# Patient Record
Sex: FEMALE | Race: WHITE | NOT HISPANIC OR LATINO | Employment: OTHER | URBAN - NONMETROPOLITAN AREA
[De-identification: names, ages, dates, MRNs, and addresses within clinical notes are randomized per-mention and may not be internally consistent; named-entity substitution may affect disease eponyms.]

---

## 2017-02-06 DIAGNOSIS — E03.8 HYPOTHYROIDISM DUE TO HASHIMOTO'S THYROIDITIS: ICD-10-CM

## 2017-02-06 DIAGNOSIS — E55.9 VITAMIN D DEFICIENCY: ICD-10-CM

## 2017-02-06 DIAGNOSIS — E53.8 B12 DEFICIENCY: ICD-10-CM

## 2017-02-06 DIAGNOSIS — E06.3 HYPOTHYROIDISM DUE TO HASHIMOTO'S THYROIDITIS: ICD-10-CM

## 2017-02-06 DIAGNOSIS — M85.80 OSTEOPENIA: Primary | ICD-10-CM

## 2017-02-07 ENCOUNTER — APPOINTMENT (OUTPATIENT)
Dept: LAB | Facility: HOSPITAL | Age: 72
End: 2017-02-07

## 2017-02-07 LAB
25(OH)D3 SERPL-MCNC: 31.9 NG/ML (ref 30–100)
ALBUMIN SERPL-MCNC: 4.3 G/DL (ref 3.4–4.8)
ALBUMIN/GLOB SERPL: 1.5 G/DL (ref 1.1–1.8)
ALP SERPL-CCNC: 77 U/L (ref 38–126)
ALT SERPL W P-5'-P-CCNC: 27 U/L (ref 9–52)
ANION GAP SERPL CALCULATED.3IONS-SCNC: 11 MMOL/L (ref 5–15)
AST SERPL-CCNC: 42 U/L (ref 14–36)
BASOPHILS # BLD AUTO: 0.04 10*3/MM3 (ref 0–0.2)
BASOPHILS NFR BLD AUTO: 0.8 % (ref 0–2)
BILIRUB SERPL-MCNC: 1.1 MG/DL (ref 0.2–1.3)
BUN BLD-MCNC: 14 MG/DL (ref 7–21)
BUN/CREAT SERPL: 15.9 (ref 7–25)
CALCIUM SPEC-SCNC: 9.9 MG/DL (ref 8.4–10.2)
CHLORIDE SERPL-SCNC: 104 MMOL/L (ref 95–110)
CO2 SERPL-SCNC: 28 MMOL/L (ref 22–31)
CREAT BLD-MCNC: 0.88 MG/DL (ref 0.5–1)
DEPRECATED RDW RBC AUTO: 39.4 FL (ref 36.4–46.3)
EOSINOPHIL # BLD AUTO: 0.25 10*3/MM3 (ref 0–0.7)
EOSINOPHIL NFR BLD AUTO: 5 % (ref 0–7)
ERYTHROCYTE [DISTWIDTH] IN BLOOD BY AUTOMATED COUNT: 12.4 % (ref 11.5–14.5)
GFR SERPL CREATININE-BSD FRML MDRD: 63 ML/MIN/1.73 (ref 39–90)
GLOBULIN UR ELPH-MCNC: 2.9 GM/DL (ref 2.3–3.5)
GLUCOSE BLD-MCNC: 87 MG/DL (ref 60–100)
HCT VFR BLD AUTO: 40.8 % (ref 35–45)
HGB BLD-MCNC: 13.9 G/DL (ref 12–15.5)
IMM GRANULOCYTES # BLD: 0.01 10*3/MM3 (ref 0–0.02)
IMM GRANULOCYTES NFR BLD: 0.2 % (ref 0–0.5)
LYMPHOCYTES # BLD AUTO: 1.53 10*3/MM3 (ref 0.6–4.2)
LYMPHOCYTES NFR BLD AUTO: 30.6 % (ref 10–50)
MCH RBC QN AUTO: 30.2 PG (ref 26.5–34)
MCHC RBC AUTO-ENTMCNC: 34.1 G/DL (ref 31.4–36)
MCV RBC AUTO: 88.7 FL (ref 80–98)
MONOCYTES # BLD AUTO: 0.34 10*3/MM3 (ref 0–0.9)
MONOCYTES NFR BLD AUTO: 6.8 % (ref 0–12)
NEUTROPHILS # BLD AUTO: 2.83 10*3/MM3 (ref 2–8.6)
NEUTROPHILS NFR BLD AUTO: 56.6 % (ref 37–80)
PLATELET # BLD AUTO: 273 10*3/MM3 (ref 150–450)
PMV BLD AUTO: 10.5 FL (ref 8–12)
POTASSIUM BLD-SCNC: 4.6 MMOL/L (ref 3.5–5.1)
PROT SERPL-MCNC: 7.2 G/DL (ref 6.3–8.6)
RBC # BLD AUTO: 4.6 10*6/MM3 (ref 3.77–5.16)
SODIUM BLD-SCNC: 143 MMOL/L (ref 137–145)
T4 FREE SERPL-MCNC: 0.67 NG/DL (ref 0.78–2.19)
TSH SERPL DL<=0.05 MIU/L-ACNC: 1.22 MIU/ML (ref 0.46–4.68)
VIT B12 BLD-MCNC: 346 PG/ML (ref 239–931)
WBC NRBC COR # BLD: 5 10*3/MM3 (ref 3.2–9.8)

## 2017-02-07 PROCEDURE — 84443 ASSAY THYROID STIM HORMONE: CPT | Performed by: INTERNAL MEDICINE

## 2017-02-07 PROCEDURE — 85025 COMPLETE CBC W/AUTO DIFF WBC: CPT | Performed by: INTERNAL MEDICINE

## 2017-02-07 PROCEDURE — 82306 VITAMIN D 25 HYDROXY: CPT | Performed by: INTERNAL MEDICINE

## 2017-02-07 PROCEDURE — 36415 COLL VENOUS BLD VENIPUNCTURE: CPT | Performed by: INTERNAL MEDICINE

## 2017-02-07 PROCEDURE — 84439 ASSAY OF FREE THYROXINE: CPT | Performed by: INTERNAL MEDICINE

## 2017-02-07 PROCEDURE — 80053 COMPREHEN METABOLIC PANEL: CPT | Performed by: INTERNAL MEDICINE

## 2017-02-07 PROCEDURE — 82607 VITAMIN B-12: CPT | Performed by: INTERNAL MEDICINE

## 2017-02-13 RX ORDER — DICYCLOMINE HYDROCHLORIDE 10 MG/1
10 CAPSULE ORAL 2 TIMES DAILY PRN
COMMUNITY
End: 2017-08-01

## 2017-02-17 ENCOUNTER — ANESTHESIA (OUTPATIENT)
Dept: GASTROENTEROLOGY | Facility: HOSPITAL | Age: 72
End: 2017-02-17

## 2017-02-17 ENCOUNTER — ANESTHESIA EVENT (OUTPATIENT)
Dept: GASTROENTEROLOGY | Facility: HOSPITAL | Age: 72
End: 2017-02-17

## 2017-02-17 ENCOUNTER — HOSPITAL ENCOUNTER (OUTPATIENT)
Facility: HOSPITAL | Age: 72
Setting detail: HOSPITAL OUTPATIENT SURGERY
Discharge: HOME | End: 2017-02-17
Attending: INTERNAL MEDICINE | Admitting: INTERNAL MEDICINE

## 2017-02-17 VITALS
DIASTOLIC BLOOD PRESSURE: 89 MMHG | RESPIRATION RATE: 18 BRPM | TEMPERATURE: 97.3 F | HEIGHT: 68 IN | BODY MASS INDEX: 29.54 KG/M2 | OXYGEN SATURATION: 95 % | HEART RATE: 79 BPM | SYSTOLIC BLOOD PRESSURE: 141 MMHG | WEIGHT: 194.89 LBS

## 2017-02-17 DIAGNOSIS — K21.9 ACID REFLUX DISEASE WITH ULCER: ICD-10-CM

## 2017-02-17 PROCEDURE — 88305 TISSUE EXAM BY PATHOLOGIST: CPT | Performed by: INTERNAL MEDICINE

## 2017-02-17 PROCEDURE — 88305 TISSUE EXAM BY PATHOLOGIST: CPT | Performed by: PATHOLOGY

## 2017-02-17 PROCEDURE — 25010000002 PROPOFOL 10 MG/ML EMULSION: Performed by: NURSE ANESTHETIST, CERTIFIED REGISTERED

## 2017-02-17 PROCEDURE — 87071 CULTURE AEROBIC QUANT OTHER: CPT | Performed by: INTERNAL MEDICINE

## 2017-02-17 PROCEDURE — 88342 IMHCHEM/IMCYTCHM 1ST ANTB: CPT | Performed by: PATHOLOGY

## 2017-02-17 PROCEDURE — 88342 IMHCHEM/IMCYTCHM 1ST ANTB: CPT | Performed by: INTERNAL MEDICINE

## 2017-02-17 RX ORDER — PROPOFOL 10 MG/ML
VIAL (ML) INTRAVENOUS AS NEEDED
Status: DISCONTINUED | OUTPATIENT
Start: 2017-02-17 | End: 2017-02-17 | Stop reason: SURG

## 2017-02-17 RX ORDER — DEXTROSE AND SODIUM CHLORIDE 5; .45 G/100ML; G/100ML
20 INJECTION, SOLUTION INTRAVENOUS CONTINUOUS
Status: DISCONTINUED | OUTPATIENT
Start: 2017-02-17 | End: 2017-02-17 | Stop reason: HOSPADM

## 2017-02-17 RX ORDER — LIDOCAINE HYDROCHLORIDE 10 MG/ML
INJECTION, SOLUTION INFILTRATION; PERINEURAL AS NEEDED
Status: DISCONTINUED | OUTPATIENT
Start: 2017-02-17 | End: 2017-02-17 | Stop reason: SURG

## 2017-02-17 RX ADMIN — PROPOFOL 70 MG: 10 INJECTION, EMULSION INTRAVENOUS at 16:51

## 2017-02-17 RX ADMIN — LIDOCAINE HYDROCHLORIDE 60 MG: 10 INJECTION, SOLUTION INFILTRATION; PERINEURAL at 16:51

## 2017-02-17 RX ADMIN — DEXTROSE AND SODIUM CHLORIDE 20 ML/HR: 5; 450 INJECTION, SOLUTION INTRAVENOUS at 15:42

## 2017-02-17 RX ADMIN — PROPOFOL 40 MG: 10 INJECTION, EMULSION INTRAVENOUS at 16:55

## 2017-02-17 NOTE — ANESTHESIA POSTPROCEDURE EVALUATION
Patient: Carla Best    Procedure Summary     Date Anesthesia Start Anesthesia Stop Room / Location    02/17/17 9380 [id number] [facility] ENDOSCOPY 2 / [facility] ENDOSCOPY       Procedure Diagnosis Surgeon Provider    ESOPHAGOGASTRODUODENOSCOPY (N/A Esophagus) Acid reflux disease with ulcer  (ACID REFLUX) DO Ashok Alvarez CRNA          Anesthesia Type: MAC  Last vitals  BP      Temp      Pulse     Resp      SpO2        Post Anesthesia Care and Evaluation    Patient location during evaluation: bedside  Patient participation: complete - patient participated  Level of consciousness: awake and awake and alert  Pain score: 1  Pain management: satisfactory to patient  Airway patency: patent  Anesthetic complications: No anesthetic complications  PONV Status: none  Cardiovascular status: acceptable and stable  Respiratory status: acceptable, room air, unassisted and spontaneous ventilation  Hydration status: acceptable  Post Neuraxial Block status: Motor and sensory function returned to baseline

## 2017-02-17 NOTE — PLAN OF CARE
Problem: Patient Care Overview (Adult)  Goal: Plan of Care Review    02/17/17 1700   Coping/Psychosocial Response Interventions   Plan Of Care Reviewed With patient   Patient Care Overview   Progress no change   Outcome Evaluation   Outcome Summary/Follow up Plan vss         Problem: GI Endoscopy (Adult)  Goal: Signs and Symptoms of Listed Potential Problems Will be Absent or Manageable (GI Endoscopy)  Outcome: Ongoing (interventions implemented as appropriate)    02/17/17 1700   GI Endoscopy   Problems Assessed (GI Endoscopy) all   Problems Present (GI Endoscopy) none

## 2017-02-17 NOTE — ANESTHESIA PREPROCEDURE EVALUATION
Anesthesia Evaluation     Patient summary reviewed and Nursing notes reviewed   NPO Status: > 6 hours   Airway   Mallampati: III  TM distance: >3 FB  Neck ROM: full  no difficulty expected  Dental - normal exam     Pulmonary - negative pulmonary ROS and normal exam   Cardiovascular - negative cardio ROS    Rhythm: regular  Rate: normal        Neuro/Psych- negative ROS  GI/Hepatic/Renal/Endo      Musculoskeletal (-) negative ROS    Abdominal    Substance History      OB/GYN          Other                                    Anesthesia Plan    ASA 3     MAC     intravenous induction   Anesthetic plan and risks discussed with patient.

## 2017-02-17 NOTE — DISCHARGE INSTR - APPOINTMENTS
Dr. Cisco Mason, DO  [address], [address]  [place], [place] [address] [phone]    Appointment date and time: pt will call, office closed

## 2017-02-17 NOTE — H&P
Sydnee Schwartz DO,Psychiatric  Gastroenterology  Hepatology  Endoscopy  Board Certified in Internal Medicine and gastroenterology  [address], [address]  [place]. [address]  - [phone]   F - [phone]     GASTROENTEROLOGY HISTORY AND PHYSICAL  NOTE   SYDNEE SCHWARTZ DO.         SUBJECTIVE:   2/17/2017    Name: Carla Best  DOD: 1945      Chief Complaint:       Subjective : Progressive problems with acid reflux.    Patient is 71 y.o. female presents with desire is for elective EGD for evaluation of mucosal damage secondary to chronic recurrent gastric reflux.  No dysphagia..      ROS/HISTORY/ CURRENT MEDICATIONS/OBJECTIVE/VS/PE:   Review of Systems:   Review of Systems    History:     Past Medical History   Diagnosis Date   • Chest pain    • Fibrocystic disease of breast    • Hashimoto's thyroiditis    • History of screening mammography 08/27/2014     SCREENING MAMMOGRAPHY DIGITAL  (Medicare) (1)    • Hyperlipidemia    • Hypothyroidism due to Hashimoto's thyroiditis 12/3/2016   • Keratoconjunctivitis sicca    • Nuclear senile cataract    • On long term drug therapy    • Osteoarthritis    • Osteopenia    • Osteopenia 12/3/2016   • Photopsia      Right   • Postmenopausal bleeding    • Sinus tachycardia    • Sjogren's syndrome    • Vitamin D deficiency    • Vitamin D deficiency 12/3/2016   • Vitreous detachment of right eye      Past Surgical History   Procedure Laterality Date   • Gallbladder surgery     • Cholecystectomy  06/09/1997     with operative cholangiogram. Chronic cholecystitis & cholelithiasis. HX of passed common duct stone   • Endoscopy and colonoscopy  09/30/1985     Normal colon to left transverse colon   • Cystoscopy  11/26/1962     urethral dilatation.Recurrent pyelonephritis. urethritis   • Pap smear  2009   • Tonsillectomy  1950   • Vaginal delivery       x3     Family History   Problem Relation Age of Onset   • Heart disease Mother    • Arthritis Mother    •  Osteoporosis Mother    • Heart disease Father    • Hypertension Sister    • Arthritis Sister    • Diabetes Sister    • Breast cancer Paternal Grandmother    • Diabetes Other    • Heart disease Other    • Hypertension Other    • Stroke Other    • Thyroid disease Other    • Lung disease Other    • Other Other      Gallstones, Bleeding Tendency, Colon Problems.     Social History   Substance Use Topics   • Smoking status: Never Smoker   • Smokeless tobacco: None   • Alcohol use Yes      Comment: less than 12 drinks a year     Prescriptions Prior to Admission   Medication Sig Dispense Refill Last Dose   • Biotin (BIOTIN MAXIMUM STRENGTH) 5 MG capsule Take  by mouth.   2/16/2017 at Unknown time   • dicyclomine (BENTYL) 10 MG capsule Take 10 mg by mouth 2 (Two) Times a Day As Needed.   2/16/2017 at Unknown time   • hydroxychloroquine (PLAQUENIL) 200 MG tablet Take  by mouth 2 (Two) Times a Day.   2/16/2017 at Unknown time   • Iron-Vitamins (GERITOL PO) Take 5 mL by mouth 2 (Two) Times a Day.   2/16/2017 at Unknown time   • meclizine (ANTIVERT) 12.5 MG tablet Take 12.5 mg by mouth 3 (Three) Times a Day As Needed for dizziness.   2/16/2017 at Unknown time   • metoprolol tartrate (LOPRESSOR) 25 MG tablet TAKE 1 TABLET BY MOUTH TWICE DAILY. 60 tablet 3 2/17/2017 at Unknown time   • Probiotic Product (PROBIOTIC DAILY PO) Take  by mouth.   2/16/2017 at Unknown time   • Thyroid 30 MG PO tablet 2 in a.m. And 2 in p.m. 120 tablet 11 2/17/2017 at Unknown time   • triamcinolone (KENALOG) 0.1 % cream Apply  topically 2 (two) times a day.   2/16/2017 at Unknown time   • vitamin D (ERGOCALCIFEROL) 51743 UNITS capsule capsule Take 50,000 Units by mouth 1 (one) time per week.   2/16/2017 at Unknown time   • aspirin 81 MG EC tablet Take 81 mg by mouth daily.   2/13/2017   • Omega-3 Fatty Acids (FISH OIL) 1000 MG capsule capsule Take 2,000 mg by mouth Daily With Breakfast.   2/13/2017     Allergies:  Augmentin [amoxicillin-pot  clavulanate]; Ciprofloxacin; Codeine; Demerol [meperidine]; Dexlansoprazole; Phenazopyridine; and Tape    I have reviewed the patients medical history, surgical history and family history in the available medical record system.     Current Medications:     Current Facility-Administered Medications   Medication Dose Route Frequency Provider Last Rate Last Dose   • dextrose 5 % and sodium chloride 0.45 % infusion  20 mL/hr Intravenous Continuous Cisco Mason, DO 20 mL/hr at 02/17/17 1542 20 mL/hr at 02/17/17 1542       Objective     Physical Exam:   Temp:  [97.7 °F (36.5 °C)] 97.7 °F (36.5 °C)  Heart Rate:  [82] 82  Resp:  [18] 18  BP: (144)/(68) 144/68    Physical Exam:  General Appearance:    Alert, cooperative, in no acute distress   Head:    Normocephalic, without obvious abnormality, atraumatic   Eyes:            Lids and lashes normal, conjunctivae and sclerae normal, no   icterus, no pallor, corneas clear, PERRLA   Ears:    Ears appear intact with no abnormalities noted   Throat:   No oral lesions, no thrush, oral mucosa moist   Neck:   No adenopathy, supple, trachea midline, no thyromegaly, no     carotid bruit, no JVD   Back:     No kyphosis present, no scoliosis present, no skin lesions,       erythema or scars, no tenderness to percussion or                   palpation,   range of motion normal   Lungs:     Clear to auscultation,respirations regular, even and                   unlabored    Heart:    Regular rhythm and normal rate, normal S1 and S2, no            murmur, no gallop, no rub, no click   Breast Exam:    Deferred   Abdomen:     Normal bowel sounds, no masses, no organomegaly, soft        non-tender, non-distended, no guarding, no rebound                 tenderness   Genitalia:    Deferred   Extremities:   Moves all extremities well, no edema, no cyanosis, no              redness   Pulses:   Pulses palpable and equal bilaterally   Skin:   No bleeding, bruising or rash   Lymph nodes:   No  palpable adenopathy   Neurologic:   Cranial nerves 2 - 12 grossly intact, sensation intact, DTR        present and equal bilaterally      Results Review:     Lab Results   Component Value Date    WBC 5.00 02/07/2017    WBC 5.0 12/07/2016    WBC 5.1 11/23/2016    HGB 13.9 02/07/2017    HGB 13.1 12/07/2016    HGB 13.6 11/23/2016    HCT 40.8 02/07/2017    HCT 39.1 12/07/2016    HCT 40.1 11/23/2016     02/07/2017     12/07/2016     11/23/2016             No results found for: LIPASE  No results found for: INR       Radiology Review:  Imaging Results (last 72 hours)     ** No results found for the last 72 hours. **           I reviewed the patient's new clinical results.  I reviewed the patient's new imaging results and agree with the interpretation.     ASSESSMENT/PLAN:   ASSESSMENT:   1.  Gastroesophageal reflux exclude the possibility of erosive esophagitis or Madison's esophagus    PLAN:   1.  EGD with biopsies    Risk and benefits associated with the procedure are reviewed with the patient.  She wishes to proceed      Cisco Mason DO  02/17/17  4:29 PM

## 2017-02-18 LAB
BACTERIA SPEC AEROBE CULT: ABNORMAL

## 2017-02-23 LAB
LAB AP CASE REPORT: NORMAL
LAB AP DIAGNOSIS COMMENT: NORMAL
Lab: NORMAL
PATH REPORT.FINAL DX SPEC: NORMAL
PATH REPORT.GROSS SPEC: NORMAL

## 2017-03-20 ENCOUNTER — OFFICE VISIT (OUTPATIENT)
Dept: CARDIOLOGY | Facility: CLINIC | Age: 72
End: 2017-03-20

## 2017-03-20 VITALS
WEIGHT: 193 LBS | DIASTOLIC BLOOD PRESSURE: 83 MMHG | BODY MASS INDEX: 30.29 KG/M2 | OXYGEN SATURATION: 94 % | HEART RATE: 78 BPM | HEIGHT: 67 IN | SYSTOLIC BLOOD PRESSURE: 132 MMHG

## 2017-03-20 DIAGNOSIS — E78.5 HYPERLIPIDEMIA, UNSPECIFIED HYPERLIPIDEMIA TYPE: Primary | ICD-10-CM

## 2017-03-20 DIAGNOSIS — R00.2 PALPITATIONS: ICD-10-CM

## 2017-03-20 PROCEDURE — 99213 OFFICE O/P EST LOW 20 MIN: CPT | Performed by: INTERNAL MEDICINE

## 2017-03-20 RX ORDER — OMEPRAZOLE 20 MG/1
CAPSULE, DELAYED RELEASE ORAL
Refills: 0 | COMMUNITY
Start: 2017-03-02 | End: 2017-04-03

## 2017-03-20 NOTE — PROGRESS NOTES
Chief complaint : Palpitation    History of Present Illness very pleasant 71-year-old female who comes today for follow-up visit.  She has no chest pain or chest discomfort.  She denies shortness of breath orthopnea or PND.  Patient has no more palpitations.    Subjective      Review of Systems   Constitution: Negative. Negative for decreased appetite, diaphoresis, weakness, night sweats, weight gain and weight loss.   HENT: Negative for headaches, hearing loss, nosebleeds and sore throat.    Eyes: Negative.  Negative for blurred vision and photophobia.   Cardiovascular: Negative for chest pain, claudication, dyspnea on exertion, irregular heartbeat, leg swelling, palpitations, paroxysmal nocturnal dyspnea and syncope.   Respiratory: Negative for cough, hemoptysis, shortness of breath and wheezing.    Endocrine: Negative for cold intolerance, heat intolerance, polydipsia, polyphagia and polyuria.   Hematologic/Lymphatic: Negative.    Skin: Negative for color change, dry skin, flushing, itching and rash.   Musculoskeletal: Negative.  Negative for muscle cramps, muscle weakness and myalgias.   Gastrointestinal: Negative for abdominal pain, change in bowel habit, diarrhea, hematemesis, melena, nausea and vomiting.   Genitourinary: Negative for dysuria, frequency and hematuria.   Neurological: Negative for dizziness, focal weakness, light-headedness, loss of balance, numbness and seizures.   Psychiatric/Behavioral: Negative.  Negative for substance abuse, suicidal ideas and thoughts of violence.   Allergic/Immunologic: Negative.        Past Medical History   Diagnosis Date   • Chest pain    • Fibrocystic disease of breast    • Hashimoto's thyroiditis    • History of screening mammography 08/27/2014     SCREENING MAMMOGRAPHY DIGITAL  (Medicare) (1)    • Hyperlipidemia    • Hypothyroidism due to Hashimoto's thyroiditis 12/3/2016   • Keratoconjunctivitis sicca    • Nuclear senile cataract    • On long term drug  therapy    • Osteoarthritis    • Osteopenia    • Osteopenia 12/3/2016   • Photopsia      Right   • Postmenopausal bleeding    • Sinus tachycardia    • Sjogren's syndrome    • Vitamin D deficiency    • Vitamin D deficiency 12/3/2016   • Vitreous detachment of right eye        Family History   Problem Relation Age of Onset   • Heart disease Mother    • Arthritis Mother    • Osteoporosis Mother    • Heart disease Father    • Hypertension Sister    • Arthritis Sister    • Diabetes Sister    • Breast cancer Paternal Grandmother    • Diabetes Other    • Heart disease Other    • Hypertension Other    • Stroke Other    • Thyroid disease Other    • Lung disease Other    • Other Other      Gallstones, Bleeding Tendency, Colon Problems.       Augmentin [amoxicillin-pot clavulanate]; Ciprofloxacin; Codeine; Demerol [meperidine]; Dexlansoprazole; Phenazopyridine; and Tape     reports that she has never smoked. She does not have any smokeless tobacco history on file. She reports that she drinks alcohol. She reports that she does not use illicit drugs.    Objective     Vital Signs  Heart Rate:  [78] 78  BP: (132)/(83) 132/83    Physical Exam   Constitutional: She is oriented to person, place, and time. She appears well-developed and well-nourished.   HENT:   Head: Normocephalic and atraumatic.   Eyes: Conjunctivae and EOM are normal. Pupils are equal, round, and reactive to light.   Neck: Neck supple. No JVD present. Carotid bruit is not present. No tracheal deviation and no edema present.   Cardiovascular: Normal rate, regular rhythm, S1 normal, S2 normal, normal heart sounds and intact distal pulses.  Exam reveals no gallop, no S3, no S4 and no friction rub.    No murmur heard.  Pulmonary/Chest: Effort normal and breath sounds normal. She has no wheezes. She has no rales. She exhibits no tenderness.   Abdominal: Bowel sounds are normal. She exhibits no abdominal bruit and no pulsatile midline mass. There is no rebound and no  guarding.   Musculoskeletal: Normal range of motion. She exhibits no edema.   Neurological: She is alert and oriented to person, place, and time.   Skin: Skin is warm and dry.   Psychiatric: She has a normal mood and affect.       Procedures    Assessment/Plan     Patient Active Problem List   Diagnosis   • Osteopenia   • Hypothyroidism due to Hashimoto's thyroiditis   • Vitamin D deficiency   • Keratoconjunctivitis sicca   • Long term use of drug   • Cataract   • Primary osteoarthritis of right knee     1. Hyperlipidemia, unspecified hyperlipidemia type  Component      Latest Ref Rng 11/6/2014 9/9/2015 7/12/2016   HDL-C      60 - 200 mg/dl 53 (L) 45 (L) 52 (L)   LP(a) Cholesterol      0 - 10 mg/dl 0 0 4   LDLCALC ELECT      0 - 129 mg/dl 111 110 107   Chylomicron      Not Present Not Present Not Present Not Present   LDL/HDL Ratio      0.00 - 3.22 2.09 2.44 2.06   Total Cholesterol      0 - 199 mg/dl 199 197 195   Triglycerides      20 - 199 mg/dl 197 210 (H) 165       Component      Latest Ref Rng 1/20/2014 3/13/2014 6/18/2014 1/30/2015   TSH Baseline      0.460 - 4.680 mIU/mL 2.48 2.37 1.41 3.61     Component      Latest Ref Rng 3/16/2015 6/9/2015 1/26/2016 11/23/2016   TSH Baseline      0.460 - 4.680 mIU/mL 0.15 (L) 0.99 0.94 0.69     Component      Latest Ref Rng 2/7/2017   TSH Baseline      0.460 - 4.680 mIU/mL 1.220     Continue with current management.  2. Palpitations  We will continue with current management.    I discussed the patients findings and my recommendations with patient.    Vikas Cleveland MD  03/20/17  3:24 PM    EMR Dragon/Transcription disclaimer:   Much of this encounter note is an electronic transcription/translation of spoken language to printed text. The electronic translation of spoken language may permit erroneous, or at times, nonsensical words or phrases to be inadvertently transcribed; Although I have reviewed the note for such errors, some may still exist.

## 2017-03-28 ENCOUNTER — APPOINTMENT (OUTPATIENT)
Dept: LAB | Facility: HOSPITAL | Age: 72
End: 2017-03-28

## 2017-03-28 PROCEDURE — 84443 ASSAY THYROID STIM HORMONE: CPT | Performed by: INTERNAL MEDICINE

## 2017-03-28 PROCEDURE — 82607 VITAMIN B-12: CPT | Performed by: INTERNAL MEDICINE

## 2017-03-28 PROCEDURE — 84439 ASSAY OF FREE THYROXINE: CPT | Performed by: INTERNAL MEDICINE

## 2017-03-28 PROCEDURE — 84480 ASSAY TRIIODOTHYRONINE (T3): CPT | Performed by: INTERNAL MEDICINE

## 2017-03-28 PROCEDURE — 82306 VITAMIN D 25 HYDROXY: CPT | Performed by: INTERNAL MEDICINE

## 2017-03-28 PROCEDURE — 36415 COLL VENOUS BLD VENIPUNCTURE: CPT | Performed by: INTERNAL MEDICINE

## 2017-03-28 PROCEDURE — 80053 COMPREHEN METABOLIC PANEL: CPT | Performed by: INTERNAL MEDICINE

## 2017-03-28 PROCEDURE — 85025 COMPLETE CBC W/AUTO DIFF WBC: CPT | Performed by: INTERNAL MEDICINE

## 2017-04-03 ENCOUNTER — OFFICE VISIT (OUTPATIENT)
Dept: ENDOCRINOLOGY | Facility: CLINIC | Age: 72
End: 2017-04-03

## 2017-04-03 VITALS
DIASTOLIC BLOOD PRESSURE: 84 MMHG | HEART RATE: 92 BPM | WEIGHT: 193.5 LBS | SYSTOLIC BLOOD PRESSURE: 134 MMHG | BODY MASS INDEX: 30.37 KG/M2 | HEIGHT: 67 IN

## 2017-04-03 DIAGNOSIS — E55.9 VITAMIN D DEFICIENCY: ICD-10-CM

## 2017-04-03 DIAGNOSIS — E06.3 HYPOTHYROIDISM DUE TO HASHIMOTO'S THYROIDITIS: ICD-10-CM

## 2017-04-03 DIAGNOSIS — E03.8 HYPOTHYROIDISM DUE TO HASHIMOTO'S THYROIDITIS: ICD-10-CM

## 2017-04-03 DIAGNOSIS — E53.8 B12 DEFICIENCY: ICD-10-CM

## 2017-04-03 DIAGNOSIS — M85.80 OSTEOPENIA: Primary | ICD-10-CM

## 2017-04-03 PROCEDURE — 99214 OFFICE O/P EST MOD 30 MIN: CPT | Performed by: INTERNAL MEDICINE

## 2017-04-03 RX ORDER — ERGOCALCIFEROL 1.25 MG/1
CAPSULE ORAL
Qty: 6 CAPSULE | Refills: 0 | Status: SHIPPED | OUTPATIENT
Start: 2017-04-03 | End: 2017-06-27 | Stop reason: SDUPTHER

## 2017-04-05 RX ORDER — LEVOTHYROXINE SODIUM 88 MCG
88 TABLET ORAL DAILY
Qty: 30 TABLET | Refills: 11 | Status: SHIPPED | OUTPATIENT
Start: 2017-04-05 | End: 2017-08-01 | Stop reason: CLARIF

## 2017-04-06 ENCOUNTER — TRANSCRIBE ORDERS (OUTPATIENT)
Dept: LAB | Facility: HOSPITAL | Age: 72
End: 2017-04-06

## 2017-04-06 ENCOUNTER — APPOINTMENT (OUTPATIENT)
Dept: LAB | Facility: HOSPITAL | Age: 72
End: 2017-04-06

## 2017-04-06 DIAGNOSIS — Z79.899 ENCOUNTER FOR LONG-TERM (CURRENT) USE OF MEDICATIONS: Primary | ICD-10-CM

## 2017-04-06 DIAGNOSIS — M32.9 LUPUS (HCC): ICD-10-CM

## 2017-04-06 DIAGNOSIS — L67.8 OTHER SPECIFIED DISEASE OF HAIR AND HAIR FOLLICLES: ICD-10-CM

## 2017-04-06 DIAGNOSIS — L40.0 PSORIASIS VULGARIS: ICD-10-CM

## 2017-04-06 DIAGNOSIS — T50.0X5A: ICD-10-CM

## 2017-04-06 DIAGNOSIS — R79.0 DECREASED FERRITIN: ICD-10-CM

## 2017-04-06 DIAGNOSIS — L73.8 OTHER SPECIFIED DISEASE OF HAIR AND HAIR FOLLICLES: ICD-10-CM

## 2017-04-06 LAB
ALBUMIN SERPL-MCNC: 4.7 G/DL (ref 3.4–4.8)
ALBUMIN/GLOB SERPL: 1.6 G/DL (ref 1.1–1.8)
ALP SERPL-CCNC: 77 U/L (ref 38–126)
ALT SERPL W P-5'-P-CCNC: 36 U/L (ref 9–52)
ANION GAP SERPL CALCULATED.3IONS-SCNC: 13 MMOL/L (ref 5–15)
AST SERPL-CCNC: 33 U/L (ref 14–36)
BASOPHILS # BLD AUTO: 0.03 10*3/MM3 (ref 0–0.2)
BASOPHILS NFR BLD AUTO: 0.4 % (ref 0–2)
BILIRUB SERPL-MCNC: 0.7 MG/DL (ref 0.2–1.3)
BUN BLD-MCNC: 18 MG/DL (ref 7–21)
BUN/CREAT SERPL: 19.8 (ref 7–25)
CALCIUM SPEC-SCNC: 9.6 MG/DL (ref 8.4–10.2)
CHLORIDE SERPL-SCNC: 98 MMOL/L (ref 95–110)
CO2 SERPL-SCNC: 27 MMOL/L (ref 22–31)
CREAT BLD-MCNC: 0.91 MG/DL (ref 0.5–1)
DEPRECATED RDW RBC AUTO: 40.1 FL (ref 36.4–46.3)
EOSINOPHIL # BLD AUTO: 0.19 10*3/MM3 (ref 0–0.7)
EOSINOPHIL NFR BLD AUTO: 2.6 % (ref 0–7)
ERYTHROCYTE [DISTWIDTH] IN BLOOD BY AUTOMATED COUNT: 12.4 % (ref 11.5–14.5)
FERRITIN SERPL-MCNC: 96.5 NG/ML (ref 11.1–264)
GFR SERPL CREATININE-BSD FRML MDRD: 61 ML/MIN/1.73 (ref 39–90)
GLOBULIN UR ELPH-MCNC: 2.9 GM/DL (ref 2.3–3.5)
GLUCOSE BLD-MCNC: 95 MG/DL (ref 60–100)
HCT VFR BLD AUTO: 39.8 % (ref 35–45)
HGB BLD-MCNC: 13.7 G/DL (ref 12–15.5)
IMM GRANULOCYTES # BLD: 0.01 10*3/MM3 (ref 0–0.02)
IMM GRANULOCYTES NFR BLD: 0.1 % (ref 0–0.5)
LYMPHOCYTES # BLD AUTO: 1.66 10*3/MM3 (ref 0.6–4.2)
LYMPHOCYTES NFR BLD AUTO: 23.1 % (ref 10–50)
MCH RBC QN AUTO: 30.2 PG (ref 26.5–34)
MCHC RBC AUTO-ENTMCNC: 34.4 G/DL (ref 31.4–36)
MCV RBC AUTO: 87.9 FL (ref 80–98)
MONOCYTES # BLD AUTO: 0.67 10*3/MM3 (ref 0–0.9)
MONOCYTES NFR BLD AUTO: 9.3 % (ref 0–12)
NEUTROPHILS # BLD AUTO: 4.62 10*3/MM3 (ref 2–8.6)
NEUTROPHILS NFR BLD AUTO: 64.5 % (ref 37–80)
PLATELET # BLD AUTO: 276 10*3/MM3 (ref 150–450)
PMV BLD AUTO: 9.8 FL (ref 8–12)
POTASSIUM BLD-SCNC: 4.7 MMOL/L (ref 3.5–5.1)
PROT SERPL-MCNC: 7.6 G/DL (ref 6.3–8.6)
RBC # BLD AUTO: 4.53 10*6/MM3 (ref 3.77–5.16)
SODIUM BLD-SCNC: 138 MMOL/L (ref 137–145)
WBC NRBC COR # BLD: 7.18 10*3/MM3 (ref 3.2–9.8)

## 2017-04-06 PROCEDURE — 80053 COMPREHEN METABOLIC PANEL: CPT | Performed by: NURSE PRACTITIONER

## 2017-04-06 PROCEDURE — 86038 ANTINUCLEAR ANTIBODIES: CPT | Performed by: NURSE PRACTITIONER

## 2017-04-06 PROCEDURE — 82728 ASSAY OF FERRITIN: CPT | Performed by: NURSE PRACTITIONER

## 2017-04-06 PROCEDURE — 36415 COLL VENOUS BLD VENIPUNCTURE: CPT | Performed by: NURSE PRACTITIONER

## 2017-04-06 PROCEDURE — 85025 COMPLETE CBC W/AUTO DIFF WBC: CPT | Performed by: NURSE PRACTITIONER

## 2017-04-07 LAB — ANA SER QL IA: NEGATIVE

## 2017-06-27 RX ORDER — ERGOCALCIFEROL 1.25 MG/1
CAPSULE ORAL
Qty: 6 CAPSULE | Refills: 0 | Status: SHIPPED | OUTPATIENT
Start: 2017-06-27 | End: 2017-09-22 | Stop reason: SDUPTHER

## 2017-06-29 ENCOUNTER — OFFICE VISIT (OUTPATIENT)
Dept: OPHTHALMOLOGY | Facility: CLINIC | Age: 72
End: 2017-06-29

## 2017-06-29 DIAGNOSIS — H26.9 CATARACT: ICD-10-CM

## 2017-06-29 DIAGNOSIS — Z79.899 LONG TERM USE OF DRUG: Primary | ICD-10-CM

## 2017-06-29 DIAGNOSIS — H52.4 PRESBYOPIA: ICD-10-CM

## 2017-06-29 DIAGNOSIS — H43.813 VITREOUS DETACHMENT, BILATERAL: ICD-10-CM

## 2017-06-29 PROCEDURE — 99213 OFFICE O/P EST LOW 20 MIN: CPT | Performed by: OPHTHALMOLOGY

## 2017-06-29 PROCEDURE — 92134 CPTRZ OPH DX IMG PST SGM RTA: CPT | Performed by: OPHTHALMOLOGY

## 2017-06-29 NOTE — PROGRESS NOTES
Subjective   Carla Best is a 71 y.o. female.   Chief Complaint   Patient presents with   • Long Term use of a High Risk Medication     Patient is on Plaquenil for approximately 20 years  Takes 200mg po BID for Sjogren's syndrome      HPI     Long Term use of a High Risk Medication    Additional comments: Patient is on Plaquenil for approximately 20 years  Takes 200mg po BID for Sjogren's syndrome        Last edited by Barbara Elder MD on 6/29/2017  3:01 PM. (History)          Review of Systems   Constitutional: Negative for chills and fever.   Respiratory: Negative for shortness of breath.    Cardiovascular: Negative for chest pain.   Gastrointestinal: Positive for abdominal pain.   Genitourinary: Negative for dysuria.   Musculoskeletal: Positive for myalgias.   Psychiatric/Behavioral: Negative for confusion.     The following portions of the patient’s history were reviewed and updated as appropriate: current medications, allergies, past medical and surgical history and social history.     Objective   Base Eye Exam     Visual Acuity (Snellen - Linear)      Right Left   Dist cc 20/40 -2 20/40   Near cc J2 J3       Correction:  Glasses      Pupils      Pupils   Right PERRL   Left PERRL         Neuro/Psych     Oriented x3:  Yes    Mood/Affect:  Normal      Dilation     Both eyes:  1.0% Mydriacyl, 2.5% Ervin Synephrine @ 2:20 PM            Slit Lamp and Fundus Exam     External Exam      Right Left    External Normal Normal      Slit Lamp Exam      Right Left    Lids/Lashes Normal Normal    Conjunctiva/Sclera White and quiet White and quiet    Cornea Clear Clear    Anterior Chamber Deep and quiet Deep and quiet    Iris Round and reactive Round and reactive    Lens 1-2+ Nuclear sclerosis 1-2+ Nuclear sclerosis    Vitreous Posterior vitreous detachment Posterior vitreous detachment      Fundus Exam      Right Left    Disc Normal Normal    Macula Normal Normal    Vessels Normal Normal    Periphery Normal, no  RT or RD Normal, no RT or Rd            Refraction     Wearing Rx      Sphere Cylinder Add   Right +1.50 Sphere +1.75   Left +1.75 Sphere +1.75         Manifest Refraction      Sphere Cylinder Dist   Right +1.50 Sphere 20/40-2 (NI)   Left +1.50 Sphere 20/30                   OCT Macula:  OD- normal  OS- normal      Assessment/Plan   Diagnoses and all orders for this visit:    Long term use of drug  Comments:  Patient is on Plauquenil 200mg BID OU for about about 20 years. No changes in OCT of the macula or physical examination. Continue regular testing.     Cataract  Comments:  Minimal. Will RTC for refraction.     Vitreous detachment, bilateral  Comments:  No RT or RD. Continue to observe.     Presbyopia  Comments:  Will refract at next visit.    Plan:       Return in about 1 year (around 6/29/2018) for Refraction, Visual Field 10-2, Dilated exam.                            This document has been signed by Barbara Elder MD on June 30, 2017 1:17 PM

## 2017-07-20 ENCOUNTER — TRANSCRIBE ORDERS (OUTPATIENT)
Dept: GENERAL RADIOLOGY | Facility: CLINIC | Age: 72
End: 2017-07-20

## 2017-07-20 DIAGNOSIS — M25.551 RIGHT HIP PAIN: Primary | ICD-10-CM

## 2017-07-21 ENCOUNTER — APPOINTMENT (OUTPATIENT)
Dept: LAB | Facility: HOSPITAL | Age: 72
End: 2017-07-21

## 2017-07-21 ENCOUNTER — TRANSCRIBE ORDERS (OUTPATIENT)
Dept: LAB | Facility: HOSPITAL | Age: 72
End: 2017-07-21

## 2017-07-21 DIAGNOSIS — D64.9 ANEMIA, UNSPECIFIED TYPE: Primary | ICD-10-CM

## 2017-07-21 DIAGNOSIS — E03.9 HYPOTHYROIDISM, UNSPECIFIED TYPE: ICD-10-CM

## 2017-07-21 DIAGNOSIS — R53.83 FATIGUE, UNSPECIFIED TYPE: ICD-10-CM

## 2017-07-21 LAB
ALBUMIN SERPL-MCNC: 4 G/DL (ref 3.4–4.8)
ALBUMIN/GLOB SERPL: 1.4 G/DL (ref 1.1–1.8)
ALP SERPL-CCNC: 85 U/L (ref 38–126)
ALT SERPL W P-5'-P-CCNC: 30 U/L (ref 9–52)
ANION GAP SERPL CALCULATED.3IONS-SCNC: 9 MMOL/L (ref 5–15)
AST SERPL-CCNC: 33 U/L (ref 14–36)
BASOPHILS # BLD AUTO: 0.02 10*3/MM3 (ref 0–0.2)
BASOPHILS NFR BLD AUTO: 0.4 % (ref 0–2)
BILIRUB SERPL-MCNC: 0.7 MG/DL (ref 0.2–1.3)
BUN BLD-MCNC: 12 MG/DL (ref 7–21)
BUN/CREAT SERPL: 15.2 (ref 7–25)
CALCIUM SPEC-SCNC: 9.3 MG/DL (ref 8.4–10.2)
CHLORIDE SERPL-SCNC: 106 MMOL/L (ref 95–110)
CO2 SERPL-SCNC: 27 MMOL/L (ref 22–31)
CREAT BLD-MCNC: 0.79 MG/DL (ref 0.5–1)
DEPRECATED RDW RBC AUTO: 41.1 FL (ref 36.4–46.3)
EOSINOPHIL # BLD AUTO: 0.24 10*3/MM3 (ref 0–0.7)
EOSINOPHIL NFR BLD AUTO: 4.8 % (ref 0–7)
ERYTHROCYTE [DISTWIDTH] IN BLOOD BY AUTOMATED COUNT: 12.7 % (ref 11.5–14.5)
FERRITIN SERPL-MCNC: 106 NG/ML (ref 11.1–264)
GFR SERPL CREATININE-BSD FRML MDRD: 71 ML/MIN/1.73 (ref 39–90)
GLOBULIN UR ELPH-MCNC: 2.9 GM/DL (ref 2.3–3.5)
GLUCOSE BLD-MCNC: 87 MG/DL (ref 60–100)
HCT VFR BLD AUTO: 37.5 % (ref 35–45)
HGB BLD-MCNC: 12.7 G/DL (ref 12–15.5)
IMM GRANULOCYTES # BLD: 0 10*3/MM3 (ref 0–0.02)
IMM GRANULOCYTES NFR BLD: 0 % (ref 0–0.5)
LYMPHOCYTES # BLD AUTO: 1.12 10*3/MM3 (ref 0.6–4.2)
LYMPHOCYTES NFR BLD AUTO: 22.4 % (ref 10–50)
MCH RBC QN AUTO: 30.3 PG (ref 26.5–34)
MCHC RBC AUTO-ENTMCNC: 33.9 G/DL (ref 31.4–36)
MCV RBC AUTO: 89.5 FL (ref 80–98)
MONOCYTES # BLD AUTO: 0.38 10*3/MM3 (ref 0–0.9)
MONOCYTES NFR BLD AUTO: 7.6 % (ref 0–12)
NEUTROPHILS # BLD AUTO: 3.23 10*3/MM3 (ref 2–8.6)
NEUTROPHILS NFR BLD AUTO: 64.8 % (ref 37–80)
PLATELET # BLD AUTO: 303 10*3/MM3 (ref 150–450)
PMV BLD AUTO: 11 FL (ref 8–12)
POTASSIUM BLD-SCNC: 4.2 MMOL/L (ref 3.5–5.1)
PROT SERPL-MCNC: 6.9 G/DL (ref 6.3–8.6)
RBC # BLD AUTO: 4.19 10*6/MM3 (ref 3.77–5.16)
SODIUM BLD-SCNC: 142 MMOL/L (ref 137–145)
T4 SERPL-MCNC: 8.05 MCG/DL (ref 5.5–11)
TSH SERPL DL<=0.05 MIU/L-ACNC: 0.27 MIU/ML (ref 0.46–4.68)
WBC NRBC COR # BLD: 4.99 10*3/MM3 (ref 3.2–9.8)

## 2017-07-21 PROCEDURE — 84436 ASSAY OF TOTAL THYROXINE: CPT | Performed by: DERMATOLOGY

## 2017-07-21 PROCEDURE — 85025 COMPLETE CBC W/AUTO DIFF WBC: CPT | Performed by: DERMATOLOGY

## 2017-07-21 PROCEDURE — 86038 ANTINUCLEAR ANTIBODIES: CPT | Performed by: DERMATOLOGY

## 2017-07-21 PROCEDURE — 36415 COLL VENOUS BLD VENIPUNCTURE: CPT | Performed by: DERMATOLOGY

## 2017-07-21 PROCEDURE — 80053 COMPREHEN METABOLIC PANEL: CPT | Performed by: DERMATOLOGY

## 2017-07-21 PROCEDURE — 82728 ASSAY OF FERRITIN: CPT | Performed by: DERMATOLOGY

## 2017-07-21 PROCEDURE — 84443 ASSAY THYROID STIM HORMONE: CPT | Performed by: DERMATOLOGY

## 2017-07-21 PROCEDURE — 82306 VITAMIN D 25 HYDROXY: CPT | Performed by: DERMATOLOGY

## 2017-07-21 PROCEDURE — 82607 VITAMIN B-12: CPT | Performed by: DERMATOLOGY

## 2017-07-23 DIAGNOSIS — E53.8 B12 DEFICIENCY: ICD-10-CM

## 2017-07-23 DIAGNOSIS — E55.9 VITAMIN D DEFICIENCY: ICD-10-CM

## 2017-07-23 DIAGNOSIS — E06.3 HYPOTHYROIDISM DUE TO HASHIMOTO'S THYROIDITIS: ICD-10-CM

## 2017-07-23 DIAGNOSIS — M85.80 OSTEOPENIA: Primary | ICD-10-CM

## 2017-07-23 DIAGNOSIS — E03.8 HYPOTHYROIDISM DUE TO HASHIMOTO'S THYROIDITIS: ICD-10-CM

## 2017-07-23 LAB
25(OH)D3 SERPL-MCNC: 31.5 NG/ML (ref 30–100)
VIT B12 BLD-MCNC: 295 PG/ML (ref 239–931)

## 2017-07-24 LAB — ANA SER QL IA: NEGATIVE

## 2017-07-25 ENCOUNTER — TRANSCRIBE ORDERS (OUTPATIENT)
Dept: PHYSICAL THERAPY | Facility: HOSPITAL | Age: 72
End: 2017-07-25

## 2017-07-25 DIAGNOSIS — M25.559 HIP JOINT PAINFUL ON MOVEMENT, UNSPECIFIED LATERALITY: Primary | ICD-10-CM

## 2017-07-26 ENCOUNTER — TELEPHONE (OUTPATIENT)
Dept: ENDOCRINOLOGY | Facility: CLINIC | Age: 72
End: 2017-07-26

## 2017-07-26 DIAGNOSIS — M85.80 OSTEOPENIA: Primary | ICD-10-CM

## 2017-07-26 DIAGNOSIS — E55.9 VITAMIN D DEFICIENCY: ICD-10-CM

## 2017-07-26 DIAGNOSIS — E03.8 HYPOTHYROIDISM DUE TO HASHIMOTO'S THYROIDITIS: ICD-10-CM

## 2017-07-26 DIAGNOSIS — E06.3 HYPOTHYROIDISM DUE TO HASHIMOTO'S THYROIDITIS: ICD-10-CM

## 2017-07-26 DIAGNOSIS — E53.8 B12 DEFICIENCY: ICD-10-CM

## 2017-07-28 DIAGNOSIS — E89.0 POSTABLATIVE HYPOTHYROIDISM: Primary | ICD-10-CM

## 2017-07-31 ENCOUNTER — APPOINTMENT (OUTPATIENT)
Dept: LAB | Facility: HOSPITAL | Age: 72
End: 2017-07-31

## 2017-07-31 LAB
T3 SERPL-MCNC: 146 NG/DL (ref 97–169)
T4 FREE SERPL-MCNC: 1.05 NG/DL (ref 0.78–2.19)

## 2017-07-31 PROCEDURE — 84480 ASSAY TRIIODOTHYRONINE (T3): CPT | Performed by: INTERNAL MEDICINE

## 2017-07-31 PROCEDURE — 84439 ASSAY OF FREE THYROXINE: CPT | Performed by: INTERNAL MEDICINE

## 2017-07-31 PROCEDURE — 36415 COLL VENOUS BLD VENIPUNCTURE: CPT | Performed by: INTERNAL MEDICINE

## 2017-08-01 ENCOUNTER — OFFICE VISIT (OUTPATIENT)
Dept: ENDOCRINOLOGY | Facility: CLINIC | Age: 72
End: 2017-08-01

## 2017-08-01 VITALS
HEART RATE: 74 BPM | DIASTOLIC BLOOD PRESSURE: 64 MMHG | HEIGHT: 67 IN | BODY MASS INDEX: 30.06 KG/M2 | SYSTOLIC BLOOD PRESSURE: 118 MMHG | WEIGHT: 191.5 LBS

## 2017-08-01 DIAGNOSIS — E03.8 HYPOTHYROIDISM DUE TO HASHIMOTO'S THYROIDITIS: Primary | ICD-10-CM

## 2017-08-01 DIAGNOSIS — E06.3 HYPOTHYROIDISM DUE TO HASHIMOTO'S THYROIDITIS: Primary | ICD-10-CM

## 2017-08-01 DIAGNOSIS — E55.9 VITAMIN D DEFICIENCY: ICD-10-CM

## 2017-08-01 DIAGNOSIS — M85.80 OSTEOPENIA: ICD-10-CM

## 2017-08-01 PROCEDURE — 99214 OFFICE O/P EST MOD 30 MIN: CPT | Performed by: NURSE PRACTITIONER

## 2017-08-01 RX ORDER — LEVOTHYROXINE AND LIOTHYRONINE 19; 4.5 UG/1; UG/1
30 TABLET ORAL 2 TIMES DAILY WITH MEALS
COMMUNITY
End: 2017-12-11 | Stop reason: SDUPTHER

## 2017-08-01 RX ORDER — HYDROXYCHLOROQUINE SULFATE 200 MG/1
TABLET, FILM COATED ORAL
COMMUNITY
Start: 2017-04-11 | End: 2021-01-29

## 2017-08-01 RX ORDER — LEVOTHYROXINE SODIUM 75 MCG
75 TABLET ORAL DAILY
Qty: 30 TABLET | Refills: 11 | Status: SHIPPED | OUTPATIENT
Start: 2017-08-01 | End: 2017-09-15

## 2017-08-01 RX ORDER — ERGOCALCIFEROL 1.25 MG/1
50000 CAPSULE ORAL
COMMUNITY
End: 2017-10-17 | Stop reason: SDUPTHER

## 2017-08-01 RX ORDER — LEVOTHYROXINE SODIUM 88 UG/1
88 TABLET ORAL
COMMUNITY
End: 2017-09-15

## 2017-08-01 RX ORDER — CLOBETASOL PROPIONATE 0.46 MG/ML
1 SOLUTION TOPICAL DAILY
COMMUNITY
End: 2019-05-30 | Stop reason: SDUPTHER

## 2017-08-01 NOTE — PROGRESS NOTES
Subjective    Carla Best is a 72 y.o. female. she is here today for follow-up.    History of Present Illness            72 y.o.   female comes for fu , very pleasant     PCP - Dr. Lindy Landeros     Note- from June 2011 reviewed        very pleasant     history of subacute thyroiditis with subsequent permanent hypothyroidism      duration - 5+ years  timing - constant  quality - biochemically controlled but still symptomatic        Lab Results   Component Value Date     TSH 2.760 03/28/2017            severity - moderate   symptoms - multiple , detailed under ROS  Fatigue, hair loss, cold intolerance, weigh gain, myalgias, muscle weakness  alleviating factors - brand name synthroid and cytomel      she states that if no changes are needed then she will accept that it is not her thyroid, she only wants to be certain there isn't anything that has been missed.      she has fibromylgia and neck pain has worsened since last appt         The following portions of the patient's history were reviewed and updated as appropriate:   Past Medical History:   Diagnosis Date   • Chest pain    • Fibrocystic disease of breast    • Hashimoto's thyroiditis    • History of screening mammography 08/27/2014    SCREENING MAMMOGRAPHY DIGITAL  (Medicare) (1)    • Hyperlipidemia    • Hypothyroidism due to Hashimoto's thyroiditis 12/3/2016   • Keratoconjunctivitis sicca    • Nuclear senile cataract    • On long term drug therapy    • Osteoarthritis    • Osteopenia    • Osteopenia 12/3/2016   • Photopsia     Right   • Postmenopausal bleeding    • Sinus tachycardia    • Sjogren's syndrome    • Vitamin D deficiency    • Vitamin D deficiency 12/3/2016   • Vitreous detachment of right eye      Past Surgical History:   Procedure Laterality Date   • CHOLECYSTECTOMY  06/09/1997    with operative cholangiogram. Chronic cholecystitis & cholelithiasis. HX of passed common duct stone   • CYSTOSCOPY  11/26/1962    urethral dilatation.Recurrent  pyelonephritis. urethritis   • ENDOSCOPY N/A 2/17/2017    Procedure: ESOPHAGOGASTRODUODENOSCOPY;  Surgeon: Cisco Mason DO;  Location: [facility] ENDOSCOPY;  Service:    • ENDOSCOPY AND COLONOSCOPY  09/30/1985    Normal colon to left transverse colon   • GALLBLADDER SURGERY     • PAP SMEAR  2009   • TONSILLECTOMY  1950   • VAGINAL DELIVERY      x3     Family History   Problem Relation Age of Onset   • Heart disease Mother    • Arthritis Mother    • Osteoporosis Mother    • Cataracts Mother    • Heart disease Father    • Hypertension Sister    • Arthritis Sister    • Diabetes Sister    • Fuchs' dystrophy Sister    • Breast cancer Paternal Grandmother    • Diabetes Other    • Heart disease Other    • Hypertension Other    • Stroke Other    • Thyroid disease Other    • Lung disease Other    • Other Other      Gallstones, Bleeding Tendency, Colon Problems.   • Fuchs' dystrophy Maternal Grandfather      OB History     No data available        Current Outpatient Prescriptions   Medication Sig Dispense Refill   • aspirin 81 MG EC tablet Take 81 mg by mouth daily.     • Biotin (BIOTIN MAXIMUM STRENGTH) 5 MG capsule Take  by mouth.     • CALCIUM CITRATE-VITAMIN D3 PO Take  by mouth.     • clobetasol (TEMOVATE) 0.05 % external solution Apply 1 application topically Daily.     • hydroxychloroquine (PLAQUENIL) 200 MG tablet Take  by mouth 2 (Two) Times a Day.     • hydroxychloroquine (PLAQUENIL) 200 MG tablet TAKE 1 TABLET BY MOUTH TWICE DAILY     • Iron-Vitamins (GERITOL PO) Take 5 mL by mouth 2 (Two) Times a Day.     • meclizine (ANTIVERT) 12.5 MG tablet Take 12.5 mg by mouth 3 (Three) Times a Day As Needed for dizziness.     • metoprolol tartrate (LOPRESSOR) 25 MG tablet TAKE 1 TABLET BY MOUTH TWICE DAILY. 60 tablet 11   • Multiple Vitamins-Minerals (MULTIVITAMIN ADULT PO) Take  by mouth.     • Omega-3 Fatty Acids (FISH OIL) 1000 MG capsule capsule Take 2,000 mg by mouth Daily With Breakfast.     • Probiotic Product  (PROBIOTIC DAILY PO) Take  by mouth.     • Thyroid 30 MG PO tablet 2 in a.m. And 2 in p.m. 120 tablet 11   • triamcinolone (KENALOG) 0.1 % cream Apply  topically 2 (two) times a day.     • vitamin D (ERGOCALCIFEROL) 74722 UNITS capsule capsule TAKE 1 CAPSULE BY MOUTH EVERY 2 WEEKS 6 capsule 0   • Calcium Carbonate-Vitamin D (CALCIUM 600+D) 600-200 MG-UNIT tablet Take  by mouth.     • levothyroxine (SYNTHROID, LEVOTHROID) 88 MCG tablet Take 88 mcg by mouth.     • Multiple Vitamin (M.V.I. ADULT IV) Take  by mouth.     • Probiotic Product (PROBIOTIC & ACIDOPHILUS EX ST PO) Take  by mouth.     • SYNTHROID 75 MCG tablet Take 1 tablet by mouth Daily. 30 tablet 11   • Thyroid 30 MG PO tablet Take 30 mg by mouth.     • vitamin D (ERGOCALCIFEROL) 53043 UNITS capsule capsule Take 50,000 Units by mouth.       No current facility-administered medications for this visit.      Allergies   Allergen Reactions   • Augmentin [Amoxicillin-Pot Clavulanate] Diarrhea   • Ciprofloxacin    • Codeine    • Demerol [Meperidine]    • Dexlansoprazole    • Phenazopyridine    • Tape      Social History     Social History   • Marital status:      Spouse name: N/A   • Number of children: N/A   • Years of education: N/A     Social History Main Topics   • Smoking status: Never Smoker   • Smokeless tobacco: Never Used   • Alcohol use Yes      Comment: less than 12 drinks a year   • Drug use: No   • Sexual activity: Not Asked     Other Topics Concern   • None     Social History Narrative       Review of Systems  Review of Systems   Constitutional: Negative for activity change, appetite change, chills, diaphoresis and fatigue.   HENT: Negative for congestion, dental problem, drooling, ear discharge, ear pain, facial swelling, sneezing, sore throat, tinnitus, trouble swallowing and voice change.    Eyes: Negative for photophobia, pain, discharge, redness, itching and visual disturbance.   Respiratory: Negative for apnea, cough, choking, chest  "tightness and shortness of breath.    Cardiovascular: Negative for chest pain, palpitations and leg swelling.   Gastrointestinal: Negative for abdominal distention, abdominal pain, constipation, diarrhea, nausea and vomiting.   Endocrine: Negative for cold intolerance, heat intolerance, polydipsia, polyphagia and polyuria.   Genitourinary: Negative for difficulty urinating, dysuria, frequency, hematuria and urgency.   Musculoskeletal: Negative for arthralgias, back pain, gait problem, joint swelling, myalgias, neck pain and neck stiffness.   Skin: Negative for color change, pallor, rash and wound.   Allergic/Immunologic: Negative for environmental allergies, food allergies and immunocompromised state.   Neurological: Negative for dizziness, tremors, facial asymmetry, weakness, light-headedness, numbness and headaches.   Hematological: Negative for adenopathy. Does not bruise/bleed easily.   Psychiatric/Behavioral: Negative for agitation, behavioral problems, confusion, decreased concentration and sleep disturbance.        Objective    /64 (BP Location: Right arm, Patient Position: Sitting, Cuff Size: Adult)  Pulse 74  Ht 67\" (170.2 cm)  Wt 191 lb 8 oz (86.9 kg)  BMI 29.99 kg/m2  Physical Exam   Constitutional: She is oriented to person, place, and time. She appears well-developed and well-nourished. No distress.   HENT:   Head: Normocephalic and atraumatic.   Right Ear: External ear normal.   Left Ear: External ear normal.   Nose: Nose normal.   Eyes: Conjunctivae and EOM are normal. Pupils are equal, round, and reactive to light.   Neck: Normal range of motion. Neck supple. No tracheal deviation present. No thyromegaly present.   Cardiovascular: Normal rate, regular rhythm and normal heart sounds.    No murmur heard.  Pulmonary/Chest: Effort normal and breath sounds normal. No respiratory distress. She has no wheezes.   Abdominal: Soft. Bowel sounds are normal. There is no tenderness. There is no rebound " and no guarding.   Musculoskeletal: Normal range of motion. She exhibits no edema, tenderness or deformity.   Neurological: She is alert and oriented to person, place, and time. No cranial nerve deficit.   Skin: Skin is warm and dry. No rash noted.   Psychiatric: She has a normal mood and affect. Her behavior is normal. Judgment and thought content normal.       Lab Review  Glucose (mg/dL)   Date Value   07/21/2017 87   04/06/2017 95   03/28/2017 95     Sodium (mmol/L)   Date Value   07/21/2017 142   04/06/2017 138   03/28/2017 141     Potassium (mmol/L)   Date Value   07/21/2017 4.2   04/06/2017 4.7   03/28/2017 4.4     Chloride (mmol/L)   Date Value   07/21/2017 106   04/06/2017 98   03/28/2017 105     CO2 (mmol/L)   Date Value   07/21/2017 27.0   04/06/2017 27.0   03/28/2017 26.0     BUN (mg/dL)   Date Value   07/21/2017 12   04/06/2017 18   03/28/2017 14     Creatinine (mg/dL)   Date Value   07/21/2017 0.79   04/06/2017 0.91   03/28/2017 0.80     Triglycerides (mg/dl)   Date Value   12/07/2016 228 (H)   09/25/2014 298 (H)       Assessment/Plan      1. Hypothyroidism due to Hashimoto's thyroiditis    2. Vitamin D deficiency    3. Osteopenia    .    Medications prescribed:  Outpatient Encounter Prescriptions as of 8/1/2017   Medication Sig Dispense Refill   • aspirin 81 MG EC tablet Take 81 mg by mouth daily.     • Biotin (BIOTIN MAXIMUM STRENGTH) 5 MG capsule Take  by mouth.     • CALCIUM CITRATE-VITAMIN D3 PO Take  by mouth.     • clobetasol (TEMOVATE) 0.05 % external solution Apply 1 application topically Daily.     • hydroxychloroquine (PLAQUENIL) 200 MG tablet Take  by mouth 2 (Two) Times a Day.     • hydroxychloroquine (PLAQUENIL) 200 MG tablet TAKE 1 TABLET BY MOUTH TWICE DAILY     • Iron-Vitamins (GERITOL PO) Take 5 mL by mouth 2 (Two) Times a Day.     • meclizine (ANTIVERT) 12.5 MG tablet Take 12.5 mg by mouth 3 (Three) Times a Day As Needed for dizziness.     • metoprolol tartrate (LOPRESSOR) 25 MG  tablet TAKE 1 TABLET BY MOUTH TWICE DAILY. 60 tablet 11   • Multiple Vitamins-Minerals (MULTIVITAMIN ADULT PO) Take  by mouth.     • Omega-3 Fatty Acids (FISH OIL) 1000 MG capsule capsule Take 2,000 mg by mouth Daily With Breakfast.     • Probiotic Product (PROBIOTIC DAILY PO) Take  by mouth.     • Thyroid 30 MG PO tablet 2 in a.m. And 2 in p.m. 120 tablet 11   • triamcinolone (KENALOG) 0.1 % cream Apply  topically 2 (two) times a day.     • vitamin D (ERGOCALCIFEROL) 42983 UNITS capsule capsule TAKE 1 CAPSULE BY MOUTH EVERY 2 WEEKS 6 capsule 0   • [DISCONTINUED] SYNTHROID 88 MCG tablet Take 1 tablet by mouth Daily. 30 tablet 11   • Calcium Carbonate-Vitamin D (CALCIUM 600+D) 600-200 MG-UNIT tablet Take  by mouth.     • levothyroxine (SYNTHROID, LEVOTHROID) 88 MCG tablet Take 88 mcg by mouth.     • Multiple Vitamin (M.V.I. ADULT IV) Take  by mouth.     • Probiotic Product (PROBIOTIC & ACIDOPHILUS EX ST PO) Take  by mouth.     • SYNTHROID 75 MCG tablet Take 1 tablet by mouth Daily. 30 tablet 11   • Thyroid 30 MG PO tablet Take 30 mg by mouth.     • vitamin D (ERGOCALCIFEROL) 24851 UNITS capsule capsule Take 50,000 Units by mouth.     • [DISCONTINUED] dicyclomine (BENTYL) 10 MG capsule Take 10 mg by mouth 2 (Two) Times a Day As Needed.     • [DISCONTINUED] metoprolol tartrate (LOPRESSOR) 25 MG tablet Take 1 tablet by mouth 2 (Two) Times a Day. 180 tablet 3     No facility-administered encounter medications on file as of 8/1/2017.        Orders placed during this encounter include:  Orders Placed This Encounter   Procedures   • TSH     Standing Status:   Standing     Number of Occurrences:   12     Standing Expiration Date:   8/1/2018   • T4, Free     Standing Status:   Standing     Number of Occurrences:   12     Standing Expiration Date:   8/1/2018   • T3     Standing Status:   Standing     Number of Occurrences:   12     Standing Expiration Date:   8/1/2018     Hypothyroidism  developed after episode of subacute  thyroiditis     Was on synthroid 88 mcgs x7.5 tabs per week            Lab Results   Component Value Date     TSH 2.760 03/28/2017         [place] not covered but willing to pay out of pocket  changed  to synthroid and cytomel didn't feel better      armour 30 mg tabs, presently doing 60         Add synthroid 88 mcgs two tabs per week ---- decrease to 75 mcg two tablets a week         neg celiac panel and nl  b12  she also has sjogren's      Labs in 1 month and before next appt      =====     osteopenia  dxa from Nov 2015 personally reviewed     L1-L4 bmd 1.056 , T score -1  LFN bmd 0.847 , T score -1.4  RFN , bmd 0.885 , T score -1.1      vit D low in Nov 2013 - start vit D 50,000 q 2weeks and keep calcium + D daily      continue vit D 50,000 units every 2 weeks      Nl Vit D           Lab Results   Component Value Date     [id number] 31.9 03/28/2017     [id number] 31.9 02/07/2017     [id number] 31.7 11/23/2016               =====     follwoing w  pain management   never got botox.   may try supplements      call if needed     ======     Alopecia     Thyroid as above  Dr. Baig added aldactone, she can't tolerate  Added iron even though levels are normal         4. Follow-up: Return in about 6 months (around 2/1/2018) for Recheck.

## 2017-08-02 ENCOUNTER — TELEPHONE (OUTPATIENT)
Dept: ENDOCRINOLOGY | Facility: CLINIC | Age: 72
End: 2017-08-02

## 2017-08-02 ENCOUNTER — HOSPITAL ENCOUNTER (OUTPATIENT)
Dept: PHYSICAL THERAPY | Facility: HOSPITAL | Age: 72
Setting detail: THERAPIES SERIES
Discharge: HOME | End: 2017-08-02

## 2017-08-02 ENCOUNTER — TRANSCRIBE ORDERS (OUTPATIENT)
Dept: PHYSICAL THERAPY | Facility: HOSPITAL | Age: 72
End: 2017-08-02

## 2017-08-02 DIAGNOSIS — M25.559 HIP JOINT PAINFUL ON MOVEMENT, UNSPECIFIED LATERALITY: Primary | ICD-10-CM

## 2017-08-02 DIAGNOSIS — M25.559 ARTHRALGIA OF HIP, UNSPECIFIED LATERALITY: Primary | ICD-10-CM

## 2017-08-02 PROCEDURE — 97110 THERAPEUTIC EXERCISES: CPT

## 2017-08-02 PROCEDURE — G8978 MOBILITY CURRENT STATUS: HCPCS

## 2017-08-02 PROCEDURE — G8979 MOBILITY GOAL STATUS: HCPCS

## 2017-08-02 PROCEDURE — 97161 PT EVAL LOW COMPLEX 20 MIN: CPT

## 2017-08-02 PROCEDURE — 97140 MANUAL THERAPY 1/> REGIONS: CPT

## 2017-08-02 NOTE — THERAPY EVALUATION
Outpatient Physical Therapy Ortho Initial Evaluation  [facility]     Patient Name: Carla Best  : 1945  MRN: [id number]  Today's Date: 2017      Visit Date: 2017  Subjective Improvement: 0%  Visit Number: 1  Insurance approval: Medicare  MD Visit: PRN  Recert Date: 2017      Therapy Diagnosis: R Hip Pain secondary to Posteriorly Rotated Hip and concordant Muscle Shortening  Patient Active Problem List   Diagnosis   • Osteopenia   • Hypothyroidism due to Hashimoto's thyroiditis   • Vitamin D deficiency   • Keratoconjunctivitis sicca   • Long term use of drug   • Cataract   • Primary osteoarthritis of right knee   • B12 deficiency        Past Medical History:   Diagnosis Date   • Chest pain    • Fibrocystic disease of breast    • Hashimoto's thyroiditis    • History of screening mammography 2014    SCREENING MAMMOGRAPHY DIGITAL  (Medicare) (1)    • Hyperlipidemia    • Hypothyroidism due to Hashimoto's thyroiditis 12/3/2016   • Keratoconjunctivitis sicca    • Nuclear senile cataract    • On long term drug therapy    • Osteoarthritis    • Osteopenia    • Osteopenia 12/3/2016   • Photopsia     Right   • Postmenopausal bleeding    • Sinus tachycardia    • Sjogren's syndrome    • Vitamin D deficiency    • Vitamin D deficiency 12/3/2016   • Vitreous detachment of right eye         Past Surgical History:   Procedure Laterality Date   • CHOLECYSTECTOMY  1997    with operative cholangiogram. Chronic cholecystitis & cholelithiasis. HX of passed common duct stone   • CYSTOSCOPY  1962    urethral dilatation.Recurrent pyelonephritis. urethritis   • ENDOSCOPY N/A 2017    Procedure: ESOPHAGOGASTRODUODENOSCOPY;  Surgeon: Cisco Mason DO;  Location: [facility] ENDOSCOPY;  Service:    • ENDOSCOPY AND COLONOSCOPY  1985    Normal colon to left transverse colon   • GALLBLADDER SURGERY     • PAP SMEAR     • TONSILLECTOMY  1950   • VAGINAL DELIVERY      x3        Visit Dx:     ICD-10-CM ICD-9-CM   1. Hip joint painful on movement, unspecified laterality M25.559 719.45             Patient History       08/02/17 0830          History    Chief Complaint Pain  -AK      Type of Pain Hip pain  -AK      Date Current Problem(s) Began --   over 6 weeks ago  -AK      Brief Description of Current Complaint pt was on both knees washing her hair in the bathtub and when she went to stand up she slipped and twisted her R leg, falling backwards. Pt states that since that time her hip pain which was originally around her groin has spread into her left knee and up lateral aspect of her R leg into lateral and posterior aspects of R Hip makng any prolonged postioning or walking difficult.  -AK      Patient/Caregiver Goals Relieve pain  -AK      Hand Dominance right-handed  -AK      Occupation/sports/leisure activities Retired  -AK        User Key  (r) = Recorded By, (t) = Taken By, (c) = Cosigned By    Initials Name Provider Type    AK Gerson Neff, PT Physical Therapist                PT Ortho       08/02/17 0830    Precautions and Contraindications    Precautions/Limitations fall precautions  -AK    Subjective Pain    Able to rate subjective pain? yes  -AK    Pre-Treatment Pain Level 5  -AK    Post-Treatment Pain Level 5  -AK    Posture/Observations    Posture/Observations Comments R Hip rotated posteriorly. Pt tender to palpation along entirety of Right IT Band, Adductors, Quadriceps and Hamstrings. R Quadriceps and Iliopsoas shortened severely resulting in shortened painful steps.   -AK    MMT (Manual Muscle Testing)    General MMT Assessment Detail L LE: Hip Flexion=4-/5, Hip Extension=4/5, Hip Abduction=4+/5, Knee Flexion=4/5, Knee Extension=4+/5, Ankle DF+PF=4+/5. R LE: Hip Flexion=3/5, Hip Extension=4-/5, Hip Abduction=4/5, Knee Flexion and Extension=4-/5, Ankle DF+PF=4-/5.  -AK    Transfers    Transfers, Bed-Chair [place] conditional independence  -AK    Transfers,  Sit-Stand [place] conditional independence   st cane  -AK    Transfer, Safety Issues step length decreased;weight-shifting ability decreased  -AK    Transfer, Impairments decreased flexibility;ROM decreased;strength decreased;impaired balance;coordination impaired;postural control impaired;pain  -AK    Gait Assessment/Treatment    Gait, [place] Level conditional independence  -AK    Gait, Assistive Device straight cane  -AK    Gait, Distance (Feet) 150  -AK    Gait, Gait Deviations narrow base;step length decreased;stride length decreased;stride width increased  -AK      User Key  (r) = Recorded By, (t) = Taken By, (c) = Cosigned By    Initials Name Provider Type    KHARI Neff PT Physical Therapist                            Therapy Education       08/02/17 0942          Therapy Education    Given HEP  -AK      Program New  -AK      How Provided Verbal;Demonstration  -AK      Provided to Patient  -AK      Level of Understanding Teach back education performed;Demonstrated  -AK        User Key  (r) = Recorded By, (t) = Taken By, (c) = Cosigned By    Initials Name Provider Type    KHARI Neff PT Physical Therapist                PT OP Goals       08/02/17 0830       PT Short Term Goals    STG Date to Achieve 08/23/17  -AK     STG 1 Pt pain level in R hip will be no greater than 3/10 on the VAS at all times  -AK     STG 2 Pt will have 4/5 or greater strength in R LE in all regards  -AK     STG 3 Pt will be able to ambulate 400' or gretaer intervals without exacerbation of hip pain symptoms  -AK     STG 4 Pt will have 15 point improvement in LEFS score  -AK     Long Term Goals    LTG Date to Achieve 09/13/17  -AK     LTG 1 Pt will have R hip pain no greater than 1/10 on the VAS at all times.  -AK     LTG 2 Pt will have 4+/5 strength in R LE in all regards  -AK     LTG 3 Pt will be able to ambulate 900' or greater with least restrictive AD without exacerbation or R hip pain symptoms.  -AK     LTG  4 Pt will have 25 point or greater improvement in LEFS score  -AK     Time Calculation    PT Goal Re-Cert Due Date 08/23/17  -AK       User Key  (r) = Recorded By, (t) = Taken By, (c) = Cosigned By    Initials Name Provider Type    KHARI Neff, PT Physical Therapist                PT Assessment/Plan       08/02/17 0946       PT Assessment    Functional Limitations Decreased safety during functional activities;Impaired gait;Performance in leisure activities;Limitations in community activities;Performance in self-care ADL;Limitation in home management  -AK     Impairments Balance;Coordination;Muscle strength;Pain;Posture;Poor body mechanics;Motor function;Joint mobility;Gait;Endurance  -AK     Assessment Comments Pt will benefit from skilled PT intervention addressing pain, strength, coordination, ambulation endurance deficts and musculoskeletal imbalances in order to return pt to highest level of functional mobility and prevent reinjury.  -AK     Rehab Potential Good  -AK     Patient/caregiver participated in establishment of treatment plan and goals Yes  -AK     Patient would benefit from skilled therapy intervention Yes  -AK     PT Plan    PT Frequency 2x/week  -AK     Predicted Duration of Therapy Intervention (days/wks) 6 weeks  -AK     Planned CPT's? PT EVAL LOW COMPLEXITY: 32913;PT THER SUPP EA 15 MIN;PT HOT OR COLD PACK TREAT MCARE;PT THER ACT EA 15 MIN: 13612;PT AQUATIC THERAPY EA 15 MIN: 60196;PT RE-EVAL: 00514;PT NEUROMUSC RE-EDUCATION EA 15 MIN: 97796;PT SELF CARE/HOME MGMT/TRAIN EA 15: 44329  -AK     Physical Therapy Interventions (Optional Details) aquatics exercise;balance training;dry needling;gross motor skills;gait training;home exercise program;joint mobilization;manual therapy techniques;modalities;motor coordination training;neuromuscular re-education;ROM (Range of Motion);strengthening;stretching;stair training;postural re-education;patient/family education  -AK       User Key  (r) =  Recorded By, (t) = Taken By, (c) = Cosigned By    Initials Name Provider Type    KHARI Neff PT Physical Therapist                Modalities       08/02/17 0830          Moist Heat    MH Applied Yes  -AK      Location right hip   during Manual  -AK      Rx Minutes 10 mins  -AK        User Key  (r) = Recorded By, (t) = Taken By, (c) = Cosigned By    Initials Name Provider Type    KHARI Neff PT Physical Therapist              Exercises       08/02/17 0830          Subjective Pain    Able to rate subjective pain? yes  -AK      Pre-Treatment Pain Level 5  -AK      Post-Treatment Pain Level 5  -AK      Aquatics    Aquatics performed? No  -AK      Exercise 1    Exercise Name 1 Bridges 4 x10  -AK      Exercise 2    Exercise Name 2 Rhythmic Trunk rotations in supine with deep breath at end range  -AK      Exercise 3    Exercise Name 3 B knee to chest aarom x20 each  -AK        User Key  (r) = Recorded By, (t) = Taken By, (c) = Cosigned By    Initials Name Provider Type    KHARI Neff PT Physical Therapist           Manual Rx (last 36 hours)      Manual Treatments       08/02/17 0830          Manual Rx 1    Manual Rx 1 Location Cupping to R Quadriceps, IT Band, Adductors, Hamstrings  -AK      Manual Rx 2    Manual Rx 2 Location Manual stretch to R Quadriceps and Iliopsoas muscles  -AK        User Key  (r) = Recorded By, (t) = Taken By, (c) = Cosigned By    Initials Name Provider Type    KHARI Neff PT Physical Therapist                            Outcome Measures       08/02/17 0830          Lower Extremity Functional Index    Any of your usual work, housework or school activities 2  -AK      Your usual hobbies, recreational or sporting activities 2  -AK      Getting into or out of the bath 1  -AK      Walking between rooms 2  -AK      Putting on your shoes or socks 1  -AK      Squatting 1  -AK      Lifting an object, like a bag of groceries from the floor 2  -AK      Performing light activities  around your home 2  -AK      Performing heavy activities around your home 1  -AK      Getting into or out of a car 2  -AK      Walking 2 blocks 1  -AK      Walking a mile 1  -AK      Going up or down 10 stairs (about 1 flight of stairs) 2  -AK      Standing for 1 hour 1  -AK      Sitting for 1 hour 1  -AK      Running on even ground 1  -AK      Running on uneven ground 1  -AK      Making sharp turns while running fast 1  -AK      Hopping 1  -AK      Rolling over in bed 2  -AK      Total 28  -AK      Functional Assessment    Outcome Measure Options Lower Extremity Functional Scale (LEFS)  -AK        User Key  (r) = Recorded By, (t) = Taken By, (c) = Cosigned By    Initials Name Provider Type    AK Gerson Neff PT Physical Therapist            Time Calculation:   Start Time: 0832  Stop Time: 0924  Time Calculation (min): 52 min  Total Timed Code Minutes- PT: 32 minute(s)     Therapy Charges for Today     Code Description Service Date Service Provider Modifiers Qty    66806359995 HC PT MOBILITY CURRENT 8/2/2017 Gerson Neff, PT GP, CL 1    97823888497 HC PT MOBILITY PROJECTED 8/2/2017 Gerson Neff, PT GP, CJ 1    48670932855 HC PT THER SUPP EA 15 MIN 8/2/2017 Gerson Neff, PT GP 1    22131383652 HC PT EVAL LOW COMPLEXITY 1 8/2/2017 Gerson Neff PT GP 1    21995594419 HC PT MANUAL THERAPY EA 15 MIN 8/2/2017 Gerson Neff PT GP 1    33648241418 HC PT THER PROC EA 15 MIN 8/2/2017 Gerson Neff, PT GP 1          PT G-Codes  PT Professional Judgement Used?: Yes  Outcome Measure Options: Lower Extremity Functional Scale (LEFS)  Score: 28/80  Functional Limitation: Mobility: Walking and moving around  Mobility: Walking and Moving Around Current Status (): At least 60 percent but less than 80 percent impaired, limited or restricted  Mobility: Walking and Moving Around Goal Status (): At least 20 percent but less than 40 percent impaired, limited or restricted         Gerson Neff PT  8/2/2017

## 2017-08-08 ENCOUNTER — TELEPHONE (OUTPATIENT)
Dept: ENDOCRINOLOGY | Facility: CLINIC | Age: 72
End: 2017-08-08

## 2017-08-09 ENCOUNTER — HOSPITAL ENCOUNTER (OUTPATIENT)
Dept: PHYSICAL THERAPY | Facility: HOSPITAL | Age: 72
Setting detail: THERAPIES SERIES
Discharge: HOME | End: 2017-08-09

## 2017-08-09 DIAGNOSIS — M25.559 HIP JOINT PAINFUL ON MOVEMENT, UNSPECIFIED LATERALITY: Primary | ICD-10-CM

## 2017-08-09 PROCEDURE — 97140 MANUAL THERAPY 1/> REGIONS: CPT

## 2017-08-09 PROCEDURE — 97110 THERAPEUTIC EXERCISES: CPT

## 2017-08-09 NOTE — THERAPY TREATMENT NOTE
Outpatient Physical Therapy Ortho Treatment Note  [facility]     Patient Name: Carla Best  : 1945  MRN: [id number]  Today's Date: 2017      Visit Date: 2017     Subjective Improvement: 0%  Attendance:   (medicare)  Next MD Visit : PRN  Recert Date:  17      Therapy Diagnosis:   R hip pain secondary to posterior rotation of hip and concordant muscle shortening       Visit Dx:    ICD-10-CM ICD-9-CM   1. Hip joint painful on movement, unspecified laterality M25.559 719.45       Patient Active Problem List   Diagnosis   • Osteopenia   • Hypothyroidism due to Hashimoto's thyroiditis   • Vitamin D deficiency   • Keratoconjunctivitis sicca   • Long term use of drug   • Cataract   • Primary osteoarthritis of right knee   • B12 deficiency        Past Medical History:   Diagnosis Date   • Chest pain    • Fibrocystic disease of breast    • Hashimoto's thyroiditis    • History of screening mammography 2014    SCREENING MAMMOGRAPHY DIGITAL  (Medicare) (1)    • Hyperlipidemia    • Hypothyroidism due to Hashimoto's thyroiditis 12/3/2016   • Keratoconjunctivitis sicca    • Nuclear senile cataract    • On long term drug therapy    • Osteoarthritis    • Osteopenia    • Osteopenia 12/3/2016   • Photopsia     Right   • Postmenopausal bleeding    • Sinus tachycardia    • Sjogren's syndrome    • Vitamin D deficiency    • Vitamin D deficiency 12/3/2016   • Vitreous detachment of right eye         Past Surgical History:   Procedure Laterality Date   • CHOLECYSTECTOMY  1997    with operative cholangiogram. Chronic cholecystitis & cholelithiasis. HX of passed common duct stone   • CYSTOSCOPY  1962    urethral dilatation.Recurrent pyelonephritis. urethritis   • ENDOSCOPY N/A 2017    Procedure: ESOPHAGOGASTRODUODENOSCOPY;  Surgeon: Cisco Mason DO;  Location: [facility] ENDOSCOPY;  Service:    • ENDOSCOPY AND COLONOSCOPY  1985    Normal colon to left transverse colon  "  • GALLBLADDER SURGERY     • PAP SMEAR  2009   • TONSILLECTOMY  1950   • VAGINAL DELIVERY      x3             PT Ortho       08/09/17 1052    Precautions and Contraindications    Precautions/Limitations fall precautions  -    Posture/Observations    Posture/Observations Comments gait antalgic with SPC; decrease extension on R knee; TTP R hip flexor/ITB/glute med  -      User Key  (r) = Recorded By, (t) = Taken By, (c) = Cosigned By    Initials Name Provider Type    KALPANA Tsai PTA Physical Therapy Assistant                            PT Assessment/Plan       08/09/17 1052       PT Assessment    Assessment Comments manual with good response; some increase discomfrot with exercises and hip feels fatigued; pt to use MHP at home; Pain still noted in groining post treatment this date.   -     PT Plan    PT Frequency 2x/week  -     Predicted Duration of Therapy Intervention (days/wks) 6 weeks  -     PT Plan Comments Continue with manaul to R hip; check alignement next visit possible pro ll next for ROM and strenth in the R knee/hip;   -       User Key  (r) = Recorded By, (t) = Taken By, (c) = Cosigned By    Initials Name Provider Type    KALPANA Tsai PTA Physical Therapy Assistant                    Exercises       08/09/17 1052          Subjective Comments    Subjective Comments Using heat at home; Did well after the first therapy visit; no increase pain.   -      Subjective Pain    Able to rate subjective pain? yes  -      Pre-Treatment Pain Level 5  -KH      Post-Treatment Pain Level 4  -KH      Exercise 1    Exercise Name 1 see manual  -KH      Time (Minutes) 1 25'  -KH      Exercise 2    Exercise Name 2 SKTC stretch R  -KH      Sets 2 3  -KH      Time (Seconds) 2 30\"  -KH      Exercise 3    Exercise Name 3 supine pirfomris R  -KH      Sets 3 3  -KH      Time (Seconds) 3 30\"  -KH      Exercise 4    Exercise Name 4 quad sets over bolster  -KH      Sets 4 2  -KH      Reps 4 10  -KH      Exercise 5 " "   Exercise Name 5 LTR   -KH      Sets 5 1  -KH      Reps 5 10  -KH      Time (Seconds) 5 10\"  -KH      Exercise 6    Exercise Name 6 bridges  -KH      Sets 6 4  -KH      Reps 6 10  -KH      Exercise 7    Exercise Name 7 hooklye bent knee fall out  -KH      Sets 7 2  -KH      Reps 7 10  -KH      Exercise 8    Exercise Name 8 SAQ R  -KH      Sets 8 2  -KH      Reps 8 10  -KH        User Key  (r) = Recorded By, (t) = Taken By, (c) = Cosigned By    Initials Name Provider Type    KALPANA Tsai PTA Physical Therapy Assistant                        Manual Rx (last 36 hours)      Manual Treatments       08/09/17 1052          Manual Rx 1    Manual Rx 1 Location R hip musculature  -      Manual Rx 1 Type IASTM/MRF R quad/adductor;ITB;hip flexor  -      Manual Rx 1 Duration 25'  -KH        User Key  (r) = Recorded By, (t) = Taken By, (c) = Cosigned By    Initials Name Provider Type    KALPANA Tsai PTA Physical Therapy Assistant                PT OP Goals       08/09/17 1052       PT Short Term Goals    STG Date to Achieve 08/23/17  -     STG 1 Pt pain level in R hip will be no gretaer than 3/10 on the VAS at all times  -     STG 1 Progress Progressing  -     STG 2 Pt will have 4/5 or greater strength in R LE in all regards  -     STG 2 Progress Progressing  -     STG 3 Pt will be able to ambulate 400' or gretaer intervals without exacerbation of hip pain symptoms  -     STG 3 Progress Progressing  -     STG 4 Pt will have 15 point improvement in LEFS score  -     STG 4 Progress Progressing  -     Long Term Goals    LTG Date to Achieve 09/13/17  -     LTG 1 Pt will have R hip pain no greater than 1/10 on the VAS at all times.  -     LTG 1 Progress Progressing  -     LTG 2 Pt will have 4+/5 strength in R LE in all regards  -     LTG 2 Progress Progressing  -     LTG 3 Pt will be able to ambulate 900' or gretaer with least restrictive AD without exacervation or R hip pain symptoms.  -KH     " LTG 3 Progress Progressing  -     LTG 4 Pt will have 25 point or gretaer improvement in LEFS score  -     LTG 4 Progress Progressing  -     Time Calculation    PT Goal Re-Cert Due Date 08/23/17  -       User Key  (r) = Recorded By, (t) = Taken By, (c) = Cosigned By    Initials Name Provider Type    KALPANA Tsai PTA Physical Therapy Assistant                    Time Calculation:   Start Time: 1052  Stop Time: 1145  Time Calculation (min): 53 min  Total Timed Code Minutes- PT: 53 minute(s)    Therapy Charges for Today     Code Description Service Date Service Provider Modifiers Qty    74244375451 HC PT THER PROC EA 15 MIN 8/9/2017 Eula Tsai PTA GP 2    24469224569 HC PT MANUAL THERAPY EA 15 MIN 8/9/2017 Eula Tsai PTA GP 2                    Eula Tsai PTA  8/9/2017

## 2017-08-14 ENCOUNTER — HOSPITAL ENCOUNTER (OUTPATIENT)
Dept: PHYSICAL THERAPY | Facility: HOSPITAL | Age: 72
Setting detail: THERAPIES SERIES
Discharge: HOME | End: 2017-08-14

## 2017-08-14 DIAGNOSIS — M25.559 HIP JOINT PAINFUL ON MOVEMENT, UNSPECIFIED LATERALITY: Primary | ICD-10-CM

## 2017-08-14 PROCEDURE — 97110 THERAPEUTIC EXERCISES: CPT

## 2017-08-14 PROCEDURE — 97140 MANUAL THERAPY 1/> REGIONS: CPT

## 2017-08-16 ENCOUNTER — HOSPITAL ENCOUNTER (OUTPATIENT)
Dept: PHYSICAL THERAPY | Facility: HOSPITAL | Age: 72
Setting detail: THERAPIES SERIES
Discharge: HOME | End: 2017-08-16

## 2017-08-16 DIAGNOSIS — M25.559 HIP JOINT PAINFUL ON MOVEMENT, UNSPECIFIED LATERALITY: Primary | ICD-10-CM

## 2017-08-16 PROCEDURE — 97140 MANUAL THERAPY 1/> REGIONS: CPT

## 2017-08-16 PROCEDURE — 97110 THERAPEUTIC EXERCISES: CPT

## 2017-08-16 NOTE — THERAPY TREATMENT NOTE
Outpatient Physical Therapy Ortho Treatment Note  [facility]     Patient Name: Carla Best  : 1945  MRN: [id number]  Today's Date: 2017      Visit Date: 2017     Subjective Improvement: 30%  Attendance:   (medicare)  Next MD Visit : PRN  Recert Date:  17      Therapy Diagnosis:  R hip pain secondary to posterior rotation of hip and concordant muscle shortening.         Visit Dx:    ICD-10-CM ICD-9-CM   1. Hip joint painful on movement, unspecified laterality M25.559 719.45       Patient Active Problem List   Diagnosis   • Osteopenia   • Hypothyroidism due to Hashimoto's thyroiditis   • Vitamin D deficiency   • Keratoconjunctivitis sicca   • Long term use of drug   • Cataract   • Primary osteoarthritis of right knee   • B12 deficiency        Past Medical History:   Diagnosis Date   • Chest pain    • Fibrocystic disease of breast    • Hashimoto's thyroiditis    • History of screening mammography 2014    SCREENING MAMMOGRAPHY DIGITAL  (Medicare) (1)    • Hyperlipidemia    • Hypothyroidism due to Hashimoto's thyroiditis 12/3/2016   • Keratoconjunctivitis sicca    • Nuclear senile cataract    • On long term drug therapy    • Osteoarthritis    • Osteopenia    • Osteopenia 12/3/2016   • Photopsia     Right   • Postmenopausal bleeding    • Sinus tachycardia    • Sjogren's syndrome    • Vitamin D deficiency    • Vitamin D deficiency 12/3/2016   • Vitreous detachment of right eye         Past Surgical History:   Procedure Laterality Date   • CHOLECYSTECTOMY  1997    with operative cholangiogram. Chronic cholecystitis & cholelithiasis. HX of passed common duct stone   • CYSTOSCOPY  1962    urethral dilatation.Recurrent pyelonephritis. urethritis   • ENDOSCOPY N/A 2017    Procedure: ESOPHAGOGASTRODUODENOSCOPY;  Surgeon: Cisco Mason DO;  Location: [facility] ENDOSCOPY;  Service:    • ENDOSCOPY AND COLONOSCOPY  1985    Normal colon to left transverse  colon   • GALLBLADDER SURGERY     • PAP SMEAR  2009   • TONSILLECTOMY  1950   • VAGINAL DELIVERY      x3             PT Ortho       08/16/17 1055    Subjective Comments    Subjective Comments Beleives heel lift is helping; Believes her hip is getting some better.  Not catching as much but still having difficulty with getting up and down.   -    Precautions and Contraindications    Precautions/Limitations fall precautions  -    Precautions fibromyalgia  -    Posture/Observations    Posture/Observations Comments decrease antalgia this date; gait with SPC  -      08/14/17 1100    Precautions and Contraindications    Precautions/Limitations fall precautions  -    Precautions fibromyalgia  -    Posture/Observations    Posture/Observations Comments R ant torsion; sacrum deep on R; Reg long; antalgic gait w/ SPC; TTP right adductor; hip flexor area;  -      User Key  (r) = Recorded By, (t) = Taken By, (c) = Cosigned By    Initials Name Provider Type    KALPANA Tsai PTA Physical Therapy Assistant                            PT Assessment/Plan       08/16/17 1055       PT Assessment    Assessment Comments continues to have some discomfort with manual but tolerates. Good tolerance to exercises this date. Pt given standing hip flexor stretch to HEP this date.   -     PT Plan    PT Frequency 2x/week  -     Predicted Duration of Therapy Intervention (days/wks) 6 weeks  -     PT Plan Comments Recheck scheduled for next week; Increase WB activities as able. next  Manual as needed.   -       User Key  (r) = Recorded By, (t) = Taken By, (c) = Cosigned By    Initials Name Provider Type    KALPANA Tsai PTA Physical Therapy Assistant                    Exercises       08/16/17 1055          Subjective Comments    Subjective Comments Murtaza heel lift is helping; Believes her hip is getting some better.  Not catching as much but still having difficulty with getting up and down.   -      Subjective Pain     "Able to rate subjective pain? yes  -      Pre-Treatment Pain Level 0  -KH      Subjective Pain Comment 5 with getting up and down  -KH      Exercise 1    Exercise Name 1 see manual  -KH      Time (Minutes) 1 10'  -KH      Exercise 2    Exercise Name 2 LTR  -KH      Sets 2 1  -KH      Reps 2 10  -KH      Time (Seconds) 2 10\"  -KH      Exercise 3    Exercise Name 3 bent knee fall outs  -KH      Sets 3 2  -KH      Reps 3 10  -KH      Exercise 4    Exercise Name 4 hooklye alt LE lifts  -KH      Sets 4 2  -KH      Reps 4 10  -KH      Exercise 5    Exercise Name 5 supine hamstring stretch  -KH      Sets 5 3  -KH      Time (Seconds) 5 30\"  -KH      Exercise 6    Exercise Name 6 LAQ B alt  -KH      Sets 6 2  -KH      Reps 6 10  -KH      Exercise 7    Exercise Name 7 standing hip flexor stretch  -KH      Sets 7 3  -KH      Time (Seconds) 7 30\"  -KH        User Key  (r) = Recorded By, (t) = Taken By, (c) = Cosigned By    Initials Name Provider Type    KALPANA Tsai PTA Physical Therapy Assistant                        Manual Rx (last 36 hours)      Manual Treatments       08/16/17 1055          Manual Rx 1    Manual Rx 1 Location R hip  -KH      Manual Rx 1 Type MFR/STM quad hip flexor  -KH      Manual Rx 1 Duration 10'  -KH      Manual Rx 2    Manual Rx 2 Location R hip  -KH      Manual Rx 2 Type distraction and ossicilations  -KH      Manual Rx 2 Duration 5'  -KH        User Key  (r) = Recorded By, (t) = Taken By, (c) = Cosigned By    Initials Name Provider Type    KALPANA Tsai PTA Physical Therapy Assistant                PT OP Goals       08/16/17 1055       PT Short Term Goals    STG Date to Achieve 08/23/17  -     STG 1 Pt pain level in R hip will be no gretaer than 3/10 on the VAS at all times  -     STG 1 Progress Progressing  -     STG 2 Pt will have 4/5 or greater strength in R LE in all regards  -     STG 2 Progress Progressing  -     STG 3 Pt will be able to ambulate 400' or gretaer intervals " without exacerbation of hip pain symptoms  -     STG 3 Progress Progressing  -     STG 4 Pt will have 15 point improvement in LEFS score  -     STG 4 Progress Progressing  -     Long Term Goals    LTG Date to Achieve 09/13/17  -     LTG 1 Pt will have R hip pain no greater than 1/10 on the VAS at all times.  -     LTG 1 Progress Progressing  -     LTG 2 Pt will have 4+/5 strength in R LE in all regards  -     LTG 2 Progress Progressing  -     LTG 3 Pt will be able to ambulate 900' or gretaer with least restrictive AD without exacervation or R hip pain symptoms.  -     LTG 3 Progress Progressing  -     LTG 4 Pt will have 25 point or gretaer improvement in LEFS score  -     LTG 4 Progress Progressing  -     Time Calculation    PT Goal Re-Cert Due Date 08/23/17  -       User Key  (r) = Recorded By, (t) = Taken By, (c) = Cosigned By    Initials Name Provider Type    KALPANA Tsai PTA Physical Therapy Assistant                    Time Calculation:   Start Time: 1055  Stop Time: 1138  Time Calculation (min): 43 min  Total Timed Code Minutes- PT: 43 minute(s)    Therapy Charges for Today     Code Description Service Date Service Provider Modifiers Qty    20647494229 HC PT MANUAL THERAPY EA 15 MIN 8/16/2017 Eula Tsai PTA GP 1    09175696505 HC PT THER PROC EA 15 MIN 8/16/2017 Eula Tsai PTA GP 2                    Eula Tsai PTA  8/16/2017

## 2017-08-23 ENCOUNTER — HOSPITAL ENCOUNTER (OUTPATIENT)
Dept: PHYSICAL THERAPY | Facility: HOSPITAL | Age: 72
Setting detail: THERAPIES SERIES
Discharge: HOME | End: 2017-08-23

## 2017-08-23 DIAGNOSIS — M25.559 HIP JOINT PAINFUL ON MOVEMENT, UNSPECIFIED LATERALITY: Primary | ICD-10-CM

## 2017-08-23 PROCEDURE — 97112 NEUROMUSCULAR REEDUCATION: CPT

## 2017-08-23 PROCEDURE — G8978 MOBILITY CURRENT STATUS: HCPCS

## 2017-08-23 PROCEDURE — G8979 MOBILITY GOAL STATUS: HCPCS

## 2017-08-23 PROCEDURE — 97110 THERAPEUTIC EXERCISES: CPT

## 2017-08-23 NOTE — THERAPY PROGRESS REPORT/RE-CERT
Outpatient Physical Therapy Ortho Progress Note  [facility]     Patient Name: Carla Best  : 1945  MRN: [id number]  Today's Date: 2017      Visit Date: 2017  Subjective Improvement: 50%  Visit Number:   Insurance approval: Medicare MD Visit: PRN  Recert Date: 2017      Therapy Diagnosis: R Hip Pain secondary to Posteriorly Rotated Hip and concordant Muscle Shortening  Visit Dx:    ICD-10-CM ICD-9-CM   1. Hip joint painful on movement, unspecified laterality M25.559 719.45       Patient Active Problem List   Diagnosis   • Osteopenia   • Hypothyroidism due to Hashimoto's thyroiditis   • Vitamin D deficiency   • Keratoconjunctivitis sicca   • Long term use of drug   • Cataract   • Primary osteoarthritis of right knee   • B12 deficiency        Past Medical History:   Diagnosis Date   • Chest pain    • Fibrocystic disease of breast    • Hashimoto's thyroiditis    • History of screening mammography 2014    SCREENING MAMMOGRAPHY DIGITAL  (Medicare) (1)    • Hyperlipidemia    • Hypothyroidism due to Hashimoto's thyroiditis 12/3/2016   • Keratoconjunctivitis sicca    • Nuclear senile cataract    • On long term drug therapy    • Osteoarthritis    • Osteopenia    • Osteopenia 12/3/2016   • Photopsia     Right   • Postmenopausal bleeding    • Sinus tachycardia    • Sjogren's syndrome    • Vitamin D deficiency    • Vitamin D deficiency 12/3/2016   • Vitreous detachment of right eye         Past Surgical History:   Procedure Laterality Date   • CHOLECYSTECTOMY  1997    with operative cholangiogram. Chronic cholecystitis & cholelithiasis. HX of passed common duct stone   • CYSTOSCOPY  1962    urethral dilatation.Recurrent pyelonephritis. urethritis   • ENDOSCOPY N/A 2017    Procedure: ESOPHAGOGASTRODUODENOSCOPY;  Surgeon: Cisco Mason DO;  Location: [facility] ENDOSCOPY;  Service:    • ENDOSCOPY AND COLONOSCOPY  1985    Normal colon to left transverse  colon   • GALLBLADDER SURGERY     • PAP SMEAR  2009   • TONSILLECTOMY  1950   • VAGINAL DELIVERY      x3             PT Ortho       08/23/17 1000    Subjective Comments    Subjective Comments Pt states she fell down the stairs this Saturday when her foot slipped; pt states she went to the ER and had imaging which revealed no fractures.  -AK    Precautions and Contraindications    Precautions/Limitations fall precautions  -AK    Precautions fibromyalgia  -AK    Subjective Pain    Able to rate subjective pain? yes  -AK    Pre-Treatment Pain Level 2  -AK    Post-Treatment Pain Level 2  -AK    MMT (Manual Muscle Testing)    General MMT Assessment Detail L LE : Hip Flexion=4-/5, Hip Extension=4/5, Hip Abduction=4/5, Knee Flexion and Extension=4+/5, Ankle DF=4+/5, Ankle PF=5/5. R LE: Hip Flexion=4-/5, Hip Extension=4/5, Hip Abduction=4/5, Knee Flexion and Extension=4+/5, Ankle DF=4+/5, Ankle PF=5/5.  -AK      User Key  (r) = Recorded By, (t) = Taken By, (c) = Cosigned By    Initials Name Provider Type    KHARI Neff, PT Physical Therapist                            PT Assessment/Plan       08/23/17 1205       PT Assessment    Assessment Comments Pt presents with improved LE strength and improved ambulation endurance however has suffered a setback as a result of her fall this past Saturday which resulted in pain and bruising but no fractures. Pt will continue to benefit from skilled PT intervention addressing strength and standing dynamic balance deficits in order to decrease risk of falls and maximize safe functional mobility.  -AK     PT Plan    PT Frequency 2x/week  -AK     Predicted Duration of Therapy Intervention (days/wks) 5 weeks  -AK     Physical Therapy Interventions (Optional Details) balance training;gross motor skills;gait training;home exercise program;manual therapy techniques;modalities;motor coordination training;neuromuscular re-education;stretching;strengthening;stair training;postural  "re-education;patient/family education;transfer training  -AK       User Key  (r) = Recorded By, (t) = Taken By, (c) = Cosigned By    Initials Name Provider Type    KHARI Neff, PT Physical Therapist                    Exercises       08/23/17 1000          Subjective Comments    Subjective Comments Pt states she fell down the stairs this Saturday when her foot slipped; pt states she went to the ER and had imaging which revealed no fractures.  -AK      Subjective Pain    Able to rate subjective pain? yes  -AK      Pre-Treatment Pain Level 2  -AK      Post-Treatment Pain Level 2  -AK      Exercise 1    Exercise Name 1 Bridges 4x10  -AK      Exercise 2    Exercise Name 2 Rhythmic Trunk Rotations in supine x10  -AK      Exercise 3    Exercise Name 3 B Knee to chest 2x10 each  -AK      Exercise 4    Exercise Name 4 Orange hurdles forwards and laterally L+R 3x each  -AK      Exercise 5    Exercise Name 5 sit to stands from 18\" high platform 2x10  -AK      Exercise 6    Exercise Name 6 sidesteps onto 6\" step without UE support 2x10 each  -AK      Exercise 7    Exercise Name 7 Recumbent bike x10 minutes level 2  -AK        User Key  (r) = Recorded By, (t) = Taken By, (c) = Cosigned By    Initials Name Provider Type    KHARI Neff, PT Physical Therapist                               PT OP Goals       08/23/17 1000       PT Short Term Goals    STG Date to Achieve 08/23/17  -AK     STG 1 Pt pain level in R hip will be no gretaer than 3/10 on the VAS at all times  -AK     STG 1 Progress Progressing  -AK     STG 2 Pt will have 4/5 or greater strength in R LE in all regards  -AK     STG 2 Progress Progressing  -AK     STG 3 Pt will be able to ambulate 400' or greater intervals without exacerbation of hip pain symptoms  -AK     STG 3 Progress Progressing  -AK     STG 4 Pt will have 15 point improvement in LEFS score  -AK     STG 4 Progress Progressing  -AK     Long Term Goals    LTG Date to Achieve 09/13/17  -AK     " LTG 1 Pt will have R hip pain no greater than 1/10 on the VAS at all times.  -AK     LTG 1 Progress Progressing  -AK     LTG 2 Pt will have 4+/5 strength in R LE in all regards  -AK     LTG 2 Progress Progressing  -AK     LTG 3 Pt will be able to ambulate 900' or greater with least restrictive AD without exacervation or R hip pain symptoms.  -AK     LTG 3 Progress Progressing  -AK     LTG 4 Pt will have 25 point or greater improvement in LEFS score  -AK     LTG 4 Progress Progressing  -AK       User Key  (r) = Recorded By, (t) = Taken By, (c) = Cosigned By    Initials Name Provider Type    KHARI Neff PT Physical Therapist                    Outcome Measures       08/23/17 1000          Lower Extremity Functional Index    Any of your usual work, housework or school activities 2  -AK      Your usual hobbies, recreational or sporting activities 1  -AK      Getting into or out of the bath 2  -AK      Walking between rooms 3  -AK      Putting on your shoes or socks 3  -AK      Squatting 0  -AK      Lifting an object, like a bag of groceries from the floor 3  -AK      Performing light activities around your home 3  -AK      Performing heavy activities around your home 1  -AK      Getting into or out of a car 2  -AK      Walking 2 blocks 1  -AK      Walking a mile 0  -AK      Going up or down 10 stairs (about 1 flight of stairs) 2  -AK      Standing for 1 hour 0  -AK      Sitting for 1 hour 3  -AK      Running on even ground 0  -AK      Running on uneven ground 0  -AK      Making sharp turns while running fast 0  -AK      Hopping 0  -AK      Rolling over in bed 2  -AK      Total 28  -AK      Functional Assessment    Outcome Measure Options Lower Extremity Functional Scale (LEFS)  -AK        User Key  (r) = Recorded By, (t) = Taken By, (c) = Cosigned By    Initials Name Provider Type    KHARI Neff PT Physical Therapist            Time Calculation:   Start Time: 1000  Stop Time: 1045  Time Calculation (min):  45 min  Total Timed Code Minutes- PT: 45 minute(s)    Therapy Charges for Today     Code Description Service Date Service Provider Modifiers Qty    25577611878 HC PT MOBILITY CURRENT 8/23/2017 Gerson Neff, PT GP, CL 1    21725673855 HC PT MOBILITY PROJECTED 8/23/2017 Gerson Neff, PT GP, CJ 1    23270716114 HC PT NEUROMUSC RE EDUCATION EA 15 MIN 8/23/2017 Gerson Neff, PT GP 1    77136122484 HC PT THER PROC EA 15 MIN 8/23/2017 Gerson Neff, PT GP 2          PT G-Codes  Outcome Measure Options: Lower Extremity Functional Scale (LEFS)  Score: 28/80  Functional Limitation: Mobility: Walking and moving around  Mobility: Walking and Moving Around Current Status (): At least 60 percent but less than 80 percent impaired, limited or restricted  Mobility: Walking and Moving Around Goal Status (): At least 20 percent but less than 40 percent impaired, limited or restricted         Gerson Neff PT  8/23/2017

## 2017-08-25 ENCOUNTER — HOSPITAL ENCOUNTER (OUTPATIENT)
Dept: PHYSICAL THERAPY | Facility: HOSPITAL | Age: 72
Setting detail: THERAPIES SERIES
Discharge: HOME | End: 2017-08-25

## 2017-08-25 DIAGNOSIS — M25.559 HIP JOINT PAINFUL ON MOVEMENT, UNSPECIFIED LATERALITY: Primary | ICD-10-CM

## 2017-08-25 PROCEDURE — 97110 THERAPEUTIC EXERCISES: CPT

## 2017-08-25 NOTE — THERAPY TREATMENT NOTE
Outpatient Physical Therapy Ortho Treatment Note  [facility]     Patient Name: Carla Best  : 1945  MRN: [id number]  Today's Date: 2017      Visit Date: 2017     Subjective Improvement: 50%  Attendance:   (medicare)  Next MD Visit : PRN  Recert Date:  17      Therapy Diagnosis:  R Hip Pain secondary to Posteriorly rotated hip and concordant muscle shortening        Visit Dx:    ICD-10-CM ICD-9-CM   1. Hip joint painful on movement, unspecified laterality M25.559 719.45       Patient Active Problem List   Diagnosis   • Osteopenia   • Hypothyroidism due to Hashimoto's thyroiditis   • Vitamin D deficiency   • Keratoconjunctivitis sicca   • Long term use of drug   • Cataract   • Primary osteoarthritis of right knee   • B12 deficiency        Past Medical History:   Diagnosis Date   • Chest pain    • Fibrocystic disease of breast    • Hashimoto's thyroiditis    • History of screening mammography 2014    SCREENING MAMMOGRAPHY DIGITAL  (Medicare) (1)    • Hyperlipidemia    • Hypothyroidism due to Hashimoto's thyroiditis 12/3/2016   • Keratoconjunctivitis sicca    • Nuclear senile cataract    • On long term drug therapy    • Osteoarthritis    • Osteopenia    • Osteopenia 12/3/2016   • Photopsia     Right   • Postmenopausal bleeding    • Sinus tachycardia    • Sjogren's syndrome    • Vitamin D deficiency    • Vitamin D deficiency 12/3/2016   • Vitreous detachment of right eye         Past Surgical History:   Procedure Laterality Date   • CHOLECYSTECTOMY  1997    with operative cholangiogram. Chronic cholecystitis & cholelithiasis. HX of passed common duct stone   • CYSTOSCOPY  1962    urethral dilatation.Recurrent pyelonephritis. urethritis   • ENDOSCOPY N/A 2017    Procedure: ESOPHAGOGASTRODUODENOSCOPY;  Surgeon: Cisco Mason DO;  Location: [facility] ENDOSCOPY;  Service:    • ENDOSCOPY AND COLONOSCOPY  1985    Normal colon to left transverse colon    • GALLBLADDER SURGERY     • PAP SMEAR  2009   • TONSILLECTOMY  1950   • VAGINAL DELIVERY      x3             PT Ortho       08/25/17 1100    Subjective Comments    Subjective Comments Leg still brusided from fall. Still hurting in the groining but not much  pain to mention.   -    Precautions and Contraindications    Precautions/Limitations fall precautions  -    Precautions fibromyalgia  -    Posture/Observations    Posture/Observations Comments no trendlenburg; gait SPC  -      08/23/17 1000    Subjective Comments    Subjective Comments Pt states she fell down the stairs this Saturday when her foot slipped; pt states she went to the ER and had imaging which revealed no fractures.  -AK    Precautions and Contraindications    Precautions/Limitations fall precautions  -AK    Precautions fibromyalgia  -AK    Subjective Pain    Able to rate subjective pain? yes  -AK    Pre-Treatment Pain Level 2  -AK    Post-Treatment Pain Level 2  -AK    MMT (Manual Muscle Testing)    General MMT Assessment Detail L LE : Hip Flexion=4-/5, Hip Extension=4/5, Hip Abduction=4/5, Knee Flexion and Extension=4+/5, Ankle DF=4+/5, Ankle PF=5/5. R LE: Hip Flexion=4-/5, Hip Extension=4/5, Hip Abduction=4/5, Knee Flexion and Extension=4+/5, Ankle DF=4+/5, Ankle PF=5/5.  -AK      User Key  (r) = Recorded By, (t) = Taken By, (c) = Cosigned By    Initials Name Provider Type    KALPANA Tsai, PTA Physical Therapy Assistant    KHARI Neff, PT Physical Therapist                            PT Assessment/Plan       08/25/17 1100       PT Assessment    Assessment Comments Leg tired after PT; No severe increase pain but sore.  No LOB noted with balance activities.   -     PT Plan    PT Frequency 2x/week  -     Predicted Duration of Therapy Intervention (days/wks) 5 weeks  -     PT Plan Comments Continue with balance training and LE strength. Manual as needed when bruising subsides.   -       User Key  (r) = Recorded By, (t) = Taken  "By, (c) = Cosigned By    Initials Name Provider Type    KALPANA Eulacarlotta Tsai PTA Physical Therapy Assistant                Modalities       08/25/17 1100          Subjective Pain    Post-Treatment Pain Level 1  -KH        User Key  (r) = Recorded By, (t) = Taken By, (c) = Cosigned By    Initials Name Provider Type    KALPANA Eula JAIDEN Tsai Physical Therapy Assistant                Exercises       08/25/17 1100          Subjective Comments    Subjective Comments Leg still brusided from fall. Still hurting in the groining but not much  pain to mention.   -KH      Subjective Pain    Able to rate subjective pain? yes  -KH      Pre-Treatment Pain Level 1  -KH      Post-Treatment Pain Level 1  -KH      Exercise 1    Exercise Name 1 pro ll LE ROM/strength  -KH      Time (Minutes) 1 5'  -KH      Exercise 2    Exercise Name 2 standing hip flexor stretch  -KH      Sets 2 3  -KH      Time (Seconds) 2 30\"  -KH      Exercise 3    Exercise Name 3 standing balance FT EO  -KH      Sets 3 3  -KH      Time (Seconds) 3 30\"  -KH      Exercise 4    Exercise Name 4 standing balance FA EO/EC  -KH      Sets 4 3  -KH      Time (Seconds) 4 30\" each  -KH      Exercise 5    Exercise Name 5 bridges   -KH      Sets 5 4  -KH      Reps 5 10  -KH      Exercise 6    Exercise Name 6 DKTC w/ feet on ball  -KH      Sets 6 2  -KH      Reps 6 10  -KH      Exercise 7    Exercise Name 7 ham sets w/ feet over ball  -KH      Sets 7 2  -KH      Reps 7 10  -KH      Exercise 8    Exercise Name 8 step ups fwd B   -KH      Sets 8 2  -KH      Reps 8 10  -KH      Additional Comments 4 inch  -KH      Exercise 9    Exercise Name 9 step ups lat B  -KH      Sets 9 2  -KH      Reps 9 10  -KH      Additional Comments 4 inch  -KH        User Key  (r) = Recorded By, (t) = Taken By, (c) = Cosigned By    Initials Name Provider Type    KALPANA Eulacarlotta Tsai PTA Physical Therapy Assistant                               PT OP Goals       08/25/17 1100       PT Short Term Goals    STG Date to " Achieve 08/23/17  -     STG 1 Pt pain level in R hip will be no gretaer than 3/10 on the VAS at all times  -     STG 1 Progress Progressing  -KH     STG 2 Pt will have 4/5 or greater strength in R LE in all regards  -     STG 2 Progress Progressing  -KH     STG 3 Pt will be able to ambulate 400' or greater intervals without exacerbation of hip pain symptoms  -KH     STG 3 Progress Progressing  -KH     STG 4 Pt will have 15 point improvement in LEFS score  -     STG 4 Progress Progressing  -KH     Long Term Goals    LTG Date to Achieve 09/13/17  -KH     LTG 1 Pt will have R hip pain no greater than 1/10 on the VAS at all times.  -KH     LTG 1 Progress Progressing  -KH     LTG 2 Pt will have 4+/5 strength in R LE in all regards  -KH     LTG 2 Progress Progressing  -KH     LTG 3 Pt will be able to ambulate 900' or greater with least restrictive AD without exacervation or R hip pain symptoms.  -     LTG 3 Progress Progressing  -KH     LTG 4 Pt will have 25 point or greater improvement in LEFS score  -     LTG 4 Progress Progressing  -KH     Time Calculation    PT Goal Re-Cert Due Date 09/13/17  -       User Key  (r) = Recorded By, (t) = Taken By, (c) = Cosigned By    Initials Name Provider Type    KALPANA Tsai PTA Physical Therapy Assistant                    Outcome Measures       08/23/17 1000          Lower Extremity Functional Index    Any of your usual work, housework or school activities 2  -AK      Your usual hobbies, recreational or sporting activities 1  -AK      Getting into or out of the bath 2  -AK      Walking between rooms 3  -AK      Putting on your shoes or socks 3  -AK      Squatting 0  -AK      Lifting an object, like a bag of groceries from the floor 3  -AK      Performing light activities around your home 3  -AK      Performing heavy activities around your home 1  -AK      Getting into or out of a car 2  -AK      Walking 2 blocks 1  -AK      Walking a mile 0  -AK      Going up or down  10 stairs (about 1 flight of stairs) 2  -AK      Standing for 1 hour 0  -AK      Sitting for 1 hour 3  -AK      Running on even ground 0  -AK      Running on uneven ground 0  -AK      Making sharp turns while running fast 0  -AK      Hopping 0  -AK      Rolling over in bed 2  -AK      Total 28  -AK      Functional Assessment    Outcome Measure Options Lower Extremity Functional Scale (LEFS)  -AK        User Key  (r) = Recorded By, (t) = Taken By, (c) = Cosigned By    Initials Name Provider Type    KHARI Neff, PT Physical Therapist            Time Calculation:   Start Time: 1100  Stop Time: 1145  Time Calculation (min): 45 min  Total Timed Code Minutes- PT: 45 minute(s)    Therapy Charges for Today     Code Description Service Date Service Provider Modifiers Qty    05094071387  PT THER PROC EA 15 MIN 8/25/2017 Eula Tsai PTA GP 3                    Eula Tsai PTA  8/25/2017

## 2017-08-28 ENCOUNTER — APPOINTMENT (OUTPATIENT)
Dept: PHYSICAL THERAPY | Facility: HOSPITAL | Age: 72
End: 2017-08-28

## 2017-08-30 ENCOUNTER — HOSPITAL ENCOUNTER (OUTPATIENT)
Dept: PHYSICAL THERAPY | Facility: HOSPITAL | Age: 72
Setting detail: THERAPIES SERIES
Discharge: HOME | End: 2017-08-30

## 2017-08-30 DIAGNOSIS — M25.559 HIP JOINT PAINFUL ON MOVEMENT, UNSPECIFIED LATERALITY: Primary | ICD-10-CM

## 2017-08-30 PROCEDURE — 97110 THERAPEUTIC EXERCISES: CPT

## 2017-09-01 ENCOUNTER — HOSPITAL ENCOUNTER (OUTPATIENT)
Dept: PHYSICAL THERAPY | Facility: HOSPITAL | Age: 72
Setting detail: THERAPIES SERIES
Discharge: HOME | End: 2017-09-01

## 2017-09-01 DIAGNOSIS — M25.559 HIP JOINT PAINFUL ON MOVEMENT, UNSPECIFIED LATERALITY: Primary | ICD-10-CM

## 2017-09-01 PROCEDURE — 97110 THERAPEUTIC EXERCISES: CPT

## 2017-09-01 PROCEDURE — 97140 MANUAL THERAPY 1/> REGIONS: CPT

## 2017-09-01 NOTE — THERAPY TREATMENT NOTE
Outpatient Physical Therapy Ortho Treatment Note  [facility]     Patient Name: Carla Best  : 1945  MRN: [id number]  Today's Date: 2017      Visit Date: 2017     Subjective Improvement: 50%  Attendance:   (medicare)  Next MD Visit : Aditya Deya 17  Recert Date:  17      Therapy Diagnosis:  R Hip pain secondary to posteriorly rotated hip and concordant muscle shortening.         Visit Dx:    ICD-10-CM ICD-9-CM   1. Hip joint painful on movement, unspecified laterality M25.559 719.45       Patient Active Problem List   Diagnosis   • Osteopenia   • Hypothyroidism due to Hashimoto's thyroiditis   • Vitamin D deficiency   • Keratoconjunctivitis sicca   • Long term use of drug   • Cataract   • Primary osteoarthritis of right knee   • B12 deficiency        Past Medical History:   Diagnosis Date   • Chest pain    • Fibrocystic disease of breast    • Hashimoto's thyroiditis    • History of screening mammography 2014    SCREENING MAMMOGRAPHY DIGITAL  (Medicare) (1)    • Hyperlipidemia    • Hypothyroidism due to Hashimoto's thyroiditis 12/3/2016   • Keratoconjunctivitis sicca    • Nuclear senile cataract    • On long term drug therapy    • Osteoarthritis    • Osteopenia    • Osteopenia 12/3/2016   • Photopsia     Right   • Postmenopausal bleeding    • Sinus tachycardia    • Sjogren's syndrome    • Vitamin D deficiency    • Vitamin D deficiency 12/3/2016   • Vitreous detachment of right eye         Past Surgical History:   Procedure Laterality Date   • CHOLECYSTECTOMY  1997    with operative cholangiogram. Chronic cholecystitis & cholelithiasis. HX of passed common duct stone   • CYSTOSCOPY  1962    urethral dilatation.Recurrent pyelonephritis. urethritis   • ENDOSCOPY N/A 2017    Procedure: ESOPHAGOGASTRODUODENOSCOPY;  Surgeon: Cisco Mason DO;  Location: [facility] ENDOSCOPY;  Service:    • ENDOSCOPY AND COLONOSCOPY  1985    Normal colon to  left transverse colon   • GALLBLADDER SURGERY     • PAP SMEAR  2009   • TONSILLECTOMY  1950   • VAGINAL DELIVERY      x3             PT Ortho       09/01/17 0845    Subjective Comments    Subjective Comments today not as bad as the other day but still has some brusiing an knots in the posterior thigh.   -    Precautions and Contraindications    Precautions/Limitations fall precautions  -    Precautions fibromyalgia  -    Subjective Pain    Able to rate subjective pain? yes  -    Pre-Treatment Pain Level 5  -    Post-Treatment Pain Level 5  -    Posture/Observations    Posture/Observations Comments gait with SPC;  no trendelenburg or antalgia noted.   -      08/30/17 0845    Precautions and Contraindications    Precautions/Limitations fall precautions  -    Precautions fibromyalgia  -    Subjective Pain    Post-Treatment Pain Level 5  -    Posture/Observations    Posture/Observations Comments brusing and triggers noted in the hamstring; gait w/ SPC; no antalgia noted.   -      User Key  (r) = Recorded By, (t) = Taken By, (c) = Cosigned By    Initials Name Provider Type    KALPANA Tsai PTA Physical Therapy Assistant                            PT Assessment/Plan       09/01/17 0845       PT Assessment    Assessment Comments alignment corrected with ME this date. Doing well with progression of exercises but still has pain. in the R groining area.   -     PT Plan    PT Frequency 2x/week  -     Predicted Duration of Therapy Intervention (days/wks) 5 weeks  -     PT Plan Comments MD note next visit. COntinue to increase as pt tolerates. attempt step ups next fwd and lateral.   -       User Key  (r) = Recorded By, (t) = Taken By, (c) = Cosigned By    Initials Name Provider Type    KALPANA Tsai PTA Physical Therapy Assistant                    Exercises       09/01/17 0845          Subjective Comments    Subjective Comments today not as bad as the other day but still has some brusiing an  "knots in the posterior thigh.   -KH      Subjective Pain    Able to rate subjective pain? yes  -KH      Pre-Treatment Pain Level 5  -KH      Post-Treatment Pain Level 5  -KH      Exercise 1    Exercise Name 1 pro ll LE strength  -KH      Time (Minutes) 1 10'  -KH      Additional Comments Level 2  -KH      Exercise 2    Exercise Name 2 supine hip flexor stretch off table.   -KH      Sets 2 3  -KH      Time (Seconds) 2 30\"  -KH      Exercise 3    Exercise Name 3 bridges   -KH      Sets 3 4  -KH      Reps 3 10  -KH      Exercise 4    Exercise Name 4 LTR  -KH      Sets 4 1  -KH      Reps 4 10  -KH      Time (Seconds) 4 10\"  -KH      Exercise 5    Exercise Name 5 jt mobes R hip/ME  -KH      Time (Minutes) 5 10'  -KH      Exercise 6    Exercise Name 6 prone TKE's B  -KH      Sets 6 2  -KH      Reps 6 10  -KH      Exercise 7    Exercise Name 7 prone glut and heel squeezes  -KH      Sets 7 2  -KH      Reps 7 10  -KH        User Key  (r) = Recorded By, (t) = Taken By, (c) = Cosigned By    Initials Name Provider Type    KALPANA Tsai PTA Physical Therapy Assistant                        Manual Rx (last 36 hours)      Manual Treatments       09/01/17 0845          Manual Rx 1    Manual Rx 1 Location R hip anterior torsion/ME to sacrum  -KH      Manual Rx 1 Duration 5'  -KH      Manual Rx 2    Manual Rx 2 Location R hip  -KH      Manual Rx 2 Type inferior;IR/ER jt mobes  -KH      Manual Rx 2 Duration 5'  -KH        User Key  (r) = Recorded By, (t) = Taken By, (c) = Cosigned By    Initials Name Provider Type    KALPANA Tsai PTA Physical Therapy Assistant                PT OP Goals       09/01/17 0845       PT Short Term Goals    STG Date to Achieve 08/23/17  -     STG 1 Pt pain level in R hip will be no gretaer than 3/10 on the VAS at all times  -     STG 1 Progress Progressing  -     STG 2 Pt will have 4/5 or greater strength in R LE in all regards  -     STG 2 Progress Progressing  -     STG 3 Pt will be able to " ambulate 400' or greater intervals without exacerbation of hip pain symptoms  -     STG 3 Progress Progressing  -     STG 4 Pt will have 15 point improvement in LEFS score  -     STG 4 Progress Progressing  -     Long Term Goals    LTG Date to Achieve 09/13/17  -     LTG 1 Pt will have R hip pain no greater than 1/10 on the VAS at all times.  -     LTG 1 Progress Progressing  -     LTG 2 Pt will have 4+/5 strength in R LE in all regards  -     LTG 2 Progress Progressing  -     LTG 3 Pt will be able to ambulate 900' or greater with least restrictive AD without exacervation or R hip pain symptoms.  -     LTG 3 Progress Progressing  -     LTG 4 Pt will have 25 point or greater improvement in LEFS score  -     LTG 4 Progress Progressing  -     Time Calculation    PT Goal Re-Cert Due Date 09/13/17  -       User Key  (r) = Recorded By, (t) = Taken By, (c) = Cosigned By    Initials Name Provider Type    KALPANA Tsai PTA Physical Therapy Assistant                    Time Calculation:   Start Time: 0845  Stop Time: 0925  Time Calculation (min): 40 min  Total Timed Code Minutes- PT: 40 minute(s)    Therapy Charges for Today     Code Description Service Date Service Provider Modifiers Qty    22149822653 HC PT MANUAL THERAPY EA 15 MIN 9/1/2017 Eula Tsai PTA GP 1    81255651834 HC PT THER PROC EA 15 MIN 9/1/2017 Eula Tsai PTA GP 2                    Eula Tsai PTA  9/1/2017

## 2017-09-06 ENCOUNTER — HOSPITAL ENCOUNTER (OUTPATIENT)
Dept: PHYSICAL THERAPY | Facility: HOSPITAL | Age: 72
Setting detail: THERAPIES SERIES
Discharge: HOME | End: 2017-09-06

## 2017-09-06 ENCOUNTER — OFFICE VISIT (OUTPATIENT)
Dept: ORTHOPEDIC SURGERY | Facility: CLINIC | Age: 72
End: 2017-09-06

## 2017-09-06 VITALS — BODY MASS INDEX: 29.1 KG/M2 | WEIGHT: 192 LBS | HEIGHT: 68 IN
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
## 2017-09-06 DIAGNOSIS — M25.551 RIGHT HIP PAIN: Primary | ICD-10-CM

## 2017-09-06 DIAGNOSIS — M25.559 HIP JOINT PAINFUL ON MOVEMENT, UNSPECIFIED LATERALITY: Primary | ICD-10-CM

## 2017-09-06 DIAGNOSIS — M54.50 ACUTE RIGHT-SIDED LOW BACK PAIN WITHOUT SCIATICA: ICD-10-CM

## 2017-09-06 PROCEDURE — 99214 OFFICE O/P EST MOD 30 MIN: CPT | Performed by: NURSE PRACTITIONER

## 2017-09-06 PROCEDURE — 97110 THERAPEUTIC EXERCISES: CPT

## 2017-09-06 NOTE — THERAPY TREATMENT NOTE
Outpatient Physical Therapy Ortho Treatment Note  [facility]     Patient Name: Carla Best  : 1945  MRN: [id number]  Today's Date: 2017      Visit Date: 2017     Subjective Improvement: 50%  Attendance:   (medicare)  Next MD Visit : awaiting MRI  Recert Date:  17      Therapy Diagnosis:  R Hip Pain         Visit Dx:    ICD-10-CM ICD-9-CM   1. Hip joint painful on movement, unspecified laterality M25.559 719.45       Patient Active Problem List   Diagnosis   • Osteopenia   • Hypothyroidism due to Hashimoto's thyroiditis   • Vitamin D deficiency   • Keratoconjunctivitis sicca   • Long term use of drug   • Cataract   • Primary osteoarthritis of right knee   • B12 deficiency   • Right hip pain        Past Medical History:   Diagnosis Date   • Chest pain    • Fibrocystic disease of breast    • Hashimoto's thyroiditis    • History of screening mammography 2014    SCREENING MAMMOGRAPHY DIGITAL  (Medicare) (1)    • Hyperlipidemia    • Hypothyroidism due to Hashimoto's thyroiditis 12/3/2016   • Keratoconjunctivitis sicca    • Nuclear senile cataract    • On long term drug therapy    • Osteoarthritis    • Osteopenia    • Osteopenia 12/3/2016   • Photopsia     Right   • Postmenopausal bleeding    • Sinus tachycardia    • Sjogren's syndrome    • Vitamin D deficiency    • Vitamin D deficiency 12/3/2016   • Vitreous detachment of right eye         Past Surgical History:   Procedure Laterality Date   • CHOLECYSTECTOMY  1997    with operative cholangiogram. Chronic cholecystitis & cholelithiasis. HX of passed common duct stone   • CYSTOSCOPY  1962    urethral dilatation.Recurrent pyelonephritis. urethritis   • ENDOSCOPY N/A 2017    Procedure: ESOPHAGOGASTRODUODENOSCOPY;  Surgeon: Cisco Mason DO;  Location: [facility] ENDOSCOPY;  Service:    • ENDOSCOPY AND COLONOSCOPY  1985    Normal colon to left transverse colon   • GALLBLADDER SURGERY     • PAP SMEAR   "2009   • TONSILLECTOMY  1950   • VAGINAL DELIVERY      x3             PT Ortho       09/06/17 1430    Precautions and Contraindications    Precautions/Limitations fall precautions  -    Precautions fibromyalgia  -    Posture/Observations    Posture/Observations Comments gait with SPC  -      User Key  (r) = Recorded By, (t) = Taken By, (c) = Cosigned By    Initials Name Provider Type    KALPANA Tsai PTA Physical Therapy Assistant                            PT Assessment/Plan       09/06/17 1430       PT Assessment    Assessment Comments reviewed HEP this date.  Pt to be on hold after today until she see MD and has MRI on the right hip.  Recheck to be scheduled week o 09/18/17 to keep current orders for PT.  Pt to call and cancel appts if MRI reveals any other underlying symptoms that warrent d/c of PT.   -     PT Plan    PT Frequency 2x/week  -     Predicted Duration of Therapy Intervention (days/wks) 5 weeks  -     PT Plan Comments Pt to obtain a MRI, placing patient on hold until then; will schedule recert the week of 09/18/17 in order to keep orders current with PT.  Await MRI results and will continue with HEP.   -       User Key  (r) = Recorded By, (t) = Taken By, (c) = Cosigned By    Initials Name Provider Type    KALPANA Tsai PTA Physical Therapy Assistant                    Exercises       09/06/17 1430          Subjective Comments    Subjective Comments Pt reports that her hip was feeling better until she went shopping over the weekend and has kind of went backwards.  Still has brusing and \"knots in the right thigh\". states that she didnt shop for long and made sure she took plenty of rest breaks but still had lots of pain. Saw leda heredia this date and he has ordered an MRI to rule out a hairline fracture or any other underliying issue.   -KALPANA      Subjective Pain    Able to rate subjective pain? yes  -KALPANA      Pre-Treatment Pain Level 5  -KH      Post-Treatment Pain Level 5  -KH      " "Exercise 1    Exercise Name 1 pro ll LE strength   -      Time (Minutes) 1 10'  -KH      Additional Comments Level 2  -KH      Exercise 2    Exercise Name 2 standing hip flex stretch  -KH      Sets 2 3  -KH      Time (Seconds) 2 30\"  -KH      Exercise 3    Exercise Name 3 standing hamstring stretch  -KH      Sets 3 3  -KH      Time (Seconds) 3 30\"  -KH      Exercise 4    Exercise Name 4 seated pirformis stretch  -KH      Sets 4 3  -KH      Time (Seconds) 4 30\"  -KH      Exercise 5    Exercise Name 5 LAQ w/ add squeeze  -KH      Sets 5 2  -KH      Reps 5 10  -KH      Additional Comments B  -KH      Exercise 6    Exercise Name 6 seated hip flexion  -KH      Sets 6 2  -KH      Reps 6 10  -KH        User Key  (r) = Recorded By, (t) = Taken By, (c) = Cosigned By    Initials Name Provider Type    KALPANA sTai PTA Physical Therapy Assistant                               PT OP Goals       09/06/17 1430       PT Short Term Goals    STG Date to Achieve 08/23/17  -     STG 1 Pt pain level in R hip will be no gretaer than 3/10 on the VAS at all times  -     STG 1 Progress Progressing  -     STG 2 Pt will have 4/5 or greater strength in R LE in all regards  -     STG 2 Progress Progressing  -     STG 3 Pt will be able to ambulate 400' or greater intervals without exacerbation of hip pain symptoms  -     STG 3 Progress Progressing  -     STG 4 Pt will have 15 point improvement in LEFS score  -     STG 4 Progress Progressing  -     Long Term Goals    LTG Date to Achieve 09/13/17  -     LTG 1 Pt will have R hip pain no greater than 1/10 on the VAS at all times.  -     LTG 1 Progress Progressing  -     LTG 2 Pt will have 4+/5 strength in R LE in all regards  -     LTG 2 Progress Progressing  -     LTG 3 Pt will be able to ambulate 900' or greater with least restrictive AD without exacervation or R hip pain symptoms.  -     LTG 3 Progress Progressing  -     LTG 4 Pt will have 25 point or greater " improvement in LEFS score  -KALPANA     LTG 4 Progress Progressing  -KALPANA     Time Calculation    PT Goal Re-Cert Due Date 09/13/17  -KALPANA       User Key  (r) = Recorded By, (t) = Taken By, (c) = Cosigned By    Initials Name Provider Type    KALPANA Tsai PTA Physical Therapy Assistant                    Time Calculation:   Start Time: 1430  Stop Time: 1510  Time Calculation (min): 40 min  Total Timed Code Minutes- PT: 40 minute(s)    Therapy Charges for Today     Code Description Service Date Service Provider Modifiers Qty    53197077505 HC PT THER PROC EA 15 MIN 9/6/2017 Eula Tsai PTA GP 3                    Eula Tsai PTA  9/6/2017

## 2017-09-06 NOTE — PROGRESS NOTES
Carla Best is a 72 y.o. female   Primary provider:  ZACHARY Fleming       Chief Complaint   Patient presents with   • Right Hip - Establish Care, Pain       HISTORY OF PRESENT ILLNESS: Patient fell in July    Pain   This is a new problem. The current episode started more than 1 month ago. The problem occurs constantly. The problem has been unchanged. Associated symptoms comments: Sharp pain with burning  . The symptoms are aggravated by walking. She has tried ice and rest for the symptoms.        CONCURRENT MEDICAL HISTORY:    Past Medical History:   Diagnosis Date   • Chest pain    • Fibrocystic disease of breast    • Hashimoto's thyroiditis    • History of screening mammography 08/27/2014    SCREENING MAMMOGRAPHY DIGITAL  (Medicare) (1)    • Hyperlipidemia    • Hypothyroidism due to Hashimoto's thyroiditis 12/3/2016   • Keratoconjunctivitis sicca    • Nuclear senile cataract    • On long term drug therapy    • Osteoarthritis    • Osteopenia    • Osteopenia 12/3/2016   • Photopsia     Right   • Postmenopausal bleeding    • Sinus tachycardia    • Sjogren's syndrome    • Vitamin D deficiency    • Vitamin D deficiency 12/3/2016   • Vitreous detachment of right eye        Allergies   Allergen Reactions   • Augmentin [Amoxicillin-Pot Clavulanate] Diarrhea   • Ciprofloxacin    • Codeine    • Demerol [Meperidine]    • Dexlansoprazole    • Phenazopyridine    • Tape          Current Outpatient Prescriptions:   •  aspirin 81 MG EC tablet, Take 81 mg by mouth daily., Disp: , Rfl:   •  Biotin (BIOTIN MAXIMUM STRENGTH) 5 MG capsule, Take  by mouth., Disp: , Rfl:   •  Calcium Carbonate-Vitamin D (CALCIUM 600+D) 600-200 MG-UNIT tablet, Take  by mouth., Disp: , Rfl:   •  CALCIUM CITRATE-VITAMIN D3 PO, Take  by mouth., Disp: , Rfl:   •  clobetasol (TEMOVATE) 0.05 % external solution, Apply 1 application topically Daily., Disp: , Rfl:   •  hydroxychloroquine (PLAQUENIL) 200 MG tablet, Take  by mouth 2 (Two)  Times a Day., Disp: , Rfl:   •  hydroxychloroquine (PLAQUENIL) 200 MG tablet, TAKE 1 TABLET BY MOUTH TWICE DAILY, Disp: , Rfl:   •  Iron-Vitamins (GERITOL PO), Take 5 mL by mouth 2 (Two) Times a Day., Disp: , Rfl:   •  levothyroxine (SYNTHROID, LEVOTHROID) 88 MCG tablet, Take 88 mcg by mouth., Disp: , Rfl:   •  meclizine (ANTIVERT) 12.5 MG tablet, Take 12.5 mg by mouth 3 (Three) Times a Day As Needed for dizziness., Disp: , Rfl:   •  meloxicam (MOBIC) 15 MG tablet, Take 1 tablet by mouth Daily., Disp: 30 tablet, Rfl: 1  •  metoprolol tartrate (LOPRESSOR) 25 MG tablet, TAKE 1 TABLET BY MOUTH TWICE DAILY., Disp: 60 tablet, Rfl: 11  •  Multiple Vitamin (M.V.I. ADULT IV), Take  by mouth., Disp: , Rfl:   •  Multiple Vitamins-Minerals (MULTIVITAMIN ADULT PO), Take  by mouth., Disp: , Rfl:   •  Omega-3 Fatty Acids (FISH OIL) 1000 MG capsule capsule, Take 2,000 mg by mouth Daily With Breakfast., Disp: , Rfl:   •  Probiotic Product (PROBIOTIC & ACIDOPHILUS EX ST PO), Take  by mouth., Disp: , Rfl:   •  Probiotic Product (PROBIOTIC DAILY PO), Take  by mouth., Disp: , Rfl:   •  SYNTHROID 75 MCG tablet, Take 1 tablet by mouth Daily., Disp: 30 tablet, Rfl: 11  •  Thyroid 30 MG PO tablet, 2 in a.m. And 2 in p.m., Disp: 120 tablet, Rfl: 11  •  Thyroid 30 MG PO tablet, Take 30 mg by mouth., Disp: , Rfl:   •  tiZANidine (ZANAFLEX) 4 MG tablet, Take 1 tablet by mouth Every 8 (Eight) Hours As Needed for Muscle Spasms., Disp: 15 tablet, Rfl: 0  •  triamcinolone (KENALOG) 0.1 % cream, Apply  topically 2 (two) times a day., Disp: , Rfl:   •  vitamin D (ERGOCALCIFEROL) 82032 UNITS capsule capsule, TAKE 1 CAPSULE BY MOUTH EVERY 2 WEEKS, Disp: 6 capsule, Rfl: 0  •  vitamin D (ERGOCALCIFEROL) 15303 UNITS capsule capsule, Take 50,000 Units by mouth., Disp: , Rfl:     Past Surgical History:   Procedure Laterality Date   • CHOLECYSTECTOMY  06/09/1997    with operative cholangiogram. Chronic cholecystitis & cholelithiasis. HX of passed common  "duct stone   • CYSTOSCOPY  11/26/1962    urethral dilatation.Recurrent pyelonephritis. urethritis   • ENDOSCOPY N/A 2/17/2017    Procedure: ESOPHAGOGASTRODUODENOSCOPY;  Surgeon: Cisco Mason DO;  Location: [facility] ENDOSCOPY;  Service:    • ENDOSCOPY AND COLONOSCOPY  09/30/1985    Normal colon to left transverse colon   • GALLBLADDER SURGERY     • PAP SMEAR  2009   • TONSILLECTOMY  1950   • VAGINAL DELIVERY      x3       Family History   Problem Relation Age of Onset   • Heart disease Mother    • Arthritis Mother    • Osteoporosis Mother    • Cataracts Mother    • Heart disease Father    • Hypertension Sister    • Arthritis Sister    • Diabetes Sister    • Fuchs' dystrophy Sister    • Breast cancer Paternal Grandmother    • Diabetes Other    • Heart disease Other    • Hypertension Other    • Stroke Other    • Thyroid disease Other    • Lung disease Other    • Other Other      Gallstones, Bleeding Tendency, Colon Problems.   • Fuchs' dystrophy Maternal Grandfather        Social History     Social History   • Marital status:      Spouse name: N/A   • Number of children: N/A   • Years of education: N/A     Occupational History   • Not on file.     Social History Main Topics   • Smoking status: Never Smoker   • Smokeless tobacco: Never Used   • Alcohol use No   • Drug use: No   • Sexual activity: Not on file     Other Topics Concern   • Not on file     Social History Narrative        Review of Systems   Constitutional: Negative.    HENT: Negative.    Eyes: Negative.    Respiratory: Negative.    Cardiovascular: Negative.    Gastrointestinal: Negative.    Endocrine: Negative.    Genitourinary: Negative.    Musculoskeletal: Negative.    Skin: Negative.    Allergic/Immunologic: Negative.    Neurological: Negative.    Hematological: Negative.    Psychiatric/Behavioral: Negative.        PHYSICAL EXAMINATION:       Ht 68\" (172.7 cm)  Wt 192 lb (87.1 kg)  BMI 29.19 kg/m2    Physical Exam   Constitutional: She is " oriented to person, place, and time. Vital signs are normal. She appears well-developed and well-nourished. She is cooperative.   HENT:   Head: Normocephalic and atraumatic.   Neck: Trachea normal and phonation normal.   Pulmonary/Chest: Effort normal. No respiratory distress.   Abdominal: Soft. Normal appearance. She exhibits no distension.   Neurological: She is alert and oriented to person, place, and time. GCS eye subscore is 4. GCS verbal subscore is 5. GCS motor subscore is 6.   Skin: Skin is warm, dry and intact.   Psychiatric: She has a normal mood and affect. Her speech is normal and behavior is normal. Judgment and thought content normal. Cognition and memory are normal.   Vitals reviewed.      GAIT:     []  Normal  [x]  Antalgic    Assistive device: []  None  []  Walker     []  Crutches  [x]  Cane     []  Wheelchair  []  Stretcher    Back Exam     Tenderness   The patient is experiencing tenderness in the sacroiliac.    Range of Motion   Extension: abnormal   Flexion: abnormal   Lateral Bend Right: normal   Lateral Bend Left: normal   Rotation Right: abnormal   Rotation Left: abnormal     Muscle Strength   Right Quadriceps:  4/5   Left Quadriceps:  4/5   Right Hamstrings:  4/5   Left Hamstrings:  4/5     Tests   Straight leg raise right: negative  Straight leg raise left: negative    Reflexes   Patellar: 1/4  Achilles: 1/4    Other   Toe Walk: abnormal  Heel Walk: abnormal  Sensation: normal  Gait: antalgic   Erythema: no back redness  Scars: absent                  Xr Ribs Right With Pa Chest    Result Date: 8/20/2017  Narrative: Patient Name:  MRS. AKUA OLIVEIRA Patient ID:  [id number] Ordering:  SHANNAN BREWER Attending:  SHANNAN BREWER Referring:  SHANNAN BREWER ------------------------------------------------ Right RIBS with single view chest HISTORY: Pain after falling Radiographs of the right hemithorax and upright PA film of the chest obtained. COMPARISON: None No right rib fracture  identified. The lungs are clear of an acute process. Old granulomatous disease is present. The heart is not enlarged. The pulmonary vasculature is not increased. No pleural effusion. No pneumothorax. No acute osseous abnormality. Surgical clips right upper quadrant of the abdomen.     Impression: CONCLUSION: No right rib fracture identified. No acute disease in the chest. [id number] Electronically signed by:  Torin Lopez MD  8/20/2017 11:40 AM CDT Workstation: [employer] Spine Cervical Complete 4 Or 5 View    Result Date: 8/20/2017  Narrative: Patient Name:  MRS. AKUA OLIVEIRA Patient ID:  [id number] Ordering:  SHANNAN BREWER Attending:  SHANNAN BREWER Referring:  SHANNAN BREWER ------------------------------------------------ Five-view cervical spine HISTORY: Neck pain after falling. AP, odontoid, both oblique and lateral views of the cervical spine were obtained. Normal cervical lordosis. Vertebral height and alignment are maintained. Minimal facet arthropathy mid to lower cervical spine. No fracture. Prevertebral soft tissues unremarkable.     Impression: CONCLUSION: No cervical fracture [id number] Electronically signed by:  Torin Lopez MD  8/20/2017 11:34 AM CDT Workstation: [employer]    Xr Shoulder 2+ View Right    Result Date: 8/20/2017  Narrative: Patient Name:  MRS. AKUA OLIVEIRA Patient ID:  [id number] Ordering:  SHANNAN BREWER Attending:  SHANNAN BREWER Referring:  SHANNAN BREWER ------------------------------------------------ Three view right shoulder HISTORY: Pain after falling AP films with the humerus in internal and external rotation and scapular Y view were obtained. COMPARISON: None No fracture or dislocation. Minimal hypertrophic change acromioclavicular joint. No other osseous or articular abnormality.     Impression: CONCLUSION: No fracture or dislocation [id number] Electronically signed by:  Torin Lopez MD  8/20/2017 11:35 AM CDT Workstation:  [email or web address]          ASSESSMENT:    Diagnoses and all orders for this visit:    Right hip pain  -     MRI Lumbar Spine Without Contrast; Future  -     Cancel: MRI Pelvis Without Contrast; Future  -     MRI Femur Right Without Contrast; Future    Acute right-sided low back pain without sciatica    Fall, initial encounter          PLAN  Recommend MRI of the lumbar spine and right femur for further evaluation of pain that's failed to improve since July when she fell a second time.  She's continue to use a cane for modified weightbearing anti-inflammatories and Tylenol for discomfort ice and physical therapy without improvement.  No Follow-up on file.    ZACHARY Ley    Answers for HPI/ROS submitted by the patient on 9/1/2017   Incident occurred: more than 1 week ago  Incident location: at home  Injury mechanism: a fall  Pain location: right hip, right thigh  Pain quality: aching, burning, shooting, stabbing  Pain course: fluctuating  tingling: No  loss of motion: Yes  loss of sensation: No  Foreign body present: no foreign bodies

## 2017-09-08 ENCOUNTER — APPOINTMENT (OUTPATIENT)
Dept: PHYSICAL THERAPY | Facility: HOSPITAL | Age: 72
End: 2017-09-08

## 2017-09-08 DIAGNOSIS — M25.551 RIGHT HIP PAIN: ICD-10-CM

## 2017-09-12 ENCOUNTER — OFFICE VISIT (OUTPATIENT)
Dept: ORTHOPEDIC SURGERY | Facility: CLINIC | Age: 72
End: 2017-09-12

## 2017-09-12 VITALS — BODY MASS INDEX: 29.4 KG/M2 | WEIGHT: 194 LBS | HEIGHT: 68 IN

## 2017-09-12 DIAGNOSIS — M25.551 RIGHT HIP PAIN: ICD-10-CM

## 2017-09-12 DIAGNOSIS — M54.50 ACUTE RIGHT-SIDED LOW BACK PAIN WITHOUT SCIATICA: ICD-10-CM

## 2017-09-12 DIAGNOSIS — M70.61 TROCHANTERIC BURSITIS OF RIGHT HIP: Primary | ICD-10-CM

## 2017-09-12 PROCEDURE — 99214 OFFICE O/P EST MOD 30 MIN: CPT | Performed by: NURSE PRACTITIONER

## 2017-09-12 NOTE — PROGRESS NOTES
Carla Best is a 72 y.o. female returns for     Chief Complaint   Patient presents with   • Right Femur - Follow-up   • Lumbar Spine - Follow-up   • Results     MRI @  9/11/17       HISTORY OF PRESENT ILLNESS:  Patient's pain continues despite continued physical therapy anti-inflammatories and rest.     CONCURRENT MEDICAL HISTORY:    Past Medical History:   Diagnosis Date   • Chest pain    • Fibrocystic disease of breast    • Hashimoto's thyroiditis    • History of screening mammography 08/27/2014    SCREENING MAMMOGRAPHY DIGITAL  (Medicare) (1)    • Hyperlipidemia    • Hypothyroidism due to Hashimoto's thyroiditis 12/3/2016   • Keratoconjunctivitis sicca    • Nuclear senile cataract    • On long term drug therapy    • Osteoarthritis    • Osteopenia    • Osteopenia 12/3/2016   • Photopsia     Right   • Postmenopausal bleeding    • Sinus tachycardia    • Sjogren's syndrome    • Vitamin D deficiency    • Vitamin D deficiency 12/3/2016   • Vitreous detachment of right eye        Allergies   Allergen Reactions   • Augmentin [Amoxicillin-Pot Clavulanate] Diarrhea   • Ciprofloxacin    • Codeine    • Demerol [Meperidine]    • Dexlansoprazole    • Phenazopyridine    • Tape          Current Outpatient Prescriptions:   •  aspirin 81 MG EC tablet, Take 81 mg by mouth daily., Disp: , Rfl:   •  Biotin (BIOTIN MAXIMUM STRENGTH) 5 MG capsule, Take  by mouth., Disp: , Rfl:   •  Calcium Carbonate-Vitamin D (CALCIUM 600+D) 600-200 MG-UNIT tablet, Take  by mouth., Disp: , Rfl:   •  CALCIUM CITRATE-VITAMIN D3 PO, Take  by mouth., Disp: , Rfl:   •  clobetasol (TEMOVATE) 0.05 % external solution, Apply 1 application topically Daily., Disp: , Rfl:   •  hydroxychloroquine (PLAQUENIL) 200 MG tablet, Take  by mouth 2 (Two) Times a Day., Disp: , Rfl:   •  hydroxychloroquine (PLAQUENIL) 200 MG tablet, TAKE 1 TABLET BY MOUTH TWICE DAILY, Disp: , Rfl:   •  Iron-Vitamins (GERITOL PO), Take 5 mL by mouth 2 (Two) Times a Day., Disp: ,  Rfl:   •  levothyroxine (SYNTHROID, LEVOTHROID) 88 MCG tablet, Take 88 mcg by mouth., Disp: , Rfl:   •  meclizine (ANTIVERT) 12.5 MG tablet, Take 12.5 mg by mouth 3 (Three) Times a Day As Needed for dizziness., Disp: , Rfl:   •  meloxicam (MOBIC) 15 MG tablet, Take 1 tablet by mouth Daily., Disp: 30 tablet, Rfl: 1  •  metoprolol tartrate (LOPRESSOR) 25 MG tablet, TAKE 1 TABLET BY MOUTH TWICE DAILY., Disp: 60 tablet, Rfl: 11  •  Multiple Vitamin (M.V.I. ADULT IV), Take  by mouth., Disp: , Rfl:   •  Multiple Vitamins-Minerals (MULTIVITAMIN ADULT PO), Take  by mouth., Disp: , Rfl:   •  Omega-3 Fatty Acids (FISH OIL) 1000 MG capsule capsule, Take 2,000 mg by mouth Daily With Breakfast., Disp: , Rfl:   •  Probiotic Product (PROBIOTIC & ACIDOPHILUS EX ST PO), Take  by mouth., Disp: , Rfl:   •  Probiotic Product (PROBIOTIC DAILY PO), Take  by mouth., Disp: , Rfl:   •  SYNTHROID 75 MCG tablet, Take 1 tablet by mouth Daily., Disp: 30 tablet, Rfl: 11  •  Thyroid 30 MG PO tablet, 2 in a.m. And 2 in p.m., Disp: 120 tablet, Rfl: 11  •  Thyroid 30 MG PO tablet, Take 30 mg by mouth., Disp: , Rfl:   •  tiZANidine (ZANAFLEX) 4 MG tablet, Take 1 tablet by mouth Every 8 (Eight) Hours As Needed for Muscle Spasms., Disp: 15 tablet, Rfl: 0  •  triamcinolone (KENALOG) 0.1 % cream, Apply  topically 2 (two) times a day., Disp: , Rfl:   •  vitamin D (ERGOCALCIFEROL) 60231 UNITS capsule capsule, TAKE 1 CAPSULE BY MOUTH EVERY 2 WEEKS, Disp: 6 capsule, Rfl: 0  •  vitamin D (ERGOCALCIFEROL) 82342 UNITS capsule capsule, Take 50,000 Units by mouth., Disp: , Rfl:     Past Surgical History:   Procedure Laterality Date   • CHOLECYSTECTOMY  06/09/1997    with operative cholangiogram. Chronic cholecystitis & cholelithiasis. HX of passed common duct stone   • CYSTOSCOPY  11/26/1962    urethral dilatation.Recurrent pyelonephritis. urethritis   • ENDOSCOPY N/A 2/17/2017    Procedure: ESOPHAGOGASTRODUODENOSCOPY;  Surgeon: Cisco Mason DO;  Location:  "[facility] ENDOSCOPY;  Service:    • ENDOSCOPY AND COLONOSCOPY  09/30/1985    Normal colon to left transverse colon   • GALLBLADDER SURGERY     • PAP SMEAR  2009   • TONSILLECTOMY  1950   • VAGINAL DELIVERY      x3       ROS  No fevers or chills.  No chest pain or shortness of air.  No GI or  disturbances.    PHYSICAL EXAMINATION:       Ht 68\" (172.7 cm)  Wt 194 lb (88 kg)  BMI 29.5 kg/m2    Physical Exam   Constitutional: She is oriented to person, place, and time. Vital signs are normal. She appears well-developed and well-nourished. She is cooperative.   HENT:   Head: Normocephalic and atraumatic.   Neck: Trachea normal and phonation normal.   Pulmonary/Chest: Effort normal. No respiratory distress.   Abdominal: Soft. Normal appearance. She exhibits no distension.   Neurological: She is alert and oriented to person, place, and time. GCS eye subscore is 4. GCS verbal subscore is 5. GCS motor subscore is 6.   Skin: Skin is warm, dry and intact.   Psychiatric: She has a normal mood and affect. Her speech is normal and behavior is normal. Judgment and thought content normal. Cognition and memory are normal.   Vitals reviewed.      GAIT:     []  Normal  [x]  Antalgic    Assistive device: []  None  []  Walker     []  Crutches  [x]  Cane     []  Wheelchair  []  Stretcher    Back Exam     Tenderness   The patient is experiencing tenderness in the sacroiliac.    Range of Motion   Extension: abnormal   Flexion: abnormal   Lateral Bend Right: normal   Lateral Bend Left: normal   Rotation Right: abnormal   Rotation Left: abnormal     Muscle Strength   Right Quadriceps:  4/5   Left Quadriceps:  4/5   Right Hamstrings:  4/5   Left Hamstrings:  4/5     Tests   Straight leg raise right: negative  Straight leg raise left: negative    Reflexes   Patellar: 1/4  Achilles: 1/4    Other   Toe Walk: abnormal  Heel Walk: abnormal  Sensation: normal  Gait: antalgic   Erythema: no back redness  Scars: absent                  MRI femur " right without contrast9/8/2017  [email or web address]  Result Impression      Negative right femur.   Result Narrative   Indication:  Chronic right hip pain and anterior thigh pain.    MRI, Right Femur without Gadolinium:  The right femur and hip have a normal marrow signal and appearance.  No muscle injury is seen.  There is no mass, fluid collection, or inflammatory change.   Status Results Details     Encounter Summary   MRI lumbar spine without contrast9/8/2017  [email or web address]  Result Impression      Negative.  Specifically, no right nerve root compression.   Result Narrative   Indication:  Low back and right hip pain.    MRI, L-spine without Gadolinium:  The lumbar vertebrae have a normal signal and appearance.  The discs are unremarkable.  No spinal stenosis or foraminal narrowing.  The conus has a normal position and appearance.  No retroperitoneal abnormality.       Large Joint Arthrocentesis  Date/Time: 9/13/2017 9:53 AM  Consent given by: patient  Timeout: Immediately prior to procedure a time out was called to verify the correct patient, procedure, equipment, support staff and site/side marked as required   Supporting Documentation  Indications: pain   Procedure Details  Location: hip - R greater trochanteric bursa  Preparation: Patient was prepped and draped in the usual sterile fashion  Needle size: 22 G  Approach: lateral  Medications administered: 2 mL lidocaine 1 %; 80 mg triamcinolone acetonide 40 MG/ML  Patient tolerance: patient tolerated the procedure well with no immediate complications            ASSESSMENT:    Diagnoses and all orders for this visit:    Right hip pain    Acute right-sided low back pain without sciatica    Trochanteric bursitis of right hip    Other orders  -     Large Joint Arthrocentesis          PLAN  Recommend progression range of motion and activity as tolerated.  I injected the trochanteric bursa today she'll continue physical therapy and follow-up in 4-6 weeks for  recheck  No Follow-up on file.    Aditya Falk, APRN

## 2017-09-13 ENCOUNTER — LAB (OUTPATIENT)
Dept: LAB | Facility: HOSPITAL | Age: 72
End: 2017-09-13

## 2017-09-13 ENCOUNTER — TELEPHONE (OUTPATIENT)
Dept: ENDOCRINOLOGY | Facility: CLINIC | Age: 72
End: 2017-09-13

## 2017-09-13 DIAGNOSIS — E55.9 VITAMIN D DEFICIENCY: ICD-10-CM

## 2017-09-13 DIAGNOSIS — E06.3 HYPOTHYROIDISM DUE TO HASHIMOTO'S THYROIDITIS: ICD-10-CM

## 2017-09-13 DIAGNOSIS — E03.8 HYPOTHYROIDISM DUE TO HASHIMOTO'S THYROIDITIS: ICD-10-CM

## 2017-09-13 DIAGNOSIS — M85.80 OSTEOPENIA: ICD-10-CM

## 2017-09-13 LAB
T3 SERPL-MCNC: 130 NG/DL (ref 97–169)
T4 FREE SERPL-MCNC: 1.06 NG/DL (ref 0.78–2.19)
TSH SERPL DL<=0.05 MIU/L-ACNC: 0.06 MIU/ML (ref 0.46–4.68)

## 2017-09-13 PROCEDURE — 84439 ASSAY OF FREE THYROXINE: CPT | Performed by: NURSE PRACTITIONER

## 2017-09-13 PROCEDURE — 84443 ASSAY THYROID STIM HORMONE: CPT | Performed by: NURSE PRACTITIONER

## 2017-09-13 PROCEDURE — 36415 COLL VENOUS BLD VENIPUNCTURE: CPT

## 2017-09-13 PROCEDURE — 20610 DRAIN/INJ JOINT/BURSA W/O US: CPT | Performed by: NURSE PRACTITIONER

## 2017-09-13 PROCEDURE — 84480 ASSAY TRIIODOTHYRONINE (T3): CPT | Performed by: NURSE PRACTITIONER

## 2017-09-13 RX ORDER — LIDOCAINE HYDROCHLORIDE 10 MG/ML
2 INJECTION, SOLUTION INFILTRATION; PERINEURAL
Status: COMPLETED | OUTPATIENT
Start: 2017-09-13 | End: 2017-09-13

## 2017-09-13 RX ORDER — TRIAMCINOLONE ACETONIDE 40 MG/ML
80 INJECTION, SUSPENSION INTRA-ARTICULAR; INTRAMUSCULAR
Status: COMPLETED | OUTPATIENT
Start: 2017-09-13 | End: 2017-09-13

## 2017-09-13 RX ADMIN — TRIAMCINOLONE ACETONIDE 80 MG: 40 INJECTION, SUSPENSION INTRA-ARTICULAR; INTRAMUSCULAR at 09:53

## 2017-09-13 RX ADMIN — LIDOCAINE HYDROCHLORIDE 2 ML: 10 INJECTION, SOLUTION INFILTRATION; PERINEURAL at 09:53

## 2017-09-13 NOTE — TELEPHONE ENCOUNTER
----- Message from ZACHARY De La Rosa sent at 9/13/2017  1:04 PM CDT -----  Call patient with result--levels are hyperthyroid - I have she takes 75 mcg two tablets a week if correct decrease to 50 mcg two a week

## 2017-09-21 ENCOUNTER — HOSPITAL ENCOUNTER (OUTPATIENT)
Dept: PHYSICAL THERAPY | Facility: HOSPITAL | Age: 72
Setting detail: THERAPIES SERIES
Discharge: HOME | End: 2017-09-21

## 2017-09-21 DIAGNOSIS — M25.559 HIP JOINT PAINFUL ON MOVEMENT, UNSPECIFIED LATERALITY: Primary | ICD-10-CM

## 2017-09-21 PROCEDURE — 97140 MANUAL THERAPY 1/> REGIONS: CPT

## 2017-09-21 PROCEDURE — 97110 THERAPEUTIC EXERCISES: CPT

## 2017-09-21 PROCEDURE — G8979 MOBILITY GOAL STATUS: HCPCS

## 2017-09-21 PROCEDURE — G8978 MOBILITY CURRENT STATUS: HCPCS

## 2017-09-21 NOTE — THERAPY PROGRESS REPORT/RE-CERT
Outpatient Physical Therapy Ortho Progress Note  [facility]     Patient Name: Carla Best  : 1945  MRN: [id number]  Today's Date: 2017      Visit Date: 2017  Subjective Improvement: 50%  Visit Number: 10/10  Insurance approval: Medicare MD Visit: PRN  Recert Date: 10/12/2017      Therapy Diagnosis: R Hip Pain  Visit Dx:    ICD-10-CM ICD-9-CM   1. Hip joint painful on movement, unspecified laterality M25.559 719.45       Patient Active Problem List   Diagnosis   • Osteopenia   • Hypothyroidism due to Hashimoto's thyroiditis   • Vitamin D deficiency   • Keratoconjunctivitis sicca   • Long term use of drug   • Cataract   • Primary osteoarthritis of right knee   • B12 deficiency   • Right hip pain        Past Medical History:   Diagnosis Date   • Chest pain    • Fibrocystic disease of breast    • Hashimoto's thyroiditis    • History of screening mammography 2014    SCREENING MAMMOGRAPHY DIGITAL  (Medicare) (1)    • Hyperlipidemia    • Hypothyroidism due to Hashimoto's thyroiditis 12/3/2016   • Keratoconjunctivitis sicca    • Nuclear senile cataract    • On long term drug therapy    • Osteoarthritis    • Osteopenia    • Osteopenia 12/3/2016   • Photopsia     Right   • Postmenopausal bleeding    • Sinus tachycardia    • Sjogren's syndrome    • Vitamin D deficiency    • Vitamin D deficiency 12/3/2016   • Vitreous detachment of right eye         Past Surgical History:   Procedure Laterality Date   • CHOLECYSTECTOMY  1997    with operative cholangiogram. Chronic cholecystitis & cholelithiasis. HX of passed common duct stone   • CYSTOSCOPY  1962    urethral dilatation.Recurrent pyelonephritis. urethritis   • ENDOSCOPY N/A 2017    Procedure: ESOPHAGOGASTRODUODENOSCOPY;  Surgeon: Cisco Mason DO;  Location: [facility] ENDOSCOPY;  Service:    • ENDOSCOPY AND COLONOSCOPY  1985    Normal colon to left transverse colon   • GALLBLADDER SURGERY     • PAP SMEAR      • TONSILLECTOMY  1950   • VAGINAL DELIVERY      x3             PT Ortho       09/21/17 1345    Subjective Comments    Subjective Comments Pt reports some pain relief following corticone injection for trochanteric bursitis  -AK    Precautions and Contraindications    Precautions/Limitations fall precautions  -AK    Precautions fibromyalgia  -AK    Subjective Pain    Able to rate subjective pain? yes  -AK    Pre-Treatment Pain Level 2  -AK    Post-Treatment Pain Level 2  -AK    Posture/Observations    Posture/Observations Comments myofascial restriction noted along right proximal quadriceps  -AK    MMT (Manual Muscle Testing)    General MMT Assessment Detail L LE: Hip Flexion+Extension=4/5, Hip ABduction=4+/5, Knee Flexion and Extension=5/5, Ankle DF=4/5, Ankle PF=5/5. R LE: Hip Flexion and Extension=4/5, Hip ABduction=4-/5, Knee Flexion aedn Extension=4+/5, Ankle DF=4/5, Ankle PF=4+/5.  -AK      User Key  (r) = Recorded By, (t) = Taken By, (c) = Cosigned By    Initials Name Provider Type    AK Gerson Neff, PT Physical Therapist                            PT Assessment/Plan       09/21/17 6250       PT Assessment    Assessment Comments Pt presents with improved strength as compared to upon evaluation and decreased pain since having cortisone injection for R Trochanteric Bursitis however still presents with significant weakness in B Hips , standing dynamic balance deficits and pain symptoms; pt will benefit from skilled PT intervention addressing aforementioned deficits in oder to maximize functional mobility and decrease risk of falls.  -AK     PT Plan    PT Frequency 2x/week  -AK     Predicted Duration of Therapy Intervention (days/wks) 5 weeks  -AK     Physical Therapy Interventions (Optional Details) balance training;dry needling;gait training;gross motor skills;home exercise program;joint mobilization;postural re-education;patient/family education;neuromuscular re-education;motor coordination  "training;modalities;manual therapy techniques;lumbar stabilization;ROM (Range of Motion);strengthening;stair training;stretching;transfer training  -AK       User Key  (r) = Recorded By, (t) = Taken By, (c) = Cosigned By    Initials Name Provider Type    KHARI Neff PT Physical Therapist                Modalities       09/21/17 1345          Moist Heat    MH Applied Yes  -AK      Location right quadriceps  -AK      Rx Minutes 10 mins  -AK      MH Prior to Rx Yes  -AK        User Key  (r) = Recorded By, (t) = Taken By, (c) = Cosigned By    Initials Name Provider Type    KHARI Neff, PT Physical Therapist                Exercises       09/21/17 1345          Subjective Comments    Subjective Comments Pt reports some pain relief following corticone injection for trochanteric bursitis  -AK      Subjective Pain    Able to rate subjective pain? yes  -AK      Pre-Treatment Pain Level 2  -AK      Post-Treatment Pain Level 2  -AK      Aquatics    Aquatics performed? No  -AK      Exercise 1    Exercise Name 1 Bridges 4x10  -AK      Exercise 2    Exercise Name 2 Supine B knee to chest aarom x20 each  -AK      Exercise 3    Exercise Name 3 sidesteps onto 4\" step L+R 2x10 each  -AK        User Key  (r) = Recorded By, (t) = Taken By, (c) = Cosigned By    Initials Name Provider Type    KHARI Neff, OJ Physical Therapist                        Manual Rx (last 36 hours)      Manual Treatments       09/21/17 1345          Manual Rx 1    Manual Rx 1 Location Cupping to R Quadriceps and IT Band  -AK        User Key  (r) = Recorded By, (t) = Taken By, (c) = Cosigned By    Initials Name Provider Type    KHARI Neff PT Physical Therapist                PT OP Goals       09/21/17 1345       PT Short Term Goals    STG Date to Achieve 08/23/17  -AK     STG 1 Pt pain level in R hip will be no gretaer than 3/10 on the VAS at all times  -AK     STG 1 Progress Progressing  -AK     STG 2 Pt will have 4/5 or greater " strength in R LE in all regards  -AK     STG 2 Progress Progressing  -AK     STG 3 Pt will be able to ambulate 400' or greater intervals without exacerbation of hip pain symptoms  -AK     STG 3 Progress Progressing  -AK     STG 4 Pt will have 15 point improvement in LEFS score  -AK     STG 4 Progress Progressing  -AK     Long Term Goals    LTG Date to Achieve 09/13/17  -AK     LTG 1 Pt will have R hip pain no greater than 1/10 on the VAS at all times.  -AK     LTG 1 Progress Progressing  -AK     LTG 2 Pt will have 4+/5 strength in R LE in all regards  -AK     LTG 2 Progress Progressing  -AK     LTG 3 Pt will be able to ambulate 900' or greater with least restrictive AD without exacervation or R hip pain symptoms.  -AK     LTG 3 Progress Progressing  -AK     LTG 4 Pt will have 25 point or greater improvement in LEFS score  -AK     LTG 4 Progress Progressing  -AK       User Key  (r) = Recorded By, (t) = Taken By, (c) = Cosigned By    Initials Name Provider Type    KHARI Neff, PT Physical Therapist                    Outcome Measures       09/21/17 1345          Lower Extremity Functional Index    Any of your usual work, housework or school activities 2  -AK      Your usual hobbies, recreational or sporting activities 2  -AK      Getting into or out of the bath 2  -AK      Walking between rooms 3  -AK      Putting on your shoes or socks 2  -AK      Squatting 0  -AK      Lifting an object, like a bag of groceries from the floor 2  -AK      Performing light activities around your home 2  -AK      Performing heavy activities around your home 1  -AK      Getting into or out of a car 1  -AK      Walking 2 blocks 0  -AK      Walking a mile 0  -AK      Going up or down 10 stairs (about 1 flight of stairs) 1  -AK      Standing for 1 hour 0  -AK      Sitting for 1 hour 1  -AK      Running on even ground 0  -AK      Running on uneven ground 0  -AK      Making sharp turns while running fast 0  -AK      Hopping 0  -AK       Rolling over in bed 3  -AK      Total 22  -AK      Functional Assessment    Outcome Measure Options Lower Extremity Functional Scale (LEFS)  -AK        User Key  (r) = Recorded By, (t) = Taken By, (c) = Cosigned By    Initials Name Provider Type    KHARI Neff PT Physical Therapist            Time Calculation:   Start Time: 1345  Stop Time: 1430  Time Calculation (min): 45 min  Total Timed Code Minutes- PT: 35 minute(s)    Therapy Charges for Today     Code Description Service Date Service Provider Modifiers Qty    60210239565 HC PT MOBILITY CURRENT 9/21/2017 Gerson Neff, PT GP, CL 1    79481169068 HC PT MOBILITY PROJECTED 9/21/2017 Gerson Neff, PT GP, CJ 1    34727800082 HC PT THER SUPP EA 15 MIN 9/21/2017 Gerson Neff PT GP 1    47237184465 HC PT MANUAL THERAPY EA 15 MIN 9/21/2017 Gerson Neff PT GP 1    18632879119 HC PT THER PROC EA 15 MIN 9/21/2017 Gerson Neff, PT GP 1          PT G-Codes  PT Professional Judgement Used?: Yes  Outcome Measure Options: Lower Extremity Functional Scale (LEFS)  Score: 22/80  Functional Limitation: Mobility: Walking and moving around  Mobility: Walking and Moving Around Current Status (): At least 60 percent but less than 80 percent impaired, limited or restricted  Mobility: Walking and Moving Around Goal Status (): At least 20 percent but less than 40 percent impaired, limited or restricted         Gerson Neff PT  9/21/2017

## 2017-09-22 RX ORDER — ERGOCALCIFEROL 1.25 MG/1
CAPSULE ORAL
Qty: 6 CAPSULE | Refills: 0 | Status: SHIPPED | OUTPATIENT
Start: 2017-09-22 | End: 2017-12-15 | Stop reason: SDUPTHER

## 2017-09-27 ENCOUNTER — HOSPITAL ENCOUNTER (OUTPATIENT)
Dept: PHYSICAL THERAPY | Facility: HOSPITAL | Age: 72
Setting detail: THERAPIES SERIES
Discharge: HOME | End: 2017-09-27

## 2017-09-27 DIAGNOSIS — M25.559 HIP JOINT PAINFUL ON MOVEMENT, UNSPECIFIED LATERALITY: Primary | ICD-10-CM

## 2017-09-27 PROCEDURE — 97110 THERAPEUTIC EXERCISES: CPT

## 2017-09-27 PROCEDURE — 97140 MANUAL THERAPY 1/> REGIONS: CPT

## 2017-09-27 NOTE — THERAPY TREATMENT NOTE
Outpatient Physical Therapy Ortho Treatment Note  [facility]     Patient Name: Carla Best  : 1945  MRN: [id number]  Today's Date: 2017      Visit Date: 2017      Subjective Improvement: 50%  Attendance:   (medicare)  Next MD Visit : PRN  Recert Date:  10/12/17      Therapy Diagnosis:  R Hip Pain      Visit Dx:    ICD-10-CM ICD-9-CM   1. Hip joint painful on movement, unspecified laterality M25.559 719.45       Patient Active Problem List   Diagnosis   • Osteopenia   • Hypothyroidism due to Hashimoto's thyroiditis   • Vitamin D deficiency   • Keratoconjunctivitis sicca   • Long term use of drug   • Cataract   • Primary osteoarthritis of right knee   • B12 deficiency   • Right hip pain        Past Medical History:   Diagnosis Date   • Chest pain    • Fibrocystic disease of breast    • Hashimoto's thyroiditis    • History of screening mammography 2014    SCREENING MAMMOGRAPHY DIGITAL  (Medicare) (1)    • Hyperlipidemia    • Hypothyroidism due to Hashimoto's thyroiditis 12/3/2016   • Keratoconjunctivitis sicca    • Nuclear senile cataract    • On long term drug therapy    • Osteoarthritis    • Osteopenia    • Osteopenia 12/3/2016   • Photopsia     Right   • Postmenopausal bleeding    • Sinus tachycardia    • Sjogren's syndrome    • Vitamin D deficiency    • Vitamin D deficiency 12/3/2016   • Vitreous detachment of right eye         Past Surgical History:   Procedure Laterality Date   • CHOLECYSTECTOMY  1997    with operative cholangiogram. Chronic cholecystitis & cholelithiasis. HX of passed common duct stone   • CYSTOSCOPY  1962    urethral dilatation.Recurrent pyelonephritis. urethritis   • ENDOSCOPY N/A 2017    Procedure: ESOPHAGOGASTRODUODENOSCOPY;  Surgeon: Cisco Mason DO;  Location: [facility] ENDOSCOPY;  Service:    • ENDOSCOPY AND COLONOSCOPY  1985    Normal colon to left transverse colon   • GALLBLADDER SURGERY     • PAP SMEAR     •  "TONSILLECTOMY  1950   • VAGINAL DELIVERY      x3             PT Ortho       09/27/17 1345    Precautions and Contraindications    Precautions/Limitations fall precautions  -    Precautions fibromyalgia  -    Posture/Observations    Posture/Observations Comments myofscial restriction noted along right proximal quadriceps  -      User Key  (r) = Recorded By, (t) = Taken By, (c) = Cosigned By    Initials Name Provider Type    KALPANA Eula TsaiJAIDEN Physical Therapy Assistant                            PT Assessment/Plan       09/27/17 1345       PT Assessment    Assessment Comments good tolerance to treatment and had decreased pain in the right leg after manaul.   -     PT Plan    PT Frequency 2x/week  -     Predicted Duration of Therapy Intervention (days/wks) 5 weeks  -     PT Plan Comments Continue with LE strengthening and core strengthening and gait as able.   -       User Key  (r) = Recorded By, (t) = Taken By, (c) = Cosigned By    Initials Name Provider Type    KALPANA TsaiJAIDEN Physical Therapy Assistant                    Exercises       09/27/17 1345          Subjective Comments    Subjective Comments pt reports that shes been up and going all day;  having increaesd pain in the upper portion of right leg except the back;  had a busy weekend also.   -      Subjective Pain    Able to rate subjective pain? yes  -      Pre-Treatment Pain Level 5  -KH      Post-Treatment Pain Level 3  -      Exercise 1    Exercise Name 1 pro ll level 2 LE strength  -      Time (Minutes) 1 10'  -KH      Additional Comments level 2  -KH      Exercise 2    Exercise Name 2 standing hamstring stretch  -KH      Sets 2 3  -KH      Time (Seconds) 2 30\"  -      Exercise 3    Exercise Name 3 lateral step ups  -KH      Sets 3 2  -KH      Reps 3 10  -KH      Additional Comments 4 inch  -KH      Exercise 4    Exercise Name 4 standing hip abd B  -KH      Sets 4 2  -KH      Reps 4 10  -KH        User Key  (r) = Recorded By, " (t) = Taken By, (c) = Cosigned By    Initials Name Provider Type    KALPANA Tsai PTA Physical Therapy Assistant                        Manual Rx (last 36 hours)      Manual Treatments       09/27/17 1345          Manual Rx 1    Manual Rx 1 Location Cupping to R Quadriceps and IT Band  -      Manual Rx 1 Duration 10'  -        User Key  (r) = Recorded By, (t) = Taken By, (c) = Cosigned By    Initials Name Provider Type    KALPANA Tsai PTA Physical Therapy Assistant                PT OP Goals       09/27/17 1345       PT Short Term Goals    STG Date to Achieve 08/23/17  -     STG 1 Pt pain level in R hip will be no gretaer than 3/10 on the VAS at all times  -     STG 1 Progress Progressing  -     STG 2 Pt will have 4/5 or greater strength in R LE in all regards  -     STG 2 Progress Progressing  -     STG 3 Pt will be able to ambulate 400' or greater intervals without exacerbation of hip pain symptoms  -     STG 3 Progress Progressing  -     STG 4 Pt will have 15 point improvement in LEFS score  -     STG 4 Progress Progressing  -     Long Term Goals    LTG Date to Achieve 09/13/17  -     LTG 1 Pt will have R hip pain no greater than 1/10 on the VAS at all times.  -     LTG 1 Progress Progressing  -     LTG 2 Pt will have 4+/5 strength in R LE in all regards  -     LTG 2 Progress Progressing  -     LTG 3 Pt will be able to ambulate 900' or greater with least restrictive AD without exacervation or R hip pain symptoms.  -     LTG 3 Progress Progressing  -     LTG 4 Pt will have 25 point or greater improvement in LEFS score  -     LTG 4 Progress Progressing  -     Time Calculation    PT Goal Re-Cert Due Date 10/12/17  -       User Key  (r) = Recorded By, (t) = Taken By, (c) = Cosigned By    Initials Name Provider Type    KALPANA Tsai PTA Physical Therapy Assistant                    Time Calculation:   Start Time: 1345  Stop Time: 1430  Time Calculation (min): 45 min  Total  Timed Code Minutes- PT: 45 minute(s)    Therapy Charges for Today     Code Description Service Date Service Provider Modifiers Qty    59590478018 HC PT MANUAL THERAPY EA 15 MIN 9/27/2017 Eula Tsai PTA GP 1    18005365931 HC PT THER PROC EA 15 MIN 9/27/2017 Eula Tsai PTA GP 2                    Eula Tsai PTA  9/27/2017

## 2017-09-29 ENCOUNTER — HOSPITAL ENCOUNTER (OUTPATIENT)
Dept: PHYSICAL THERAPY | Facility: HOSPITAL | Age: 72
Setting detail: THERAPIES SERIES
Discharge: HOME | End: 2017-09-29

## 2017-09-29 DIAGNOSIS — M25.559 HIP JOINT PAINFUL ON MOVEMENT, UNSPECIFIED LATERALITY: Primary | ICD-10-CM

## 2017-09-29 PROCEDURE — 97140 MANUAL THERAPY 1/> REGIONS: CPT

## 2017-09-29 PROCEDURE — 97110 THERAPEUTIC EXERCISES: CPT

## 2017-09-29 NOTE — THERAPY TREATMENT NOTE
Outpatient Physical Therapy Ortho Treatment Note  [facility]     Patient Name: Carla Best  : 1945  MRN: [id number]  Today's Date: 2017      Visit Date: 2017      Subjective Improvement: 50%  Attendance:   (Medicare)  Next MD Visit : PRN  Recert Date:  10/12/17      Therapy Diagnosis:  R Hip Pain      Visit Dx:    ICD-10-CM ICD-9-CM   1. Hip joint painful on movement, unspecified laterality M25.559 719.45       Patient Active Problem List   Diagnosis   • Osteopenia   • Hypothyroidism due to Hashimoto's thyroiditis   • Vitamin D deficiency   • Keratoconjunctivitis sicca   • Long term use of drug   • Cataract   • Primary osteoarthritis of right knee   • B12 deficiency   • Right hip pain        Past Medical History:   Diagnosis Date   • Chest pain    • Fibrocystic disease of breast    • Hashimoto's thyroiditis    • History of screening mammography 2014    SCREENING MAMMOGRAPHY DIGITAL  (Medicare) (1)    • Hyperlipidemia    • Hypothyroidism due to Hashimoto's thyroiditis 12/3/2016   • Keratoconjunctivitis sicca    • Nuclear senile cataract    • On long term drug therapy    • Osteoarthritis    • Osteopenia    • Osteopenia 12/3/2016   • Photopsia     Right   • Postmenopausal bleeding    • Sinus tachycardia    • Sjogren's syndrome    • Vitamin D deficiency    • Vitamin D deficiency 12/3/2016   • Vitreous detachment of right eye         Past Surgical History:   Procedure Laterality Date   • CHOLECYSTECTOMY  1997    with operative cholangiogram. Chronic cholecystitis & cholelithiasis. HX of passed common duct stone   • CYSTOSCOPY  1962    urethral dilatation.Recurrent pyelonephritis. urethritis   • ENDOSCOPY N/A 2017    Procedure: ESOPHAGOGASTRODUODENOSCOPY;  Surgeon: Cisco Mason DO;  Location: [facility] ENDOSCOPY;  Service:    • ENDOSCOPY AND COLONOSCOPY  1985    Normal colon to left transverse colon   • GALLBLADDER SURGERY     • PAP SMEAR     •  TONSILLECTOMY  1950   • VAGINAL DELIVERY      x3             PT Ortho       09/27/17 1345    Precautions and Contraindications    Precautions/Limitations fall precautions  -    Precautions fibromyalgia  -    Posture/Observations    Posture/Observations Comments myofscial restriction noted along right proximal quadriceps  -      User Key  (r) = Recorded By, (t) = Taken By, (c) = Cosigned By    Initials Name Provider Type    KALPANA Tsai PTA Physical Therapy Assistant                                    Exercises       09/29/17 1021          Subjective Pain    Able to rate subjective pain? yes  -      Pre-Treatment Pain Level 2  -      Exercise 1    Exercise Name 1 pro ll LE strength  -      Time (Minutes) 1 10'  -KH      Additional Comments level 3  -      Exercise 2    Exercise Name 2 manual   -      Time (Minutes) 2 30  -KH        User Key  (r) = Recorded By, (t) = Taken By, (c) = Cosigned By    Initials Name Provider Type    KALPANA Tsai PTA Physical Therapy Assistant                        Manual Rx (last 36 hours)      Manual Treatments       09/29/17 1020          Manual Rx 1    Manual Rx 1 Location Cupping to R Quadriceps and IT Band  -      Manual Rx 1 Duration 30  -KH        User Key  (r) = Recorded By, (t) = Taken By, (c) = Cosigned By    Initials Name Provider Type    KALPANA Tsai PTA Physical Therapy Assistant                PT OP Goals       09/29/17 1020       PT Short Term Goals    STG Date to Achieve 08/23/17  -     STG 1 Pt pain level in R hip will be no gretaer than 3/10 on the VAS at all times  -     STG 1 Progress Progressing  -     STG 2 Pt will have 4/5 or greater strength in R LE in all regards  -     STG 2 Progress Progressing  -     STG 3 Pt will be able to ambulate 400' or greater intervals without exacerbation of hip pain symptoms  -     STG 3 Progress Progressing  -     STG 4 Pt will have 15 point improvement in LEFS score  -     STG 4 Progress  Progressing  -     Long Term Goals    LTG Date to Achieve 09/13/17  -     LTG 1 Pt will have R hip pain no greater than 1/10 on the VAS at all times.  -     LTG 1 Progress Progressing  -     LTG 2 Pt will have 4+/5 strength in R LE in all regards  -     LTG 2 Progress Progressing  -     LTG 3 Pt will be able to ambulate 900' or greater with least restrictive AD without exacervation or R hip pain symptoms.  -     LTG 3 Progress Progressing  -     LTG 4 Pt will have 25 point or greater improvement in LEFS score  -     LTG 4 Progress Progressing  -     Time Calculation    PT Goal Re-Cert Due Date 10/12/17  -       User Key  (r) = Recorded By, (t) = Taken By, (c) = Cosigned By    Initials Name Provider Type    KALPANA Tsai PTA Physical Therapy Assistant                    Time Calculation:   Start Time: 1020  Stop Time: 1100  Time Calculation (min): 40 min  Total Timed Code Minutes- PT: 40 minute(s)    Therapy Charges for Today     Code Description Service Date Service Provider Modifiers Qty    31886743076 HC PT THER PROC EA 15 MIN 9/29/2017 Eula Tsai PTA GP 1    82729438724 HC PT MANUAL THERAPY EA 15 MIN 9/29/2017 Eula Tsai PTA GP 2                    Eula Tsai PTA  9/29/2017

## 2017-10-03 ENCOUNTER — HOSPITAL ENCOUNTER (OUTPATIENT)
Dept: PHYSICAL THERAPY | Facility: HOSPITAL | Age: 72
Setting detail: THERAPIES SERIES
Discharge: HOME | End: 2017-10-03

## 2017-10-03 DIAGNOSIS — M25.559 HIP JOINT PAINFUL ON MOVEMENT, UNSPECIFIED LATERALITY: Primary | ICD-10-CM

## 2017-10-03 PROCEDURE — 97110 THERAPEUTIC EXERCISES: CPT | Performed by: PHYSICAL THERAPIST

## 2017-10-03 NOTE — THERAPY TREATMENT NOTE
Outpatient Physical Therapy Ortho Treatment Note  [facility]     Patient Name: Carla Best  : 1945  MRN: [id number]  Today's Date: 10/3/2017      Visit Date: 10/03/2017  Attendance:   Subjective % Improvement: 50%  Recert Date: 10/12/2017  MD appointment: PRN    Therapy Diagnosis: Right hip pain    Visit Dx:    ICD-10-CM ICD-9-CM   1. Hip joint painful on movement, unspecified laterality M25.559 719.45       Patient Active Problem List   Diagnosis   • Osteopenia   • Hypothyroidism due to Hashimoto's thyroiditis   • Vitamin D deficiency   • Keratoconjunctivitis sicca   • Long term use of drug   • Cataract   • Primary osteoarthritis of right knee   • B12 deficiency   • Right hip pain        Past Medical History:   Diagnosis Date   • Chest pain    • Fibrocystic disease of breast    • Hashimoto's thyroiditis    • History of screening mammography 2014    SCREENING MAMMOGRAPHY DIGITAL  (Medicare) (1)    • Hyperlipidemia    • Hypothyroidism due to Hashimoto's thyroiditis 12/3/2016   • Keratoconjunctivitis sicca    • Nuclear senile cataract    • On long term drug therapy    • Osteoarthritis    • Osteopenia    • Osteopenia 12/3/2016   • Photopsia     Right   • Postmenopausal bleeding    • Sinus tachycardia    • Sjogren's syndrome    • Vitamin D deficiency    • Vitamin D deficiency 12/3/2016   • Vitreous detachment of right eye         Past Surgical History:   Procedure Laterality Date   • CHOLECYSTECTOMY  1997    with operative cholangiogram. Chronic cholecystitis & cholelithiasis. HX of passed common duct stone   • CYSTOSCOPY  1962    urethral dilatation.Recurrent pyelonephritis. urethritis   • ENDOSCOPY N/A 2017    Procedure: ESOPHAGOGASTRODUODENOSCOPY;  Surgeon: Cisco Mason DO;  Location: [facility] ENDOSCOPY;  Service:    • ENDOSCOPY AND COLONOSCOPY  1985    Normal colon to left transverse colon   • GALLBLADDER SURGERY     • PAP SMEAR     • TONSILLECTOMY   1950   • VAGINAL DELIVERY      x3             PT Ortho       10/03/17 1441    Subjective Comments    Subjective Comments Patient reports no new complaints. Patient reports thinking the pain seems more muscular and thinks the heel lift has assisted with symptoms.  -BB    Precautions and Contraindications    Precautions/Limitations fall precautions  -BB    Precautions fibromyalgia  -BB    Subjective Pain    Pre-Treatment Pain Level 3  -BB    Post-Treatment Pain Level 3  -BB    Posture/Observations    Posture/Observations Comments No acute distress. No AD. Ataxic gait noted after treatment. Muscle knots felt distal and mid quad and ITB. Tightness noted medial hamstring.   -BB      User Key  (r) = Recorded By, (t) = Taken By, (c) = Cosigned By    Initials Name Provider Type    BB Mary Larson, PT Physical Therapist                            PT Assessment/Plan       10/03/17 1441       PT Assessment    Assessment Comments Patient tolerated treatment well today without increased complaints. Patient staggered slightly posteriorly initially after treatment but improved. Will monitor response to new treatment techniques today.   -BB     Patient would benefit from skilled therapy intervention Yes  -BB     PT Plan    PT Frequency 2x/week  -BB     Predicted Duration of Therapy Intervention (days/wks) 5 weeks  -BB     PT Plan Comments Monitor response to new treatment. Progress strength and ROM   -BB       User Key  (r) = Recorded By, (t) = Taken By, (c) = Cosigned By    Initials Name Provider Type    MICAELA Larson, PT Physical Therapist                    Exercises       10/03/17 1441          Subjective Comments    Subjective Comments Patient reports no new complaints. Patient reports thinking the pain seems more muscular and thinks the heel lift has assisted with symptoms.  -BB      Subjective Pain    Able to rate subjective pain? yes  -BB      Pre-Treatment Pain Level 3  -BB      Post-Treatment Pain Level 3  -BB       "Exercise 1    Exercise Name 1 Pro 2 seat 10    -BB      Time (Minutes) 1 10  -BB      Additional Comments level 3   -BB      Exercise 2    Exercise Name 2 Right supine 90/90 stretch   -BB      Sets 2 3  -BB      Time (Seconds) 2 20  -BB      Exercise 3    Exercise Name 3 Contract relax for HS   -BB      Sets 3 1  -BB      Reps 3 15  -BB      Time (Seconds) 3 3\" hold   -BB      Exercise 4    Exercise Name 4 Lateral hip distraction with mobilization belt   -BB      Sets 4 1  -BB      Reps 4 10  -BB      Time (Seconds) 4 10\" hold   -BB      Exercise 5    Exercise Name 5 Rolling pin to ITB and Quad  -BB      Time (Minutes) 5 9'  -BB        User Key  (r) = Recorded By, (t) = Taken By, (c) = Cosigned By    Initials Name Provider Type    BB Mary Larson, PT Physical Therapist                               PT OP Goals       10/03/17 1441       PT Short Term Goals    STG Date to Achieve 08/23/17  -BB     STG 1 Pt pain level in R hip will be no gretaer than 3/10 on the VAS at all times  -BB     STG 1 Progress Progressing  -BB     STG 2 Pt will have 4/5 or greater strength in R LE in all regards  -BB     STG 2 Progress Progressing  -BB     STG 3 Pt will be able to ambulate 400' or greater intervals without exacerbation of hip pain symptoms  -BB     STG 3 Progress Progressing  -BB     STG 4 Pt will have 15 point improvement in LEFS score  -BB     STG 4 Progress Progressing  -BB     Long Term Goals    LTG Date to Achieve 09/13/17  -BB     LTG 1 Pt will have R hip pain no greater than 1/10 on the VAS at all times.  -BB     LTG 1 Progress Progressing  -BB     LTG 2 Pt will have 4+/5 strength in R LE in all regards  -BB     LTG 2 Progress Progressing  -BB     LTG 3 Pt will be able to ambulate 900' or greater with least restrictive AD without exacervation or R hip pain symptoms.  -BB     LTG 3 Progress Progressing  -BB     LTG 4 Pt will have 25 point or greater improvement in LEFS score  -BB     LTG 4 Progress Progressing  -BB     " Time Calculation    PT Goal Re-Cert Due Date 10/12/17  -BB       User Key  (r) = Recorded By, (t) = Taken By, (c) = Cosigned By    Initials Name Provider Type    MICAELA Larson PT Physical Therapist                Therapy Education       10/03/17 1441          Therapy Education    Education Details HS 90/90 stretch  -BB      Given HEP  -BB      Program New  -BB      How Provided Verbal  -BB      Provided to Patient  -BB      Level of Understanding Verbalized;Demonstrated  -BB        User Key  (r) = Recorded By, (t) = Taken By, (c) = Cosigned By    Initials Name Provider Type    MICAELA Larson PT Physical Therapist                Time Calculation:   Start Time: 1441  Stop Time: 1532  Time Calculation (min): 51 min  Total Timed Code Minutes- PT: 40 minute(s) (time away from patient.)    Therapy Charges for Today     Code Description Service Date Service Provider Modifiers Qty    62595901014 HC PT THER PROC EA 15 MIN 10/3/2017 Mary Larson PT GP 3                    Mary Larson PT  10/3/2017

## 2017-10-05 ENCOUNTER — APPOINTMENT (OUTPATIENT)
Dept: PHYSICAL THERAPY | Facility: HOSPITAL | Age: 72
End: 2017-10-05

## 2017-10-06 ENCOUNTER — HOSPITAL ENCOUNTER (OUTPATIENT)
Dept: PHYSICAL THERAPY | Facility: HOSPITAL | Age: 72
Setting detail: THERAPIES SERIES
Discharge: HOME | End: 2017-10-06

## 2017-10-06 DIAGNOSIS — M25.559 HIP JOINT PAINFUL ON MOVEMENT, UNSPECIFIED LATERALITY: Primary | ICD-10-CM

## 2017-10-06 PROCEDURE — 97140 MANUAL THERAPY 1/> REGIONS: CPT

## 2017-10-06 PROCEDURE — 97110 THERAPEUTIC EXERCISES: CPT

## 2017-10-06 NOTE — THERAPY TREATMENT NOTE
Outpatient Physical Therapy Ortho Treatment Note  [facility]     Patient Name: Carla Best  : 1945  MRN: [id number]  Today's Date: 10/6/2017      Visit Date: 10/06/2017      Subjective Improvement: 50%  Attendance:   (medicare)  Next MD Visit : PRN  Recert Date:  10/12/17      Therapy Diagnosis:  Right Hip Pain      Visit Dx:    ICD-10-CM ICD-9-CM   1. Hip joint painful on movement, unspecified laterality M25.559 719.45       Patient Active Problem List   Diagnosis   • Osteopenia   • Hypothyroidism due to Hashimoto's thyroiditis   • Vitamin D deficiency   • Keratoconjunctivitis sicca   • Long term use of drug   • Cataract   • Primary osteoarthritis of right knee   • B12 deficiency   • Right hip pain        Past Medical History:   Diagnosis Date   • Chest pain    • Fibrocystic disease of breast    • Hashimoto's thyroiditis    • History of screening mammography 2014    SCREENING MAMMOGRAPHY DIGITAL  (Medicare) (1)    • Hyperlipidemia    • Hypothyroidism due to Hashimoto's thyroiditis 12/3/2016   • Keratoconjunctivitis sicca    • Nuclear senile cataract    • On long term drug therapy    • Osteoarthritis    • Osteopenia    • Osteopenia 12/3/2016   • Photopsia     Right   • Postmenopausal bleeding    • Sinus tachycardia    • Sjogren's syndrome    • Vitamin D deficiency    • Vitamin D deficiency 12/3/2016   • Vitreous detachment of right eye         Past Surgical History:   Procedure Laterality Date   • CHOLECYSTECTOMY  1997    with operative cholangiogram. Chronic cholecystitis & cholelithiasis. HX of passed common duct stone   • CYSTOSCOPY  1962    urethral dilatation.Recurrent pyelonephritis. urethritis   • ENDOSCOPY N/A 2017    Procedure: ESOPHAGOGASTRODUODENOSCOPY;  Surgeon: Cisco Mason DO;  Location: [facility] ENDOSCOPY;  Service:    • ENDOSCOPY AND COLONOSCOPY  1985    Normal colon to left transverse colon   • GALLBLADDER SURGERY     • PAP SMEAR      • TONSILLECTOMY  1950   • VAGINAL DELIVERY      x3             PT Ortho       10/06/17 0845    Subjective Comments    Subjective Comments Pt reports that her whole leg is sore, maybe because of the weather coming in, just wants it to feel better.   -    Precautions and Contraindications    Precautions/Limitations fall precautions  -    Precautions fibromyalgia  -    Subjective Pain    Post-Treatment Pain Level 3  -    Posture/Observations    Posture/Observations Comments gait w/ SPC; tight fasica noted across distal quad  -      10/03/17 1441    Subjective Comments    Subjective Comments Patient reports no new complaints. Patient reports thinking the pain seems more muscular and thinks the heel lift has assisted with symptoms.  -BB    Precautions and Contraindications    Precautions/Limitations fall precautions  -BB    Precautions fibromyalgia  -BB    Subjective Pain    Pre-Treatment Pain Level 3  -BB    Post-Treatment Pain Level 3  -BB    Posture/Observations    Posture/Observations Comments No acute distress. No AD. Ataxic gait noted after treatment. Muscle knots felt distal and mid quad and ITB. Tightness noted medial hamstring.   -      User Key  (r) = Recorded By, (t) = Taken By, (c) = Cosigned By    Initials Name Provider Type    MICAELA Larson, PT Physical Therapist    KALPANA Tsai, PTA Physical Therapy Assistant                            PT Assessment/Plan       10/06/17 0845       PT Assessment    Assessment Comments good tolerance to treatment; Decreased tightness in the fasica of the ITB and adductor today; still quad tightness noted;  tolerating treatment well.   -     PT Plan    PT Frequency 2x/week  -     Predicted Duration of Therapy Intervention (days/wks) 5 weeks  -     PT Plan Comments Continue to increase as pt tolerates.  Begin WB exercises again next;   -       User Key  (r) = Recorded By, (t) = Taken By, (c) = Cosigned By    Initials Name Provider Type    KALPANA Forde  "JAIDEN Tsai Physical Therapy Assistant                    Exercises       10/06/17 0845          Subjective Comments    Subjective Comments Pt reports that her whole leg is sore, maybe because of the weather coming in, just wants it to feel better.   -      Subjective Pain    Able to rate subjective pain? yes  -      Pre-Treatment Pain Level 4  -      Post-Treatment Pain Level 3  -      Exercise 1    Exercise Name 1 pro ll LE strength  -      Time (Minutes) 1 10'  -KH      Additional Comments level 3  -KH      Exercise 2    Exercise Name 2 manual   -      Time (Minutes) 2 20'  -KH      Exercise 3    Exercise Name 3 standing hip add stretch  -      Sets 3 3  -KH      Time (Seconds) 3 30\"  -KH      Exercise 4    Exercise Name 4 standing quad stretch (lunge)  -      Sets 4 3  -KH      Time (Seconds) 4 30\"  -KH        User Key  (r) = Recorded By, (t) = Taken By, (c) = Cosigned By    Initials Name Provider Type    KALPANA Tsai PTA Physical Therapy Assistant                               PT OP Goals       10/06/17 0845       PT Short Term Goals    STG Date to Achieve 08/23/17  -     STG 1 Pt pain level in R hip will be no gretaer than 3/10 on the VAS at all times  -     STG 1 Progress Progressing  -     STG 2 Pt will have 4/5 or greater strength in R LE in all regards  -     STG 2 Progress Progressing  -     STG 3 Pt will be able to ambulate 400' or greater intervals without exacerbation of hip pain symptoms  -     STG 3 Progress Progressing  -     STG 4 Pt will have 15 point improvement in LEFS score  -     STG 4 Progress Progressing  -     Long Term Goals    LTG Date to Achieve 09/13/17  -     LTG 1 Pt will have R hip pain no greater than 1/10 on the VAS at all times.  -     LTG 1 Progress Progressing  -     LTG 2 Pt will have 4+/5 strength in R LE in all regards  -     LTG 2 Progress Progressing  -     LTG 3 Pt will be able to ambulate 900' or greater with least restrictive " AD without exacervation or R hip pain symptoms.  -KALPANA     LTG 3 Progress Progressing  -KALPANA     LTG 4 Pt will have 25 point or greater improvement in LEFS score  -KALPANA     LTG 4 Progress Progressing  -KALPANA     Time Calculation    PT Goal Re-Cert Due Date 10/12/17  -KALPANA       User Key  (r) = Recorded By, (t) = Taken By, (c) = Cosigned By    Initials Name Provider Type    KALPANA Tsai PTA Physical Therapy Assistant                    Time Calculation:   Start Time: 0845  Stop Time: 0930  Time Calculation (min): 45 min  Total Timed Code Minutes- PT: 45 minute(s)    Therapy Charges for Today     Code Description Service Date Service Provider Modifiers Qty    56915752296 HC PT MANUAL THERAPY EA 15 MIN 10/6/2017 Eula Tsai PTA GP 1    90348509177 HC PT THER PROC EA 15 MIN 10/6/2017 Eula Tsai PTA GP 2                    Eula Tsai PTA  10/6/2017

## 2017-10-09 ENCOUNTER — HOSPITAL ENCOUNTER (OUTPATIENT)
Dept: PHYSICAL THERAPY | Facility: HOSPITAL | Age: 72
Setting detail: THERAPIES SERIES
Discharge: HOME | End: 2017-10-09

## 2017-10-09 DIAGNOSIS — M25.559 HIP JOINT PAINFUL ON MOVEMENT, UNSPECIFIED LATERALITY: Primary | ICD-10-CM

## 2017-10-09 PROCEDURE — 97110 THERAPEUTIC EXERCISES: CPT

## 2017-10-09 NOTE — THERAPY TREATMENT NOTE
Outpatient Physical Therapy Ortho Treatment Note  [facility]     Patient Name: Carla Best  : 1945  MRN: [id number]  Today's Date: 10/9/2017      Visit Date: 10/09/2017     Subjective Improvement: 50%  Attendance:  15/15 (Medicare)  Next MD Visit : 10/17/17  Recert Date:  10/12/17      Therapy Diagnosis:  R hip pain        Visit Dx:    ICD-10-CM ICD-9-CM   1. Hip joint painful on movement, unspecified laterality M25.559 719.45       Patient Active Problem List   Diagnosis   • Osteopenia   • Hypothyroidism due to Hashimoto's thyroiditis   • Vitamin D deficiency   • Keratoconjunctivitis sicca   • Long term use of drug   • Cataract   • Primary osteoarthritis of right knee   • B12 deficiency   • Right hip pain        Past Medical History:   Diagnosis Date   • Chest pain    • Fibrocystic disease of breast    • Hashimoto's thyroiditis    • History of screening mammography 2014    SCREENING MAMMOGRAPHY DIGITAL  (Medicare) (1)    • Hyperlipidemia    • Hypothyroidism due to Hashimoto's thyroiditis 12/3/2016   • Keratoconjunctivitis sicca    • Nuclear senile cataract    • On long term drug therapy    • Osteoarthritis    • Osteopenia    • Osteopenia 12/3/2016   • Photopsia     Right   • Postmenopausal bleeding    • Sinus tachycardia    • Sjogren's syndrome    • Vitamin D deficiency    • Vitamin D deficiency 12/3/2016   • Vitreous detachment of right eye         Past Surgical History:   Procedure Laterality Date   • CHOLECYSTECTOMY  1997    with operative cholangiogram. Chronic cholecystitis & cholelithiasis. HX of passed common duct stone   • CYSTOSCOPY  1962    urethral dilatation.Recurrent pyelonephritis. urethritis   • ENDOSCOPY N/A 2017    Procedure: ESOPHAGOGASTRODUODENOSCOPY;  Surgeon: Cisco Mason DO;  Location: [facility] ENDOSCOPY;  Service:    • ENDOSCOPY AND COLONOSCOPY  1985    Normal colon to left transverse colon   • GALLBLADDER SURGERY     • PAP SMEAR   "2009   • TONSILLECTOMY  1950   • VAGINAL DELIVERY      x3             PT Ortho       10/09/17 0850    Precautions and Contraindications    Precautions/Limitations fall precautions  -    Precautions fibromyalgia  -    Posture/Observations    Posture/Observations Comments gait w/ SPC; continues to wear heel lift   -      User Key  (r) = Recorded By, (t) = Taken By, (c) = Cosigned By    Initials Name Provider Type    KALPANA Tsai PTA Physical Therapy Assistant                            PT Assessment/Plan       10/09/17 0850       PT Assessment    Assessment Comments pt concerned about her lack of improvement.  discussion with patient about PT and all the options we could take;  Pt educated on deep tissue massage and is to seek a massage therapist at this time. pt wants to take a break from PT until after seeing MD; Feels that when she takes a break and rests it makes her feel better.   -     PT Plan    PT Frequency 2x/week  -     Predicted Duration of Therapy Intervention (days/wks) 5 weeks  -     PT Plan Comments Pt cancelled all appts this week; to seek a massage therapist, return to MD on 10/17/17 then see PT on 10/18/17 with possible d/c at that time unless further options are determined by MD.   -       User Key  (r) = Recorded By, (t) = Taken By, (c) = Cosigned By    Initials Name Provider Type    KALPANA Tsai PTA Physical Therapy Assistant                    Exercises       10/09/17 0850          Subjective Comments    Subjective Comments Pt reports that she had a really bad weekend.  States that she feels like she is \"wading through quick sand\"  Leg feels really heavy and stiff and yesterday leg felt like a rubberband was around leg and almost made leg feel numb all the way down to the ankle. Does report measuring for swelling but there was none there.  Today not much better.   -KALPANA      Subjective Pain    Able to rate subjective pain? yes  -KALPANA      Pre-Treatment Pain Level 5  -KALPANA      " "Exercise 1    Exercise Name 1 pro ll LE strength   -      Time (Minutes) 1 10'  -      Additional Comments level 3  -      Exercise 2    Exercise Name 2 LE stretches  -      Sets 2 3  -      Time (Seconds) 2 30\"  -      Additional Comments  each  -      Exercise 3    Exercise Name 3 education about massage/HEP  -        User Key  (r) = Recorded By, (t) = Taken By, (c) = Cosigned By    Initials Name Provider Type    KALPANA Tsai PTA Physical Therapy Assistant                               PT OP Goals       10/09/17 0850       PT Short Term Goals    STG Date to Achieve 08/23/17  -     STG 1 Pt pain level in R hip will be no gretaer than 3/10 on the VAS at all times  -     STG 1 Progress Progressing  -     STG 2 Pt will have 4/5 or greater strength in R LE in all regards  -     STG 2 Progress Progressing  -     STG 3 Pt will be able to ambulate 400' or greater intervals without exacerbation of hip pain symptoms  -     STG 3 Progress Progressing  -     STG 4 Pt will have 15 point improvement in LEFS score  -     STG 4 Progress Progressing  -     Long Term Goals    LTG Date to Achieve 09/13/17  -     LTG 1 Pt will have R hip pain no greater than 1/10 on the VAS at all times.  -     LTG 1 Progress Progressing  -     LTG 2 Pt will have 4+/5 strength in R LE in all regards  -     LTG 2 Progress Progressing  -     LTG 3 Pt will be able to ambulate 900' or greater with least restrictive AD without exacervation or R hip pain symptoms.  -     LTG 3 Progress Progressing  -     LTG 4 Pt will have 25 point or greater improvement in LEFS score  -     LTG 4 Progress Progressing  -     Time Calculation    PT Goal Re-Cert Due Date 10/12/17  -       User Key  (r) = Recorded By, (t) = Taken By, (c) = Cosigned By    Initials Name Provider Type    KALPANA Tsai PTA Physical Therapy Assistant                    Time Calculation:   Start Time: 0850  Stop Time: 0915  Time Calculation " (min): 25 min  Total Timed Code Minutes- PT: 15 minute(s)    Therapy Charges for Today     Code Description Service Date Service Provider Modifiers Qty    66101207803 HC PT THER PROC EA 15 MIN 10/9/2017 Eula Tsai PTA GP 1    17940023045 HC PT THER SUPP EA 15 MIN 10/9/2017 Eula Tsai PTA GP 1                    Eula Tsai PTA  10/9/2017

## 2017-10-11 ENCOUNTER — APPOINTMENT (OUTPATIENT)
Dept: PHYSICAL THERAPY | Facility: HOSPITAL | Age: 72
End: 2017-10-11

## 2017-10-16 ENCOUNTER — LAB (OUTPATIENT)
Dept: LAB | Facility: HOSPITAL | Age: 72
End: 2017-10-16

## 2017-10-16 ENCOUNTER — TELEPHONE (OUTPATIENT)
Dept: ENDOCRINOLOGY | Facility: CLINIC | Age: 72
End: 2017-10-16

## 2017-10-16 DIAGNOSIS — E03.8 HYPOTHYROIDISM DUE TO HASHIMOTO'S THYROIDITIS: ICD-10-CM

## 2017-10-16 DIAGNOSIS — E06.3 HYPOTHYROIDISM DUE TO HASHIMOTO'S THYROIDITIS: ICD-10-CM

## 2017-10-16 DIAGNOSIS — M85.80 OSTEOPENIA: ICD-10-CM

## 2017-10-16 DIAGNOSIS — E55.9 VITAMIN D DEFICIENCY: ICD-10-CM

## 2017-10-16 LAB
T3 SERPL-MCNC: 129 NG/DL (ref 97–169)
T4 FREE SERPL-MCNC: 0.8 NG/DL (ref 0.78–2.19)
TSH SERPL DL<=0.05 MIU/L-ACNC: 0.77 MIU/ML (ref 0.46–4.68)

## 2017-10-16 PROCEDURE — 36415 COLL VENOUS BLD VENIPUNCTURE: CPT

## 2017-10-16 PROCEDURE — 84443 ASSAY THYROID STIM HORMONE: CPT | Performed by: NURSE PRACTITIONER

## 2017-10-16 PROCEDURE — 84480 ASSAY TRIIODOTHYRONINE (T3): CPT | Performed by: NURSE PRACTITIONER

## 2017-10-16 PROCEDURE — 84439 ASSAY OF FREE THYROXINE: CPT | Performed by: NURSE PRACTITIONER

## 2017-10-16 NOTE — TELEPHONE ENCOUNTER
----- Message from ZACHARY De La Rosa sent at 10/16/2017  1:28 PM CDT -----  Call patient with result----thyroid levels are normal

## 2017-10-17 ENCOUNTER — OFFICE VISIT (OUTPATIENT)
Dept: ORTHOPEDIC SURGERY | Facility: CLINIC | Age: 72
End: 2017-10-17

## 2017-10-17 VITALS — BODY MASS INDEX: 28.4 KG/M2 | HEIGHT: 68 IN | WEIGHT: 187.4 LBS

## 2017-10-17 DIAGNOSIS — M25.551 RIGHT HIP PAIN: ICD-10-CM

## 2017-10-17 DIAGNOSIS — M54.50 ACUTE RIGHT-SIDED LOW BACK PAIN WITHOUT SCIATICA: ICD-10-CM

## 2017-10-17 DIAGNOSIS — M70.61 TROCHANTERIC BURSITIS OF RIGHT HIP: Primary | ICD-10-CM

## 2017-10-17 PROCEDURE — 99214 OFFICE O/P EST MOD 30 MIN: CPT | Performed by: NURSE PRACTITIONER

## 2017-10-17 RX ORDER — DIPHENOXYLATE HYDROCHLORIDE AND ATROPINE SULFATE 2.5; .025 MG/1; MG/1
TABLET ORAL
Refills: 5 | COMMUNITY
Start: 2017-09-25 | End: 2022-01-31

## 2017-10-17 NOTE — PROGRESS NOTES
Carla Best is a 72 y.o. female returns for     Chief Complaint   Patient presents with   • Lumbar Spine - Follow-up   • Right Hip - Follow-up       HISTORY OF PRESENT ILLNESS:  Patient reports that she's completed several sessions of therapy and has experienced some improvement after the trochanteric bursa injection several months ago.  She continues to report some discomfort and wonders if this could be attributed to her autoimmune disorder.  She reports that her rheumatologist has recently decreased her Plaquenil dose around the same time that she experienced the falls that exacerbated all of these symptoms.    Answers for HPI/ROS submitted by the patient on 10/17/2017   Lower extremity pain  Incident occurred: more than 1 week ago  Incident location: at home  Injury mechanism: a fall, a twisting injury  Pain location: right hip, right thigh  Pain quality: aching, burning  Pain - numeric: 5/10  Pain course: fluctuating  tingling: No  inability to bear weight: No  loss of motion: Yes  loss of sensation: No  Foreign body present: no foreign bodies  Aggravated by: movement, weight bearing         CONCURRENT MEDICAL HISTORY:    Past Medical History:   Diagnosis Date   • Chest pain    • Fibrocystic disease of breast    • Hashimoto's thyroiditis    • History of screening mammography 08/27/2014    SCREENING MAMMOGRAPHY DIGITAL  (Medicare) (1)    • Hyperlipidemia    • Hypothyroidism due to Hashimoto's thyroiditis 12/3/2016   • Keratoconjunctivitis sicca    • Nuclear senile cataract    • On long term drug therapy    • Osteoarthritis    • Osteopenia    • Osteopenia 12/3/2016   • Photopsia     Right   • Postmenopausal bleeding    • Sinus tachycardia    • Sjogren's syndrome    • Vitamin D deficiency    • Vitamin D deficiency 12/3/2016   • Vitreous detachment of right eye        Allergies   Allergen Reactions   • Augmentin [Amoxicillin-Pot Clavulanate] Diarrhea   • Ciprofloxacin    • Codeine    • Demerol  [Meperidine]    • Dexlansoprazole    • Phenazopyridine    • Tape          Current Outpatient Prescriptions:   •  aspirin 81 MG EC tablet, Take 81 mg by mouth daily., Disp: , Rfl:   •  Biotin (BIOTIN MAXIMUM STRENGTH) 5 MG capsule, Take  by mouth., Disp: , Rfl:   •  Calcium Carbonate-Vitamin D (CALCIUM 600+D) 600-200 MG-UNIT tablet, Take  by mouth., Disp: , Rfl:   •  CALCIUM CITRATE-VITAMIN D3 PO, Take  by mouth., Disp: , Rfl:   •  clobetasol (TEMOVATE) 0.05 % external solution, Apply 1 application topically Daily., Disp: , Rfl:   •  diphenoxylate-atropine (LOMOTIL) 2.5-0.025 MG per tablet, TK 1 T PO SIX TIMES PER DAY PRN, Disp: , Rfl: 5  •  hydroxychloroquine (PLAQUENIL) 200 MG tablet, TAKE 1 TABLET BY MOUTH TWICE DAILY, Disp: , Rfl:   •  Iron-Vitamins (GERITOL PO), Take 5 mL by mouth 2 (Two) Times a Day., Disp: , Rfl:   •  meclizine (ANTIVERT) 12.5 MG tablet, Take 12.5 mg by mouth 3 (Three) Times a Day As Needed for dizziness., Disp: , Rfl:   •  meloxicam (MOBIC) 15 MG tablet, Take 1 tablet by mouth Daily., Disp: 30 tablet, Rfl: 1  •  metoprolol tartrate (LOPRESSOR) 25 MG tablet, TAKE 1 TABLET BY MOUTH TWICE DAILY., Disp: 60 tablet, Rfl: 11  •  Multiple Vitamins-Minerals (MULTIVITAMIN ADULT PO), Take  by mouth., Disp: , Rfl:   •  Omega-3 Fatty Acids (FISH OIL) 1000 MG capsule capsule, Take 2,000 mg by mouth Daily With Breakfast., Disp: , Rfl:   •  Probiotic Product (PROBIOTIC & ACIDOPHILUS EX ST PO), Take  by mouth., Disp: , Rfl:   •  Thyroid 30 MG PO tablet, Take 30 mg by mouth 2 (Two) Times a Day With Meals., Disp: , Rfl:   •  tiZANidine (ZANAFLEX) 4 MG tablet, Take 1 tablet by mouth Every 8 (Eight) Hours As Needed for Muscle Spasms., Disp: 15 tablet, Rfl: 0  •  triamcinolone (KENALOG) 0.1 % cream, Apply  topically 2 (two) times a day., Disp: , Rfl:   •  vitamin D (ERGOCALCIFEROL) 18397 units capsule capsule, TAKE 1 CAPSULE BY MOUTH EVERY 2 WEEKS, Disp: 6 capsule, Rfl: 0    Past Surgical History:   Procedure  "Laterality Date   • CHOLECYSTECTOMY  06/09/1997    with operative cholangiogram. Chronic cholecystitis & cholelithiasis. HX of passed common duct stone   • CYSTOSCOPY  11/26/1962    urethral dilatation.Recurrent pyelonephritis. urethritis   • ENDOSCOPY N/A 2/17/2017    Procedure: ESOPHAGOGASTRODUODENOSCOPY;  Surgeon: Cisco Mason DO;  Location: [facility] ENDOSCOPY;  Service:    • ENDOSCOPY AND COLONOSCOPY  09/30/1985    Normal colon to left transverse colon   • GALLBLADDER SURGERY     • PAP SMEAR  2009   • TONSILLECTOMY  1950   • VAGINAL DELIVERY      x3       ROS  No fevers or chills.  No chest pain or shortness of air.  No GI or  disturbances.    PHYSICAL EXAMINATION:       Ht 68\" (172.7 cm)  Wt 187 lb 6.4 oz (85 kg)  BMI 28.49 kg/m2    Physical Exam   Constitutional: She is oriented to person, place, and time. Vital signs are normal. She appears well-developed and well-nourished. She is cooperative.   HENT:   Head: Normocephalic and atraumatic.   Neck: Trachea normal and phonation normal.   Pulmonary/Chest: Effort normal. No respiratory distress.   Abdominal: Soft. Normal appearance. She exhibits no distension.   Neurological: She is alert and oriented to person, place, and time. GCS eye subscore is 4. GCS verbal subscore is 5. GCS motor subscore is 6.   Skin: Skin is warm, dry and intact.   Psychiatric: She has a normal mood and affect. Her speech is normal and behavior is normal. Judgment and thought content normal. Cognition and memory are normal.   Vitals reviewed.      GAIT:     []  Normal  [x]  Antalgic    Assistive device: []  None  []  Walker     []  Crutches  []  Cane     []  Wheelchair  []  Stretcher    Back Exam     Tenderness   The patient is experiencing tenderness in the sacroiliac.    Range of Motion   Extension: abnormal   Flexion: abnormal   Lateral Bend Right: normal   Lateral Bend Left: normal   Rotation Right: abnormal   Rotation Left: abnormal     Muscle Strength   Right Quadriceps:  " 4/5   Left Quadriceps:  4/5   Right Hamstrings:  4/5   Left Hamstrings:  4/5     Tests   Straight leg raise right: negative  Straight leg raise left: negative    Reflexes   Patellar: 1/4  Achilles: 1/4    Other   Toe Walk: abnormal  Heel Walk: abnormal  Sensation: normal  Gait: antalgic   Erythema: no back redness  Scars: absent              No results found.          ASSESSMENT:    Diagnoses and all orders for this visit:    Trochanteric bursitis of right hip    Acute right-sided low back pain without sciatica    Right hip pain    Other orders  -     diphenoxylate-atropine (LOMOTIL) 2.5-0.025 MG per tablet; TK 1 T PO SIX TIMES PER DAY PRN          PLAN  After further discussion with the patient she has decided to contact her rheumatologist to discuss the possibility of increasing her dosage back to what she was on prior to the falls to see if that improves her discomfort.  I told her follow-up as needed for possible repeat trochanteric bursa injections or any other new or more severe symptoms.  No Follow-up on file.    Aditya Falk, APRN

## 2017-10-18 ENCOUNTER — HOSPITAL ENCOUNTER (OUTPATIENT)
Dept: PHYSICAL THERAPY | Facility: HOSPITAL | Age: 72
Setting detail: THERAPIES SERIES
Discharge: HOME | End: 2017-10-18

## 2017-10-18 DIAGNOSIS — M25.559 HIP JOINT PAINFUL ON MOVEMENT, UNSPECIFIED LATERALITY: Primary | ICD-10-CM

## 2017-10-18 PROCEDURE — G8979 MOBILITY GOAL STATUS: HCPCS | Performed by: PHYSICAL THERAPIST

## 2017-10-18 PROCEDURE — G8978 MOBILITY CURRENT STATUS: HCPCS | Performed by: PHYSICAL THERAPIST

## 2017-10-18 PROCEDURE — 97110 THERAPEUTIC EXERCISES: CPT | Performed by: PHYSICAL THERAPIST

## 2017-10-18 NOTE — THERAPY PROGRESS REPORT/RE-CERT
Outpatient Physical Therapy Ortho Progress Note  [facility]     Patient Name: Carla Best  : 1945  MRN: [id number]  Today's Date: 10/18/2017      Visit Date: 10/18/2017  Attendance:  (Medicare)  Subjective Improvement: 50%  Next MD Appt: none noted  Recert Date: 17    Therapy Diagnosis: R hip pain    Patient Active Problem List   Diagnosis   • Osteopenia   • Hypothyroidism due to Hashimoto's thyroiditis   • Vitamin D deficiency   • Keratoconjunctivitis sicca   • Long term use of drug   • Cataract   • Primary osteoarthritis of right knee   • B12 deficiency   • Right hip pain   • Encounter for screening for osteoporosis   • GERD (gastroesophageal reflux disease)   • Hip pain, acute, right   • Myofascial pain   • Neck pain   • Osteoarthritis   • Spondylosis of cervical joint        Past Medical History:   Diagnosis Date   • Chest pain    • Fibrocystic disease of breast    • Hashimoto's thyroiditis    • History of screening mammography 2014    SCREENING MAMMOGRAPHY DIGITAL  (Medicare) (1)    • Hyperlipidemia    • Hypothyroidism due to Hashimoto's thyroiditis 12/3/2016   • Keratoconjunctivitis sicca    • Nuclear senile cataract    • On long term drug therapy    • Osteoarthritis    • Osteopenia    • Osteopenia 12/3/2016   • Photopsia     Right   • Postmenopausal bleeding    • Sinus tachycardia    • Sjogren's syndrome    • Vitamin D deficiency    • Vitamin D deficiency 12/3/2016   • Vitreous detachment of right eye         Past Surgical History:   Procedure Laterality Date   • CHOLECYSTECTOMY  1997    with operative cholangiogram. Chronic cholecystitis & cholelithiasis. HX of passed common duct stone   • CYSTOSCOPY  1962    urethral dilatation.Recurrent pyelonephritis. urethritis   • ENDOSCOPY N/A 2017    Procedure: ESOPHAGOGASTRODUODENOSCOPY;  Surgeon: Cisco Mason DO;  Location: [facility] ENDOSCOPY;  Service:    • ENDOSCOPY AND COLONOSCOPY  1985     "Normal colon to left transverse colon   • GALLBLADDER SURGERY     • PAP SMEAR  2009   • TONSILLECTOMY  1950   • VAGINAL DELIVERY      x3       Visit Dx:     ICD-10-CM ICD-9-CM   1. Hip joint painful on movement, unspecified laterality M25.559 719.45       Changes in Medications: none noted  Changes in MD Orders: none noted  Number of Work Days Lost: n/a              PT Ortho       10/18/17 0900    Subjective Comments    Subjective Comments Saw ZACHARY Price, yesterday. He is unsure of anything that would be causing her continuing pain. She reports that an MRI of the pelvis was the only thing that hasn't been looked at. Her rheumatologist, Dr. Sánchez, is retiring. She has to get a new rheumatologist. \"Everything is much better, but nothing is where I need to be not to have a lot of pain.\" Able to ascend and descend stairs but painful. Has an approximate 10 minute standing tolerance. Not comfortable sitting, especially soft surfaces. Constant pain described as burning that starts in the lateral hip that radiates down the lateral thigh to the knee. Also has pain in the anterior and medial thigh. Pain pretty much constant. No additional falls. No medication changes. 50-60% subjective improvement. Forgot cane at home today. Uses cane when out and about.  -SS    Precautions and Contraindications    Precautions/Limitations fall precautions  -SS    Precautions fibromyalgia  -SS    Subjective Pain    Able to rate subjective pain? yes  -SS    Pre-Treatment Pain Level 5  -SS    Posture/Observations    Posture/Observations Comments diffusely TTP with myofascial restrictions noted in R glute, posterolateral hip, anterior thigh, medial thigh, lateral thigh. Non-specific findings with palpatory exam.  -SS    Right Hip    Flexion AROM deficit 83; posterolateral hip and thigh pain  -SS    Extension AROM Deficit 3; pain outer thigh  -SS    ABduction AROM Deficit 20; pain entire upper thigh  -SS    ADduction AROM Deficit 20; pain " "posterolateral hip and thigh  -SS    Internal Rotation AROM Deficit 22; posterolateral hip and lateral thigh pain  -SS    External Rotation AROM deficit 28; posterolateral hip and lateral thigh pain  -SS    Right Hip    Hip Flexion Gross Movement (4+/5) good plus   \"it was a little sore\"  -SS    Hip Extension Gross Movement (4+/5) good plus   uncomfortable  -SS    Hip ABduction Gross Movement (5/5) normal   pain upper groin area  -SS    Hip ADduction Gross Movement (5/5) normal  -SS    Hip Int (Medial) Rotation Gross Movement (5/5) normal  -SS    Hip Ext (Lat) Rotation Gross Movement (5/5) normal   \"feel it\" posterior hip  -SS      User Key  (r) = Recorded By, (t) = Taken By, (c) = Cosigned By    Initials Name Provider Type     Torin Euceda, PT DPT Physical Therapist                            Therapy Education       10/18/17 0900          Therapy Education    Given Other (comment)   aquatics, massage therapy  -      How Provided Verbal  -SS      Provided to Patient  -      Level of Understanding Verbalized  -SS        User Key  (r) = Recorded By, (t) = Taken By, (c) = Cosigned By    Initials Name Provider Type     Torin Euceda, PT DPT Physical Therapist                PT OP Goals       10/18/17 0900       PT Short Term Goals    STG Date to Achieve --   further STGs deferred  -     STG 1 Pt pain level in R hip will be no greater than 3/10 on the VAS at all times  -     STG 1 Progress Not Met  -     STG 2 Pt will have 4/5 or greater strength in R LE in all regards  -     STG 2 Progress Met  -     STG 3 Pt will be able to ambulate 400' or greater intervals without exacerbation of hip pain symptoms  -     STG 3 Progress Not Met  -     STG 4 Pt will have 15 point improvement in LEFS score  -     STG 4 Progress Not Met  -     Long Term Goals    LTG Date to Achieve 11/08/17  -     LTG 1 Pt will have R hip pain no greater than 1/10 on the VAS at all times.  -     LTG 1 " Progress Not Met  -SS     LTG 2 Pt will have 4+/5 strength in R LE in all regards  -SS     LTG 2 Progress Met  -SS     LTG 3 Pt will be able to ambulate 900' or greater with least restrictive AD without exacerbation or R hip pain symptoms.  -SS     LTG 3 Progress Not Met  -SS     LTG 4 Pt will have 25 point or greater improvement in LEFS score  -SS     LTG 4 Progress Not Met  -SS     LTG 5 Independent with self-management  -     LTG 5 Progress New  -     Time Calculation    PT Goal Re-Cert Due Date 11/08/17  -       User Key  (r) = Recorded By, (t) = Taken By, (c) = Cosigned By    Initials Name Provider Type     Torin Euceda, PT DPT Physical Therapist                PT Assessment/Plan       10/18/17 0900       PT Assessment    Functional Limitations Impaired gait;Limitation in home management;Performance in leisure activities;Limitations in community activities  -     Impairments Gait;Muscle strength;Pain  -     Assessment Comments Patient continues to have pain limiting her functional and activity level. I recommended massage therapy and aquatic HEP. Patient has been in aquatics previously and verbalizes that she knows what to do. She was given a free 1-month membership to [employer].   -     Rehab Potential Fair   barrier: fibromyalgia, ongoing elevated pain  -     Patient/caregiver participated in establishment of treatment plan and goals Yes  -     Patient would benefit from skilled therapy intervention --   uncertain  -     PT Plan    PT Frequency Other (comment)   PRN  -     PT Plan Comments Patient is to attempt self-management. Will keep chart open through 11/17/17 in case patient needs to return for therapy. Recert/re-eval at that time.  -       User Key  (r) = Recorded By, (t) = Taken By, (c) = Cosigned By    Initials Name Provider Type     Torin Euceda, PT DPT Physical Therapist                                    Outcome Measures       10/18/17 0900           Lower Extremity Functional Index    Any of your usual work, housework or school activities 2  -SS      Your usual hobbies, recreational or sporting activities 2  -SS      Getting into or out of the bath 3  -SS      Walking between rooms 4  -SS      Putting on your shoes or socks 2  -SS      Squatting 0  -SS      Lifting an object, like a bag of groceries from the floor 3  -SS      Performing light activities around your home 3  -SS      Performing heavy activities around your home 1  -SS      Getting into or out of a car 3  -SS      Walking 2 blocks 0  -SS      Walking a mile 0  -SS      Going up or down 10 stairs (about 1 flight of stairs) 3  -SS      Standing for 1 hour 1  -SS      Sitting for 1 hour 2  -SS      Running on even ground 0  -SS      Running on uneven ground 0  -SS      Making sharp turns while running fast 0  -SS      Hopping 0  -SS      Rolling over in bed 3  -SS      Total 32  -SS      Functional Assessment    Outcome Measure Options Lower Extremity Functional Scale (LEFS)  -SS        User Key  (r) = Recorded By, (t) = Taken By, (c) = Cosigned By    Initials Name Provider Type    SS Torin Euceda, PT DPT Physical Therapist            Time Calculation:   Start Time: 0938  Stop Time: 1018  Time Calculation (min): 40 min  Total Timed Code Minutes- PT: 40 minute(s)     Therapy Charges for Today     Code Description Service Date Service Provider Modifiers Qty    47199895053 HC PT THER PROC EA 15 MIN 10/18/2017 Torin Euceda PT DPT GP 3    12715404329 HC PT MOBILITY CURRENT 10/18/2017 Torin Euceda PT DPT GP, CL 1    47745589608 HC PT MOBILITY PROJECTED 10/18/2017 Torin Euceda PT DPT GP, CJ 1          PT G-Codes  Outcome Measure Options: Lower Extremity Functional Scale (LEFS)  Score: 32/80  Functional Limitation: Mobility: Walking and moving around  Mobility: Walking and Moving Around Current Status (): At least 60 percent but less than 80 percent impaired,  limited or restricted  Mobility: Walking and Moving Around Goal Status (): At least 20 percent but less than 40 percent impaired, limited or restricted         Torin Euceda, PT, DPT, CHT  10/18/2017

## 2017-11-06 ENCOUNTER — CLINICAL SUPPORT (OUTPATIENT)
Dept: AUDIOLOGY | Facility: CLINIC | Age: 72
End: 2017-11-06

## 2017-11-06 ENCOUNTER — OFFICE VISIT (OUTPATIENT)
Dept: OTOLARYNGOLOGY | Facility: CLINIC | Age: 72
End: 2017-11-06

## 2017-11-06 VITALS — BODY MASS INDEX: 28.19 KG/M2 | WEIGHT: 186 LBS | HEIGHT: 68 IN | OXYGEN SATURATION: 95 %

## 2017-11-06 DIAGNOSIS — H90.3 SENSORINEURAL HEARING LOSS OF BOTH EARS: Primary | ICD-10-CM

## 2017-11-06 DIAGNOSIS — H90.3 SENSORINEURAL HEARING LOSS, BILATERAL: Primary | ICD-10-CM

## 2017-11-06 DIAGNOSIS — H93.13 TINNITUS OF BOTH EARS: ICD-10-CM

## 2017-11-06 PROCEDURE — 99214 OFFICE O/P EST MOD 30 MIN: CPT | Performed by: OTOLARYNGOLOGY

## 2017-11-06 NOTE — PROGRESS NOTES
Subjective   Carla Best is a 72 y.o. female.       History of Present Illness   This is a patient have seen in the past with recurring cerumen impactions.  Normally she comes in for yearly visits to get her ears cleaned.  Called for an early work and because she perceives her hearing to be decreased and thought her wax impaction had recurred.  This is worse on the right than the left.  No otorrhea, no otalgia.  No previous otologic surgery.  No significant occupational noise exposure.  Does have a family history of hearing loss.  Has binaural tinnitus that is worse in a quiet environment.  Already utilizes a fan for masking noise when she sleeps.      The following portions of the patient's history were reviewed and updated as appropriate: allergies, current medications, past family history, past medical history, past social history, past surgical history and problem list.     reports that she has never smoked. She has never used smokeless tobacco. She reports that she does not drink alcohol or use illicit drugs.   Patient is not a tobacco user and has not been counseled for use of tobacco products      Review of Systems   Constitutional: Negative for fever.   HENT: Positive for hearing loss and tinnitus.            Objective   Physical Exam  General: Well-developed well-nourished female in no acute distress.  Alert and oriented ×3. Head: Normocephalic. Face: Symmetrical strength and appearance. PERRL. EOMI. Voice:Strong. Speech:Fluent  Ears: External ears no deformity, canals show only a modest amount of nonobstructing wax that is removed to facilitate visualization of the tympanic membranes, tympanic membranes intact clear and mobile bilaterally.  Nose: Nares show no discharge mass polyp or purulence.  Boggy mucosa is present.  No gross external deformity.  Septum: Midline  Oral cavity: Lips and gums without lesions.  Tongue and floor of mouth without lesions.  Parotid and submandibular ducts unobstructed.   No mucosal lesions on the buccal mucosa or vestibule of the mouth.  Pharynx: No erythema exudate mass or ulcer  Neck: No lymphadenopathy.  No thyromegaly.  Trachea and larynx midline.  No masses in the parotid or submandibular glands.    Due to perceived hearing loss an audiogram is obtained and reviewed and does in fact show a bilateral normal sloping to moderately severe sensorineural hearing loss bilaterally that is worse on the right than the left.  Tympanograms are type A bilaterally.  Discrimination scores are 92% on the left 80% on the right.    Assessment/Plan   Carla was seen today for ear problem.    Diagnoses and all orders for this visit:    Sensorineural hearing loss of both ears    Tinnitus of both ears        Plan: Patient's hearing loss is slightly asymmetric but in my opinion not enough to warrant a full workup for retrocochlear pathology.  I do think she should avoid unnecessary exposure loud noise, use ear protection when unavoidably around loud noise and use masking noise as she is already doing for tinnitus.  She could consider binaural amplification at her discretion.  I do want to see her back in 6 months with a repeat audiogram to assure that this is not a progressive loss in 1 ear.  Call sooner for problems or acceleration of her hearing loss prior to her next visit.

## 2017-11-07 NOTE — PROGRESS NOTES
STANDARD AUDIOMETRIC EVALUATION      Name:  Carla Best  :  1945  Age:  72 y.o.  Date of Evaluation:  2017      HISTORY    Reason for visit:  Carla Best is seen today for a hearing evaluation at the request of Dr. Leno Alvarenga.  Patient reports she has noticed a gradual decrease in hearing.  She reports that her  and daughter have noticed the decrease in hearing too.  She reports having tinnitus that mainly occurs in the right, but occasionally happens in the left.      EVALUATION    See Audiogram    RESULTS        Otoscopy and Tympanometry 226 Hz :  Right Ear:  Otoscopy:  Clear ear canal          Tympanometry:  Middle ear function within normal limits    Left Ear:   Otoscopy:  Clear ear canal        Tympanometry:  Middle ear function within normal limits    Test technique:  Standard Audiometry     Pure Tone Audiometry:   Patient responded to pure tones at 10-70 dB for 250-8000 Hz in right ear, and at 10-65 dB for 250-8000 Hz in left ear.       Speech Audiometry:        Right Ear:  Speech Reception Threshold (SRT) was obtained at 15 dBHL                 Speech Discrimination scores were 80% in quiet when words were presented at 55 dBHL       Left Ear:  Speech Reception Threshold (SRT) was obtained at 15 dBHL                 Speech Discrimination scores were 92% in quiet when words were presented at 55 dBHL    Reliability:   good    IMPRESSIONS:  1.  Tympanometry results are consistent with Middle ear function within normal limits in both ears.  2.  Pure tone results are consistent with within normal limits to moderately-severe sloping sensorineural hearing loss for both ears.       RECOMMENDATIONS:  Patient is seeing the Ear Nose and Throat physician immediately following this examination.  It was a pleasure seeing Carla Best in Audiology today.  We would be happy to do further testing or discuss these test as necessary.          This document has been electronically signed  by MELO Meyers on November 7, 2017 8:01 AM          MELO Meyers  Licensed Audiologist

## 2017-11-22 ENCOUNTER — DOCUMENTATION (OUTPATIENT)
Dept: PHYSICAL THERAPY | Facility: HOSPITAL | Age: 72
End: 2017-11-22

## 2017-11-22 DIAGNOSIS — M25.559 HIP JOINT PAINFUL ON MOVEMENT, UNSPECIFIED LATERALITY: Primary | ICD-10-CM

## 2017-11-22 NOTE — THERAPY DISCHARGE NOTE
Outpatient Physical Therapy Ortho Discharge Summary       Patient Name: Carla Best  : 1945  MRN: [id number]  Today's Date: 2017      Attendance:    Subjective Improvement: 50%     Therapy Diagnosis: R hip pain      Visit Dx:    ICD-10-CM ICD-9-CM   1. Hip joint painful on movement, unspecified laterality M25.559 719.45       Patient Active Problem List   Diagnosis   • Osteopenia   • Hypothyroidism due to Hashimoto's thyroiditis   • Vitamin D deficiency   • Keratoconjunctivitis sicca   • Long term use of drug   • Cataract   • Primary osteoarthritis of right knee   • B12 deficiency   • Right hip pain   • Encounter for screening for osteoporosis   • GERD (gastroesophageal reflux disease)   • Hip pain, acute, right   • Myofascial pain   • Neck pain   • Osteoarthritis   • Spondylosis of cervical joint        Past Medical History:   Diagnosis Date   • Chest pain    • Fibrocystic disease of breast    • Hashimoto's thyroiditis    • History of screening mammography 2014    SCREENING MAMMOGRAPHY DIGITAL  (Medicare) (1)    • Hyperlipidemia    • Hypothyroidism due to Hashimoto's thyroiditis 12/3/2016   • Keratoconjunctivitis sicca    • Nuclear senile cataract    • On long term drug therapy    • Osteoarthritis    • Osteopenia    • Osteopenia 12/3/2016   • Photopsia     Right   • Postmenopausal bleeding    • Sinus tachycardia    • Sjogren's syndrome    • Vitamin D deficiency    • Vitamin D deficiency 12/3/2016   • Vitreous detachment of right eye         Past Surgical History:   Procedure Laterality Date   • CHOLECYSTECTOMY  1997    with operative cholangiogram. Chronic cholecystitis & cholelithiasis. HX of passed common duct stone   • CYSTOSCOPY  1962    urethral dilatation.Recurrent pyelonephritis. urethritis   • ENDOSCOPY N/A 2017    Procedure: ESOPHAGOGASTRODUODENOSCOPY;  Surgeon: Cisco Mason DO;  Location: [facility] ENDOSCOPY;  Service:    • ENDOSCOPY AND  COLONOSCOPY  09/30/1985    Normal colon to left transverse colon   • GALLBLADDER SURGERY     • PAP SMEAR  2009   • TONSILLECTOMY  1950   • VAGINAL DELIVERY      x3             OP PT Discharge Summary  Date of Discharge: 11/17/17  Reason for Discharge: Maximum functional potential achieved  Outcomes Achieved: Patient able to partially achieve established goals  Discharge Destination: Home with home program      Torin Euceda, PT, DPT, CHT  11/22/2017

## 2017-12-11 RX ORDER — LEVOTHYROXINE AND LIOTHYRONINE 19; 4.5 UG/1; UG/1
30 TABLET ORAL 2 TIMES DAILY WITH MEALS
Qty: 60 TABLET | Refills: 11 | Status: SHIPPED | OUTPATIENT
Start: 2017-12-11 | End: 2018-05-11

## 2017-12-15 RX ORDER — ERGOCALCIFEROL 1.25 MG/1
CAPSULE ORAL
Qty: 6 CAPSULE | Refills: 0 | Status: SHIPPED | OUTPATIENT
Start: 2017-12-15 | End: 2018-03-09 | Stop reason: SDUPTHER

## 2018-01-24 ENCOUNTER — LAB (OUTPATIENT)
Dept: LAB | Facility: HOSPITAL | Age: 73
End: 2018-01-24

## 2018-01-24 DIAGNOSIS — M85.80 OSTEOPENIA: ICD-10-CM

## 2018-01-24 DIAGNOSIS — E03.8 HYPOTHYROIDISM DUE TO HASHIMOTO'S THYROIDITIS: ICD-10-CM

## 2018-01-24 DIAGNOSIS — E06.3 HYPOTHYROIDISM DUE TO HASHIMOTO'S THYROIDITIS: ICD-10-CM

## 2018-01-24 DIAGNOSIS — E55.9 VITAMIN D DEFICIENCY: ICD-10-CM

## 2018-01-24 LAB
T3 SERPL-MCNC: 126 NG/DL (ref 97–169)
T4 FREE SERPL-MCNC: 0.85 NG/DL (ref 0.78–2.19)
TSH SERPL DL<=0.05 MIU/L-ACNC: 1.87 MIU/ML (ref 0.46–4.68)

## 2018-01-24 PROCEDURE — 84443 ASSAY THYROID STIM HORMONE: CPT

## 2018-01-24 PROCEDURE — 84439 ASSAY OF FREE THYROXINE: CPT

## 2018-01-24 PROCEDURE — 84480 ASSAY TRIIODOTHYRONINE (T3): CPT

## 2018-01-24 PROCEDURE — 36415 COLL VENOUS BLD VENIPUNCTURE: CPT

## 2018-02-01 ENCOUNTER — OFFICE VISIT (OUTPATIENT)
Dept: ENDOCRINOLOGY | Facility: CLINIC | Age: 73
End: 2018-02-01

## 2018-02-01 VITALS
HEIGHT: 68 IN | WEIGHT: 186 LBS | HEART RATE: 82 BPM | SYSTOLIC BLOOD PRESSURE: 138 MMHG | DIASTOLIC BLOOD PRESSURE: 80 MMHG | BODY MASS INDEX: 28.19 KG/M2

## 2018-02-01 DIAGNOSIS — E53.8 B12 DEFICIENCY: ICD-10-CM

## 2018-02-01 DIAGNOSIS — E06.3 HYPOTHYROIDISM DUE TO HASHIMOTO'S THYROIDITIS: Primary | ICD-10-CM

## 2018-02-01 DIAGNOSIS — E89.0 POSTABLATIVE HYPOTHYROIDISM: ICD-10-CM

## 2018-02-01 DIAGNOSIS — M85.80 OSTEOPENIA, UNSPECIFIED LOCATION: ICD-10-CM

## 2018-02-01 DIAGNOSIS — E03.8 HYPOTHYROIDISM DUE TO HASHIMOTO'S THYROIDITIS: Primary | ICD-10-CM

## 2018-02-01 DIAGNOSIS — E55.9 VITAMIN D DEFICIENCY: ICD-10-CM

## 2018-02-01 PROCEDURE — 99214 OFFICE O/P EST MOD 30 MIN: CPT | Performed by: INTERNAL MEDICINE

## 2018-02-01 NOTE — PROGRESS NOTES
Subjective    Carla Best is a 72 y.o. female. she is here today for follow-up.    Thyroid Problem   Symptoms include anxiety. Patient reports no cold intolerance, constipation, diaphoresis, diarrhea, fatigue, heat intolerance, palpitations or tremors.               72 y.o.   female comes for fu , very pleasant      very pleasant     history of subacute thyroiditis with subsequent permanent hypothyroidism      duration - 5+ years  timing - constant  quality - biochemically controlled but still symptomatic        severity - moderate     symptoms - multiple , detailed under ROS    Fatigue, hair loss, cold intolerance, weigh gain, myalgias, muscle weakness  alleviating factors - brand name synthroid and cytomel      she states that if no changes are needed then she will accept that it is not her thyroid, she only wants to be certain there isn't anything that has been missed.      she has fibromylgia and neck pain has worsened since last appt         The following portions of the patient's history were reviewed and updated as appropriate:   Past Medical History:   Diagnosis Date   • Chest pain    • Fibrocystic disease of breast    • Hashimoto's thyroiditis    • History of screening mammography 08/27/2014    SCREENING MAMMOGRAPHY DIGITAL  (Medicare) (1)    • Hyperlipidemia    • Hypothyroidism due to Hashimoto's thyroiditis 12/3/2016   • Keratoconjunctivitis sicca    • Nuclear senile cataract    • On long term drug therapy    • Osteoarthritis    • Osteopenia    • Osteopenia 12/3/2016   • Photopsia     Right   • Postmenopausal bleeding    • Sinus tachycardia    • Sjogren's syndrome    • Vitamin D deficiency    • Vitamin D deficiency 12/3/2016   • Vitreous detachment of right eye      Past Surgical History:   Procedure Laterality Date   • CHOLECYSTECTOMY  06/09/1997    with operative cholangiogram. Chronic cholecystitis & cholelithiasis. HX of passed common duct stone   • CYSTOSCOPY  11/26/1962    urethral  dilatation.Recurrent pyelonephritis. urethritis   • ENDOSCOPY N/A 2/17/2017    Procedure: ESOPHAGOGASTRODUODENOSCOPY;  Surgeon: Cisco Mason DO;  Location: [facility] ENDOSCOPY;  Service:    • ENDOSCOPY AND COLONOSCOPY  09/30/1985    Normal colon to left transverse colon   • GALLBLADDER SURGERY     • PAP SMEAR  2009   • TONSILLECTOMY  1950   • VAGINAL DELIVERY      x3     Family History   Problem Relation Age of Onset   • Heart disease Mother    • Arthritis Mother    • Osteoporosis Mother    • Cataracts Mother    • Heart disease Father    • Hypertension Sister    • Arthritis Sister    • Diabetes Sister    • Fuchs' dystrophy Sister    • Breast cancer Paternal Grandmother    • Diabetes Other    • Heart disease Other    • Hypertension Other    • Stroke Other    • Thyroid disease Other    • Lung disease Other    • Other Other      Gallstones, Bleeding Tendency, Colon Problems.   • Fuchs' dystrophy Maternal Grandfather      OB History     No data available        Current Outpatient Prescriptions   Medication Sig Dispense Refill   • aspirin 81 MG EC tablet Take 81 mg by mouth daily.     • Biotin (BIOTIN MAXIMUM STRENGTH) 5 MG capsule Take  by mouth.     • Calcium Carbonate-Vitamin D (CALCIUM 600+D) 600-200 MG-UNIT tablet Take  by mouth.     • CALCIUM CITRATE-VITAMIN D3 PO Take  by mouth.     • clobetasol (TEMOVATE) 0.05 % external solution Apply 1 application topically Daily.     • diphenoxylate-atropine (LOMOTIL) 2.5-0.025 MG per tablet TK 1 T PO SIX TIMES PER DAY PRN  5   • hydroxychloroquine (PLAQUENIL) 200 MG tablet TAKE 1 TABLET BY MOUTH TWICE DAILY     • Iron-Vitamins (GERITOL PO) Take 5 mL by mouth 2 (Two) Times a Day.     • meclizine (ANTIVERT) 12.5 MG tablet Take 12.5 mg by mouth 3 (Three) Times a Day As Needed for dizziness.     • meloxicam (MOBIC) 15 MG tablet Take 1 tablet by mouth Daily. 30 tablet 1   • metoprolol tartrate (LOPRESSOR) 25 MG tablet TAKE 1 TABLET BY MOUTH TWICE DAILY. 60 tablet 11   • Multiple  Vitamins-Minerals (MULTIVITAMIN ADULT PO) Take  by mouth.     • Omega-3 Fatty Acids (FISH OIL) 1000 MG capsule capsule Take 2,000 mg by mouth Daily With Breakfast.     • Probiotic Product (PROBIOTIC & ACIDOPHILUS EX ST PO) Take  by mouth.     • Thyroid 30 MG PO tablet Take 1 tablet by mouth 2 (Two) Times a Day With Meals. 60 tablet 11   • tiZANidine (ZANAFLEX) 4 MG tablet Take 1 tablet by mouth Every 8 (Eight) Hours As Needed for Muscle Spasms. 15 tablet 0   • triamcinolone (KENALOG) 0.1 % cream Apply  topically 2 (two) times a day.     • vitamin D (ERGOCALCIFEROL) 21922 units capsule capsule TAKE 1 CAPSULE BY MOUTH EVERY 2 WEEKS 6 capsule 0     No current facility-administered medications for this visit.      Allergies   Allergen Reactions   • Augmentin [Amoxicillin-Pot Clavulanate] Diarrhea   • Ciprofloxacin    • Codeine    • Demerol [Meperidine]    • Dexlansoprazole    • Phenazopyridine    • Tape      Social History     Social History   • Marital status:      Spouse name: N/A   • Number of children: N/A   • Years of education: N/A     Social History Main Topics   • Smoking status: Never Smoker   • Smokeless tobacco: Never Used   • Alcohol use No   • Drug use: No   • Sexual activity: Not on file     Other Topics Concern   • Not on file     Social History Narrative       Review of Systems  Review of Systems   Constitutional: Negative for activity change, appetite change, chills, diaphoresis and fatigue.   HENT: Negative for congestion, dental problem, drooling, ear discharge, ear pain, facial swelling, sneezing, sore throat, tinnitus, trouble swallowing and voice change.    Eyes: Negative for photophobia, pain, discharge, redness, itching and visual disturbance.   Respiratory: Negative for apnea, cough, choking, chest tightness and shortness of breath.    Cardiovascular: Negative for chest pain, palpitations and leg swelling.   Gastrointestinal: Negative for abdominal distention, abdominal pain,  "constipation, diarrhea, nausea and vomiting.   Endocrine: Negative for cold intolerance, heat intolerance, polydipsia, polyphagia and polyuria.   Genitourinary: Negative for difficulty urinating, dysuria, frequency, hematuria and urgency.   Musculoskeletal: Positive for arthralgias and myalgias. Negative for back pain, gait problem, joint swelling, neck pain and neck stiffness.   Skin: Negative for color change, pallor, rash and wound.   Allergic/Immunologic: Negative for environmental allergies, food allergies and immunocompromised state.   Neurological: Positive for weakness. Negative for dizziness, tremors, facial asymmetry, light-headedness, numbness and headaches.   Hematological: Negative for adenopathy. Does not bruise/bleed easily.   Psychiatric/Behavioral: Positive for decreased concentration. Negative for agitation, behavioral problems, confusion and sleep disturbance. The patient is nervous/anxious.         Objective    /80 (BP Location: Right arm, Patient Position: Sitting, Cuff Size: Large Adult)  Pulse 82  Ht 172.7 cm (67.99\")  Wt 84.4 kg (186 lb)  BMI 28.29 kg/m2  Physical Exam   Constitutional: She is oriented to person, place, and time. She appears well-developed and well-nourished. No distress.   HENT:   Head: Normocephalic and atraumatic.   Right Ear: External ear normal.   Left Ear: External ear normal.   Nose: Nose normal.   Eyes: Conjunctivae and EOM are normal. Pupils are equal, round, and reactive to light.   Neck: Normal range of motion. Neck supple. No tracheal deviation present. No thyromegaly present.   Cardiovascular: Normal rate, regular rhythm and normal heart sounds.    No murmur heard.  Pulmonary/Chest: Effort normal and breath sounds normal. No respiratory distress. She has no wheezes.   Abdominal: Soft. Bowel sounds are normal. There is no tenderness. There is no rebound and no guarding.   Musculoskeletal: Normal range of motion. She exhibits no edema, tenderness or " deformity.   Neurological: She is alert and oriented to person, place, and time. No cranial nerve deficit.   Skin: Skin is warm and dry. No rash noted.   Psychiatric: She has a normal mood and affect. Her behavior is normal. Judgment and thought content normal.       Lab Review  Glucose (mg/dL)   Date Value   07/21/2017 87   04/06/2017 95   03/28/2017 95     Sodium (mmol/L)   Date Value   07/21/2017 142   04/06/2017 138   03/28/2017 141     Potassium (mmol/L)   Date Value   07/21/2017 4.2   04/06/2017 4.7   03/28/2017 4.4     Chloride (mmol/L)   Date Value   07/21/2017 106   04/06/2017 98   03/28/2017 105     CO2 (mmol/L)   Date Value   07/21/2017 27.0   04/06/2017 27.0   03/28/2017 26.0     BUN (mg/dL)   Date Value   07/21/2017 12   04/06/2017 18   03/28/2017 14     Creatinine (mg/dL)   Date Value   07/21/2017 0.79   04/06/2017 0.91   03/28/2017 0.80     Triglycerides (mg/dl)   Date Value   12/07/2016 228 (H)   09/25/2014 298 (H)       Assessment/Plan      1. Hypothyroidism due to Hashimoto's thyroiditis    2. Vitamin D deficiency    3. Osteopenia, unspecified location    4. Postablative hypothyroidism    5. B12 deficiency    .    Medications prescribed:  Outpatient Encounter Prescriptions as of 2/1/2018   Medication Sig Dispense Refill   • aspirin 81 MG EC tablet Take 81 mg by mouth daily.     • Biotin (BIOTIN MAXIMUM STRENGTH) 5 MG capsule Take  by mouth.     • Calcium Carbonate-Vitamin D (CALCIUM 600+D) 600-200 MG-UNIT tablet Take  by mouth.     • CALCIUM CITRATE-VITAMIN D3 PO Take  by mouth.     • clobetasol (TEMOVATE) 0.05 % external solution Apply 1 application topically Daily.     • diphenoxylate-atropine (LOMOTIL) 2.5-0.025 MG per tablet TK 1 T PO SIX TIMES PER DAY PRN  5   • hydroxychloroquine (PLAQUENIL) 200 MG tablet TAKE 1 TABLET BY MOUTH TWICE DAILY     • Iron-Vitamins (GERITOL PO) Take 5 mL by mouth 2 (Two) Times a Day.     • meclizine (ANTIVERT) 12.5 MG tablet Take 12.5 mg by mouth 3 (Three) Times a  Day As Needed for dizziness.     • meloxicam (MOBIC) 15 MG tablet Take 1 tablet by mouth Daily. 30 tablet 1   • metoprolol tartrate (LOPRESSOR) 25 MG tablet TAKE 1 TABLET BY MOUTH TWICE DAILY. 60 tablet 11   • Multiple Vitamins-Minerals (MULTIVITAMIN ADULT PO) Take  by mouth.     • Omega-3 Fatty Acids (FISH OIL) 1000 MG capsule capsule Take 2,000 mg by mouth Daily With Breakfast.     • Probiotic Product (PROBIOTIC & ACIDOPHILUS EX ST PO) Take  by mouth.     • Thyroid 30 MG PO tablet Take 1 tablet by mouth 2 (Two) Times a Day With Meals. 60 tablet 11   • tiZANidine (ZANAFLEX) 4 MG tablet Take 1 tablet by mouth Every 8 (Eight) Hours As Needed for Muscle Spasms. 15 tablet 0   • triamcinolone (KENALOG) 0.1 % cream Apply  topically 2 (two) times a day.     • vitamin D (ERGOCALCIFEROL) 95615 units capsule capsule TAKE 1 CAPSULE BY MOUTH EVERY 2 WEEKS 6 capsule 0     No facility-administered encounter medications on file as of 2/1/2018.        Orders placed during this encounter include:  No orders of the defined types were placed in this encounter.    Hypothyroidism  developed after episode of subacute thyroiditis     Was on synthroid 88 mcgs x7.5 tabs per week        Lab Results   Component Value Date    TSH 1.870 01/24/2018         [place] not covered but willing to pay out of pocket  changed  to synthroid and cytomel didn't feel better      armour 30 mg tabs bid         neg celiac panel     she also has sjogren's    =====     Osteopenia    Rheumatology Following    Last DXA in 2017     On Vit D     Lab Results   Component Value Date    QZPX30EO 31.5 07/21/2017    HXOU92LR 31.9 03/28/2017    PCRE19TG 31.9 02/07/2017              =====      Alopecia     Thyroid as above  Dr. Baig added aldactone, she can't tolerate  Added iron even though levels are normal         4. Follow-up: No Follow-up on file.

## 2018-02-01 NOTE — PATIENT INSTRUCTIONS
Call [phone]  [facility]    Component      Latest Ref Rng & Units 7/21/2017 1/24/2018   WBC      3.20 - 9.80 10*3/mm3 4.99    RBC      3.77 - 5.16 10*6/mm3 4.19    Hemoglobin      12.0 - 15.5 g/dL 12.7    Hematocrit      35.0 - 45.0 % 37.5    MCV      80.0 - 98.0 fL 89.5    MCH      26.5 - 34.0 pg 30.3    MCHC      31.4 - 36.0 g/dL 33.9    RDW      11.5 - 14.5 % 12.7    RDW-SD      36.4 - 46.3 fl 41.1    MPV      8.0 - 12.0 fL 11.0    Platelets      150 - 450 10*3/mm3 303    Neutrophil %      37.0 - 80.0 % 64.8    Lymphocyte %      10.0 - 50.0 % 22.4    Monocyte %      0.0 - 12.0 % 7.6    Eosinophil %      0.0 - 7.0 % 4.8    Basophil %      0.0 - 2.0 % 0.4    Immature Grans %      0.0 - 0.5 % 0.0    Neutrophils, Absolute      2.00 - 8.60 10*3/mm3 3.23    Lymphocytes, Absolute      0.60 - 4.20 10*3/mm3 1.12    Monocytes, Absolute      0.00 - 0.90 10*3/mm3 0.38    Eosinophils, Absolute      0.00 - 0.70 10*3/mm3 0.24    Basophils, Absolute      0.00 - 0.20 10*3/mm3 0.02    Immature Grans, Absolute      0.00 - 0.02 10*3/mm3 0.00    Glucose      60 - 100 mg/dL 87    BUN      7 - 21 mg/dL 12    Creatinine      0.50 - 1.00 mg/dL 0.79    Sodium      137 - 145 mmol/L 142    Potassium      3.5 - 5.1 mmol/L 4.2    Chloride      95 - 110 mmol/L 106    CO2      22.0 - 31.0 mmol/L 27.0    Calcium      8.4 - 10.2 mg/dL 9.3    Total Protein      6.3 - 8.6 g/dL 6.9    Albumin      3.40 - 4.80 g/dL 4.00    ALT (SGPT)      9 - 52 U/L 30    AST (SGOT)      14 - 36 U/L 33    Alkaline Phosphatase      38 - 126 U/L 85    Total Bilirubin      0.2 - 1.3 mg/dL 0.7    eGFR Non  Amer      39 - 90 mL/min/1.73 71    Globulin      2.3 - 3.5 gm/dL 2.9    A/G Ratio      1.1 - 1.8 g/dL 1.4    BUN/Creatinine Ratio      7.0 - 25.0 15.2    Anion Gap      5.0 - 15.0 mmol/L 9.0    TSH Baseline      0.460 - 4.680 mIU/mL  1.870   Free T4      0.78 - 2.19 ng/dL  0.85   T3, Total      97.0 - 169.0 ng/dl  126.0

## 2018-02-27 ENCOUNTER — OFFICE VISIT (OUTPATIENT)
Dept: CARDIOLOGY | Facility: CLINIC | Age: 73
End: 2018-02-27

## 2018-02-27 ENCOUNTER — HOSPITAL ENCOUNTER (OUTPATIENT)
Dept: CT IMAGING | Facility: HOSPITAL | Age: 73
Discharge: HOME | End: 2018-02-27
Admitting: NURSE PRACTITIONER

## 2018-02-27 ENCOUNTER — HOSPITAL ENCOUNTER (OUTPATIENT)
Dept: GENERAL RADIOLOGY | Facility: HOSPITAL | Age: 73
Discharge: HOME | End: 2018-02-27

## 2018-02-27 VITALS
DIASTOLIC BLOOD PRESSURE: 86 MMHG | BODY MASS INDEX: 28.64 KG/M2 | SYSTOLIC BLOOD PRESSURE: 130 MMHG | HEIGHT: 68 IN | HEART RATE: 93 BPM | WEIGHT: 189 LBS

## 2018-02-27 DIAGNOSIS — S70.01XA CONTUSION OF RIGHT HIP, INITIAL ENCOUNTER: ICD-10-CM

## 2018-02-27 DIAGNOSIS — H53.8 BLURRED VISION: ICD-10-CM

## 2018-02-27 DIAGNOSIS — E03.9 ACQUIRED HYPOTHYROIDISM: ICD-10-CM

## 2018-02-27 DIAGNOSIS — G44.319 ACUTE POST-TRAUMATIC HEADACHE, NOT INTRACTABLE: ICD-10-CM

## 2018-02-27 DIAGNOSIS — S00.03XA CONTUSION OF SCALP, INITIAL ENCOUNTER: ICD-10-CM

## 2018-02-27 DIAGNOSIS — R00.2 PALPITATIONS: Primary | ICD-10-CM

## 2018-02-27 DIAGNOSIS — Z01.818 PRE-OPERATIVE CLEARANCE: ICD-10-CM
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
## 2018-02-27 PROCEDURE — 70450 CT HEAD/BRAIN W/O DYE: CPT

## 2018-02-27 PROCEDURE — 73502 X-RAY EXAM HIP UNI 2-3 VIEWS: CPT

## 2018-02-27 PROCEDURE — 99214 OFFICE O/P EST MOD 30 MIN: CPT | Performed by: INTERNAL MEDICINE

## 2018-02-27 PROCEDURE — 93000 ELECTROCARDIOGRAM COMPLETE: CPT | Performed by: INTERNAL MEDICINE

## 2018-02-27 RX ORDER — METOPROLOL SUCCINATE 50 MG/1
50 TABLET, EXTENDED RELEASE ORAL
Qty: 90 TABLET | Refills: 6 | Status: SHIPPED | OUTPATIENT
Start: 2018-02-27 | End: 2019-03-18 | Stop reason: SDUPTHER

## 2018-02-27 RX ORDER — LANOLIN ALCOHOL/MO/W.PET/CERES
1000 CREAM (GRAM) TOPICAL EVERY OTHER DAY
COMMUNITY

## 2018-02-27 NOTE — PROGRESS NOTES
[facility] Cardiology  OFFICE NOTE    Carla Best  72 y.o. female    02/27/2018  1. Palpitations    2. Acquired hypothyroidism    3. Pre-operative clearance        Chief complaint -Palpitations      History of present Illness-71 lady who has no history of chest pain or shortness of breath has occasional palpitations and she has been on Lopressor 25 twice a day, she does get occasional breakthroughs with it she takes chloroquine for disease modifying agents for collagen  Disorder.  She had her last echo in 2014 and showed normal LV systolic function.  She is planning to cataract surgery.  She can proceed with mild risk by ACC/AHA guidelines.  She is on Synthroid for hypothyroidism and her free T4, TSH is okay.  She takes multivitamins and omega-3 for cholesterol.              Allergies   Allergen Reactions   • Augmentin [Amoxicillin-Pot Clavulanate] Diarrhea   • Ciprofloxacin    • Codeine    • Demerol [Meperidine]    • Dexlansoprazole    • Phenazopyridine    • Tape          Past Medical History:   Diagnosis Date   • Chest pain    • Fibrocystic disease of breast    • Hashimoto's thyroiditis    • History of screening mammography 08/27/2014    SCREENING MAMMOGRAPHY DIGITAL  (Medicare) (1)    • Hyperlipidemia    • Hypothyroidism due to Hashimoto's thyroiditis 12/3/2016   • Keratoconjunctivitis sicca    • Nuclear senile cataract    • On long term drug therapy    • Osteoarthritis    • Osteopenia    • Osteopenia 12/3/2016   • Photopsia     Right   • Postmenopausal bleeding    • Sinus tachycardia    • Sjogren's syndrome    • Vitamin D deficiency    • Vitamin D deficiency 12/3/2016   • Vitreous detachment of right eye          Past Surgical History:   Procedure Laterality Date   • CHOLECYSTECTOMY  06/09/1997    with operative cholangiogram. Chronic cholecystitis & cholelithiasis. HX of passed common duct stone   • CYSTOSCOPY  11/26/1962    urethral dilatation.Recurrent pyelonephritis. urethritis    • ENDOSCOPY N/A 2/17/2017    Procedure: ESOPHAGOGASTRODUODENOSCOPY;  Surgeon: Cisco Mason DO;  Location: [facility] ENDOSCOPY;  Service:    • ENDOSCOPY AND COLONOSCOPY  09/30/1985    Normal colon to left transverse colon   • GALLBLADDER SURGERY     • PAP SMEAR  2009   • TONSILLECTOMY  1950   • VAGINAL DELIVERY      x3         Family History   Problem Relation Age of Onset   • Heart disease Mother    • Arthritis Mother    • Osteoporosis Mother    • Cataracts Mother    • Heart disease Father    • Hypertension Sister    • Arthritis Sister    • Diabetes Sister    • Fuchs' dystrophy Sister    • Breast cancer Paternal Grandmother    • Diabetes Other    • Heart disease Other    • Hypertension Other    • Stroke Other    • Thyroid disease Other    • Lung disease Other    • Other Other      Gallstones, Bleeding Tendency, Colon Problems.   • Fuchs' dystrophy Maternal Grandfather          Social History     Social History   • Marital status:      Spouse name: N/A   • Number of children: N/A   • Years of education: N/A     Occupational History   • Not on file.     Social History Main Topics   • Smoking status: Never Smoker   • Smokeless tobacco: Never Used   • Alcohol use No   • Drug use: No   • Sexual activity: Defer     Other Topics Concern   • Not on file     Social History Narrative         Current Outpatient Prescriptions   Medication Sig Dispense Refill   • aspirin 81 MG EC tablet Take 81 mg by mouth daily.     • Biotin (BIOTIN MAXIMUM STRENGTH) 5 MG capsule Take  by mouth.     • Calcium Carbonate-Vitamin D (CALCIUM 600+D) 600-200 MG-UNIT tablet Take  by mouth.     • clobetasol (TEMOVATE) 0.05 % external solution Apply 1 application topically Daily.     • diphenoxylate-atropine (LOMOTIL) 2.5-0.025 MG per tablet TK 1 T PO SIX TIMES PER DAY PRN  5   • hydroxychloroquine (PLAQUENIL) 200 MG tablet TAKE 1 TABLET BY MOUTH TWICE DAILY     • meclizine (ANTIVERT) 12.5 MG tablet Take 12.5 mg by mouth 3 (Three) Times a Day  "As Needed for dizziness.     • metoprolol tartrate (LOPRESSOR) 25 MG tablet TAKE 1 TABLET BY MOUTH TWICE DAILY. 60 tablet 11   • Multiple Vitamins-Minerals (MULTIVITAMIN ADULT PO) Take  by mouth.     • Omega-3 Fatty Acids (FISH OIL) 1000 MG capsule capsule Take 2,000 mg by mouth Daily With Breakfast.     • Probiotic Product (PROBIOTIC & ACIDOPHILUS EX ST PO) Take  by mouth.     • Thyroid 30 MG PO tablet Take 1 tablet by mouth 2 (Two) Times a Day With Meals. 60 tablet 11   • triamcinolone (KENALOG) 0.1 % cream Apply  topically 2 (two) times a day.     • vitamin B-12 (CYANOCOBALAMIN) 1000 MCG tablet Take 1,000 mcg by mouth Daily.     • vitamin D (ERGOCALCIFEROL) 40999 units capsule capsule TAKE 1 CAPSULE BY MOUTH EVERY 2 WEEKS 6 capsule 0   • metoprolol succinate XL (TOPROL-XL) 50 MG 24 hr tablet Take 1 tablet by mouth Daily With Dinner. 90 tablet 6     No current facility-administered medications for this visit.          Review of Systems     Constitution: Denies any fatigue, fever or chills    HENT: Denies any headache, hearing impairment,     Eyes:Has cataract     Cardivascular - As per history of present illness     Respiratory system-denies any COPD, shortness of breath,     Endocrine:  history of hyperlipidemia                      Hypothyrodism       Musculoskeletal:  history of arthritis with musculoskeletal problems    Gastrointestinal: Irritable bowel syndrome    Genitourinary: No dysuria or hematuria    Neurological:   No history of seizure disorder, stroke, memory problems    Psychiatric/Behavioral:        No history of depression,     Hematological- no history of easy bruising             OBJECTIVE    /86  Pulse 93  Ht 172.7 cm (68\")  Wt 85.7 kg (189 lb)  BMI 28.74 kg/m2      Physical Exam     Constitutional: is oriented to person, place, and time.     Skin-warm and dry    Well developed and nourished in no acute distress      Head: Normocephalic and atraumatic.     Eyes: Pupils are " equal    Neck: Neck supple. No bruit in the carotids    Cardiovascular: [place] in the fifth intercostal space   Regular rate, and  Rhythm,    S1 greater than S2, no S3 or S4, no gallop     Pulmonary/Chest:   Air  Entry is equal on both sides  No wheezing or crackles,      Abdominal: Soft.  No hepatosplenomegaly, bowel sounds are present    Musculoskeletal: No kyphoscoliosis, no significant thickening of the joints    Neurological: is alert and oriented to person, place, and time.    cranial nerve are intact .   No motor or sensory deficit    Extremities-no edema, no radial femoral delay      Psychiatric: He has a normal mood and affect.                  His behavior is normal.             ECG 12 Lead  Date/Time: 2/27/2018 3:48 PM  Performed by: PRAVIN WINTERS  Authorized by: PRAVIN WINTERS   Comparison: not compared with previous ECG   Rhythm: sinus rhythm  Comments: Sinus rhythm with nonspecific ST-T changes with occasional PACs and sinus arrhythmia              Lab Results   Component Value Date    WBC 4.99 07/21/2017    HGB 12.7 07/21/2017    HCT 37.5 07/21/2017    MCV 89.5 07/21/2017     07/21/2017     Lab Results   Component Value Date    GLUCOSE 87 07/21/2017    BUN 12 07/21/2017    CREATININE 0.79 07/21/2017    EGFRIFNONA 71 07/21/2017    BCR 15.2 07/21/2017    CO2 27.0 07/21/2017    CALCIUM 9.3 07/21/2017    ALBUMIN 4.00 07/21/2017    LABIL2 1.4 07/21/2017    AST 33 07/21/2017    ALT 30 07/21/2017     No results found for: CHOL  Lab Results   Component Value Date    TRIG 228 (H) 12/07/2016    TRIG 298 (H) 09/25/2014     Lab Results   Component Value Date    HDL 51 (L) 12/07/2016    HDL 56 (L) 09/25/2014     No components found for: LDLCALC  Lab Results   Component Value Date     (H) 12/07/2016     (H) 09/25/2014     09/25/2014     No results found for: HDLLDLRATIO  No components found for: CHOLHDL  No results found for: HGBA1C  Lab Results   Component Value Date    TSH  1.870 01/24/2018    A3THREB 8.05 07/21/2017                  A/P    Pre-op cataract surgery-she can proceed with mild risk by ACC/AHA guidelines as she has no symptoms of chest pain or shortness of breath.    Palpitations she is on Lopressor I switched her to Toprol-XL 50 mill grams once a day in the evening, and she takes an aspirin every day.  She does not have any symptoms of angina or shortness of breath with her day-to-day activities.    Hyperlipidemia on omega-3.  She has history of irritable bowel syndrome and she takes probiotic, and takes Synthroid for hypothyroidism.    Follow-up in 1 year            This document has been electronically signed by Brian Jacques MD on February 27, 2018 3:45 PM       EMR Dragon/Transcription disclaimer:   Some of this note may be an electronic transcription/translation of spoken language to printed text. The electronic translation of spoken language may permit erroneous, or at times, nonsensical words or phrases to be inadvertently transcribed; Although I have reviewed the note for such errors, some may still exist.

## 2018-03-09 RX ORDER — ERGOCALCIFEROL 1.25 MG/1
CAPSULE ORAL
Qty: 6 CAPSULE | Refills: 0 | Status: SHIPPED | OUTPATIENT
Start: 2018-03-09 | End: 2018-05-31 | Stop reason: SDUPTHER

## 2018-05-07 ENCOUNTER — APPOINTMENT (OUTPATIENT)
Dept: LAB | Facility: HOSPITAL | Age: 73
End: 2018-05-07

## 2018-05-07 LAB
25(OH)D3 SERPL-MCNC: 39.6 NG/ML (ref 30–100)
ALBUMIN SERPL-MCNC: 4.1 G/DL (ref 3.4–4.8)
ALBUMIN/GLOB SERPL: 1.4 G/DL (ref 1.1–1.8)
ALP SERPL-CCNC: 63 U/L (ref 38–126)
ALT SERPL W P-5'-P-CCNC: 20 U/L (ref 9–52)
ANION GAP SERPL CALCULATED.3IONS-SCNC: 10 MMOL/L (ref 5–15)
AST SERPL-CCNC: 32 U/L (ref 14–36)
BASOPHILS # BLD AUTO: 0.02 10*3/MM3 (ref 0–0.2)
BASOPHILS NFR BLD AUTO: 0.4 % (ref 0–2)
BILIRUB SERPL-MCNC: 0.4 MG/DL (ref 0.2–1.3)
BUN BLD-MCNC: 9 MG/DL (ref 7–21)
BUN/CREAT SERPL: 12.5 (ref 7–25)
CALCIUM SPEC-SCNC: 9.8 MG/DL (ref 8.4–10.2)
CHLORIDE SERPL-SCNC: 103 MMOL/L (ref 95–110)
CO2 SERPL-SCNC: 30 MMOL/L (ref 22–31)
CREAT BLD-MCNC: 0.72 MG/DL (ref 0.5–1)
DEPRECATED RDW RBC AUTO: 39.8 FL (ref 36.4–46.3)
EOSINOPHIL # BLD AUTO: 0.27 10*3/MM3 (ref 0–0.7)
EOSINOPHIL NFR BLD AUTO: 5.5 % (ref 0–7)
ERYTHROCYTE [DISTWIDTH] IN BLOOD BY AUTOMATED COUNT: 12.4 % (ref 11.5–14.5)
GFR SERPL CREATININE-BSD FRML MDRD: 79 ML/MIN/1.73 (ref 60–90)
GLOBULIN UR ELPH-MCNC: 3 GM/DL (ref 2.3–3.5)
GLUCOSE BLD-MCNC: 88 MG/DL (ref 60–100)
HCT VFR BLD AUTO: 39.3 % (ref 35–45)
HGB BLD-MCNC: 13.4 G/DL (ref 12–15.5)
IMM GRANULOCYTES # BLD: 0.01 10*3/MM3 (ref 0–0.02)
IMM GRANULOCYTES NFR BLD: 0.2 % (ref 0–0.5)
LYMPHOCYTES # BLD AUTO: 1.44 10*3/MM3 (ref 0.6–4.2)
LYMPHOCYTES NFR BLD AUTO: 29.3 % (ref 10–50)
MCH RBC QN AUTO: 30.3 PG (ref 26.5–34)
MCHC RBC AUTO-ENTMCNC: 34.1 G/DL (ref 31.4–36)
MCV RBC AUTO: 88.9 FL (ref 80–98)
MONOCYTES # BLD AUTO: 0.34 10*3/MM3 (ref 0–0.9)
MONOCYTES NFR BLD AUTO: 6.9 % (ref 0–12)
NEUTROPHILS # BLD AUTO: 2.83 10*3/MM3 (ref 2–8.6)
NEUTROPHILS NFR BLD AUTO: 57.7 % (ref 37–80)
PLATELET # BLD AUTO: 250 10*3/MM3 (ref 150–450)
PMV BLD AUTO: 10.2 FL (ref 8–12)
POTASSIUM BLD-SCNC: 4.6 MMOL/L (ref 3.5–5.1)
PROT SERPL-MCNC: 7.1 G/DL (ref 6.3–8.6)
RBC # BLD AUTO: 4.42 10*6/MM3 (ref 3.77–5.16)
SODIUM BLD-SCNC: 143 MMOL/L (ref 137–145)
T4 FREE SERPL-MCNC: 0.63 NG/DL (ref 0.78–2.19)
TSH SERPL DL<=0.05 MIU/L-ACNC: 3.31 MIU/ML (ref 0.46–4.68)
VIT B12 BLD-MCNC: >1000 PG/ML (ref 239–931)
WBC NRBC COR # BLD: 4.91 10*3/MM3 (ref 3.2–9.8)

## 2018-05-07 PROCEDURE — 84443 ASSAY THYROID STIM HORMONE: CPT | Performed by: INTERNAL MEDICINE

## 2018-05-07 PROCEDURE — 82607 VITAMIN B-12: CPT | Performed by: INTERNAL MEDICINE

## 2018-05-07 PROCEDURE — 82306 VITAMIN D 25 HYDROXY: CPT | Performed by: INTERNAL MEDICINE

## 2018-05-07 PROCEDURE — 85025 COMPLETE CBC W/AUTO DIFF WBC: CPT | Performed by: INTERNAL MEDICINE

## 2018-05-07 PROCEDURE — 84481 FREE ASSAY (FT-3): CPT | Performed by: INTERNAL MEDICINE

## 2018-05-07 PROCEDURE — 84439 ASSAY OF FREE THYROXINE: CPT | Performed by: INTERNAL MEDICINE

## 2018-05-07 PROCEDURE — 36415 COLL VENOUS BLD VENIPUNCTURE: CPT | Performed by: INTERNAL MEDICINE

## 2018-05-07 PROCEDURE — 80053 COMPREHEN METABOLIC PANEL: CPT | Performed by: INTERNAL MEDICINE

## 2018-05-08 ENCOUNTER — CLINICAL SUPPORT (OUTPATIENT)
Dept: AUDIOLOGY | Facility: CLINIC | Age: 73
End: 2018-05-08

## 2018-05-08 ENCOUNTER — OFFICE VISIT (OUTPATIENT)
Dept: OTOLARYNGOLOGY | Facility: CLINIC | Age: 73
End: 2018-05-08

## 2018-05-08 VITALS — HEIGHT: 68 IN | BODY MASS INDEX: 29.4 KG/M2 | OXYGEN SATURATION: 96 % | WEIGHT: 194 LBS

## 2018-05-08 DIAGNOSIS — H90.3 SENSORINEURAL HEARING LOSS, BILATERAL: Primary | ICD-10-CM

## 2018-05-08 DIAGNOSIS — H90.3 SENSORINEURAL HEARING LOSS OF BOTH EARS: Primary | ICD-10-CM

## 2018-05-08 LAB — T3FREE SERPL-MCNC: 2.4 PG/ML (ref 2–4.4)

## 2018-05-08 PROCEDURE — 99213 OFFICE O/P EST LOW 20 MIN: CPT | Performed by: OTOLARYNGOLOGY

## 2018-05-08 NOTE — PROGRESS NOTES
STANDARD AUDIOMETRIC EVALUATION      Name:  Carla Best  :  1945  Age:  72 y.o.  Date of Evaluation:  2018      HISTORY    Reason for visit:  Carla Best is seen today for a hearing evaluation at the request of Dr. Leno Alvarenga.  Patient reports no new problems or concerns since her last hearing test in 2017.      EVALUATION    See Audiogram    RESULTS        Otoscopy and Tympanometry 226 Hz :  Right Ear:  Otoscopy:  Clear ear canal          Tympanometry:  Middle ear function within normal limits    Left Ear:   Otoscopy:  Clear ear canal        Tympanometry:  Middle ear function within normal limits    Test technique:  Standard Audiometry     Pure Tone Audiometry:   Patient responded to pure tones at 10-70 dB for 250-8000 Hz in right ear, and at 10-65 dB for 250-8000 Hz in left ear.       Speech Audiometry:        Right Ear:  Speech Reception Threshold (SRT) was obtained at 20 dBHL                 Speech Discrimination scores were 76% in quiet when words were presented at 60 dBHL       Left Ear:  Speech Reception Threshold (SRT) was obtained at 20 dBHL                 Speech Discrimination scores were 88% in quiet when words were presented at 60 dBHL    Reliability:   good    IMPRESSIONS:  1.  Tympanometry results are consistent with Middle ear function within normal limits in both ears.  2.  Pure tone results are consistent with within normal limits to moderately-severe sloping sensorineural hearing loss for both ears.     RECOMMENDATIONS:  Patient is seeing the Ear Nose and Throat physician immediately following this examination.  It was a pleasure seeing Carla Best in Audiology today.  We would be happy to do further testing or discuss these test as necessary.      This document has been electronically signed by MELO Meyers on May 8, 2018 10:43 AM          MELO Meyers  Licensed Audiologist

## 2018-05-09 NOTE — PROGRESS NOTES
Bridget Best is a 72 y.o. female.       History of Present Illness   Patient is followed with binaural sensorineural hearing loss.  Hearing was slightly asymmetric.  I had recommended a 6 month reevaluation to assure this was rapidly progressing.  She says she can't tell that her hearing is significantly decreased.  She says her right ear still feels full at times.  No otorrhea.  Has not had any ear infections.      The following portions of the patient's history were reviewed and updated as appropriate: allergies, current medications, past family history, past medical history, past social history, past surgical history and problem list.     reports that she has never smoked. She has never used smokeless tobacco. She reports that she does not drink alcohol or use drugs.   Patient is not a tobacco user and has not been counseled for use of tobacco products      Review of Systems   Constitutional: Negative for fever.           Objective   Physical Exam  General: Well-developed well-nourished female in no acute distress.  Alert and oriented ×3. Head: Normocephalic. Face: Symmetrical strength and appearance. PERRL. EOMI. Voice:Strong. Speech:Fluent  Ears: External ears no deformity, canals show modest squamous debris that cleaned under the microscope bilaterally., tympanic membranes intact clear and mobile bilaterally.  Nose: Nares show no discharge mass polyp or purulence.  Boggy mucosa is present.  No gross external deformity.  Septum: Midline  Oral cavity: Lips and gums without lesions.  Tongue and floor of mouth without lesions.  Parotid and submandibular ducts unobstructed.  No mucosal lesions on the buccal mucosa or vestibule of the mouth.  Pharynx: No erythema exudate mass or ulcer  Neck: No lymphadenopathy.  No thyromegaly.  Trachea and larynx midline.  No masses in the parotid or submandibular glands.    Audiogram is obtained and reviewed and shows a bilateral normal sloping to moderately  severe sensorineural hearing loss that is slightly worse on the right than the left but unchanged from 6 months ago.  Tympanograms are type A bilaterally.  Discrimination scores 88% on the left 76% on the right    Assessment/Plan   Carla was seen today for follow-up.    Diagnoses and all orders for this visit:    Sensorineural hearing loss of both ears      Plan: Reassured the patient that her hearing is stable.  I'll she could consider amplification she doesn't feel like she needs it I've advised avoidance of unnecessary exposure loud noise and use severe protection when unavoidably around loud noise.  She should return in a year with a hearing test and call sooner for problems.

## 2018-05-11 ENCOUNTER — OFFICE VISIT (OUTPATIENT)
Dept: ENDOCRINOLOGY | Facility: CLINIC | Age: 73
End: 2018-05-11

## 2018-05-11 VITALS
SYSTOLIC BLOOD PRESSURE: 128 MMHG | OXYGEN SATURATION: 96 % | HEIGHT: 68 IN | DIASTOLIC BLOOD PRESSURE: 74 MMHG | HEART RATE: 101 BPM | WEIGHT: 195 LBS | BODY MASS INDEX: 29.55 KG/M2

## 2018-05-11 DIAGNOSIS — M85.80 OSTEOPENIA, UNSPECIFIED LOCATION: ICD-10-CM

## 2018-05-11 DIAGNOSIS — E55.9 VITAMIN D DEFICIENCY: ICD-10-CM

## 2018-05-11 DIAGNOSIS — E53.8 B12 DEFICIENCY: ICD-10-CM

## 2018-05-11 DIAGNOSIS — E03.8 HYPOTHYROIDISM DUE TO HASHIMOTO'S THYROIDITIS: Primary | ICD-10-CM

## 2018-05-11 DIAGNOSIS — E06.3 HYPOTHYROIDISM DUE TO HASHIMOTO'S THYROIDITIS: Primary | ICD-10-CM

## 2018-05-11 PROCEDURE — 99214 OFFICE O/P EST MOD 30 MIN: CPT | Performed by: INTERNAL MEDICINE

## 2018-05-11 RX ORDER — SPIRONOLACTONE 25 MG/1
25 TABLET ORAL DAILY
Qty: 30 TABLET | Refills: 11 | Status: SHIPPED | OUTPATIENT
Start: 2018-05-11 | End: 2018-06-17

## 2018-05-11 RX ORDER — FINASTERIDE 1 MG/1
1 TABLET, FILM COATED ORAL DAILY
Qty: 30 TABLET | Refills: 11 | Status: SHIPPED | OUTPATIENT
Start: 2018-05-11 | End: 2019-03-18

## 2018-05-11 RX ORDER — LEVOTHYROXINE SODIUM 0.05 MG/1
50 TABLET ORAL DAILY
Qty: 30 TABLET | Refills: 11 | Status: SHIPPED | OUTPATIENT
Start: 2018-05-11 | End: 2018-05-13

## 2018-05-11 NOTE — PROGRESS NOTES
Bridget Best is a 72 y.o. female. she is here today for follow-up.    Thyroid Problem   Symptoms include anxiety. Patient reports no cold intolerance, constipation, diaphoresis, diarrhea, fatigue, heat intolerance, palpitations or tremors.   Hypothyroidism   Associated symptoms include arthralgias, myalgias and weakness. Pertinent negatives include no abdominal pain, chest pain, chills, congestion, coughing, diaphoresis, fatigue, headaches, joint swelling, nausea, neck pain, numbness, rash, sore throat or vomiting.               72 y.o.   female comes for fu , very pleasant       history of subacute thyroiditis with subsequent permanent hypothyroidism      duration - 5+ years  timing - constant  quality - biochemically controlled but still symptomatic        severity - moderate     symptoms - multiple , detailed under ROS    Fatigue, hair loss, cold intolerance, weigh gain, myalgias, muscle weakness  alleviating factors - brand name synthroid and cytomel but lately not equally effective     she states that if no changes are needed then she will accept that it is not her thyroid, she only wants to be certain there isn't anything that has been missed.      she has fibromylgia and neck pain has worsened since last appt, most importantly for her , hair loss         The following portions of the patient's history were reviewed and updated as appropriate:   Past Medical History:   Diagnosis Date   • Chest pain    • Fibrocystic disease of breast    • Hashimoto's thyroiditis    • History of screening mammography 08/27/2014    SCREENING MAMMOGRAPHY DIGITAL  (Medicare) (1)    • Hyperlipidemia    • Hypothyroidism due to Hashimoto's thyroiditis 12/3/2016   • Keratoconjunctivitis sicca    • Nuclear senile cataract    • On long term drug therapy    • Osteoarthritis    • Osteopenia    • Osteopenia 12/3/2016   • Photopsia     Right   • Postmenopausal bleeding    • Sinus tachycardia    • Sjogren's syndrome     • Vitamin D deficiency    • Vitamin D deficiency 12/3/2016   • Vitreous detachment of right eye      Past Surgical History:   Procedure Laterality Date   • CHOLECYSTECTOMY  06/09/1997    with operative cholangiogram. Chronic cholecystitis & cholelithiasis. HX of passed common duct stone   • CYSTOSCOPY  11/26/1962    urethral dilatation.Recurrent pyelonephritis. urethritis   • ENDOSCOPY N/A 2/17/2017    Procedure: ESOPHAGOGASTRODUODENOSCOPY;  Surgeon: Cisco Mason DO;  Location: [facility] ENDOSCOPY;  Service:    • ENDOSCOPY AND COLONOSCOPY  09/30/1985    Normal colon to left transverse colon   • GALLBLADDER SURGERY     • PAP SMEAR  2009   • TONSILLECTOMY  1950   • VAGINAL DELIVERY      x3     Family History   Problem Relation Age of Onset   • Heart disease Mother    • Arthritis Mother    • Osteoporosis Mother    • Cataracts Mother    • Heart disease Father    • Hypertension Sister    • Arthritis Sister    • Diabetes Sister    • Fuchs' dystrophy Sister    • Breast cancer Paternal Grandmother    • Diabetes Other    • Heart disease Other    • Hypertension Other    • Stroke Other    • Thyroid disease Other    • Lung disease Other    • Other Other      Gallstones, Bleeding Tendency, Colon Problems.   • Fuchs' dystrophy Maternal Grandfather      OB History     No data available        Current Outpatient Prescriptions   Medication Sig Dispense Refill   • aspirin 81 MG EC tablet Take 81 mg by mouth daily.     • Biotin (BIOTIN MAXIMUM STRENGTH) 5 MG capsule Take  by mouth.     • Calcium Carbonate-Vitamin D (CALCIUM 600+D) 600-200 MG-UNIT tablet Take  by mouth.     • clobetasol (TEMOVATE) 0.05 % external solution Apply 1 application topically Daily.     • diphenoxylate-atropine (LOMOTIL) 2.5-0.025 MG per tablet TK 1 T PO SIX TIMES PER DAY PRN  5   • hydroxychloroquine (PLAQUENIL) 200 MG tablet TAKE 1 TABLET BY MOUTH TWICE DAILY     • meclizine (ANTIVERT) 12.5 MG tablet Take 12.5 mg by mouth 3 (Three) Times a Day As Needed  for dizziness.     • metoprolol succinate XL (TOPROL-XL) 50 MG 24 hr tablet Take 1 tablet by mouth Daily With Dinner. 90 tablet 6   • metoprolol tartrate (LOPRESSOR) 25 MG tablet TAKE 1 TABLET BY MOUTH TWICE DAILY. 60 tablet 11   • Multiple Vitamins-Minerals (MULTIVITAMIN ADULT PO) Take  by mouth.     • Omega-3 Fatty Acids (FISH OIL) 1000 MG capsule capsule Take 2,000 mg by mouth Daily With Breakfast.     • Probiotic Product (PROBIOTIC & ACIDOPHILUS EX ST PO) Take  by mouth.     • triamcinolone (KENALOG) 0.1 % cream Apply  topically 2 (two) times a day.     • vitamin B-12 (CYANOCOBALAMIN) 1000 MCG tablet Take 1,000 mcg by mouth Daily.     • vitamin D (ERGOCALCIFEROL) 91734 units capsule capsule TAKE 1 CAPSULE BY MOUTH EVERY 2 WEEKS 6 capsule 0   • finasteride (PROPECIA) 1 MG tablet Take 1 tablet by mouth Daily. 30 tablet 11   • levothyroxine (SYNTHROID) 50 MCG tablet Take 1 tablet by mouth Daily. 30 tablet 11   • spironolactone (ALDACTONE) 25 MG tablet Take 1 tablet by mouth Daily. 30 tablet 11     No current facility-administered medications for this visit.      Allergies   Allergen Reactions   • Augmentin [Amoxicillin-Pot Clavulanate] Diarrhea   • Ciprofloxacin    • Codeine    • Demerol [Meperidine]    • Dexlansoprazole    • Phenazopyridine    • Tape      Social History     Social History   • Marital status:      Social History Main Topics   • Smoking status: Never Smoker   • Smokeless tobacco: Never Used   • Alcohol use No   • Drug use: No   • Sexual activity: Defer     Other Topics Concern   • Not on file       Review of Systems  Review of Systems   Constitutional: Negative for activity change, appetite change, chills, diaphoresis and fatigue.   HENT: Negative for congestion, dental problem, drooling, ear discharge, ear pain, facial swelling, sneezing, sore throat, tinnitus, trouble swallowing and voice change.    Eyes: Negative for photophobia, pain, discharge, redness, itching and visual disturbance.  "  Respiratory: Negative for apnea, cough, choking, chest tightness and shortness of breath.    Cardiovascular: Negative for chest pain, palpitations and leg swelling.   Gastrointestinal: Negative for abdominal distention, abdominal pain, constipation, diarrhea, nausea and vomiting.   Endocrine: Negative for cold intolerance, heat intolerance, polydipsia, polyphagia and polyuria.   Genitourinary: Negative for difficulty urinating, dysuria, frequency, hematuria and urgency.   Musculoskeletal: Positive for arthralgias and myalgias. Negative for back pain, gait problem, joint swelling, neck pain and neck stiffness.   Skin: Negative for color change, pallor, rash and wound.   Allergic/Immunologic: Negative for environmental allergies, food allergies and immunocompromised state.   Neurological: Positive for weakness. Negative for dizziness, tremors, facial asymmetry, light-headedness, numbness and headaches.   Hematological: Negative for adenopathy. Does not bruise/bleed easily.   Psychiatric/Behavioral: Positive for decreased concentration. Negative for agitation, behavioral problems, confusion and sleep disturbance. The patient is nervous/anxious.         Objective    /74 (BP Location: Left arm, Patient Position: Sitting, Cuff Size: Large Adult)   Pulse 101   Ht 172.7 cm (68\")   Wt 88.5 kg (195 lb)   SpO2 96%   BMI 29.65 kg/m²   Physical Exam   Constitutional: She is oriented to person, place, and time. She appears well-developed and well-nourished. No distress.   HENT:   Head: Normocephalic and atraumatic.   Right Ear: External ear normal.   Left Ear: External ear normal.   Nose: Nose normal.   Eyes: Conjunctivae and EOM are normal. Pupils are equal, round, and reactive to light.   Neck: Normal range of motion. Neck supple. No tracheal deviation present. No thyromegaly present.   Cardiovascular: Normal rate, regular rhythm and normal heart sounds.    No murmur heard.  Pulmonary/Chest: Effort normal and " breath sounds normal. No respiratory distress. She has no wheezes.   Abdominal: Soft. Bowel sounds are normal. There is no tenderness. There is no rebound and no guarding.   Musculoskeletal: Normal range of motion. She exhibits no edema, tenderness or deformity.   Neurological: She is alert and oriented to person, place, and time. No cranial nerve deficit.   Skin: Skin is warm and dry. No rash noted.   Psychiatric: She has a normal mood and affect. Her behavior is normal. Judgment and thought content normal.       Lab Review  Glucose (mg/dL)   Date Value   05/07/2018 88   07/21/2017 87   04/06/2017 95     Sodium (mmol/L)   Date Value   05/07/2018 143   07/21/2017 142   04/06/2017 138     Potassium (mmol/L)   Date Value   05/07/2018 4.6   07/21/2017 4.2   04/06/2017 4.7     Chloride (mmol/L)   Date Value   05/07/2018 103   07/21/2017 106   04/06/2017 98     CO2 (mmol/L)   Date Value   05/07/2018 30.0   07/21/2017 27.0   04/06/2017 27.0     BUN (mg/dL)   Date Value   05/07/2018 9   07/21/2017 12   04/06/2017 18     Creatinine (mg/dL)   Date Value   05/07/2018 0.72   07/21/2017 0.79   04/06/2017 0.91     Triglycerides (mg/dl)   Date Value   12/07/2016 228 (H)   09/25/2014 298 (H)     LDL Cholesterol  (mg/dl)   Date Value   12/07/2016 152 (H)   09/25/2014 143 (H)   09/25/2014 123       Assessment/Plan      1. Hypothyroidism due to Hashimoto's thyroiditis    2. Vitamin D deficiency    3. B12 deficiency    4. Osteopenia, unspecified location    .    Medications prescribed:  Outpatient Encounter Prescriptions as of 5/11/2018   Medication Sig Dispense Refill   • aspirin 81 MG EC tablet Take 81 mg by mouth daily.     • Biotin (BIOTIN MAXIMUM STRENGTH) 5 MG capsule Take  by mouth.     • Calcium Carbonate-Vitamin D (CALCIUM 600+D) 600-200 MG-UNIT tablet Take  by mouth.     • clobetasol (TEMOVATE) 0.05 % external solution Apply 1 application topically Daily.     • diphenoxylate-atropine (LOMOTIL) 2.5-0.025 MG per tablet TK 1 T  PO SIX TIMES PER DAY PRN  5   • hydroxychloroquine (PLAQUENIL) 200 MG tablet TAKE 1 TABLET BY MOUTH TWICE DAILY     • meclizine (ANTIVERT) 12.5 MG tablet Take 12.5 mg by mouth 3 (Three) Times a Day As Needed for dizziness.     • metoprolol succinate XL (TOPROL-XL) 50 MG 24 hr tablet Take 1 tablet by mouth Daily With Dinner. 90 tablet 6   • metoprolol tartrate (LOPRESSOR) 25 MG tablet TAKE 1 TABLET BY MOUTH TWICE DAILY. 60 tablet 11   • Multiple Vitamins-Minerals (MULTIVITAMIN ADULT PO) Take  by mouth.     • Omega-3 Fatty Acids (FISH OIL) 1000 MG capsule capsule Take 2,000 mg by mouth Daily With Breakfast.     • Probiotic Product (PROBIOTIC & ACIDOPHILUS EX ST PO) Take  by mouth.     • triamcinolone (KENALOG) 0.1 % cream Apply  topically 2 (two) times a day.     • vitamin B-12 (CYANOCOBALAMIN) 1000 MCG tablet Take 1,000 mcg by mouth Daily.     • vitamin D (ERGOCALCIFEROL) 94776 units capsule capsule TAKE 1 CAPSULE BY MOUTH EVERY 2 WEEKS 6 capsule 0   • [DISCONTINUED] Thyroid 30 MG PO tablet Take 1 tablet by mouth 2 (Two) Times a Day With Meals. 60 tablet 11   • finasteride (PROPECIA) 1 MG tablet Take 1 tablet by mouth Daily. 30 tablet 11   • levothyroxine (SYNTHROID) 50 MCG tablet Take 1 tablet by mouth Daily. 30 tablet 11   • spironolactone (ALDACTONE) 25 MG tablet Take 1 tablet by mouth Daily. 30 tablet 11     No facility-administered encounter medications on file as of 5/11/2018.        Orders placed during this encounter include:  No orders of the defined types were placed in this encounter.    Hypothyroidism  developed after episode of subacute thyroiditis     Was on synthroid 88 mcgs x7.5 tabs per week        Lab Results   Component Value Date    TSH 3.310 05/07/2018         [place] not covered but willing to pay out of pocket  changed  to synthroid and cytomel didn't feel better      armour 30 mg tabs bid      Change back  to synthroid 88 mcgs daily      neg celiac panel     she also has sjogren's    =====      Osteopenia    Rheumatology Following    Last DXA in 2017     On Vit D     Lab Results   Component Value Date    EZAU16TY 39.6 05/07/2018    BOBL43LH 31.5 07/21/2017    MHJG25PJ 31.9 03/28/2017              =====      Alopecia     Thyroid as above  Dr. Baig added aldactone, she can't tolerate  Added iron even though levels are normal       I added finasteride 1 mg daily or aldactone 25 mg daily , either one       4. Follow-up: No Follow-up on file.      Labs in 1 month and talk over the phone and appt in 4 months , orders entered as monthly x 3

## 2018-05-13 RX ORDER — LEVOTHYROXINE SODIUM 88 MCG
88 TABLET ORAL DAILY
Qty: 30 TABLET | Refills: 11 | Status: SHIPPED | OUTPATIENT
Start: 2018-05-13 | End: 2018-06-14 | Stop reason: SDUPTHER

## 2018-05-31 RX ORDER — ERGOCALCIFEROL 1.25 MG/1
CAPSULE ORAL
Qty: 6 CAPSULE | Refills: 0 | Status: SHIPPED | OUTPATIENT
Start: 2018-05-31 | End: 2019-02-26 | Stop reason: SDUPTHER

## 2018-06-14 ENCOUNTER — LAB (OUTPATIENT)
Dept: LAB | Facility: HOSPITAL | Age: 73
End: 2018-06-14

## 2018-06-14 DIAGNOSIS — E55.9 VITAMIN D DEFICIENCY: ICD-10-CM

## 2018-06-14 DIAGNOSIS — E55.9 VITAMIN D DEFICIENCY: Primary | ICD-10-CM

## 2018-06-14 DIAGNOSIS — M85.80 OSTEOPENIA: ICD-10-CM

## 2018-06-14 DIAGNOSIS — E06.3 HYPOTHYROIDISM DUE TO HASHIMOTO'S THYROIDITIS: ICD-10-CM

## 2018-06-14 DIAGNOSIS — E03.8 HYPOTHYROIDISM DUE TO HASHIMOTO'S THYROIDITIS: ICD-10-CM

## 2018-06-14 DIAGNOSIS — M85.80 OSTEOPENIA, UNSPECIFIED LOCATION: ICD-10-CM

## 2018-06-14 DIAGNOSIS — E89.0 POSTABLATIVE HYPOTHYROIDISM: ICD-10-CM

## 2018-06-14 DIAGNOSIS — E53.8 B12 DEFICIENCY: ICD-10-CM

## 2018-06-14 LAB
25(OH)D3 SERPL-MCNC: 39.8 NG/ML (ref 30–100)
ALBUMIN SERPL-MCNC: 4.4 G/DL (ref 3.4–4.8)
ALBUMIN/GLOB SERPL: 1.5 G/DL (ref 1.1–1.8)
ALP SERPL-CCNC: 72 U/L (ref 38–126)
ALT SERPL W P-5'-P-CCNC: 25 U/L (ref 9–52)
ANION GAP SERPL CALCULATED.3IONS-SCNC: 10 MMOL/L (ref 5–15)
AST SERPL-CCNC: 32 U/L (ref 14–36)
BASOPHILS # BLD AUTO: 0.03 10*3/MM3 (ref 0–0.2)
BASOPHILS NFR BLD AUTO: 0.6 % (ref 0–2)
BILIRUB SERPL-MCNC: 0.6 MG/DL (ref 0.2–1.3)
BUN BLD-MCNC: 14 MG/DL (ref 7–21)
BUN/CREAT SERPL: 17.3 (ref 7–25)
CALCIUM SPEC-SCNC: 9.6 MG/DL (ref 8.4–10.2)
CHLORIDE SERPL-SCNC: 103 MMOL/L (ref 95–110)
CO2 SERPL-SCNC: 29 MMOL/L (ref 22–31)
CREAT BLD-MCNC: 0.81 MG/DL (ref 0.5–1)
DEPRECATED RDW RBC AUTO: 40.5 FL (ref 36.4–46.3)
EOSINOPHIL # BLD AUTO: 0.13 10*3/MM3 (ref 0–0.7)
EOSINOPHIL NFR BLD AUTO: 2.7 % (ref 0–7)
ERYTHROCYTE [DISTWIDTH] IN BLOOD BY AUTOMATED COUNT: 12.5 % (ref 11.5–14.5)
GFR SERPL CREATININE-BSD FRML MDRD: 69 ML/MIN/1.73 (ref 39–90)
GLOBULIN UR ELPH-MCNC: 3 GM/DL (ref 2.3–3.5)
GLUCOSE BLD-MCNC: 94 MG/DL (ref 60–100)
HCT VFR BLD AUTO: 40.4 % (ref 35–45)
HGB BLD-MCNC: 13.9 G/DL (ref 12–15.5)
IMM GRANULOCYTES # BLD: 0.01 10*3/MM3 (ref 0–0.02)
IMM GRANULOCYTES NFR BLD: 0.2 % (ref 0–0.5)
LYMPHOCYTES # BLD AUTO: 1.09 10*3/MM3 (ref 0.6–4.2)
LYMPHOCYTES NFR BLD AUTO: 22.8 % (ref 10–50)
MCH RBC QN AUTO: 30.3 PG (ref 26.5–34)
MCHC RBC AUTO-ENTMCNC: 34.4 G/DL (ref 31.4–36)
MCV RBC AUTO: 88 FL (ref 80–98)
MONOCYTES # BLD AUTO: 0.45 10*3/MM3 (ref 0–0.9)
MONOCYTES NFR BLD AUTO: 9.4 % (ref 0–12)
NEUTROPHILS # BLD AUTO: 3.08 10*3/MM3 (ref 2–8.6)
NEUTROPHILS NFR BLD AUTO: 64.3 % (ref 37–80)
PLATELET # BLD AUTO: 243 10*3/MM3 (ref 150–450)
PMV BLD AUTO: 9.8 FL (ref 8–12)
POTASSIUM BLD-SCNC: 4.5 MMOL/L (ref 3.5–5.1)
PROT SERPL-MCNC: 7.4 G/DL (ref 6.3–8.6)
RBC # BLD AUTO: 4.59 10*6/MM3 (ref 3.77–5.16)
SODIUM BLD-SCNC: 142 MMOL/L (ref 137–145)
T3 SERPL-MCNC: 112 NG/DL (ref 97–169)
T4 FREE SERPL-MCNC: 1.2 NG/DL (ref 0.78–2.19)
TSH SERPL DL<=0.05 MIU/L-ACNC: 6.12 MIU/ML (ref 0.46–4.68)
VIT B12 BLD-MCNC: 885 PG/ML (ref 239–931)
WBC NRBC COR # BLD: 4.79 10*3/MM3 (ref 3.2–9.8)

## 2018-06-14 PROCEDURE — 84443 ASSAY THYROID STIM HORMONE: CPT

## 2018-06-14 PROCEDURE — 84439 ASSAY OF FREE THYROXINE: CPT

## 2018-06-14 PROCEDURE — 82607 VITAMIN B-12: CPT

## 2018-06-14 PROCEDURE — 84480 ASSAY TRIIODOTHYRONINE (T3): CPT

## 2018-06-14 PROCEDURE — 85025 COMPLETE CBC W/AUTO DIFF WBC: CPT

## 2018-06-14 PROCEDURE — 36415 COLL VENOUS BLD VENIPUNCTURE: CPT

## 2018-06-14 PROCEDURE — 80053 COMPREHEN METABOLIC PANEL: CPT

## 2018-06-14 PROCEDURE — 82306 VITAMIN D 25 HYDROXY: CPT

## 2018-06-14 RX ORDER — LEVOTHYROXINE SODIUM 88 MCG
TABLET ORAL
Qty: 32 TABLET | Refills: 11 | Status: SHIPPED | OUTPATIENT
Start: 2018-06-14 | End: 2019-07-04 | Stop reason: SDUPTHER

## 2018-08-10 ENCOUNTER — LAB (OUTPATIENT)
Dept: LAB | Facility: HOSPITAL | Age: 73
End: 2018-08-10

## 2018-08-10 DIAGNOSIS — E55.9 VITAMIN D DEFICIENCY: ICD-10-CM

## 2018-08-10 DIAGNOSIS — M85.80 OSTEOPENIA, UNSPECIFIED LOCATION: ICD-10-CM

## 2018-08-10 DIAGNOSIS — E03.8 HYPOTHYROIDISM DUE TO HASHIMOTO'S THYROIDITIS: ICD-10-CM

## 2018-08-10 DIAGNOSIS — E53.8 B12 DEFICIENCY: ICD-10-CM

## 2018-08-10 DIAGNOSIS — E06.3 HYPOTHYROIDISM DUE TO HASHIMOTO'S THYROIDITIS: ICD-10-CM

## 2018-08-10 LAB
25(OH)D3 SERPL-MCNC: 44.1 NG/ML (ref 30–100)
ALBUMIN SERPL-MCNC: 4.2 G/DL (ref 3.4–4.8)
ALBUMIN/GLOB SERPL: 1.4 G/DL (ref 1.1–1.8)
ALP SERPL-CCNC: 59 U/L (ref 38–126)
ALT SERPL W P-5'-P-CCNC: 22 U/L (ref 9–52)
ANION GAP SERPL CALCULATED.3IONS-SCNC: 8 MMOL/L (ref 5–15)
AST SERPL-CCNC: 28 U/L (ref 14–36)
BASOPHILS # BLD AUTO: 0.03 10*3/MM3 (ref 0–0.2)
BASOPHILS NFR BLD AUTO: 0.6 % (ref 0–2)
BILIRUB SERPL-MCNC: 0.6 MG/DL (ref 0.2–1.3)
BUN BLD-MCNC: 12 MG/DL (ref 7–21)
BUN/CREAT SERPL: 13.6 (ref 7–25)
CALCIUM SPEC-SCNC: 9.7 MG/DL (ref 8.4–10.2)
CHLORIDE SERPL-SCNC: 104 MMOL/L (ref 95–110)
CO2 SERPL-SCNC: 28 MMOL/L (ref 22–31)
CREAT BLD-MCNC: 0.88 MG/DL (ref 0.5–1)
DEPRECATED RDW RBC AUTO: 41.5 FL (ref 36.4–46.3)
EOSINOPHIL # BLD AUTO: 0.27 10*3/MM3 (ref 0–0.7)
EOSINOPHIL NFR BLD AUTO: 5.7 % (ref 0–7)
ERYTHROCYTE [DISTWIDTH] IN BLOOD BY AUTOMATED COUNT: 12.5 % (ref 11.5–14.5)
GFR SERPL CREATININE-BSD FRML MDRD: 63 ML/MIN/1.73 (ref 39–90)
GLOBULIN UR ELPH-MCNC: 3 GM/DL (ref 2.3–3.5)
GLUCOSE BLD-MCNC: 83 MG/DL (ref 60–100)
HCT VFR BLD AUTO: 39.5 % (ref 35–45)
HGB BLD-MCNC: 13.4 G/DL (ref 12–15.5)
IMM GRANULOCYTES # BLD: 0.01 10*3/MM3 (ref 0–0.02)
IMM GRANULOCYTES NFR BLD: 0.2 % (ref 0–0.5)
LYMPHOCYTES # BLD AUTO: 1.38 10*3/MM3 (ref 0.6–4.2)
LYMPHOCYTES NFR BLD AUTO: 28.9 % (ref 10–50)
MCH RBC QN AUTO: 30.5 PG (ref 26.5–34)
MCHC RBC AUTO-ENTMCNC: 33.9 G/DL (ref 31.4–36)
MCV RBC AUTO: 90 FL (ref 80–98)
MONOCYTES # BLD AUTO: 0.47 10*3/MM3 (ref 0–0.9)
MONOCYTES NFR BLD AUTO: 9.9 % (ref 0–12)
NEUTROPHILS # BLD AUTO: 2.61 10*3/MM3 (ref 2–8.6)
NEUTROPHILS NFR BLD AUTO: 54.7 % (ref 37–80)
PLATELET # BLD AUTO: 267 10*3/MM3 (ref 150–450)
PMV BLD AUTO: 9.7 FL (ref 8–12)
POTASSIUM BLD-SCNC: 4.6 MMOL/L (ref 3.5–5.1)
PROT SERPL-MCNC: 7.2 G/DL (ref 6.3–8.6)
RBC # BLD AUTO: 4.39 10*6/MM3 (ref 3.77–5.16)
SODIUM BLD-SCNC: 140 MMOL/L (ref 137–145)
T3 SERPL-MCNC: 123 NG/DL (ref 97–169)
T4 FREE SERPL-MCNC: 1.34 NG/DL (ref 0.78–2.19)
TSH SERPL DL<=0.05 MIU/L-ACNC: 1.47 MIU/ML (ref 0.46–4.68)
VIT B12 BLD-MCNC: 870 PG/ML (ref 239–931)
WBC NRBC COR # BLD: 4.77 10*3/MM3 (ref 3.2–9.8)

## 2018-08-10 PROCEDURE — 82306 VITAMIN D 25 HYDROXY: CPT

## 2018-08-10 PROCEDURE — 82607 VITAMIN B-12: CPT

## 2018-08-10 PROCEDURE — 84439 ASSAY OF FREE THYROXINE: CPT | Performed by: INTERNAL MEDICINE

## 2018-08-10 PROCEDURE — 84443 ASSAY THYROID STIM HORMONE: CPT

## 2018-08-10 PROCEDURE — 84480 ASSAY TRIIODOTHYRONINE (T3): CPT | Performed by: INTERNAL MEDICINE

## 2018-08-10 PROCEDURE — 36415 COLL VENOUS BLD VENIPUNCTURE: CPT | Performed by: INTERNAL MEDICINE

## 2018-08-10 PROCEDURE — 84481 FREE ASSAY (FT-3): CPT | Performed by: INTERNAL MEDICINE

## 2018-08-10 PROCEDURE — 85025 COMPLETE CBC W/AUTO DIFF WBC: CPT

## 2018-08-10 PROCEDURE — 80053 COMPREHEN METABOLIC PANEL: CPT

## 2018-08-11 LAB — T3FREE SERPL-MCNC: 2.5 PG/ML (ref 2–4.4)

## 2018-08-17 ENCOUNTER — OFFICE VISIT (OUTPATIENT)
Dept: ENDOCRINOLOGY | Facility: CLINIC | Age: 73
End: 2018-08-17

## 2018-08-17 VITALS
OXYGEN SATURATION: 92 % | WEIGHT: 198.5 LBS | DIASTOLIC BLOOD PRESSURE: 64 MMHG | SYSTOLIC BLOOD PRESSURE: 122 MMHG | BODY MASS INDEX: 30.08 KG/M2 | HEIGHT: 68 IN | HEART RATE: 98 BPM

## 2018-08-17 DIAGNOSIS — E06.3 HYPOTHYROIDISM DUE TO HASHIMOTO'S THYROIDITIS: ICD-10-CM

## 2018-08-17 DIAGNOSIS — E55.9 VITAMIN D DEFICIENCY: ICD-10-CM

## 2018-08-17 DIAGNOSIS — M85.80 OSTEOPENIA, UNSPECIFIED LOCATION: ICD-10-CM

## 2018-08-17 DIAGNOSIS — E53.8 B12 DEFICIENCY: ICD-10-CM

## 2018-08-17 DIAGNOSIS — E03.8 HYPOTHYROIDISM DUE TO HASHIMOTO'S THYROIDITIS: ICD-10-CM

## 2018-08-17 DIAGNOSIS — E89.0 POSTABLATIVE HYPOTHYROIDISM: Primary | ICD-10-CM

## 2018-08-17 PROCEDURE — 99214 OFFICE O/P EST MOD 30 MIN: CPT | Performed by: INTERNAL MEDICINE

## 2018-08-17 NOTE — PROGRESS NOTES
Bridget Best is a 73 y.o. female. she is here today for follow-up.    Hypothyroidism   Associated symptoms include arthralgias, myalgias and weakness. Pertinent negatives include no abdominal pain, chest pain, chills, congestion, coughing, diaphoresis, fatigue, headaches, joint swelling, nausea, neck pain, numbness, rash, sore throat or vomiting.   Thyroid Problem   Symptoms include anxiety. Patient reports no cold intolerance, constipation, diaphoresis, diarrhea, fatigue, heat intolerance, palpitations or tremors.               72 y.o.   female comes for fu , very pleasant       history of subacute thyroiditis with subsequent permanent hypothyroidism      duration - 5+ years  timing - constant  quality - biochemically controlled but still symptomatic        severity - moderate     symptoms - multiple , detailed under ROS    Fatigue, hair loss, cold intolerance, weigh gain, myalgias, muscle weakness  alleviating factors - brand name synthroid and cytomel but lately not equally effective     she states that if no changes are needed then she will accept that it is not her thyroid, she only wants to be certain there isn't anything that has been missed.      she has fibromylgia and neck pain has worsened since last appt, most importantly for her , hair loss     On cerave       The following portions of the patient's history were reviewed and updated as appropriate:   Past Medical History:   Diagnosis Date   • Chest pain    • Fibrocystic disease of breast    • Hashimoto's thyroiditis    • History of screening mammography 08/27/2014    SCREENING MAMMOGRAPHY DIGITAL  (Medicare) (1)    • Hyperlipidemia    • Hypothyroidism due to Hashimoto's thyroiditis 12/3/2016   • Keratoconjunctivitis sicca (CMS/HCC)    • Nuclear senile cataract    • On long term drug therapy    • Osteoarthritis    • Osteopenia    • Osteopenia 12/3/2016   • Photopsia     Right   • Postmenopausal bleeding    • Sinus tachycardia     • Sjogren's syndrome (CMS/HCC)    • Vitamin D deficiency    • Vitamin D deficiency 12/3/2016   • Vitreous detachment of right eye      Past Surgical History:   Procedure Laterality Date   • CHOLECYSTECTOMY  06/09/1997    with operative cholangiogram. Chronic cholecystitis & cholelithiasis. HX of passed common duct stone   • CYSTOSCOPY  11/26/1962    urethral dilatation.Recurrent pyelonephritis. urethritis   • ENDOSCOPY N/A 2/17/2017    Procedure: ESOPHAGOGASTRODUODENOSCOPY;  Surgeon: Cisco Mason DO;  Location: [facility] ENDOSCOPY;  Service:    • ENDOSCOPY AND COLONOSCOPY  09/30/1985    Normal colon to left transverse colon   • GALLBLADDER SURGERY     • PAP SMEAR  2009   • TONSILLECTOMY  1950   • VAGINAL DELIVERY      x3     Family History   Problem Relation Age of Onset   • Heart disease Mother    • Arthritis Mother    • Osteoporosis Mother    • Cataracts Mother    • Heart disease Father    • Hypertension Sister    • Arthritis Sister    • Diabetes Sister    • Fuchs' dystrophy Sister    • Breast cancer Paternal Grandmother    • Diabetes Other    • Heart disease Other    • Hypertension Other    • Stroke Other    • Thyroid disease Other    • Lung disease Other    • Other Other         Gallstones, Bleeding Tendency, Colon Problems.   • Fuchs' dystrophy Maternal Grandfather      OB History     No data available        Current Outpatient Prescriptions   Medication Sig Dispense Refill   • aspirin 81 MG EC tablet Take 81 mg by mouth daily.     • Biotin (BIOTIN MAXIMUM STRENGTH) 5 MG capsule Take  by mouth.     • Calcium Carbonate-Vitamin D (CALCIUM 600+D) 600-200 MG-UNIT tablet Take  by mouth.     • clobetasol (TEMOVATE) 0.05 % external solution Apply 1 application topically Daily.     • diphenoxylate-atropine (LOMOTIL) 2.5-0.025 MG per tablet TK 1 T PO SIX TIMES PER DAY PRN  5   • finasteride (PROPECIA) 1 MG tablet Take 1 tablet by mouth Daily. 30 tablet 11   • hydroxychloroquine (PLAQUENIL) 200 MG tablet TAKE 1  TABLET BY MOUTH TWICE DAILY     • meclizine (ANTIVERT) 12.5 MG tablet Take 12.5 mg by mouth 3 (Three) Times a Day As Needed for dizziness.     • metoprolol succinate XL (TOPROL-XL) 50 MG 24 hr tablet Take 1 tablet by mouth Daily With Dinner. 90 tablet 6   • Multiple Vitamins-Minerals (MULTIVITAMIN ADULT PO) Take  by mouth.     • Omega-3 Fatty Acids (FISH OIL) 1000 MG capsule capsule Take 2,000 mg by mouth Daily With Breakfast.     • SYNTHROID 88 MCG tablet Take 1 tablet daily Monday to Saturday and 1.5 tablets on Sunday 32 tablet 11   • triamcinolone (KENALOG) 0.1 % cream Apply  topically 2 (two) times a day.     • vitamin B-12 (CYANOCOBALAMIN) 1000 MCG tablet Take 1,000 mcg by mouth Daily.     • vitamin D (ERGOCALCIFEROL) 95557 units capsule capsule TAKE 1 CAPSULE BY MOUTH EVERY 2 WEEKS 6 capsule 0     No current facility-administered medications for this visit.      Allergies   Allergen Reactions   • Augmentin [Amoxicillin-Pot Clavulanate] Diarrhea   • Ciprofloxacin    • Codeine    • Demerol [Meperidine]    • Dexlansoprazole    • Phenazopyridine    • Tape      Social History     Social History   • Marital status:      Social History Main Topics   • Smoking status: Never Smoker   • Smokeless tobacco: Never Used   • Alcohol use No   • Drug use: No   • Sexual activity: Defer     Other Topics Concern   • Not on file       Review of Systems  Review of Systems   Constitutional: Negative for activity change, appetite change, chills, diaphoresis and fatigue.   HENT: Negative for congestion, dental problem, drooling, ear discharge, ear pain, facial swelling, sneezing, sore throat, tinnitus, trouble swallowing and voice change.    Eyes: Negative for photophobia, pain, discharge, redness, itching and visual disturbance.   Respiratory: Negative for apnea, cough, choking, chest tightness and shortness of breath.    Cardiovascular: Negative for chest pain, palpitations and leg swelling.   Gastrointestinal: Negative for  "abdominal distention, abdominal pain, constipation, diarrhea, nausea and vomiting.   Endocrine: Negative for cold intolerance, heat intolerance, polydipsia, polyphagia and polyuria.   Genitourinary: Negative for difficulty urinating, dysuria, frequency, hematuria and urgency.   Musculoskeletal: Positive for arthralgias and myalgias. Negative for back pain, gait problem, joint swelling, neck pain and neck stiffness.   Skin: Negative for color change, pallor, rash and wound.   Allergic/Immunologic: Negative for environmental allergies, food allergies and immunocompromised state.   Neurological: Positive for weakness. Negative for dizziness, tremors, facial asymmetry, light-headedness, numbness and headaches.   Hematological: Negative for adenopathy. Does not bruise/bleed easily.   Psychiatric/Behavioral: Positive for decreased concentration. Negative for agitation, behavioral problems, confusion and sleep disturbance. The patient is nervous/anxious.         Objective    /64   Pulse 98   Ht 172.7 cm (68\")   Wt 90 kg (198 lb 8 oz)   SpO2 92%   BMI 30.18 kg/m²   Physical Exam   Constitutional: She is oriented to person, place, and time. She appears well-developed and well-nourished. No distress.   HENT:   Head: Normocephalic and atraumatic.   Right Ear: External ear normal.   Left Ear: External ear normal.   Nose: Nose normal.   Eyes: Pupils are equal, round, and reactive to light. Conjunctivae and EOM are normal.   Neck: Normal range of motion. Neck supple. No tracheal deviation present. No thyromegaly present.   Cardiovascular: Normal rate, regular rhythm and normal heart sounds.    No murmur heard.  Pulmonary/Chest: Effort normal and breath sounds normal. No respiratory distress. She has no wheezes.   Abdominal: Soft. Bowel sounds are normal. There is no tenderness. There is no rebound and no guarding.   Musculoskeletal: Normal range of motion. She exhibits no edema, tenderness or deformity.   Neurological: " She is alert and oriented to person, place, and time. No cranial nerve deficit.   Skin: Skin is warm and dry. No rash noted.   Psychiatric: She has a normal mood and affect. Her behavior is normal. Judgment and thought content normal.       Lab Review  Glucose (mg/dL)   Date Value   08/10/2018 83   06/14/2018 94   05/07/2018 88     Sodium (mmol/L)   Date Value   08/10/2018 140   06/14/2018 142   05/07/2018 143     Potassium (mmol/L)   Date Value   08/10/2018 4.6   06/14/2018 4.5   05/07/2018 4.6     Chloride (mmol/L)   Date Value   08/10/2018 104   06/14/2018 103   05/07/2018 103     CO2 (mmol/L)   Date Value   08/10/2018 28.0   06/14/2018 29.0   05/07/2018 30.0     BUN (mg/dL)   Date Value   08/10/2018 12   06/14/2018 14   05/07/2018 9     Creatinine (mg/dL)   Date Value   08/10/2018 0.88   06/14/2018 0.81   05/07/2018 0.72     Triglycerides (mg/dl)   Date Value   12/07/2016 228 (H)   09/25/2014 298 (H)     LDL Cholesterol  (mg/dl)   Date Value   12/07/2016 152 (H)   09/25/2014 143 (H)   09/25/2014 123       Assessment/Plan      1. Postablative hypothyroidism    2. Vitamin D deficiency    3. Hypothyroidism due to Hashimoto's thyroiditis    4. B12 deficiency    5. Osteopenia, unspecified location    .    Medications prescribed:  Outpatient Encounter Prescriptions as of 8/17/2018   Medication Sig Dispense Refill   • aspirin 81 MG EC tablet Take 81 mg by mouth daily.     • Biotin (BIOTIN MAXIMUM STRENGTH) 5 MG capsule Take  by mouth.     • Calcium Carbonate-Vitamin D (CALCIUM 600+D) 600-200 MG-UNIT tablet Take  by mouth.     • clobetasol (TEMOVATE) 0.05 % external solution Apply 1 application topically Daily.     • diphenoxylate-atropine (LOMOTIL) 2.5-0.025 MG per tablet TK 1 T PO SIX TIMES PER DAY PRN  5   • finasteride (PROPECIA) 1 MG tablet Take 1 tablet by mouth Daily. 30 tablet 11   • hydroxychloroquine (PLAQUENIL) 200 MG tablet TAKE 1 TABLET BY MOUTH TWICE DAILY     • meclizine (ANTIVERT) 12.5 MG tablet Take  12.5 mg by mouth 3 (Three) Times a Day As Needed for dizziness.     • metoprolol succinate XL (TOPROL-XL) 50 MG 24 hr tablet Take 1 tablet by mouth Daily With Dinner. 90 tablet 6   • Multiple Vitamins-Minerals (MULTIVITAMIN ADULT PO) Take  by mouth.     • Omega-3 Fatty Acids (FISH OIL) 1000 MG capsule capsule Take 2,000 mg by mouth Daily With Breakfast.     • SYNTHROID 88 MCG tablet Take 1 tablet daily Monday to Saturday and 1.5 tablets on Sunday 32 tablet 11   • triamcinolone (KENALOG) 0.1 % cream Apply  topically 2 (two) times a day.     • vitamin B-12 (CYANOCOBALAMIN) 1000 MCG tablet Take 1,000 mcg by mouth Daily.     • vitamin D (ERGOCALCIFEROL) 48686 units capsule capsule TAKE 1 CAPSULE BY MOUTH EVERY 2 WEEKS 6 capsule 0     No facility-administered encounter medications on file as of 8/17/2018.        Orders placed during this encounter include:  No orders of the defined types were placed in this encounter.    Hypothyroidism  developed after episode of subacute thyroiditis     On synthroid 88 mcgs x7.5 tabs per week     Failed armour and cytomel     She stops biotin        Lab Results   Component Value Date    TSH 1.470 08/10/2018              neg celiac panel     she also has sjogren's    =====     Osteopenia    Rheumatology Following    Last DXA in 2017     On Vit D     Lab Results   Component Value Date    TPPW89CQ 44.1 08/10/2018    VHFR12RA 39.8 06/14/2018    [id number] 39.6 05/07/2018              =====      Alopecia     Thyroid as above  Dr. Baig added aldactone, she can't tolerate  Added iron even though levels are normal       I added finasteride 1 mg daily     Lab Results   Component Value Date    GLUCOSE 83 08/10/2018    BUN 12 08/10/2018    CREATININE 0.88 08/10/2018    EGFRIFNONA 63 08/10/2018    BCR 13.6 08/10/2018    K 4.6 08/10/2018    CO2 28.0 08/10/2018    CALCIUM 9.7 08/10/2018    ALBUMIN 4.20 08/10/2018    AST 28 08/10/2018    ALT 22 08/10/2018           4. Follow-up: No Follow-up on  file.      Labs in 1 month and talk over the phone and appt in 4 months , orders entered as monthly x 3

## 2018-09-12 ENCOUNTER — OFFICE VISIT (OUTPATIENT)
Dept: ORTHOPEDIC SURGERY | Facility: CLINIC | Age: 73
End: 2018-09-12

## 2018-09-12 VITALS — BODY MASS INDEX: 30.01 KG/M2 | WEIGHT: 198 LBS | HEIGHT: 68 IN

## 2018-09-12 DIAGNOSIS — M17.11 PRIMARY OSTEOARTHRITIS OF RIGHT KNEE: ICD-10-CM

## 2018-09-12 DIAGNOSIS — M25.551 RIGHT HIP PAIN: ICD-10-CM

## 2018-09-12 DIAGNOSIS — M70.61 GREATER TROCHANTERIC BURSITIS, RIGHT: Primary | ICD-10-CM

## 2018-09-12 PROCEDURE — 20610 DRAIN/INJ JOINT/BURSA W/O US: CPT | Performed by: NURSE PRACTITIONER

## 2018-09-12 PROCEDURE — 99213 OFFICE O/P EST LOW 20 MIN: CPT | Performed by: NURSE PRACTITIONER

## 2018-09-12 RX ORDER — LIDOCAINE HYDROCHLORIDE 20 MG/ML
2 INJECTION, SOLUTION INFILTRATION; PERINEURAL
Status: COMPLETED | OUTPATIENT
Start: 2018-09-12 | End: 2018-09-12

## 2018-09-12 RX ORDER — TRIAMCINOLONE ACETONIDE 40 MG/ML
80 INJECTION, SUSPENSION INTRA-ARTICULAR; INTRAMUSCULAR ONCE
Status: DISCONTINUED | OUTPATIENT
Start: 2018-09-12 | End: 2021-04-08

## 2018-09-12 RX ORDER — TRIAMCINOLONE ACETONIDE 40 MG/ML
40 INJECTION, SUSPENSION INTRA-ARTICULAR; INTRAMUSCULAR
Status: COMPLETED | OUTPATIENT
Start: 2018-09-12 | End: 2018-09-12

## 2018-09-12 RX ADMIN — TRIAMCINOLONE ACETONIDE 40 MG: 40 INJECTION, SUSPENSION INTRA-ARTICULAR; INTRAMUSCULAR at 13:46

## 2018-09-12 RX ADMIN — LIDOCAINE HYDROCHLORIDE 2 ML: 20 INJECTION, SOLUTION INFILTRATION; PERINEURAL at 13:46

## 2018-09-12 NOTE — PROGRESS NOTES
"Carla Best is a 73 y.o. female returns for     Chief Complaint   Patient presents with   • Right Hip - Follow-up       HISTORY OF PRESENT ILLNESS: f/u rt hip patient wants injection today, patient needs a form filled out for medical reasons she has it with her,      CONCURRENT MEDICAL HISTORY:    The following portions of the patient's history were reviewed and updated as appropriate: allergies, current medications, past family history, past medical history, past social history, past surgical history and problem list.     ROS  No fevers or chills.  No chest pain or shortness of air.  No GI or  disturbances.    PHYSICAL EXAMINATION:       Ht 172.7 cm (68\")   Wt 89.8 kg (198 lb)   BMI 30.11 kg/m²     Physical Exam   Constitutional: She is oriented to person, place, and time. Vital signs are normal. She appears well-developed and well-nourished. She is cooperative.   HENT:   Head: Normocephalic and atraumatic.   Neck: Trachea normal and phonation normal.   Pulmonary/Chest: Effort normal. No respiratory distress.   Abdominal: Soft. Normal appearance. She exhibits no distension.   Neurological: She is alert and oriented to person, place, and time. GCS eye subscore is 4. GCS verbal subscore is 5. GCS motor subscore is 6.   Skin: Skin is warm, dry and intact.   Psychiatric: She has a normal mood and affect. Her speech is normal and behavior is normal. Judgment and thought content normal. Cognition and memory are normal.   Vitals reviewed.      GAIT:     [x]  Normal  []  Antalgic    Assistive device: []  None  []  Walker     []  Crutches  [x]  Cane     []  Wheelchair  []  Stretcher    Back Exam     Tenderness   The patient is experiencing tenderness in the sacroiliac.    Range of Motion   Extension: abnormal   Flexion: abnormal   Lateral Bend Right: normal   Lateral Bend Left: normal   Rotation Right: abnormal   Rotation Left: abnormal     Muscle Strength   Right Quadriceps:  4/5   Left Quadriceps:  4/5   Right " Hamstrings:  4/5   Left Hamstrings:  4/5     Tests   Straight leg raise right: negative  Straight leg raise left: negative    Reflexes   Patellar: 1/4  Achilles: 1/4    Other   Toe Walk: abnormal  Heel Walk: abnormal  Sensation: normal  Gait: antalgic   Erythema: no back redness  Scars: absent              Large Joint Arthrocentesis  Date/Time: 9/12/2018 1:46 PM  Procedure Details  Location: hip - R greater trochanteric bursa  Preparation: Patient was prepped and draped in the usual sterile fashion  Needle size: 22 G  Approach: lateral  Medications administered: 2 mL lidocaine 2%; 40 mg triamcinolone acetonide 40 MG/ML  Patient tolerance: patient tolerated the procedure well with no immediate complications                ASSESSMENT:    Diagnoses and all orders for this visit:    Greater trochanteric bursitis, right    Right hip pain  -     Miscellaneous DME  -     triamcinolone acetonide (KENALOG-40) injection 80 mg; Inject 2 mL into the appropriate muscle as directed by prescriber 1 (One) Time.  -     Large Joint Arthrocentesis    Primary osteoarthritis of right knee  -     Miscellaneous DME  -     triamcinolone acetonide (KENALOG-40) injection 80 mg; Inject 2 mL into the appropriate muscle as directed by prescriber 1 (One) Time.  -     Large Joint Arthrocentesis          PLAN  Repeated the trochanteric bursa defect injection today and recommended progression range of motion and activity as tolerated, continued home exercises, continued use of a cane for modified weightbearing during periods of increased pain and follow-up as needed for exacerbations of symptoms.  No Follow-up on file.    Aditya Falk, APRN

## 2018-10-24 RX ORDER — AMITRIPTYLINE HYDROCHLORIDE 10 MG/1
10 TABLET, FILM COATED ORAL NIGHTLY
COMMUNITY
End: 2019-02-18

## 2018-10-31 ENCOUNTER — ANESTHESIA (OUTPATIENT)
Dept: GASTROENTEROLOGY | Facility: HOSPITAL | Age: 73
End: 2018-10-31

## 2018-10-31 ENCOUNTER — ANESTHESIA EVENT (OUTPATIENT)
Dept: GASTROENTEROLOGY | Facility: HOSPITAL | Age: 73
End: 2018-10-31

## 2018-10-31 ENCOUNTER — HOSPITAL ENCOUNTER (OUTPATIENT)
Facility: HOSPITAL | Age: 73
Setting detail: HOSPITAL OUTPATIENT SURGERY
Discharge: HOME | End: 2018-10-31
Attending: INTERNAL MEDICINE | Admitting: INTERNAL MEDICINE

## 2018-10-31 VITALS
TEMPERATURE: 96.9 F | HEART RATE: 87 BPM | RESPIRATION RATE: 18 BRPM | BODY MASS INDEX: 29.84 KG/M2 | HEIGHT: 68 IN | SYSTOLIC BLOOD PRESSURE: 141 MMHG | OXYGEN SATURATION: 95 % | WEIGHT: 196.87 LBS | DIASTOLIC BLOOD PRESSURE: 74 MMHG

## 2018-10-31 DIAGNOSIS — K22.2 STRICTURE ESOPHAGUS: ICD-10-CM

## 2018-10-31 PROCEDURE — 25010000002 PROPOFOL 10 MG/ML EMULSION: Performed by: NURSE ANESTHETIST, CERTIFIED REGISTERED

## 2018-10-31 PROCEDURE — 88305 TISSUE EXAM BY PATHOLOGIST: CPT | Performed by: PATHOLOGY

## 2018-10-31 PROCEDURE — 88305 TISSUE EXAM BY PATHOLOGIST: CPT | Performed by: INTERNAL MEDICINE

## 2018-10-31 RX ORDER — DEXTROSE AND SODIUM CHLORIDE 5; .45 G/100ML; G/100ML
20 INJECTION, SOLUTION INTRAVENOUS CONTINUOUS
Status: DISCONTINUED | OUTPATIENT
Start: 2018-10-31 | End: 2018-10-31 | Stop reason: HOSPADM

## 2018-10-31 RX ORDER — LIDOCAINE HYDROCHLORIDE 10 MG/ML
INJECTION, SOLUTION INFILTRATION; PERINEURAL AS NEEDED
Status: DISCONTINUED | OUTPATIENT
Start: 2018-10-31 | End: 2018-10-31 | Stop reason: SURG

## 2018-10-31 RX ORDER — PROPOFOL 10 MG/ML
VIAL (ML) INTRAVENOUS AS NEEDED
Status: DISCONTINUED | OUTPATIENT
Start: 2018-10-31 | End: 2018-10-31 | Stop reason: SURG

## 2018-10-31 RX ADMIN — PROPOFOL 30 MG: 10 INJECTION, EMULSION INTRAVENOUS at 15:59

## 2018-10-31 RX ADMIN — DEXTROSE AND SODIUM CHLORIDE 20 ML/HR: 5; 450 INJECTION, SOLUTION INTRAVENOUS at 15:33

## 2018-10-31 RX ADMIN — LIDOCAINE HYDROCHLORIDE 100 MG: 10 INJECTION, SOLUTION INFILTRATION; PERINEURAL at 15:57

## 2018-10-31 RX ADMIN — PROPOFOL 100 MG: 10 INJECTION, EMULSION INTRAVENOUS at 15:57

## 2018-10-31 RX ADMIN — PROPOFOL 30 MG: 10 INJECTION, EMULSION INTRAVENOUS at 16:01

## 2018-10-31 NOTE — ANESTHESIA POSTPROCEDURE EVALUATION
Patient: Carla Best    Procedure Summary     Date:  10/31/18 Room / Location:  [facility] ENDOSCOPY 2 / [facility] ENDOSCOPY    Anesthesia Start:  1555 Anesthesia Stop:  1605    Procedure:  ESOPHAGOGASTRODUODENOSCOPY (N/A ) Diagnosis:       Stricture esophagus      (Stricture esophagus [K22.2])    Surgeon:  Cisco Mason DO Provider:  Pamela Cole CRNA    Anesthesia Type:  MAC ASA Status:  2          Anesthesia Type: MAC  Last vitals  BP   172/94 (10/31/18 1520)   Temp   97.9 °F (36.6 °C) (10/31/18 1520)   Pulse   88 (10/31/18 1520)   Resp   18 (10/31/18 1520)     SpO2   95 % (10/31/18 1520)     Post Anesthesia Care and Evaluation    Patient location during evaluation: bedside  Patient participation: complete - patient participated  Level of consciousness: sleepy but conscious  Pain management: adequate  Airway patency: patent  Anesthetic complications: No anesthetic complications  PONV Status: none  Cardiovascular status: acceptable  Respiratory status: acceptable  Hydration status: acceptable

## 2018-10-31 NOTE — ANESTHESIA PREPROCEDURE EVALUATION
Anesthesia Evaluation     no history of anesthetic complications:  NPO Solid Status: > 8 hours  NPO Liquid Status: > 2 hours           Airway   Mallampati: II  TM distance: >3 FB  Neck ROM: full  No difficulty expected  Dental - normal exam     Pulmonary - negative pulmonary ROS and normal exam   Cardiovascular - normal exam    (+) hypertension less than 2 medications, hyperlipidemia,       Neuro/Psych- negative ROS  GI/Hepatic/Renal/Endo    (+)  GERD,  hypothyroidism,     Musculoskeletal     (+) neck pain,   Abdominal    Substance History      OB/GYN          Other   (+) arthritis                     Anesthesia Plan    ASA 2     MAC     intravenous induction   Anesthetic plan, all risks, benefits, and alternatives have been provided, discussed and informed consent has been obtained with: patient.

## 2018-11-02 LAB
LAB AP CASE REPORT: NORMAL
PATH REPORT.FINAL DX SPEC: NORMAL
PATH REPORT.GROSS SPEC: NORMAL

## 2018-11-19 ENCOUNTER — APPOINTMENT (OUTPATIENT)
Dept: LAB | Facility: HOSPITAL | Age: 73
End: 2018-11-19

## 2018-11-19 LAB
25(OH)D3 SERPL-MCNC: 45.3 NG/ML (ref 30–100)
ALBUMIN SERPL-MCNC: 4.6 G/DL (ref 3.4–4.8)
ALBUMIN/GLOB SERPL: 1.5 G/DL (ref 1.1–1.8)
ALP SERPL-CCNC: 84 U/L (ref 38–126)
ALT SERPL W P-5'-P-CCNC: 19 U/L (ref 9–52)
ANION GAP SERPL CALCULATED.3IONS-SCNC: 12 MMOL/L (ref 5–15)
ARTICHOKE IGE QN: 138 MG/DL (ref 1–129)
AST SERPL-CCNC: 47 U/L (ref 14–36)
BASOPHILS # BLD AUTO: 0.04 10*3/MM3 (ref 0–0.2)
BASOPHILS NFR BLD AUTO: 0.6 % (ref 0–2)
BILIRUB SERPL-MCNC: 0.7 MG/DL (ref 0.2–1.3)
BUN BLD-MCNC: 17 MG/DL (ref 7–21)
BUN/CREAT SERPL: 14.8 (ref 7–25)
CALCIUM SPEC-SCNC: 9.7 MG/DL (ref 8.4–10.2)
CHLORIDE SERPL-SCNC: 101 MMOL/L (ref 95–110)
CHOLEST SERPL-MCNC: 249 MG/DL (ref 0–199)
CO2 SERPL-SCNC: 25 MMOL/L (ref 22–31)
CREAT BLD-MCNC: 1.15 MG/DL (ref 0.5–1)
DEPRECATED RDW RBC AUTO: 39.8 FL (ref 36.4–46.3)
EOSINOPHIL # BLD AUTO: 0.32 10*3/MM3 (ref 0–0.7)
EOSINOPHIL NFR BLD AUTO: 4.6 % (ref 0–7)
ERYTHROCYTE [DISTWIDTH] IN BLOOD BY AUTOMATED COUNT: 12.5 % (ref 11.5–14.5)
GFR SERPL CREATININE-BSD FRML MDRD: 46 ML/MIN/1.73 (ref 39–90)
GLOBULIN UR ELPH-MCNC: 3.1 GM/DL (ref 2.3–3.5)
GLUCOSE BLD-MCNC: 87 MG/DL (ref 60–100)
HCT VFR BLD AUTO: 40.3 % (ref 35–45)
HDLC SERPL-MCNC: 47 MG/DL (ref 60–200)
HGB BLD-MCNC: 14 G/DL (ref 12–15.5)
IMM GRANULOCYTES # BLD: 0.02 10*3/MM3 (ref 0–0.02)
IMM GRANULOCYTES NFR BLD: 0.3 % (ref 0–0.5)
LDLC/HDLC SERPL: 3.21 {RATIO} (ref 0–3.22)
LYMPHOCYTES # BLD AUTO: 1.68 10*3/MM3 (ref 0.6–4.2)
LYMPHOCYTES NFR BLD AUTO: 24.3 % (ref 10–50)
MCH RBC QN AUTO: 30.8 PG (ref 26.5–34)
MCHC RBC AUTO-ENTMCNC: 34.7 G/DL (ref 31.4–36)
MCV RBC AUTO: 88.8 FL (ref 80–98)
MONOCYTES # BLD AUTO: 0.43 10*3/MM3 (ref 0–0.9)
MONOCYTES NFR BLD AUTO: 6.2 % (ref 0–12)
NEUTROPHILS # BLD AUTO: 4.42 10*3/MM3 (ref 2–8.6)
NEUTROPHILS NFR BLD AUTO: 64 % (ref 37–80)
PLATELET # BLD AUTO: 303 10*3/MM3 (ref 150–450)
PMV BLD AUTO: 9.5 FL (ref 8–12)
POTASSIUM BLD-SCNC: 5 MMOL/L (ref 3.5–5.1)
PROT SERPL-MCNC: 7.7 G/DL (ref 6.3–8.6)
RBC # BLD AUTO: 4.54 10*6/MM3 (ref 3.77–5.16)
SODIUM BLD-SCNC: 138 MMOL/L (ref 137–145)
TRIGL SERPL-MCNC: 255 MG/DL (ref 20–199)
TSH SERPL DL<=0.05 MIU/L-ACNC: 0.96 MIU/ML (ref 0.46–4.68)
VIT B12 BLD-MCNC: 767 PG/ML (ref 239–931)
WBC NRBC COR # BLD: 6.91 10*3/MM3 (ref 3.2–9.8)

## 2018-11-19 PROCEDURE — 82607 VITAMIN B-12: CPT | Performed by: INTERNAL MEDICINE

## 2018-11-19 PROCEDURE — 80053 COMPREHEN METABOLIC PANEL: CPT | Performed by: INTERNAL MEDICINE

## 2018-11-19 PROCEDURE — 85025 COMPLETE CBC W/AUTO DIFF WBC: CPT | Performed by: INTERNAL MEDICINE

## 2018-11-19 PROCEDURE — 36415 COLL VENOUS BLD VENIPUNCTURE: CPT | Performed by: INTERNAL MEDICINE

## 2018-11-19 PROCEDURE — 82306 VITAMIN D 25 HYDROXY: CPT | Performed by: INTERNAL MEDICINE

## 2018-11-19 PROCEDURE — 84443 ASSAY THYROID STIM HORMONE: CPT | Performed by: INTERNAL MEDICINE

## 2018-11-19 PROCEDURE — 80061 LIPID PANEL: CPT | Performed by: INTERNAL MEDICINE

## 2019-02-11 ENCOUNTER — APPOINTMENT (OUTPATIENT)
Dept: LAB | Facility: HOSPITAL | Age: 74
End: 2019-02-11

## 2019-02-11 DIAGNOSIS — E55.9 VITAMIN D DEFICIENCY: ICD-10-CM

## 2019-02-11 DIAGNOSIS — M85.80 OSTEOPENIA, UNSPECIFIED LOCATION: ICD-10-CM

## 2019-02-11 DIAGNOSIS — E89.0 POSTABLATIVE HYPOTHYROIDISM: Primary | ICD-10-CM

## 2019-02-11 DIAGNOSIS — E53.8 B12 DEFICIENCY: ICD-10-CM

## 2019-02-11 LAB
25(OH)D3 SERPL-MCNC: 38.6 NG/ML (ref 30–100)
ALBUMIN SERPL-MCNC: 4.4 G/DL (ref 3.4–4.8)
ALBUMIN/GLOB SERPL: 1.8 G/DL (ref 1.1–1.8)
ALP SERPL-CCNC: 66 U/L (ref 38–126)
ALT SERPL W P-5'-P-CCNC: 15 U/L (ref 9–52)
ANION GAP SERPL CALCULATED.3IONS-SCNC: 9 MMOL/L (ref 5–15)
AST SERPL-CCNC: 40 U/L (ref 14–36)
BASOPHILS # BLD AUTO: 0.06 10*3/MM3 (ref 0–0.2)
BASOPHILS NFR BLD AUTO: 1.2 % (ref 0–1.5)
BILIRUB SERPL-MCNC: 0.6 MG/DL (ref 0.2–1.3)
BUN BLD-MCNC: 13 MG/DL (ref 7–21)
BUN/CREAT SERPL: 16.3 (ref 7–25)
CALCIUM SPEC-SCNC: 9.9 MG/DL (ref 8.4–10.2)
CHLORIDE SERPL-SCNC: 103 MMOL/L (ref 95–110)
CO2 SERPL-SCNC: 27 MMOL/L (ref 22–31)
CREAT BLD-MCNC: 0.8 MG/DL (ref 0.5–1)
DEPRECATED RDW RBC AUTO: 38.7 FL (ref 37–54)
EOSINOPHIL # BLD AUTO: 0.27 10*3/MM3 (ref 0–0.4)
EOSINOPHIL NFR BLD AUTO: 5.4 % (ref 0.3–6.2)
ERYTHROCYTE [DISTWIDTH] IN BLOOD BY AUTOMATED COUNT: 11.9 % (ref 12.3–15.4)
GFR SERPL CREATININE-BSD FRML MDRD: 70 ML/MIN/1.73 (ref 39–90)
GLOBULIN UR ELPH-MCNC: 2.5 GM/DL (ref 2.3–3.5)
GLUCOSE BLD-MCNC: 101 MG/DL (ref 60–100)
HCT VFR BLD AUTO: 40.1 % (ref 34–46.6)
HGB BLD-MCNC: 13.4 G/DL (ref 12–15.9)
IMM GRANULOCYTES # BLD AUTO: 0.01 10*3/MM3 (ref 0–0.05)
IMM GRANULOCYTES NFR BLD AUTO: 0.2 % (ref 0–0.5)
LYMPHOCYTES # BLD AUTO: 1.41 10*3/MM3 (ref 0.7–3.1)
LYMPHOCYTES NFR BLD AUTO: 28.3 % (ref 19.6–45.3)
MCH RBC QN AUTO: 29.9 PG (ref 26.6–33)
MCHC RBC AUTO-ENTMCNC: 33.4 G/DL (ref 31.5–35.7)
MCV RBC AUTO: 89.5 FL (ref 79–97)
MONOCYTES # BLD AUTO: 0.5 10*3/MM3 (ref 0.1–0.9)
MONOCYTES NFR BLD AUTO: 10 % (ref 5–12)
NEUTROPHILS # BLD AUTO: 2.74 10*3/MM3 (ref 1.4–7)
NEUTROPHILS NFR BLD AUTO: 54.9 % (ref 42.7–76)
NRBC BLD AUTO-RTO: 0 /100 WBC (ref 0–0)
PLATELET # BLD AUTO: 289 10*3/MM3 (ref 140–450)
PMV BLD AUTO: 10.7 FL (ref 6–12)
POTASSIUM BLD-SCNC: 4.3 MMOL/L (ref 3.5–5.1)
PROT SERPL-MCNC: 6.9 G/DL (ref 6.3–8.6)
RBC # BLD AUTO: 4.48 10*6/MM3 (ref 3.77–5.28)
SODIUM BLD-SCNC: 139 MMOL/L (ref 137–145)
TSH SERPL DL<=0.05 MIU/L-ACNC: 0.47 MIU/ML (ref 0.46–4.68)
VIT B12 BLD-MCNC: 834 PG/ML (ref 239–931)
WBC NRBC COR # BLD: 4.99 10*3/MM3 (ref 3.4–10.8)

## 2019-02-11 PROCEDURE — 80053 COMPREHEN METABOLIC PANEL: CPT | Performed by: INTERNAL MEDICINE

## 2019-02-11 PROCEDURE — 82306 VITAMIN D 25 HYDROXY: CPT | Performed by: INTERNAL MEDICINE

## 2019-02-11 PROCEDURE — 84443 ASSAY THYROID STIM HORMONE: CPT | Performed by: INTERNAL MEDICINE

## 2019-02-11 PROCEDURE — 85025 COMPLETE CBC W/AUTO DIFF WBC: CPT | Performed by: INTERNAL MEDICINE

## 2019-02-11 PROCEDURE — 82607 VITAMIN B-12: CPT | Performed by: INTERNAL MEDICINE

## 2019-02-11 PROCEDURE — 36415 COLL VENOUS BLD VENIPUNCTURE: CPT | Performed by: INTERNAL MEDICINE

## 2019-02-18 ENCOUNTER — OFFICE VISIT (OUTPATIENT)
Dept: ENDOCRINOLOGY | Facility: CLINIC | Age: 74
End: 2019-02-18

## 2019-02-18 VITALS
WEIGHT: 200.6 LBS | DIASTOLIC BLOOD PRESSURE: 76 MMHG | BODY MASS INDEX: 30.4 KG/M2 | HEIGHT: 68 IN | OXYGEN SATURATION: 91 % | HEART RATE: 83 BPM | SYSTOLIC BLOOD PRESSURE: 138 MMHG

## 2019-02-18 DIAGNOSIS — E55.9 VITAMIN D DEFICIENCY: ICD-10-CM

## 2019-02-18 DIAGNOSIS — E53.8 B12 DEFICIENCY: ICD-10-CM

## 2019-02-18 DIAGNOSIS — E89.0 POSTABLATIVE HYPOTHYROIDISM: Primary | ICD-10-CM

## 2019-02-18 DIAGNOSIS — E78.5 DYSLIPIDEMIA: ICD-10-CM

## 2019-02-18 DIAGNOSIS — M85.80 OSTEOPENIA, UNSPECIFIED LOCATION: ICD-10-CM

## 2019-02-18 PROCEDURE — 99214 OFFICE O/P EST MOD 30 MIN: CPT | Performed by: INTERNAL MEDICINE

## 2019-02-18 RX ORDER — EZETIMIBE 10 MG/1
10 TABLET ORAL DAILY
Qty: 30 TABLET | Refills: 11 | Status: SHIPPED | OUTPATIENT
Start: 2019-02-18 | End: 2019-03-18

## 2019-02-18 NOTE — PROGRESS NOTES
Bridget Best is a 73 y.o. female. she is here today for follow-up.    Hypothyroidism   Associated symptoms include arthralgias, myalgias and weakness. Pertinent negatives include no abdominal pain, chest pain, chills, congestion, coughing, diaphoresis, fatigue, headaches, joint swelling, nausea, neck pain, numbness, rash, sore throat or vomiting.   Thyroid Problem   Symptoms include anxiety. Patient reports no cold intolerance, constipation, diaphoresis, diarrhea, fatigue, heat intolerance, palpitations or tremors.               73 y.o.    female comes for fu , very pleasant       history of subacute thyroiditis with subsequent permanent hypothyroidism      duration - 5+ years  timing - constant  quality - biochemically controlled but still symptomatic        severity - moderate     symptoms - multiple , detailed under ROS    Fatigue, hair loss, cold intolerance, weigh gain, myalgias, muscle weakness  alleviating factors - brand name synthroid and cytomel but lately not equally effective     she states that if no changes are needed then she will accept that it is not her thyroid, she only wants to be certain there isn't anything that has been missed.      she has fibromylgia and neck pain has worsened since last appt, most importantly for her , hair loss     On cerave       The following portions of the patient's history were reviewed and updated as appropriate:   Past Medical History:   Diagnosis Date   • Chest pain    • Fibrocystic disease of breast    • Hashimoto's thyroiditis    • History of screening mammography 08/27/2014    SCREENING MAMMOGRAPHY DIGITAL  (Medicare) (1)    • Hyperlipidemia    • Hypothyroidism due to Hashimoto's thyroiditis 12/3/2016   • Keratoconjunctivitis sicca (CMS/HCC)    • Nuclear senile cataract    • On long term drug therapy    • Osteoarthritis    • Osteopenia    • Osteopenia 12/3/2016   • Photopsia     Right   • Postmenopausal bleeding    • Sinus tachycardia     • Sjogren's syndrome (CMS/HCC)    • Vitamin D deficiency    • Vitamin D deficiency 12/3/2016   • Vitreous detachment of right eye      Past Surgical History:   Procedure Laterality Date   • CHOLECYSTECTOMY  1997    with operative cholangiogram. Chronic cholecystitis & cholelithiasis. HX of passed common duct stone   • CYSTOSCOPY  1962    urethral dilatation.Recurrent pyelonephritis. urethritis   • ENDOSCOPY N/A 2017    Procedure: ESOPHAGOGASTRODUODENOSCOPY;  Surgeon: Cisco Mason DO;  Location: [facility] ENDOSCOPY;  Service:    • ENDOSCOPY N/A 10/31/2018    Procedure: ESOPHAGOGASTRODUODENOSCOPY;  Surgeon: Cisco Mason DO;  Location: [facility] ENDOSCOPY;  Service: Gastroenterology   • ENDOSCOPY AND COLONOSCOPY  1985    Normal colon to left transverse colon   • GALLBLADDER SURGERY     • PAP SMEAR     • TONSILLECTOMY  1950   • VAGINAL DELIVERY      x3     Family History   Problem Relation Age of Onset   • Heart disease Mother    • Arthritis Mother    • Osteoporosis Mother    • Cataracts Mother    • Heart disease Father    • Hypertension Sister    • Arthritis Sister    • Diabetes Sister    • Fuchs' dystrophy Sister    • Breast cancer Paternal Grandmother    • Diabetes Other    • Heart disease Other    • Hypertension Other    • Stroke Other    • Thyroid disease Other    • Lung disease Other    • Other Other         Gallstones, Bleeding Tendency, Colon Problems.   • Fuchs' dystrophy Maternal Grandfather      OB History      Para Term  AB Living    3 3 3          SAB TAB Ectopic Molar Multiple Live Births                       Current Outpatient Medications   Medication Sig Dispense Refill   • aspirin 81 MG EC tablet Take 81 mg by mouth daily.     • Biotin (BIOTIN MAXIMUM STRENGTH) 5 MG capsule Take 1 tablet by mouth Daily.     • Calcium Carbonate-Vitamin D (CALCIUM 600+D) 600-200 MG-UNIT tablet Take 1 tablet by mouth Daily.     • clobetasol (TEMOVATE) 0.05 % external  solution Apply 1 application topically Daily.     • diphenoxylate-atropine (LOMOTIL) 2.5-0.025 MG per tablet TK 1 T PO SIX TIMES PER DAY PRN  5   • finasteride (PROPECIA) 1 MG tablet Take 1 tablet by mouth Daily. 30 tablet 11   • hydroxychloroquine (PLAQUENIL) 200 MG tablet TAKE 1 TABLET BY MOUTH TWICE DAILY     • meclizine (ANTIVERT) 12.5 MG tablet Take 12.5 mg by mouth 3 (Three) Times a Day As Needed for dizziness.     • metoprolol succinate XL (TOPROL-XL) 50 MG 24 hr tablet Take 1 tablet by mouth Daily With Dinner. 90 tablet 6   • Multiple Vitamins-Minerals (MULTIVITAMIN ADULT PO) Take 1 tablet by mouth Daily.     • Omega-3 Fatty Acids (FISH OIL) 1000 MG capsule capsule Take 2,000 mg by mouth 2 (Two) Times a Day.     • SYNTHROID 88 MCG tablet Take 1 tablet daily Monday to Saturday and 1.5 tablets on Sunday (Patient taking differently: Take 88 mcg by mouth Daily. Take 1 tablet daily Monday to Saturday and 1.5 tablets on Sunday) 32 tablet 11   • triamcinolone (KENALOG) 0.1 % cream Apply 1 application topically to the appropriate area as directed 2 (Two) Times a Day.     • vitamin B-12 (CYANOCOBALAMIN) 1000 MCG tablet Take 1,000 mcg by mouth Every Other Day.     • vitamin D (ERGOCALCIFEROL) 29444 units capsule capsule TAKE 1 CAPSULE BY MOUTH EVERY 2 WEEKS 6 capsule 0   • ezetimibe (ZETIA) 10 MG tablet Take 1 tablet by mouth Daily. 30 tablet 11     Current Facility-Administered Medications   Medication Dose Route Frequency Provider Last Rate Last Dose   • triamcinolone acetonide (KENALOG-40) injection 80 mg  80 mg Intramuscular Once Aditya Falk APRN         Allergies   Allergen Reactions   • Augmentin [Amoxicillin-Pot Clavulanate] Diarrhea   • Ciprofloxacin Hives   • Codeine Other (See Comments)     headache   • Demerol [Meperidine] Other (See Comments)     Drop in blood pressure   • Dexlansoprazole Nausea Only   • Tape Other (See Comments)     Burns skin      • Phenazopyridine Rash     Social History      Socioeconomic History   • Marital status:      Spouse name: Not on file   • Number of children: Not on file   • Years of education: Not on file   • Highest education level: Not on file   Tobacco Use   • Smoking status: Never Smoker   • Smokeless tobacco: Never Used   Substance and Sexual Activity   • Alcohol use: No   • Drug use: No   • Sexual activity: Defer       Review of Systems  Review of Systems   Constitutional: Negative for activity change, appetite change, chills, diaphoresis and fatigue.   HENT: Negative for congestion, dental problem, drooling, ear discharge, ear pain, facial swelling, sneezing, sore throat, tinnitus, trouble swallowing and voice change.    Eyes: Negative for photophobia, pain, discharge, redness, itching and visual disturbance.   Respiratory: Negative for apnea, cough, choking, chest tightness and shortness of breath.    Cardiovascular: Negative for chest pain, palpitations and leg swelling.   Gastrointestinal: Negative for abdominal distention, abdominal pain, constipation, diarrhea, nausea and vomiting.   Endocrine: Negative for cold intolerance, heat intolerance, polydipsia, polyphagia and polyuria.   Genitourinary: Negative for difficulty urinating, dysuria, frequency, hematuria and urgency.   Musculoskeletal: Positive for arthralgias and myalgias. Negative for back pain, gait problem, joint swelling, neck pain and neck stiffness.   Skin: Negative for color change, pallor, rash and wound.   Allergic/Immunologic: Negative for environmental allergies, food allergies and immunocompromised state.   Neurological: Positive for weakness. Negative for dizziness, tremors, facial asymmetry, light-headedness, numbness and headaches.   Hematological: Negative for adenopathy. Does not bruise/bleed easily.   Psychiatric/Behavioral: Positive for decreased concentration. Negative for agitation, behavioral problems, confusion and sleep disturbance. The patient is nervous/anxious.        "  Objective    /76   Pulse 83   Ht 172.7 cm (68\")   Wt 91 kg (200 lb 9.6 oz)   SpO2 91%   BMI 30.50 kg/m²   Physical Exam   Constitutional: She is oriented to person, place, and time. She appears well-developed and well-nourished. No distress.   HENT:   Head: Normocephalic and atraumatic.   Right Ear: External ear normal.   Left Ear: External ear normal.   Nose: Nose normal.   Eyes: Conjunctivae and EOM are normal. Pupils are equal, round, and reactive to light.   Neck: Normal range of motion. Neck supple. No tracheal deviation present. No thyromegaly present.   Cardiovascular: Normal rate, regular rhythm and normal heart sounds.   No murmur heard.  Pulmonary/Chest: Effort normal and breath sounds normal. No respiratory distress. She has no wheezes.   Abdominal: Soft. Bowel sounds are normal. There is no tenderness. There is no rebound and no guarding.   Musculoskeletal: Normal range of motion. She exhibits no edema, tenderness or deformity.   Neurological: She is alert and oriented to person, place, and time. No cranial nerve deficit.   Skin: Skin is warm and dry. No rash noted.   Psychiatric: She has a normal mood and affect. Her behavior is normal. Judgment and thought content normal.       Lab Review  Glucose (mg/dL)   Date Value   02/11/2019 101 (H)   11/19/2018 87   08/10/2018 83     Sodium (mmol/L)   Date Value   02/11/2019 139   11/19/2018 138   08/10/2018 140     Potassium (mmol/L)   Date Value   02/11/2019 4.3   11/19/2018 5.0   08/10/2018 4.6     Chloride (mmol/L)   Date Value   02/11/2019 103   11/19/2018 101   08/10/2018 104     CO2 (mmol/L)   Date Value   02/11/2019 27.0   11/19/2018 25.0   08/10/2018 28.0     BUN (mg/dL)   Date Value   02/11/2019 13   11/19/2018 17   08/10/2018 12     Creatinine (mg/dL)   Date Value   02/11/2019 0.80   11/19/2018 1.15 (H)   08/10/2018 0.88     Triglycerides   Date Value   11/19/2018 255 mg/dL (H)   12/07/2016 228 mg/dl (H)   09/25/2014 298 mg/dl (H)     LDL " Cholesterol    Date Value   11/19/2018 138 mg/dL (H)   12/07/2016 152 mg/dl (H)   09/25/2014 143 mg/dl (H)   09/25/2014 123 mg/dl       Assessment/Plan      1. Postablative hypothyroidism    2. Vitamin D deficiency    3. B12 deficiency    4. Osteopenia, unspecified location    5. Dyslipidemia    .    Medications prescribed:  Outpatient Encounter Medications as of 2/18/2019   Medication Sig Dispense Refill   • aspirin 81 MG EC tablet Take 81 mg by mouth daily.     • Biotin (BIOTIN MAXIMUM STRENGTH) 5 MG capsule Take 1 tablet by mouth Daily.     • Calcium Carbonate-Vitamin D (CALCIUM 600+D) 600-200 MG-UNIT tablet Take 1 tablet by mouth Daily.     • clobetasol (TEMOVATE) 0.05 % external solution Apply 1 application topically Daily.     • diphenoxylate-atropine (LOMOTIL) 2.5-0.025 MG per tablet TK 1 T PO SIX TIMES PER DAY PRN  5   • finasteride (PROPECIA) 1 MG tablet Take 1 tablet by mouth Daily. 30 tablet 11   • hydroxychloroquine (PLAQUENIL) 200 MG tablet TAKE 1 TABLET BY MOUTH TWICE DAILY     • meclizine (ANTIVERT) 12.5 MG tablet Take 12.5 mg by mouth 3 (Three) Times a Day As Needed for dizziness.     • metoprolol succinate XL (TOPROL-XL) 50 MG 24 hr tablet Take 1 tablet by mouth Daily With Dinner. 90 tablet 6   • Multiple Vitamins-Minerals (MULTIVITAMIN ADULT PO) Take 1 tablet by mouth Daily.     • Omega-3 Fatty Acids (FISH OIL) 1000 MG capsule capsule Take 2,000 mg by mouth 2 (Two) Times a Day.     • SYNTHROID 88 MCG tablet Take 1 tablet daily Monday to Saturday and 1.5 tablets on Sunday (Patient taking differently: Take 88 mcg by mouth Daily. Take 1 tablet daily Monday to Saturday and 1.5 tablets on Sunday) 32 tablet 11   • triamcinolone (KENALOG) 0.1 % cream Apply 1 application topically to the appropriate area as directed 2 (Two) Times a Day.     • vitamin B-12 (CYANOCOBALAMIN) 1000 MCG tablet Take 1,000 mcg by mouth Every Other Day.     • vitamin D (ERGOCALCIFEROL) 54677 units capsule capsule TAKE 1 CAPSULE BY  MOUTH EVERY 2 WEEKS 6 capsule 0   • [DISCONTINUED] amitriptyline (ELAVIL) 10 MG tablet Take 10 mg by mouth Every Night.     • ezetimibe (ZETIA) 10 MG tablet Take 1 tablet by mouth Daily. 30 tablet 11     Facility-Administered Encounter Medications as of 2/18/2019   Medication Dose Route Frequency Provider Last Rate Last Dose   • triamcinolone acetonide (KENALOG-40) injection 80 mg  80 mg Intramuscular Once Aditya Falk APRN           Orders placed during this encounter include:  Orders Placed This Encounter   Procedures   • TSH   • T4, Free   • T3, Free   • Comprehensive Metabolic Panel   • Vitamin B12   • Vitamin D 25 Hydroxy   • Lipid Panel   • CBC & Differential     Order Specific Question:   Manual Differential     Answer:   No     Hypothyroidism  developed after episode of subacute thyroiditis     On synthroid 88 mcgs x7.5 tabs per week     Failed armour and cytomel     She stops biotin        Lab Results   Component Value Date    TSH 0.470 02/11/2019       Wants T3 and T4 measured        neg celiac panel     she also has sjogren's    =====     Osteopenia    Rheumatology Following    Last DXA in 2017     On Vit D     Lab Results   Component Value Date    IWGD85YE 38.6 02/11/2019    WPML05IA 45.3 11/19/2018    ENOJ09PJ 44.1 08/10/2018              =====      Alopecia     Thyroid as above  Dr. Baig added aldactone, she can't tolerate  Added iron even though levels are normal       I added finasteride 1 mg daily     Lab Results   Component Value Date    GLUCOSE 101 (H) 02/11/2019    BUN 13 02/11/2019    CREATININE 0.80 02/11/2019    EGFRIFNONA 70 02/11/2019    BCR 16.3 02/11/2019    K 4.3 02/11/2019    CO2 27.0 02/11/2019    CALCIUM 9.9 02/11/2019    ALBUMIN 4.40 02/11/2019    AST 40 (H) 02/11/2019    ALT 15 02/11/2019       Gained weight due to elavil , she had to stop due to side effects  Fasting glucose 101 and mild elevation in LFT     ===    Dyslipidemia    Side effects to statins in the past     Add  zetia 10 mg daily     Strong fam hx of CAD    4. Follow-up: No Follow-up on file.      Labs in 1 month and talk over the phone and appt in 4 months , orders entered as monthly x 3

## 2019-02-26 RX ORDER — ERGOCALCIFEROL 1.25 MG/1
50000 CAPSULE ORAL
Qty: 6 CAPSULE | Refills: 0 | Status: SHIPPED | OUTPATIENT
Start: 2019-02-26 | End: 2019-06-12 | Stop reason: SDUPTHER

## 2019-03-18 ENCOUNTER — OFFICE VISIT (OUTPATIENT)
Dept: CARDIOLOGY | Facility: CLINIC | Age: 74
End: 2019-03-18

## 2019-03-18 VITALS
HEART RATE: 96 BPM | HEIGHT: 68 IN | SYSTOLIC BLOOD PRESSURE: 142 MMHG | WEIGHT: 201 LBS | OXYGEN SATURATION: 97 % | BODY MASS INDEX: 30.46 KG/M2 | DIASTOLIC BLOOD PRESSURE: 86 MMHG

## 2019-03-18 DIAGNOSIS — R00.2 PALPITATIONS: Primary | ICD-10-CM

## 2019-03-18 PROCEDURE — 99214 OFFICE O/P EST MOD 30 MIN: CPT | Performed by: INTERNAL MEDICINE

## 2019-03-18 RX ORDER — METOPROLOL SUCCINATE 50 MG/1
50 TABLET, EXTENDED RELEASE ORAL
Qty: 90 TABLET | Refills: 6 | Status: SHIPPED | OUTPATIENT
Start: 2019-03-18 | End: 2021-07-29 | Stop reason: SDUPTHER

## 2019-03-18 RX ORDER — PRAVASTATIN SODIUM 20 MG
20 TABLET ORAL DAILY
Qty: 90 TABLET | Refills: 3 | Status: SHIPPED | OUTPATIENT
Start: 2019-03-18 | End: 2019-03-18 | Stop reason: SDUPTHER

## 2019-03-18 RX ORDER — PRAVASTATIN SODIUM 20 MG
20 TABLET ORAL DAILY
Qty: 90 TABLET | Refills: 3 | Status: SHIPPED | OUTPATIENT
Start: 2019-03-18 | End: 2021-07-29 | Stop reason: SDUPTHER

## 2019-03-18 NOTE — PROGRESS NOTES
[facility] Cardiology  OFFICE NOTE    Carla Best  73 y.o. female    03/18/2019  1. Palpitations    2.    Hyperlipidemia    Chief complaint -Palpitations      History of present Illness-73 lady who has no history of chest pain or shortness of breath has occasional palpitations and she has been on Toprol-XL 50 mg daily, she does get occasional breakthroughs with it she takes chloroquine for disease modifying agents for collagen  Disorder.  She sees Dr. Jose Og regarding her endocrinological problems and her LDL is high start her on pravastatin as she had previous problems with atorvastatin.  Denies any GI symptoms              Allergies   Allergen Reactions   • Augmentin [Amoxicillin-Pot Clavulanate] Diarrhea   • Ciprofloxacin Hives   • Codeine Other (See Comments)     headache   • Demerol [Meperidine] Other (See Comments)     Drop in blood pressure   • Dexlansoprazole Nausea Only   • Lipitor [Atorvastatin Calcium] Myalgia   • Tape Other (See Comments)     Burns skin      • Phenazopyridine Rash         Past Medical History:   Diagnosis Date   • Chest pain    • Fibrocystic disease of breast    • Hashimoto's thyroiditis    • History of screening mammography 08/27/2014    SCREENING MAMMOGRAPHY DIGITAL  (Medicare) (1)    • Hyperlipidemia    • Hypothyroidism due to Hashimoto's thyroiditis 12/3/2016   • Keratoconjunctivitis sicca (CMS/HCC)    • Nuclear senile cataract    • On long term drug therapy    • Osteoarthritis    • Osteopenia    • Osteopenia 12/3/2016   • Photopsia     Right   • Postmenopausal bleeding    • Sinus tachycardia    • Sjogren's syndrome (CMS/HCC)    • Vitamin D deficiency    • Vitamin D deficiency 12/3/2016   • Vitreous detachment of right eye          Past Surgical History:   Procedure Laterality Date   • CATARACT EXTRACTION, BILATERAL  2018   • CHOLECYSTECTOMY  06/09/1997    with operative cholangiogram. Chronic cholecystitis & cholelithiasis. HX of passed common  duct stone   • CYSTOSCOPY  11/26/1962    urethral dilatation.Recurrent pyelonephritis. urethritis   • ENDOSCOPY N/A 2/17/2017    Procedure: ESOPHAGOGASTRODUODENOSCOPY;  Surgeon: Cisco Mason DO;  Location: [facility] ENDOSCOPY;  Service:    • ENDOSCOPY N/A 10/31/2018    Procedure: ESOPHAGOGASTRODUODENOSCOPY;  Surgeon: Cisco Mason DO;  Location: [facility] ENDOSCOPY;  Service: Gastroenterology   • ENDOSCOPY AND COLONOSCOPY  09/30/1985    Normal colon to left transverse colon   • GALLBLADDER SURGERY     • PAP SMEAR  2009   • TONSILLECTOMY  1950   • VAGINAL DELIVERY      x3         Family History   Problem Relation Age of Onset   • Heart disease Mother    • Arthritis Mother    • Osteoporosis Mother    • Cataracts Mother    • Heart disease Father    • Hypertension Sister    • Arthritis Sister    • Diabetes Sister    • Fuchs' dystrophy Sister    • Breast cancer Paternal Grandmother    • Diabetes Other    • Heart disease Other    • Hypertension Other    • Stroke Other    • Thyroid disease Other    • Lung disease Other    • Other Other         Gallstones, Bleeding Tendency, Colon Problems.   • Fuchs' dystrophy Maternal Grandfather          Social History     Socioeconomic History   • Marital status:      Spouse name: Not on file   • Number of children: Not on file   • Years of education: Not on file   • Highest education level: Not on file   Social Needs   • Financial resource strain: Not on file   • Food insecurity - worry: Not on file   • Food insecurity - inability: Not on file   • Transportation needs - medical: Not on file   • Transportation needs - non-medical: Not on file   Occupational History   • Not on file   Tobacco Use   • Smoking status: Never Smoker   • Smokeless tobacco: Never Used   Substance and Sexual Activity   • Alcohol use: No   • Drug use: No   • Sexual activity: Defer   Other Topics Concern   • Not on file   Social History Narrative   • Not on file         Current Outpatient Medications    Medication Sig Dispense Refill   • aspirin 81 MG EC tablet Take 81 mg by mouth daily.     • Biotin (BIOTIN MAXIMUM STRENGTH) 5 MG capsule Take 1 tablet by mouth Daily.     • Calcium Carbonate-Vitamin D (CALCIUM 600+D) 600-200 MG-UNIT tablet Take 1 tablet by mouth Daily.     • clobetasol (TEMOVATE) 0.05 % external solution Apply 1 application topically Daily.     • diphenoxylate-atropine (LOMOTIL) 2.5-0.025 MG per tablet TK 1 T PO SIX TIMES PER DAY PRN  5   • hydroxychloroquine (PLAQUENIL) 200 MG tablet TAKE 1 TABLET BY MOUTH TWICE DAILY     • meclizine (ANTIVERT) 12.5 MG tablet Take 12.5 mg by mouth 3 (Three) Times a Day As Needed for dizziness.     • metoprolol succinate XL (TOPROL-XL) 50 MG 24 hr tablet Take 1 tablet by mouth Daily With Dinner. 90 tablet 6   • Multiple Vitamins-Minerals (MULTIVITAMIN ADULT PO) Take 1 tablet by mouth Daily.     • Omega-3 Fatty Acids (FISH OIL) 1000 MG capsule capsule Take 2,000 mg by mouth 2 (Two) Times a Day.     • Probiotic Product (PROBIOTIC-10 PO) Take  by mouth.     • SYNTHROID 88 MCG tablet Take 1 tablet daily Monday to Saturday and 1.5 tablets on Sunday (Patient taking differently: Take 88 mcg by mouth Daily. Take 1 tablet daily Monday to Saturday and 1.5 tablets on Sunday) 32 tablet 11   • triamcinolone (KENALOG) 0.1 % cream Apply 1 application topically to the appropriate area as directed 2 (Two) Times a Day.     • vitamin B-12 (CYANOCOBALAMIN) 1000 MCG tablet Take 1,000 mcg by mouth Every Other Day.     • vitamin D (ERGOCALCIFEROL) 78241 units capsule capsule Take 1 capsule by mouth Every 14 (Fourteen) Days. 6 capsule 0   • pravastatin (PRAVACHOL) 20 MG tablet Take 1 tablet by mouth Daily. 90 tablet 3     Current Facility-Administered Medications   Medication Dose Route Frequency Provider Last Rate Last Dose   • triamcinolone acetonide (KENALOG-40) injection 80 mg  80 mg Intramuscular Once Aditya Falk, APRN             Review of Systems     Constitution: Denies  "any fatigue, fever or chills    HENT: Denies any headache, hearing impairment,     Eyes:Has cataract     Cardivascular - As per history of present illness     Respiratory system-denies any COPD, shortness of breath,     Endocrine:  history of hyperlipidemia                      Hypothyrodism       Musculoskeletal:  history of arthritis with musculoskeletal problems    Gastrointestinal: Irritable bowel syndrome    Genitourinary: No dysuria or hematuria    Neurological:   No history of seizure disorder, stroke, memory problems    Psychiatric/Behavioral:        No history of depression,     Hematological- no history of easy bruising             OBJECTIVE    /86   Pulse 96   Ht 172.7 cm (68\")   Wt 91.2 kg (201 lb)   SpO2 97%   BMI 30.56 kg/m²       Physical Exam     Constitutional: is oriented to person, place, and time.     Skin-warm and dry    Well developed and nourished in no acute distress      Head: Normocephalic and atraumatic.     Eyes: Pupils are equal    Neck: Neck supple. No bruit in the carotids    Cardiovascular: [place] in the fifth intercostal space   Regular rate, and  Rhythm,    S1 greater than S2,     Pulmonary/Chest:   Air  Entry is equal on both sides  No wheezing or crackles,      Abdominal: Soft.  No hepatosplenomegaly, bowel sounds are present    Musculoskeletal: No kyphoscoliosis, no significant thickening of the joints    Neurological: is alert and oriented to person, place, and time.    cranial nerve are intact .   No motor or sensory deficit    Extremities-no edema, no radial femoral delay      Psychiatric: He has a normal mood and affect.                  His behavior is normal.           Procedures      Lab Results   Component Value Date    WBC 4.99 02/11/2019    HGB 13.4 02/11/2019    HCT 40.1 02/11/2019    MCV 89.5 02/11/2019     02/11/2019     Lab Results   Component Value Date    GLUCOSE 101 (H) 02/11/2019    BUN 13 02/11/2019    CREATININE 0.80 02/11/2019    EGFRIFNONA 70 " 02/11/2019    BCR 16.3 02/11/2019    CO2 27.0 02/11/2019    CALCIUM 9.9 02/11/2019    ALBUMIN 4.40 02/11/2019    AST 40 (H) 02/11/2019    ALT 15 02/11/2019     Lab Results   Component Value Date    CHOL 249 (H) 11/19/2018     Lab Results   Component Value Date    TRIG 255 (H) 11/19/2018    TRIG 228 (H) 12/07/2016    TRIG 298 (H) 09/25/2014     Lab Results   Component Value Date    HDL 47 (L) 11/19/2018    HDL 51 (L) 12/07/2016    HDL 56 (L) 09/25/2014     No components found for: LDLCALC  Lab Results   Component Value Date     (H) 11/19/2018     (H) 12/07/2016     (H) 09/25/2014     09/25/2014     No results found for: HDLLDLRATIO  No components found for: CHOLHDL  No results found for: HGBA1C  Lab Results   Component Value Date    TSH 0.470 02/11/2019    [id number] 8.05 07/21/2017                  A/P    Palpitations she is on  Toprol-XL 50 mill grams once a day in the evening, and she takes an aspirin every day.  She does not have any symptoms of angina or shortness of breath with her day-to-day activities.    Hyperlipidemia started her on pravastatin she could not tolerate atorvastatin in the past    Hyperlipidemia on omega-3.  She has history of irritable bowel syndrome and she takes probiotic, and takes Synthroid for hypothyroidism.    Follow-up in 1 year            This document has been electronically signed by Brian Jacques MD on March 18, 2019 11:34 AM       EMR Dragon/Transcription disclaimer:   Some of this note may be an electronic transcription/translation of spoken language to printed text. The electronic translation of spoken language may permit erroneous, or at times, nonsensical words or phrases to be inadvertently transcribed; Although I have reviewed the note for such errors, some may still exist.

## 2019-03-26 ENCOUNTER — OFFICE VISIT (OUTPATIENT)
Dept: OTOLARYNGOLOGY | Facility: CLINIC | Age: 74
End: 2019-03-26

## 2019-03-26 VITALS — OXYGEN SATURATION: 96 % | HEIGHT: 68 IN | WEIGHT: 201.4 LBS | BODY MASS INDEX: 30.52 KG/M2

## 2019-03-26 DIAGNOSIS — H61.21 IMPACTED CERUMEN OF RIGHT EAR: Primary | ICD-10-CM

## 2019-03-26 PROCEDURE — 69210 REMOVE IMPACTED EAR WAX UNI: CPT | Performed by: OTOLARYNGOLOGY

## 2019-03-27 NOTE — PROGRESS NOTES
Subjective   Carla Best is a 73 y.o. female.       History of Present Illness   Patient has a history of cerumen impactions as well as sensorineural hearing loss that was slightly asymmetric.  She asked to be seen early today because her right ear has felt stopped up for about a month.  She is not having any otorrhea.          The following portions of the patient's history were reviewed and updated as appropriate: allergies, current medications, past family history, past medical history, past social history, past surgical history and problem list.     reports that she has never smoked. She has never used smokeless tobacco. She reports that she does not drink alcohol or use drugs.   Patient is not a tobacco user and has not been counseled for use of tobacco products      Review of Systems        Objective   Physical Exam  Right ear is completely occluded with cerumen.  Using the binocular microscope for visualization, cerumen impaction was removed from right ear canal(s) using instrumentation. This was personally performed by Leno Alvarenga MD  Following cerumen removal tympanic membrane is noted to be intact and clear and she notes immediate subjective improvement in hearing  Left ear no discharge.  Tympanic membrane intact and clear.    Assessment/Plan   Carla was seen today for ear problem.    Diagnoses and all orders for this visit:    Impacted cerumen of right ear      Plan: Cerumen removed as described above.  Advised her to keep her previously scheduled appointment in May with myself and audiology.

## 2019-05-07 ENCOUNTER — OFFICE VISIT (OUTPATIENT)
Dept: OTOLARYNGOLOGY | Facility: CLINIC | Age: 74
End: 2019-05-07

## 2019-05-07 ENCOUNTER — CLINICAL SUPPORT (OUTPATIENT)
Dept: AUDIOLOGY | Facility: CLINIC | Age: 74
End: 2019-05-07

## 2019-05-07 VITALS — BODY MASS INDEX: 30.07 KG/M2 | HEIGHT: 68 IN | RESPIRATION RATE: 18 BRPM | WEIGHT: 198.4 LBS

## 2019-05-07 DIAGNOSIS — H93.13 TINNITUS OF BOTH EARS: ICD-10-CM

## 2019-05-07 DIAGNOSIS — H90.3 SENSORINEURAL HEARING LOSS OF BOTH EARS: Primary | ICD-10-CM

## 2019-05-07 DIAGNOSIS — H90.3 SENSORINEURAL HEARING LOSS, BILATERAL: Primary | ICD-10-CM

## 2019-05-07 PROCEDURE — 99213 OFFICE O/P EST LOW 20 MIN: CPT | Performed by: OTOLARYNGOLOGY

## 2019-05-07 NOTE — PROGRESS NOTES
STANDARD AUDIOMETRIC EVALUATION      Name:  Carla Best  :  1945  Age:  73 y.o.  Date of Evaluation:  2019      HISTORY    Reason for visit:  Carla Best is seen today for a hearing test at the request of Dr. Leno Alvarenga.  Patient reports she doesn't hear well in her right ear.  She states otherwise she hasn't noticed any significant changes in her hearing.  She states she does not wear hearing aids.       EVALUATION    See Audiogram    RESULTS        Otoscopy and Tympanometry 226 Hz :  Right Ear:  Otoscopy:  Clear ear canal          Tympanometry:  Middle ear function within normal limits    Left Ear:   Otoscopy:  Clear ear canal        Tympanometry:  Middle ear function within normal limits    Test technique:  Standard Audiometry     Pure Tone Audiometry:   Patient responded to pure tones at 10-70 dB for 250-8000 Hz in right ear, and at 10-60 dB for 250-8000 Hz in left ear.       Speech Audiometry:        Right Ear:  Speech Reception Threshold (SRT) was obtained at 10 dBHL                 Speech Discrimination scores were 68% in quiet when words were presented at 50 dBHL       Left Ear:  Speech Reception Threshold (SRT) was obtained at 20 dBHL                 Speech Discrimination scores were 84% in quiet when words were presented at 60 dBHL    Reliability:   good    IMPRESSIONS:  1.  Tympanometry results are consistent with Middle ear function within normal limits in both ears.  2.  Pure tone results are consistent with within normal limits to moderate sloping sensorineural hearing loss for both ears.       RECOMMENDATIONS:  Patient is seeing the Ear Nose and Throat physician immediately following this examination.  It was a pleasure seeing Carla Best in Audiology today.  We would be happy to do further testing or discuss these test as necessary.          This document has been electronically signed by Elsa Euceda MS CCC-A on May 7, 2019 4:53 PM       Elsa Coffey  MS Ok CCC-A  Licensed Audiologist

## 2019-05-10 NOTE — PROGRESS NOTES
Subjective   Carla Best is a 73 y.o. female.       History of Present Illness   Patient has a history of bilateral sensorineural hearing loss that was slightly asymmetrical previously.  Also has recurring cerumen impactions.  Could not wait the full year to get her ears cleaned and had come in a couple months ago and had cerumen removed.  Feels like her hearing is not subjectively changed now that she has had her ears cleaned out.  No otorrhea.    The following portions of the patient's history were reviewed and updated as appropriate: allergies, current medications, past family history, past medical history, past social history, past surgical history and problem list.     reports that she has never smoked. She has never used smokeless tobacco. She reports that she does not drink alcohol or use drugs.   Patient is not a tobacco user and has not been counseled for use of tobacco products      Review of Systems   Constitutional: Negative for fever.           Objective   Physical Exam  Ears normal    Audiogram is obtained and reviewed and shows a bilateral sensorineural hearing loss that is now essentially symmetrical.  Tympanograms are type a bilaterally.  Discrimination scores are 68% on the right, 84% on the left.  This represents a slight decrease in hearing on the left compared to previous.      Assessment/Plan   Carla was seen today for follow-up.    Diagnoses and all orders for this visit:    Sensorineural hearing loss of both ears    Tinnitus of both ears      Plan: She should return in 10 months for reevaluation of recurrent cerumen impaction.  As long as her hearing remains subjectively unchanged I do not think she needs another audiogram until 2021.

## 2019-05-30 ENCOUNTER — OFFICE VISIT (OUTPATIENT)
Dept: OBSTETRICS AND GYNECOLOGY | Facility: CLINIC | Age: 74
End: 2019-05-30

## 2019-05-30 VITALS
DIASTOLIC BLOOD PRESSURE: 89 MMHG | SYSTOLIC BLOOD PRESSURE: 145 MMHG | HEIGHT: 68 IN | WEIGHT: 202 LBS | BODY MASS INDEX: 30.62 KG/M2

## 2019-05-30 DIAGNOSIS — R32 URINARY INCONTINENCE IN FEMALE: ICD-10-CM

## 2019-05-30 DIAGNOSIS — N89.8 VAGINAL IRRITATION: ICD-10-CM

## 2019-05-30 DIAGNOSIS — N89.8 VAGINAL DISCHARGE: Primary | ICD-10-CM

## 2019-05-30 LAB
CANDIDA ALBICANS: NEGATIVE
GARDNERELLA VAGINALIS: NEGATIVE
T VAGINALIS DNA VAG QL PROBE+SIG AMP: NEGATIVE

## 2019-05-30 PROCEDURE — 99214 OFFICE O/P EST MOD 30 MIN: CPT | Performed by: OBSTETRICS & GYNECOLOGY

## 2019-05-30 PROCEDURE — 87660 TRICHOMONAS VAGIN DIR PROBE: CPT | Performed by: OBSTETRICS & GYNECOLOGY

## 2019-05-30 PROCEDURE — 87109 MYCOPLASMA: CPT | Performed by: OBSTETRICS & GYNECOLOGY

## 2019-05-30 PROCEDURE — A4561 PESSARY RUBBER, ANY TYPE: HCPCS | Performed by: OBSTETRICS & GYNECOLOGY

## 2019-05-30 PROCEDURE — 87480 CANDIDA DNA DIR PROBE: CPT | Performed by: OBSTETRICS & GYNECOLOGY

## 2019-05-30 PROCEDURE — 57160 INSERT PESSARY/OTHER DEVICE: CPT | Performed by: OBSTETRICS & GYNECOLOGY

## 2019-05-30 PROCEDURE — 87510 GARDNER VAG DNA DIR PROBE: CPT | Performed by: OBSTETRICS & GYNECOLOGY

## 2019-05-30 RX ORDER — CLOBETASOL PROPIONATE 0.46 MG/ML
1 SOLUTION TOPICAL DAILY
Qty: 50 ML | Refills: 3 | Status: SHIPPED | OUTPATIENT
Start: 2019-05-30 | End: 2019-08-21

## 2019-06-03 ENCOUNTER — OFFICE VISIT (OUTPATIENT)
Dept: OBSTETRICS AND GYNECOLOGY | Facility: CLINIC | Age: 74
End: 2019-06-03

## 2019-06-03 VITALS
HEART RATE: 77 BPM | WEIGHT: 202 LBS | HEIGHT: 68 IN | BODY MASS INDEX: 30.62 KG/M2 | DIASTOLIC BLOOD PRESSURE: 84 MMHG | SYSTOLIC BLOOD PRESSURE: 142 MMHG

## 2019-06-03 DIAGNOSIS — N81.4 CYSTOCELE WITH PROLAPSE: Primary | ICD-10-CM

## 2019-06-03 DIAGNOSIS — N81.9 URINARY INCONTINENCE CONCURRENT WITH AND DUE TO FEMALE GENITAL PROLAPSE: ICD-10-CM

## 2019-06-03 DIAGNOSIS — R32 URINARY INCONTINENCE CONCURRENT WITH AND DUE TO FEMALE GENITAL PROLAPSE: ICD-10-CM

## 2019-06-03 PROCEDURE — 99212 OFFICE O/P EST SF 10 MIN: CPT | Performed by: OBSTETRICS & GYNECOLOGY

## 2019-06-04 NOTE — PROGRESS NOTES
Carla Best is a 73 y.o. y/o female.     Chief Complaint: Follow-up pessary    HPI:   73 y.o. .  No LMP recorded. Patient is postmenopausal..  Patient says the pessary is stated well good support occasional urgency incontinence she wants to leave this in for a while importance of follow-up reviewed          Review of Systems   ROS:  CNS: No history of brain malignancy.  HEENT: No history of throat cancer.  Eye: No history of retinal cancer.  Pulmonary: No history of lung cancer.                                                                                 Cardiovascular: No history of cardiac tumors.  Gastrointestinal: No history of small bowel tumors.  Renal: No history of kidney malignancy.  Musculoskeletal: No history of smooth muscle tumors.  Lymphatics: No history of Hodgkin's disease.  Endocrine: No history of thyroid malignancy.    The following portions of the patient's history were reviewed and updated as appropriate: allergies, current medications, past family history, past medical history, past social history, past surgical history and problem list.    Allergies   Allergen Reactions   • Augmentin [Amoxicillin-Pot Clavulanate] Diarrhea   • Ciprofloxacin Hives   • Codeine Other (See Comments)     headache   • Demerol [Meperidine] Other (See Comments)     Drop in blood pressure   • Dexlansoprazole Nausea Only   • Lipitor [Atorvastatin Calcium] Myalgia   • Tape Other (See Comments)     Burns skin      • Phenazopyridine Rash        Outpatient Medications Prior to Visit   Medication Sig Dispense Refill   • aspirin 81 MG EC tablet Take 81 mg by mouth daily.     • Biotin (BIOTIN MAXIMUM STRENGTH) 5 MG capsule Take 1 tablet by mouth Daily.     • Calcium Carbonate-Vitamin D (CALCIUM 600+D) 600-200 MG-UNIT tablet Take 1 tablet by mouth Daily.     • clobetasol (TEMOVATE) 0.05 % external solution Apply 1 application topically to the appropriate area as directed Daily. 50 mL 3   • diphenoxylate-atropine  (LOMOTIL) 2.5-0.025 MG per tablet TK 1 T PO SIX TIMES PER DAY PRN  5   • hydroxychloroquine (PLAQUENIL) 200 MG tablet TAKE 1 TABLET BY MOUTH TWICE DAILY     • meclizine (ANTIVERT) 12.5 MG tablet Take 12.5 mg by mouth 3 (Three) Times a Day As Needed for dizziness.     • metoprolol succinate XL (TOPROL-XL) 50 MG 24 hr tablet Take 1 tablet by mouth Daily With Dinner. 90 tablet 6   • Multiple Vitamins-Minerals (MULTIVITAMIN ADULT PO) Take 1 tablet by mouth Daily.     • Omega-3 Fatty Acids (FISH OIL) 1000 MG capsule capsule Take 2,000 mg by mouth 2 (Two) Times a Day.     • pravastatin (PRAVACHOL) 20 MG tablet Take 1 tablet by mouth Daily. 90 tablet 3   • Probiotic Product (PROBIOTIC-10 PO) Take  by mouth.     • SYNTHROID 88 MCG tablet Take 1 tablet daily Monday to Saturday and 1.5 tablets on Sunday (Patient taking differently: Take 88 mcg by mouth Daily. Take 1 tablet daily Monday to Saturday and 1.5 tablets on Sunday) 32 tablet 11   • triamcinolone (KENALOG) 0.1 % cream Apply 1 application topically to the appropriate area as directed 2 (Two) Times a Day.     • vitamin B-12 (CYANOCOBALAMIN) 1000 MCG tablet Take 1,000 mcg by mouth Every Other Day.     • vitamin D (ERGOCALCIFEROL) 15544 units capsule capsule Take 1 capsule by mouth Every 14 (Fourteen) Days. 6 capsule 0     Facility-Administered Medications Prior to Visit   Medication Dose Route Frequency Provider Last Rate Last Dose   • triamcinolone acetonide (KENALOG-40) injection 80 mg  80 mg Intramuscular Once Aditya Falk, ZACHARY            The patient has a family history of   Family History   Problem Relation Age of Onset   • Heart disease Mother    • Arthritis Mother    • Osteoporosis Mother    • Cataracts Mother    • Heart disease Father    • Hypertension Sister    • Arthritis Sister    • Diabetes Sister    • Fuchs' dystrophy Sister    • Breast cancer Paternal Grandmother    • Diabetes Other    • Heart disease Other    • Hypertension Other    • Stroke Other     • Thyroid disease Other    • Lung disease Other    • Other Other         Gallstones, Bleeding Tendency, Colon Problems.   • Fuchs' dystrophy Maternal Grandfather         Past Medical History:   Diagnosis Date   • Chest pain    • Fibrocystic disease of breast    • Hashimoto's thyroiditis    • History of screening mammography 2014    SCREENING MAMMOGRAPHY DIGITAL  (Medicare) (1)    • Hyperlipidemia    • Hypothyroidism due to Hashimoto's thyroiditis 12/3/2016   • Keratoconjunctivitis sicca (CMS/[facility])    • Nuclear senile cataract    • On long term drug therapy    • Osteoarthritis    • Osteopenia    • Osteopenia 12/3/2016   • Photopsia     Right   • Postmenopausal bleeding    • Sinus tachycardia    • Sjogren's syndrome (CMS/HCC)    • Vitamin D deficiency    • Vitamin D deficiency 12/3/2016   • Vitreous detachment of right eye         OB History      Para Term  AB Living    3 3 3          SAB TAB Ectopic Molar Multiple Live Births                          Social History     Socioeconomic History   • Marital status:      Spouse name: Not on file   • Number of children: Not on file   • Years of education: Not on file   • Highest education level: Not on file   Tobacco Use   • Smoking status: Never Smoker   • Smokeless tobacco: Never Used   Substance and Sexual Activity   • Alcohol use: No   • Drug use: No   • Sexual activity: Defer        Past Surgical History:   Procedure Laterality Date   • CATARACT EXTRACTION, BILATERAL     • CHOLECYSTECTOMY  1997    with operative cholangiogram. Chronic cholecystitis & cholelithiasis. HX of passed common duct stone   • CYSTOSCOPY  1962    urethral dilatation.Recurrent pyelonephritis. urethritis   • ENDOSCOPY N/A 2017    Procedure: ESOPHAGOGASTRODUODENOSCOPY;  Surgeon: Cisco Mason DO;  Location: [facility] ENDOSCOPY;  Service:    • ENDOSCOPY N/A 10/31/2018    Procedure: ESOPHAGOGASTRODUODENOSCOPY;  Surgeon: Cisco Mason DO;   "Location: [facility] ENDOSCOPY;  Service: Gastroenterology   • ENDOSCOPY AND COLONOSCOPY  09/30/1985    Normal colon to left transverse colon   • GALLBLADDER SURGERY     • PAP SMEAR  2009   • TONSILLECTOMY  1950   • VAGINAL DELIVERY      x3        Patient Active Problem List   Diagnosis   • Osteopenia   • Hypothyroidism due to Hashimoto's thyroiditis   • Vitamin D deficiency   • Keratoconjunctivitis sicca (CMS/HCC)   • Long term use of drug   • Cataract   • Primary osteoarthritis of right knee   • B12 deficiency   • Right hip pain   • Encounter for screening for osteoporosis   • GERD (gastroesophageal reflux disease)   • Hip pain, acute, right   • Myofascial pain   • Neck pain   • Osteoarthritis   • Spondylosis of cervical joint   • Palpitations   • Acquired hypothyroidism   • Pre-operative clearance   • Vaginal discharge   • Vaginal irritation   • Urinary incontinence in female        Documented Vitals    06/03/19 0916   BP: 142/84   Pulse: 77   Weight: 91.6 kg (202 lb)   Height: 172.7 cm (68\")   PainSc: 0-No pain        Body mass index is 30.71 kg/m².    Physical Exam  Constitutional: Appears to be in no acute distress; Eyes: sclera normal; Endocrine system: thyroid palpate is normal; Pulmonary system: lungs clear; Cardiovascular system: heart regular rate and rhythm; Gastrointestinal system: abdomen soft nontender, active bowel sounds; Urologic system: CVA negative; Psychiatric: appropriate insight; Neurologic: gait within normal limits female reproductive system pessary in place no evidence of pressure points    Assessment       No diagnosis found.      Plan       No orders of the defined types were placed in this encounter.          This document has been electronically signed by Nav Barber MD on June 4, 2019 5:10 PM    "

## 2019-06-10 LAB
MYCOPLASMA HOMINIS: NEGATIVE
UREAPLASMA UREALYTICUM: NEGATIVE

## 2019-06-12 RX ORDER — ERGOCALCIFEROL 1.25 MG/1
50000 CAPSULE ORAL
Qty: 6 CAPSULE | Refills: 0 | Status: SHIPPED | OUTPATIENT
Start: 2019-06-12 | End: 2019-08-27 | Stop reason: SDUPTHER

## 2019-06-18 NOTE — PROGRESS NOTES
Calra Best is a 73 y.o. y/o female.     Chief Complaint: Vaginal irritation    HPI:   73 y.o. .  No LMP recorded. Patient is postmenopausal.  Patient presents to the office with complaints of vaginal and perineal irritation.  She is uncertain if it is due to her recent diagnosis of lichen sclerosis.  She only notes mild discharge in most of her irritation is externally and only at the introitus of the vagina.  She also complains of urinary stress incontinence mostly when coughing he also notes increased urinary frequency and incomplete emptying and when she voids.  He would like to discuss management options for this issue as well.  Denies fevers or chills.  She has occasional dysuria.          Review of Systems   Constitutional: Positive for fatigue and unexpected weight change (weight gain).   HENT: Positive for trouble swallowing.    Eyes: Negative.    Respiratory: Negative.    Cardiovascular: Negative.    Gastrointestinal: Positive for diarrhea.   Endocrine:        Thyroid issues   Genitourinary: Positive for vaginal discharge and vaginal pain.        Vaginal itching, burning   Musculoskeletal: Positive for arthralgias and joint swelling.   Skin:        Itchy skin   Allergic/Immunologic:        Augmentin   Neurological: Positive for tremors and numbness.   Hematological: Negative.    Psychiatric/Behavioral: Positive for sleep disturbance.      ROS:  Constitutional: Subjective fever  CNS: No history of brain malignancy.  HEENT: No history of throat cancer.  Eye: No history of retinal cancer.  Pulmonary: No history of lung cancer.                                                                                   Cardiovascular: No history of cardiac tumors.  Gastrointestinal: No history of small bowel tumors.  Renal: No history of kidney malignancy.  Musculoskeletal: No history of smooth muscle tumors.  Lymphatics: No history of Hodgkin's disease.  Endocrine: No history of thyroid malignancy.    The  following portions of the patient's history were reviewed and updated as appropriate: allergies, current medications, past family history, past medical history, past social history, past surgical history and problem list.    Allergies   Allergen Reactions   • Augmentin [Amoxicillin-Pot Clavulanate] Diarrhea   • Ciprofloxacin Hives   • Codeine Other (See Comments)     headache   • Demerol [Meperidine] Other (See Comments)     Drop in blood pressure   • Dexlansoprazole Nausea Only   • Lipitor [Atorvastatin Calcium] Myalgia   • Tape Other (See Comments)     Burns skin      • Phenazopyridine Rash        Outpatient Medications Prior to Visit   Medication Sig Dispense Refill   • aspirin 81 MG EC tablet Take 81 mg by mouth daily.     • Biotin (BIOTIN MAXIMUM STRENGTH) 5 MG capsule Take 1 tablet by mouth Daily.     • Calcium Carbonate-Vitamin D (CALCIUM 600+D) 600-200 MG-UNIT tablet Take 1 tablet by mouth Daily.     • diphenoxylate-atropine (LOMOTIL) 2.5-0.025 MG per tablet TK 1 T PO SIX TIMES PER DAY PRN  5   • hydroxychloroquine (PLAQUENIL) 200 MG tablet TAKE 1 TABLET BY MOUTH TWICE DAILY     • meclizine (ANTIVERT) 12.5 MG tablet Take 12.5 mg by mouth 3 (Three) Times a Day As Needed for dizziness.     • metoprolol succinate XL (TOPROL-XL) 50 MG 24 hr tablet Take 1 tablet by mouth Daily With Dinner. 90 tablet 6   • Multiple Vitamins-Minerals (MULTIVITAMIN ADULT PO) Take 1 tablet by mouth Daily.     • Omega-3 Fatty Acids (FISH OIL) 1000 MG capsule capsule Take 2,000 mg by mouth 2 (Two) Times a Day.     • pravastatin (PRAVACHOL) 20 MG tablet Take 1 tablet by mouth Daily. 90 tablet 3   • Probiotic Product (PROBIOTIC-10 PO) Take  by mouth.     • SYNTHROID 88 MCG tablet Take 1 tablet daily Monday to Saturday and 1.5 tablets on Sunday (Patient taking differently: Take 88 mcg by mouth Daily. Take 1 tablet daily Monday to Saturday and 1.5 tablets on Sunday) 32 tablet 11   • triamcinolone (KENALOG) 0.1 % cream Apply 1 application  topically to the appropriate area as directed 2 (Two) Times a Day.     • vitamin B-12 (CYANOCOBALAMIN) 1000 MCG tablet Take 1,000 mcg by mouth Every Other Day.     • clobetasol (TEMOVATE) 0.05 % external solution Apply 1 application topically Daily.     • vitamin D (ERGOCALCIFEROL) 02793 units capsule capsule Take 1 capsule by mouth Every 14 (Fourteen) Days. 6 capsule 0     Facility-Administered Medications Prior to Visit   Medication Dose Route Frequency Provider Last Rate Last Dose   • triamcinolone acetonide (KENALOG-40) injection 80 mg  80 mg Intramuscular Once Aditya Falk APRN            The patient has a family history of   Family History   Problem Relation Age of Onset   • Heart disease Mother    • Arthritis Mother    • Osteoporosis Mother    • Cataracts Mother    • Heart disease Father    • Hypertension Sister    • Arthritis Sister    • Diabetes Sister    • Fuchs' dystrophy Sister    • Breast cancer Paternal Grandmother    • Diabetes Other    • Heart disease Other    • Hypertension Other    • Stroke Other    • Thyroid disease Other    • Lung disease Other    • Other Other         Gallstones, Bleeding Tendency, Colon Problems.   • Fuchs' dystrophy Maternal Grandfather         Past Medical History:   Diagnosis Date   • Chest pain    • Fibrocystic disease of breast    • Hashimoto's thyroiditis    • History of screening mammography 2014    SCREENING MAMMOGRAPHY DIGITAL  (Medicare) (1)    • Hyperlipidemia    • Hypothyroidism due to Hashimoto's thyroiditis 12/3/2016   • Keratoconjunctivitis sicca (CMS/HCC)    • Nuclear senile cataract    • On long term drug therapy    • Osteoarthritis    • Osteopenia    • Osteopenia 12/3/2016   • Photopsia     Right   • Postmenopausal bleeding    • Sinus tachycardia    • Sjogren's syndrome (CMS/HCC)    • Vitamin D deficiency    • Vitamin D deficiency 12/3/2016   • Vitreous detachment of right eye         OB History      Para Term  AB Living    3 3 3           SAB TAB Ectopic Molar Multiple Live Births                          Social History     Socioeconomic History   • Marital status:      Spouse name: Not on file   • Number of children: Not on file   • Years of education: Not on file   • Highest education level: Not on file   Tobacco Use   • Smoking status: Never Smoker   • Smokeless tobacco: Never Used   Substance and Sexual Activity   • Alcohol use: No   • Drug use: No   • Sexual activity: Defer        Past Surgical History:   Procedure Laterality Date   • CATARACT EXTRACTION, BILATERAL  2018   • CHOLECYSTECTOMY  06/09/1997    with operative cholangiogram. Chronic cholecystitis & cholelithiasis. HX of passed common duct stone   • CYSTOSCOPY  11/26/1962    urethral dilatation.Recurrent pyelonephritis. urethritis   • ENDOSCOPY N/A 2/17/2017    Procedure: ESOPHAGOGASTRODUODENOSCOPY;  Surgeon: Cisco Mason DO;  Location: [facility] ENDOSCOPY;  Service:    • ENDOSCOPY N/A 10/31/2018    Procedure: ESOPHAGOGASTRODUODENOSCOPY;  Surgeon: Cisco Mason DO;  Location: [facility] ENDOSCOPY;  Service: Gastroenterology   • ENDOSCOPY AND COLONOSCOPY  09/30/1985    Normal colon to left transverse colon   • GALLBLADDER SURGERY     • PAP SMEAR  2009   • TONSILLECTOMY  1950   • VAGINAL DELIVERY      x3        Patient Active Problem List   Diagnosis   • Osteopenia   • Hypothyroidism due to Hashimoto's thyroiditis   • Vitamin D deficiency   • Keratoconjunctivitis sicca (CMS/HCC)   • Long term use of drug   • Cataract   • Primary osteoarthritis of right knee   • B12 deficiency   • Right hip pain   • Encounter for screening for osteoporosis   • GERD (gastroesophageal reflux disease)   • Hip pain, acute, right   • Myofascial pain   • Neck pain   • Osteoarthritis   • Spondylosis of cervical joint   • Palpitations   • Acquired hypothyroidism   • Pre-operative clearance   • Vaginal discharge   • Vaginal irritation   • Urinary incontinence in female        Documented Vitals     "05/30/19 1601   BP: 145/89   Weight: 91.6 kg (202 lb)   Height: 172.7 cm (68\")        Body mass index is 30.71 kg/m².    Physical Exam  Constitutional: Appears to be in no acute distress;   Eyes: sclera normal  Endocrine system: thyroid palpate is normal  Pulmonary system: lungs clear  Cardiovascular system: heart regular rate and rhythm  Gastrointestinal system: abdomen soft nontender; active bowel sounds, no rebound or guarding  Genitourinary system: CVA negative; Thinning of perineal skin and loss of architecture of clitoral españa, c/w lichen sclerosis, Grade 2/3 anterior prolapse; minimal discharge;   Psychiatric: appropriate insight  Neurologic: gait within normal limits    Assessment        Diagnosis Plan   1. Vaginal discharge  Gardnerella vaginalis, Trichomonas vaginalis, Candida albicans, DNA - Swab, Vagina    Mycoplasma / Ureaplasma Culture - Swab, Endocervix   2. Vaginal irritation     3. Urinary incontinence in female           Plan         New Medications Ordered This Visit   Medications   • clobetasol (TEMOVATE) 0.05 % external solution     Sig: Apply 1 application topically to the appropriate area as directed Daily.     Dispense:  50 mL     Refill:  3       1.  One swab sent today to test for mycoplasma and Ureaplasma  2.  Clobetasol prescription refilled today  3.  Patient was attempted to be fitted for pessary today with the assistance from Dr. Barber in the office.  She was fitted with a size 1 today and will return to clinic for follow-up in 1 week with Dr. Barber for reevaluation.  He was able to void with pessary in place prior to leaving the office.    This document has been electronically signed by Mary Mei MD PhD on June 18, 2019 9:37 AM  "

## 2019-06-26 DIAGNOSIS — R35.0 URINARY FREQUENCY: Primary | ICD-10-CM

## 2019-06-27 ENCOUNTER — LAB (OUTPATIENT)
Dept: LAB | Facility: HOSPITAL | Age: 74
End: 2019-06-27

## 2019-06-27 DIAGNOSIS — R35.0 URINARY FREQUENCY: ICD-10-CM

## 2019-06-27 PROCEDURE — 87186 SC STD MICRODIL/AGAR DIL: CPT

## 2019-06-27 PROCEDURE — 87077 CULTURE AEROBIC IDENTIFY: CPT

## 2019-06-27 PROCEDURE — 87086 URINE CULTURE/COLONY COUNT: CPT

## 2019-06-27 PROCEDURE — 81001 URINALYSIS AUTO W/SCOPE: CPT

## 2019-06-28 LAB
BACTERIA UR QL AUTO: ABNORMAL /HPF
BILIRUB UR QL STRIP: NEGATIVE
CLARITY UR: ABNORMAL
COLOR UR: YELLOW
GLUCOSE UR STRIP-MCNC: NEGATIVE MG/DL
HGB UR QL STRIP.AUTO: ABNORMAL
HYALINE CASTS UR QL AUTO: ABNORMAL /LPF
KETONES UR QL STRIP: NEGATIVE
LEUKOCYTE ESTERASE UR QL STRIP.AUTO: ABNORMAL
NITRITE UR QL STRIP: NEGATIVE
PH UR STRIP.AUTO: 7 [PH] (ref 5–8)
PROT UR QL STRIP: NEGATIVE
RBC # UR: ABNORMAL /HPF
REF LAB TEST METHOD: ABNORMAL
SP GR UR STRIP: 1.01 (ref 1–1.03)
SQUAMOUS #/AREA URNS HPF: ABNORMAL /HPF
UROBILINOGEN UR QL STRIP: ABNORMAL
WBC UR QL AUTO: ABNORMAL /HPF

## 2019-06-29 ENCOUNTER — DOCUMENTATION (OUTPATIENT)
Dept: OBSTETRICS AND GYNECOLOGY | Facility: CLINIC | Age: 74
End: 2019-06-29

## 2019-06-29 DIAGNOSIS — N30.00 ACUTE CYSTITIS WITHOUT HEMATURIA: Primary | ICD-10-CM

## 2019-06-29 LAB — BACTERIA SPEC AEROBE CULT: ABNORMAL

## 2019-06-29 RX ORDER — SULFAMETHOXAZOLE AND TRIMETHOPRIM 400; 80 MG/1; MG/1
1 TABLET ORAL 2 TIMES DAILY
COMMUNITY
Start: 2019-06-29 | End: 2019-07-06

## 2019-06-29 RX ORDER — SULFAMETHOXAZOLE AND TRIMETHOPRIM 400; 80 MG/1; MG/1
1 TABLET ORAL 2 TIMES DAILY
Qty: 14 TABLET | Refills: 0 | Status: SHIPPED | OUTPATIENT
Start: 2019-06-29 | End: 2019-07-06

## 2019-06-29 NOTE — PROGRESS NOTES
Urine positive at 50,000 but patient symptomatic we will treat since takes Bactrim without problems risks benefits alternatives reviewed

## 2019-07-01 ENCOUNTER — OFFICE VISIT (OUTPATIENT)
Dept: OBSTETRICS AND GYNECOLOGY | Facility: CLINIC | Age: 74
End: 2019-07-01

## 2019-07-01 VITALS
BODY MASS INDEX: 30.22 KG/M2 | WEIGHT: 199.4 LBS | SYSTOLIC BLOOD PRESSURE: 120 MMHG | DIASTOLIC BLOOD PRESSURE: 80 MMHG | HEIGHT: 68 IN

## 2019-07-01 DIAGNOSIS — N81.4 CYSTOCELE WITH PROLAPSE: ICD-10-CM

## 2019-07-01 DIAGNOSIS — R35.0 URINARY FREQUENCY: Primary | ICD-10-CM

## 2019-07-01 PROCEDURE — 99213 OFFICE O/P EST LOW 20 MIN: CPT | Performed by: OBSTETRICS & GYNECOLOGY

## 2019-07-01 PROCEDURE — 57160 INSERT PESSARY/OTHER DEVICE: CPT | Performed by: OBSTETRICS & GYNECOLOGY

## 2019-07-01 PROCEDURE — A4561 PESSARY RUBBER, ANY TYPE: HCPCS | Performed by: OBSTETRICS & GYNECOLOGY

## 2019-07-02 ENCOUNTER — LAB (OUTPATIENT)
Dept: LAB | Facility: HOSPITAL | Age: 74
End: 2019-07-02

## 2019-07-02 DIAGNOSIS — R35.0 URINARY FREQUENCY: ICD-10-CM

## 2019-07-02 LAB
BACTERIA UR QL AUTO: ABNORMAL /HPF
BILIRUB UR QL STRIP: NEGATIVE
CLARITY UR: CLEAR
COLOR UR: YELLOW
GLUCOSE UR STRIP-MCNC: NEGATIVE MG/DL
HGB UR QL STRIP.AUTO: ABNORMAL
HYALINE CASTS UR QL AUTO: ABNORMAL /LPF
KETONES UR QL STRIP: NEGATIVE
LEUKOCYTE ESTERASE UR QL STRIP.AUTO: ABNORMAL
NITRITE UR QL STRIP: NEGATIVE
PH UR STRIP.AUTO: 7 [PH] (ref 5–8)
PROT UR QL STRIP: ABNORMAL
RBC # UR: ABNORMAL /HPF
REF LAB TEST METHOD: ABNORMAL
SP GR UR STRIP: 1.01 (ref 1–1.03)
SQUAMOUS #/AREA URNS HPF: ABNORMAL /HPF
UROBILINOGEN UR QL STRIP: ABNORMAL
WBC UR QL AUTO: ABNORMAL /HPF

## 2019-07-02 PROCEDURE — 81001 URINALYSIS AUTO W/SCOPE: CPT

## 2019-07-04 DIAGNOSIS — E55.9 VITAMIN D DEFICIENCY: ICD-10-CM

## 2019-07-04 DIAGNOSIS — E89.0 POSTABLATIVE HYPOTHYROIDISM: ICD-10-CM

## 2019-07-05 ENCOUNTER — OFFICE VISIT (OUTPATIENT)
Dept: OBSTETRICS AND GYNECOLOGY | Facility: CLINIC | Age: 74
End: 2019-07-05

## 2019-07-05 VITALS
HEIGHT: 68 IN | SYSTOLIC BLOOD PRESSURE: 126 MMHG | BODY MASS INDEX: 30.62 KG/M2 | WEIGHT: 202 LBS | DIASTOLIC BLOOD PRESSURE: 77 MMHG

## 2019-07-05 DIAGNOSIS — N81.10 FEMALE CYSTOCELE: Primary | ICD-10-CM

## 2019-07-05 PROCEDURE — 99213 OFFICE O/P EST LOW 20 MIN: CPT | Performed by: OBSTETRICS & GYNECOLOGY

## 2019-07-05 RX ORDER — LEVOTHYROXINE SODIUM 88 MCG
TABLET ORAL
Qty: 32 TABLET | Refills: 1 | Status: SHIPPED | OUTPATIENT
Start: 2019-07-05 | End: 2019-08-19 | Stop reason: SDUPTHER

## 2019-07-08 NOTE — PROGRESS NOTES
Carla Best is a 73 y.o. y/o female.     Chief Complaint: Follow-up pessary    HPI:   73 y.o. .  No LMP recorded. Patient is postmenopausal..  She has seen a follow-up pessary for cystocele.  She denies vaginal bleeding she denies extreme pain is having some pressure with the pessary but finds it tolerable.  The pressure of the cystocele coming out is gone.  After reviewing the risks benefits alternatives she would like to keep using the pessary for now          Review of Systems   ROS:  CNS: No history of brain malignancy.  HEENT: No history of throat cancer.  Eye: No history of retinal cancer.  Pulmonary: No history of lung cancer.                                                                                 Cardiovascular: No history of cardiac tumors.  Gastrointestinal: No history of small bowel tumors.  Renal: No history of kidney malignancy.  Musculoskeletal: No history of smooth muscle tumors.  Lymphatics: No history of Hodgkin's disease.  Endocrine: No history of thyroid malignancy.    The following portions of the patient's history were reviewed and updated as appropriate: allergies, current medications, past family history, past medical history, past social history, past surgical history and problem list.    Allergies   Allergen Reactions   • Augmentin [Amoxicillin-Pot Clavulanate] Diarrhea   • Ciprofloxacin Hives   • Codeine Other (See Comments)     headache   • Demerol [Meperidine] Other (See Comments)     Drop in blood pressure   • Dexlansoprazole Nausea Only   • Lipitor [Atorvastatin Calcium] Myalgia   • Tape Other (See Comments)     Burns skin      • Phenazopyridine Rash        Outpatient Medications Prior to Visit   Medication Sig Dispense Refill   • aspirin 81 MG EC tablet Take 81 mg by mouth daily.     • Biotin (BIOTIN MAXIMUM STRENGTH) 5 MG capsule Take 1 tablet by mouth Daily.     • Calcium Carbonate-Vitamin D (CALCIUM 600+D) 600-200 MG-UNIT tablet Take 1 tablet by mouth Daily.      • clobetasol (TEMOVATE) 0.05 % external solution Apply 1 application topically to the appropriate area as directed Daily. 50 mL 3   • diphenoxylate-atropine (LOMOTIL) 2.5-0.025 MG per tablet TK 1 T PO SIX TIMES PER DAY PRN  5   • hydroxychloroquine (PLAQUENIL) 200 MG tablet TAKE 1 TABLET BY MOUTH TWICE DAILY     • meclizine (ANTIVERT) 12.5 MG tablet Take 12.5 mg by mouth 3 (Three) Times a Day As Needed for dizziness.     • metoprolol succinate XL (TOPROL-XL) 50 MG 24 hr tablet Take 1 tablet by mouth Daily With Dinner. 90 tablet 6   • Multiple Vitamins-Minerals (MULTIVITAMIN ADULT PO) Take 1 tablet by mouth Daily.     • Omega-3 Fatty Acids (FISH OIL) 1000 MG capsule capsule Take 2,000 mg by mouth 2 (Two) Times a Day.     • pravastatin (PRAVACHOL) 20 MG tablet Take 1 tablet by mouth Daily. 90 tablet 3   • Probiotic Product (PROBIOTIC-10 PO) Take  by mouth.     • sulfamethoxazole-trimethoprim (BACTRIM) 400-80 MG tablet Take 1 tablet by mouth 2 (Two) Times a Day for 7 days. 14 tablet 0   • SYNTHROID 88 MCG tablet TAKE 1 TABLET BY MOUTH DAILY ON MONDAY TO SATURDAY AND 1.5 TABLETS ON SUNDAY. 32 tablet 1   • triamcinolone (KENALOG) 0.1 % cream Apply 1 application topically to the appropriate area as directed 2 (Two) Times a Day.     • vitamin B-12 (CYANOCOBALAMIN) 1000 MCG tablet Take 1,000 mcg by mouth Every Other Day.     • vitamin D (ERGOCALCIFEROL) 86303 units capsule capsule Take 1 capsule by mouth Every 14 (Fourteen) Days. 6 capsule 0   • sulfamethoxazole-trimethoprim (BACTRIM,SEPTRA) 400-80 MG tablet Take 1 tablet by mouth 2 (Two) Times a Day.       Facility-Administered Medications Prior to Visit   Medication Dose Route Frequency Provider Last Rate Last Dose   • triamcinolone acetonide (KENALOG-40) injection 80 mg  80 mg Intramuscular Once Aditya Falk APRN            The patient has a family history of   Family History   Problem Relation Age of Onset   • Heart disease Mother    • Arthritis Mother    •  Osteoporosis Mother    • Cataracts Mother    • Heart disease Father    • Hypertension Sister    • Arthritis Sister    • Diabetes Sister    • Fuchs' dystrophy Sister    • Breast cancer Paternal Grandmother    • Diabetes Other    • Heart disease Other    • Hypertension Other    • Stroke Other    • Thyroid disease Other    • Lung disease Other    • Other Other         Gallstones, Bleeding Tendency, Colon Problems.   • Fuchs' dystrophy Maternal Grandfather         Past Medical History:   Diagnosis Date   • Chest pain    • Fibrocystic disease of breast    • Hashimoto's thyroiditis    • History of screening mammography 2014    SCREENING MAMMOGRAPHY DIGITAL  (Medicare) (1)    • Hyperlipidemia    • Hypothyroidism due to Hashimoto's thyroiditis 12/3/2016   • Keratoconjunctivitis sicca (CMS/[facility])    • Nuclear senile cataract    • On long term drug therapy    • Osteoarthritis    • Osteopenia    • Osteopenia 12/3/2016   • Photopsia     Right   • Postmenopausal bleeding    • Sinus tachycardia    • Sjogren's syndrome (CMS/[facility])    • Vitamin D deficiency    • Vitamin D deficiency 12/3/2016   • Vitreous detachment of right eye         OB History      Para Term  AB Living    3 3 3          SAB TAB Ectopic Molar Multiple Live Births                          Social History     Socioeconomic History   • Marital status:      Spouse name: Not on file   • Number of children: Not on file   • Years of education: Not on file   • Highest education level: Not on file   Tobacco Use   • Smoking status: Never Smoker   • Smokeless tobacco: Never Used   Substance and Sexual Activity   • Alcohol use: No   • Drug use: No   • Sexual activity: Defer        Past Surgical History:   Procedure Laterality Date   • CATARACT EXTRACTION, BILATERAL     • CHOLECYSTECTOMY  1997    with operative cholangiogram. Chronic cholecystitis & cholelithiasis. HX of passed common duct stone   • CYSTOSCOPY  1962    urethral  "dilatation.Recurrent pyelonephritis. urethritis   • ENDOSCOPY N/A 2/17/2017    Procedure: ESOPHAGOGASTRODUODENOSCOPY;  Surgeon: Cisco Mason DO;  Location: [facility] ENDOSCOPY;  Service:    • ENDOSCOPY N/A 10/31/2018    Procedure: ESOPHAGOGASTRODUODENOSCOPY;  Surgeon: Cisco Mason DO;  Location: [facility] ENDOSCOPY;  Service: Gastroenterology   • ENDOSCOPY AND COLONOSCOPY  09/30/1985    Normal colon to left transverse colon   • GALLBLADDER SURGERY     • PAP SMEAR  2009   • TONSILLECTOMY  1950   • VAGINAL DELIVERY      x3        Patient Active Problem List   Diagnosis   • Osteopenia   • Hypothyroidism due to Hashimoto's thyroiditis   • Vitamin D deficiency   • Keratoconjunctivitis sicca (CMS/HCC)   • Long term use of drug   • Cataract   • Primary osteoarthritis of right knee   • B12 deficiency   • Right hip pain   • Encounter for screening for osteoporosis   • GERD (gastroesophageal reflux disease)   • Hip pain, acute, right   • Myofascial pain   • Neck pain   • Osteoarthritis   • Spondylosis of cervical joint   • Palpitations   • Acquired hypothyroidism   • Pre-operative clearance   • Vaginal discharge   • Vaginal irritation   • Urinary incontinence in female        Documented Vitals    07/05/19 0904   BP: 126/77   Weight: 91.6 kg (202 lb)   Height: 172.7 cm (68\")   PainSc: 0-No pain        Body mass index is 30.71 kg/m².    Physical Exam  Constitutional: Appears to be in no acute distress; Eyes: sclera normal; Endocrine system: thyroid palpate is normal; Pulmonary system: lungs clear; Cardiovascular system: heart regular rate and rhythm; Gastrointestinal system: abdomen soft nontender, active bowel sounds; Urologic system: CVA negative; Psychiatric: appropriate insight; Neurologic: gait within normal limits female genital system pessary in place no evidence of erosive    Laboratory Data:   Lab Results - Last 18 Months   Lab Units 02/11/19  0814 11/19/18  0810 08/10/18  0752 06/14/18  0745 05/07/18  0817 "   GLUCOSE mg/dL 101* 87 83 94 88   BUN mg/dL 13 17 12 14 9   CREATININE mg/dL 0.80 1.15* 0.88 0.81 0.72   SODIUM mmol/L 139 138 140 142 143   POTASSIUM mmol/L 4.3 5.0 4.6 4.5 4.6   CHLORIDE mmol/L 103 101 104 103 103   CO2 mmol/L 27.0 25.0 28.0 29.0 30.0   CALCIUM mg/dL 9.9 9.7 9.7 9.6 9.8   TOTAL PROTEIN g/dL 6.9 7.7 7.2 7.4 7.1   ALBUMIN g/dL 4.40 4.60 4.20 4.40 4.10   ALT (SGPT) U/L 15 19 22 25 20   AST (SGOT) U/L 40* 47* 28 32 32   ALK PHOS U/L 66 84 59 72 63   BILIRUBIN mg/dL 0.6 0.7 0.6 0.6 0.4   EGFR IF NONAFRICN AM mL/min/1.73 70 46 63 69 79   GLOBULIN gm/dL 2.5 3.1 3.0 3.0 3.0   A/G RATIO g/dL 1.8 1.5 1.4 1.5 1.4   BUN / CREAT RATIO  16.3 14.8 13.6 17.3 12.5   ANION GAP mmol/L 9.0 12.0 8.0 10.0 10.0     Lab Results - Last 18 Months   Lab Units 02/11/19  0814 11/19/18  0810 08/10/18  0752 06/14/18  0745 05/07/18  0817   WBC 10*3/mm3 4.99 6.91 4.77 4.79 4.91   RBC 10*6/mm3 4.48 4.54 4.39 4.59 4.42   HEMOGLOBIN g/dL 13.4 14.0 13.4 13.9 13.4   HEMATOCRIT % 40.1 40.3 39.5 40.4 39.3   MCV fL 89.5 88.8 90.0 88.0 88.9   MCH pg 29.9 30.8 30.5 30.3 30.3   MCHC g/dL 33.4 34.7 33.9 34.4 34.1   RDW % 11.9* 12.5 12.5 12.5 12.4   RDW-SD fl 38.7 39.8 41.5 40.5 39.8   MPV fL 10.7 9.5 9.7 9.8 10.2   PLATELETS 10*3/mm3 289 303 267 243 250     No results for input(s): HCGQUAL in the last 83303 hours.    Assessment        Diagnosis Plan   1. Female cystocele           Plan       No orders of the defined types were placed in this encounter.                This document has been electronically signed by Nav Barebr MD on July 11, 2019 4:22 PM  This document has been electronically signed by Nav Barber MD on July 7, 2019 11:59 PM

## 2019-07-14 NOTE — PROGRESS NOTES
Carla Best is a 73 y.o. y/o female.     Chief Complaint: My pessary fell out    HPI:   73 y.o. .  No LMP recorded. Patient is postmenopausal..  She had pessary placed.  Unfortunately it came out she said it was causing some pain and riding when standing.  She wants to try another one.  Risks benefits alternatives reviewed including risk of erosion he also complains on questioning of some urgency          Review of Systems   ROS:  CNS: No history of brain malignancy.  HEENT: No history of throat cancer.  Eye: No history of retinal cancer.  Pulmonary: No history of lung cancer.                                                                                 Cardiovascular: No history of cardiac tumors.  Gastrointestinal: No history of small bowel tumors.  Renal: No history of kidney malignancy.  Musculoskeletal: No history of smooth muscle tumors.  Lymphatics: No history of Hodgkin's disease.  Endocrine: No history of thyroid malignancy.    The following portions of the patient's history were reviewed and updated as appropriate: allergies, current medications, past family history, past medical history, past social history, past surgical history and problem list.    Allergies   Allergen Reactions   • Augmentin [Amoxicillin-Pot Clavulanate] Diarrhea   • Ciprofloxacin Hives   • Codeine Other (See Comments)     headache   • Demerol [Meperidine] Other (See Comments)     Drop in blood pressure   • Dexlansoprazole Nausea Only   • Lipitor [Atorvastatin Calcium] Myalgia   • Tape Other (See Comments)     Burns skin      • Phenazopyridine Rash        Outpatient Medications Prior to Visit   Medication Sig Dispense Refill   • aspirin 81 MG EC tablet Take 81 mg by mouth daily.     • Biotin (BIOTIN MAXIMUM STRENGTH) 5 MG capsule Take 1 tablet by mouth Daily.     • Calcium Carbonate-Vitamin D (CALCIUM 600+D) 600-200 MG-UNIT tablet Take 1 tablet by mouth Daily.     • clobetasol (TEMOVATE) 0.05 % external solution Apply 1  application topically to the appropriate area as directed Daily. 50 mL 3   • diphenoxylate-atropine (LOMOTIL) 2.5-0.025 MG per tablet TK 1 T PO SIX TIMES PER DAY PRN  5   • hydroxychloroquine (PLAQUENIL) 200 MG tablet TAKE 1 TABLET BY MOUTH TWICE DAILY     • meclizine (ANTIVERT) 12.5 MG tablet Take 12.5 mg by mouth 3 (Three) Times a Day As Needed for dizziness.     • metoprolol succinate XL (TOPROL-XL) 50 MG 24 hr tablet Take 1 tablet by mouth Daily With Dinner. 90 tablet 6   • Multiple Vitamins-Minerals (MULTIVITAMIN ADULT PO) Take 1 tablet by mouth Daily.     • Omega-3 Fatty Acids (FISH OIL) 1000 MG capsule capsule Take 2,000 mg by mouth 2 (Two) Times a Day.     • pravastatin (PRAVACHOL) 20 MG tablet Take 1 tablet by mouth Daily. 90 tablet 3   • Probiotic Product (PROBIOTIC-10 PO) Take  by mouth.     • sulfamethoxazole-trimethoprim (BACTRIM) 400-80 MG tablet Take 1 tablet by mouth 2 (Two) Times a Day for 7 days. 14 tablet 0   • sulfamethoxazole-trimethoprim (BACTRIM,SEPTRA) 400-80 MG tablet Take 1 tablet by mouth 2 (Two) Times a Day.     • triamcinolone (KENALOG) 0.1 % cream Apply 1 application topically to the appropriate area as directed 2 (Two) Times a Day.     • vitamin B-12 (CYANOCOBALAMIN) 1000 MCG tablet Take 1,000 mcg by mouth Every Other Day.     • vitamin D (ERGOCALCIFEROL) 51137 units capsule capsule Take 1 capsule by mouth Every 14 (Fourteen) Days. 6 capsule 0   • SYNTHROID 88 MCG tablet Take 1 tablet daily Monday to Saturday and 1.5 tablets on Sunday (Patient taking differently: Take 88 mcg by mouth Daily. Take 1 tablet daily Monday to Saturday and 1.5 tablets on Sunday) 32 tablet 11     Facility-Administered Medications Prior to Visit   Medication Dose Route Frequency Provider Last Rate Last Dose   • triamcinolone acetonide (KENALOG-40) injection 80 mg  80 mg Intramuscular Once Aditya Falk APRN            The patient has a family history of   Family History   Problem Relation Age of Onset   •  Heart disease Mother    • Arthritis Mother    • Osteoporosis Mother    • Cataracts Mother    • Heart disease Father    • Hypertension Sister    • Arthritis Sister    • Diabetes Sister    • Fuchs' dystrophy Sister    • Breast cancer Paternal Grandmother    • Diabetes Other    • Heart disease Other    • Hypertension Other    • Stroke Other    • Thyroid disease Other    • Lung disease Other    • Other Other         Gallstones, Bleeding Tendency, Colon Problems.   • Fuchs' dystrophy Maternal Grandfather         Past Medical History:   Diagnosis Date   • Chest pain    • Fibrocystic disease of breast    • Hashimoto's thyroiditis    • History of screening mammography 2014    SCREENING MAMMOGRAPHY DIGITAL  (Medicare) (1)    • Hyperlipidemia    • Hypothyroidism due to Hashimoto's thyroiditis 12/3/2016   • Keratoconjunctivitis sicca (CMS/[facility])    • Nuclear senile cataract    • On long term drug therapy    • Osteoarthritis    • Osteopenia    • Osteopenia 12/3/2016   • Photopsia     Right   • Postmenopausal bleeding    • Sinus tachycardia    • Sjogren's syndrome (CMS/[facility])    • Vitamin D deficiency    • Vitamin D deficiency 12/3/2016   • Vitreous detachment of right eye         OB History      Para Term  AB Living    3 3 3          SAB TAB Ectopic Molar Multiple Live Births                          Social History     Socioeconomic History   • Marital status:      Spouse name: Not on file   • Number of children: Not on file   • Years of education: Not on file   • Highest education level: Not on file   Tobacco Use   • Smoking status: Never Smoker   • Smokeless tobacco: Never Used   Substance and Sexual Activity   • Alcohol use: No   • Drug use: No   • Sexual activity: Defer        Past Surgical History:   Procedure Laterality Date   • CATARACT EXTRACTION, BILATERAL  2018   • CHOLECYSTECTOMY  1997    with operative cholangiogram. Chronic cholecystitis & cholelithiasis. HX of passed common duct  "stone   • CYSTOSCOPY  11/26/1962    urethral dilatation.Recurrent pyelonephritis. urethritis   • ENDOSCOPY N/A 2/17/2017    Procedure: ESOPHAGOGASTRODUODENOSCOPY;  Surgeon: Cisco Mason DO;  Location: [facility] ENDOSCOPY;  Service:    • ENDOSCOPY N/A 10/31/2018    Procedure: ESOPHAGOGASTRODUODENOSCOPY;  Surgeon: Cisco Mason DO;  Location: [facility] ENDOSCOPY;  Service: Gastroenterology   • ENDOSCOPY AND COLONOSCOPY  09/30/1985    Normal colon to left transverse colon   • GALLBLADDER SURGERY     • PAP SMEAR  2009   • TONSILLECTOMY  1950   • VAGINAL DELIVERY      x3        Patient Active Problem List   Diagnosis   • Osteopenia   • Hypothyroidism due to Hashimoto's thyroiditis   • Vitamin D deficiency   • Keratoconjunctivitis sicca (CMS/HCC)   • Long term use of drug   • Cataract   • Primary osteoarthritis of right knee   • B12 deficiency   • Right hip pain   • Encounter for screening for osteoporosis   • GERD (gastroesophageal reflux disease)   • Hip pain, acute, right   • Myofascial pain   • Neck pain   • Osteoarthritis   • Spondylosis of cervical joint   • Palpitations   • Acquired hypothyroidism   • Pre-operative clearance   • Vaginal discharge   • Vaginal irritation   • Urinary incontinence in female        Documented Vitals    07/01/19 1058   BP: 120/80   Weight: 90.4 kg (199 lb 6.4 oz)   Height: 172.7 cm (68\")   PainSc:   5   PainLoc: Abdomen        Body mass index is 30.32 kg/m².    Physical Exam  Constitutional: Appears to be in no acute distress; Eyes: sclera normal; Endocrine system: thyroid palpate is normal; Pulmonary system: lungs clear; Cardiovascular system: heart regular rate and rhythm; Gastrointestinal system: abdomen soft nontender, active bowel sounds; Urologic system: CVA negative; Psychiatric: appropriate insight; Neurologic: gait within normal limits external genitalia appear normal urethra appears normal urethra palpates is normal the vagina is without lesion the posterior vagina without " lesion patient fitted with exercise incontinence pessary incontinence pessary    Laboratory Data:   Lab Results - Last 18 Months   Lab Units 02/11/19  0814 11/19/18  0810 08/10/18  0752 06/14/18  0745 05/07/18  0817   GLUCOSE mg/dL 101* 87 83 94 88   BUN mg/dL 13 17 12 14 9   CREATININE mg/dL 0.80 1.15* 0.88 0.81 0.72   SODIUM mmol/L 139 138 140 142 143   POTASSIUM mmol/L 4.3 5.0 4.6 4.5 4.6   CHLORIDE mmol/L 103 101 104 103 103   CO2 mmol/L 27.0 25.0 28.0 29.0 30.0   CALCIUM mg/dL 9.9 9.7 9.7 9.6 9.8   TOTAL PROTEIN g/dL 6.9 7.7 7.2 7.4 7.1   ALBUMIN g/dL 4.40 4.60 4.20 4.40 4.10   ALT (SGPT) U/L 15 19 22 25 20   AST (SGOT) U/L 40* 47* 28 32 32   ALK PHOS U/L 66 84 59 72 63   BILIRUBIN mg/dL 0.6 0.7 0.6 0.6 0.4   EGFR IF NONAFRICN AM mL/min/1.73 70 46 63 69 79   GLOBULIN gm/dL 2.5 3.1 3.0 3.0 3.0   A/G RATIO g/dL 1.8 1.5 1.4 1.5 1.4   BUN / CREAT RATIO  16.3 14.8 13.6 17.3 12.5   ANION GAP mmol/L 9.0 12.0 8.0 10.0 10.0     Lab Results - Last 18 Months   Lab Units 02/11/19  0814 11/19/18  0810 08/10/18  0752 06/14/18  0745 05/07/18  0817   WBC 10*3/mm3 4.99 6.91 4.77 4.79 4.91   RBC 10*6/mm3 4.48 4.54 4.39 4.59 4.42   HEMOGLOBIN g/dL 13.4 14.0 13.4 13.9 13.4   HEMATOCRIT % 40.1 40.3 39.5 40.4 39.3   MCV fL 89.5 88.8 90.0 88.0 88.9   MCH pg 29.9 30.8 30.5 30.3 30.3   MCHC g/dL 33.4 34.7 33.9 34.4 34.1   RDW % 11.9* 12.5 12.5 12.5 12.4   RDW-SD fl 38.7 39.8 41.5 40.5 39.8   MPV fL 10.7 9.5 9.7 9.8 10.2   PLATELETS 10*3/mm3 289 303 267 243 250     No results for input(s): HCGQUAL in the last 52493 hours.    Assessment        Diagnosis Plan   1. Urinary frequency  Urinalysis With Microscopic - Urine, Clean Catch   2. Cystocele with prolapse           Plan       No orders of the defined types were placed in this encounter.            This document has been electronically signed by Nav Barber MD on July 14, 2019 5:55 PM

## 2019-07-15 ENCOUNTER — TELEPHONE (OUTPATIENT)
Dept: OBSTETRICS AND GYNECOLOGY | Facility: CLINIC | Age: 74
End: 2019-07-15

## 2019-07-15 RX ORDER — SULFAMETHOXAZOLE AND TRIMETHOPRIM 800; 160 MG/1; MG/1
1 TABLET ORAL 2 TIMES DAILY
Qty: 10 TABLET | Refills: 0 | Status: SHIPPED | OUTPATIENT
Start: 2019-07-15 | End: 2019-07-20

## 2019-07-15 NOTE — TELEPHONE ENCOUNTER
I called the patient to let her know that she has a UTI and that I was calling Bactrim in for her to [facility]. I asked the patient if she was allergic to Bactrim and she stated that she wasn't.

## 2019-07-19 ENCOUNTER — OFFICE VISIT (OUTPATIENT)
Dept: OBSTETRICS AND GYNECOLOGY | Facility: CLINIC | Age: 74
End: 2019-07-19

## 2019-07-19 VITALS
SYSTOLIC BLOOD PRESSURE: 120 MMHG | HEIGHT: 68 IN | WEIGHT: 200.6 LBS | DIASTOLIC BLOOD PRESSURE: 78 MMHG | BODY MASS INDEX: 30.4 KG/M2

## 2019-07-19 DIAGNOSIS — N81.10 FEMALE CYSTOCELE: Primary | ICD-10-CM

## 2019-07-19 DIAGNOSIS — N30.00 ACUTE CYSTITIS WITHOUT HEMATURIA: ICD-10-CM

## 2019-07-19 PROCEDURE — 99213 OFFICE O/P EST LOW 20 MIN: CPT | Performed by: OBSTETRICS & GYNECOLOGY

## 2019-07-19 RX ORDER — CEFUROXIME AXETIL 500 MG/1
500 TABLET ORAL 2 TIMES DAILY
Qty: 20 TABLET | Refills: 0 | Status: SHIPPED | OUTPATIENT
Start: 2019-07-19 | End: 2019-07-29

## 2019-07-21 PROBLEM — N81.10 FEMALE CYSTOCELE: Status: ACTIVE | Noted: 2019-07-21

## 2019-07-21 PROBLEM — N39.0 UTI (URINARY TRACT INFECTION): Status: ACTIVE | Noted: 2019-07-21

## 2019-07-21 PROBLEM — N30.00 ACUTE CYSTITIS WITHOUT HEMATURIA: Status: ACTIVE | Noted: 2019-07-21

## 2019-07-21 NOTE — PROGRESS NOTES
Carla Best is a 74 y.o. y/o female.     Chief Complaint: Follow-up UTI; and cystocele treated with pessary    HPI:   74 y.o. .  No LMP recorded. Patient is postmenopausal..  Patient says she still has UTI symptoms she grew out a Proteus which can be difficult to treat will extend treatment with Bactrim after reviewing risks benefits and alternatives.  She is having pretty good relief with the pessary risks benefits alternatives reviewed          Review of Systems   ROS:  CNS: No history of brain malignancy.  HEENT: No history of throat cancer.  Eye: No history of retinal cancer.  Pulmonary: No history of lung cancer.                                                                                 Cardiovascular: No history of cardiac tumors.  Gastrointestinal: No history of small bowel tumors.  Renal: No history of kidney malignancy.  Musculoskeletal: No history of smooth muscle tumors.  Lymphatics: No history of Hodgkin's disease.  Endocrine: No history of thyroid malignancy.    The following portions of the patient's history were reviewed and updated as appropriate: allergies, current medications, past family history, past medical history, past social history, past surgical history and problem list.    Allergies   Allergen Reactions   • Augmentin [Amoxicillin-Pot Clavulanate] Diarrhea   • Ciprofloxacin Hives   • Codeine Other (See Comments)     headache   • Demerol [Meperidine] Other (See Comments)     Drop in blood pressure   • Dexlansoprazole Nausea Only   • Lipitor [Atorvastatin Calcium] Myalgia   • Tape Other (See Comments)     Burns skin      • Phenazopyridine Rash        Outpatient Medications Prior to Visit   Medication Sig Dispense Refill   • aspirin 81 MG EC tablet Take 81 mg by mouth daily.     • Biotin (BIOTIN MAXIMUM STRENGTH) 5 MG capsule Take 1 tablet by mouth Daily.     • Calcium Carbonate-Vitamin D (CALCIUM 600+D) 600-200 MG-UNIT tablet Take 1 tablet by mouth Daily.     • clobetasol  (TEMOVATE) 0.05 % external solution Apply 1 application topically to the appropriate area as directed Daily. 50 mL 3   • diphenoxylate-atropine (LOMOTIL) 2.5-0.025 MG per tablet TK 1 T PO SIX TIMES PER DAY PRN  5   • hydroxychloroquine (PLAQUENIL) 200 MG tablet TAKE 1 TABLET BY MOUTH TWICE DAILY     • meclizine (ANTIVERT) 12.5 MG tablet Take 12.5 mg by mouth 3 (Three) Times a Day As Needed for dizziness.     • metoprolol succinate XL (TOPROL-XL) 50 MG 24 hr tablet Take 1 tablet by mouth Daily With Dinner. 90 tablet 6   • Multiple Vitamins-Minerals (MULTIVITAMIN ADULT PO) Take 1 tablet by mouth Daily.     • Omega-3 Fatty Acids (FISH OIL) 1000 MG capsule capsule Take 2,000 mg by mouth 2 (Two) Times a Day.     • pravastatin (PRAVACHOL) 20 MG tablet Take 1 tablet by mouth Daily. 90 tablet 3   • Probiotic Product (PROBIOTIC-10 PO) Take  by mouth.     • sulfamethoxazole-trimethoprim (BACTRIM DS) 800-160 MG per tablet Take 1 tablet by mouth 2 (Two) Times a Day for 5 days. 10 tablet 0   • SYNTHROID 88 MCG tablet TAKE 1 TABLET BY MOUTH DAILY ON MONDAY TO SATURDAY AND 1.5 TABLETS ON SUNDAY. 32 tablet 1   • triamcinolone (KENALOG) 0.1 % cream Apply 1 application topically to the appropriate area as directed 2 (Two) Times a Day.     • vitamin B-12 (CYANOCOBALAMIN) 1000 MCG tablet Take 1,000 mcg by mouth Every Other Day.     • vitamin D (ERGOCALCIFEROL) 32299 units capsule capsule Take 1 capsule by mouth Every 14 (Fourteen) Days. 6 capsule 0     Facility-Administered Medications Prior to Visit   Medication Dose Route Frequency Provider Last Rate Last Dose   • triamcinolone acetonide (KENALOG-40) injection 80 mg  80 mg Intramuscular Once Aditya Falk APRN            The patient has a family history of   Family History   Problem Relation Age of Onset   • Heart disease Mother    • Arthritis Mother    • Osteoporosis Mother    • Cataracts Mother    • Heart disease Father    • Hypertension Sister    • Arthritis Sister    •  Diabetes Sister    • Fuchs' dystrophy Sister    • Breast cancer Paternal Grandmother    • Diabetes Other    • Heart disease Other    • Hypertension Other    • Stroke Other    • Thyroid disease Other    • Lung disease Other    • Other Other         Gallstones, Bleeding Tendency, Colon Problems.   • Fuchs' dystrophy Maternal Grandfather         Past Medical History:   Diagnosis Date   • Chest pain    • Fibrocystic disease of breast    • Hashimoto's thyroiditis    • History of screening mammography 2014    SCREENING MAMMOGRAPHY DIGITAL  (Medicare) (1)    • Hyperlipidemia    • Hypothyroidism due to Hashimoto's thyroiditis 12/3/2016   • Keratoconjunctivitis sicca (CMS/HCC)    • Nuclear senile cataract    • On long term drug therapy    • Osteoarthritis    • Osteopenia    • Osteopenia 12/3/2016   • Photopsia     Right   • Postmenopausal bleeding    • Sinus tachycardia    • Sjogren's syndrome (CMS/HCC)    • Vitamin D deficiency    • Vitamin D deficiency 12/3/2016   • Vitreous detachment of right eye         OB History      Para Term  AB Living    3 3 3          SAB TAB Ectopic Molar Multiple Live Births                          Social History     Socioeconomic History   • Marital status:      Spouse name: Not on file   • Number of children: Not on file   • Years of education: Not on file   • Highest education level: Not on file   Tobacco Use   • Smoking status: Never Smoker   • Smokeless tobacco: Never Used   Substance and Sexual Activity   • Alcohol use: No   • Drug use: No   • Sexual activity: Defer        Past Surgical History:   Procedure Laterality Date   • CATARACT EXTRACTION, BILATERAL     • CHOLECYSTECTOMY  1997    with operative cholangiogram. Chronic cholecystitis & cholelithiasis. HX of passed common duct stone   • CYSTOSCOPY  1962    urethral dilatation.Recurrent pyelonephritis. urethritis   • ENDOSCOPY N/A 2017    Procedure: ESOPHAGOGASTRODUODENOSCOPY;   "Surgeon: Cisco Mason DO;  Location: [facility] ENDOSCOPY;  Service:    • ENDOSCOPY N/A 10/31/2018    Procedure: ESOPHAGOGASTRODUODENOSCOPY;  Surgeon: Cisco Mason DO;  Location: [facility] ENDOSCOPY;  Service: Gastroenterology   • ENDOSCOPY AND COLONOSCOPY  09/30/1985    Normal colon to left transverse colon   • GALLBLADDER SURGERY     • PAP SMEAR  2009   • TONSILLECTOMY  1950   • VAGINAL DELIVERY      x3        Patient Active Problem List   Diagnosis   • Osteopenia   • Hypothyroidism due to Hashimoto's thyroiditis   • Vitamin D deficiency   • Keratoconjunctivitis sicca (CMS/HCC)   • Long term use of drug   • Cataract   • Primary osteoarthritis of right knee   • B12 deficiency   • Right hip pain   • Encounter for screening for osteoporosis   • GERD (gastroesophageal reflux disease)   • Hip pain, acute, right   • Myofascial pain   • Neck pain   • Osteoarthritis   • Spondylosis of cervical joint   • Palpitations   • Acquired hypothyroidism   • Pre-operative clearance   • Vaginal discharge   • Vaginal irritation   • Urinary incontinence in female   • UTI (urinary tract infection)   • Female cystocele   • Acute cystitis without hematuria        Documented Vitals    07/19/19 1056   BP: 120/78   Weight: 91 kg (200 lb 9.6 oz)   Height: 172.7 cm (68\")   PainSc:   4        Body mass index is 30.5 kg/m².    Physical Exam  Constitutional: Appears to be in no acute distress; Eyes: sclera normal; Endocrine system: thyroid palpate is normal; Pulmonary system: lungs clear; Cardiovascular system: heart regular rate and rhythm; Gastrointestinal system: abdomen soft nontender, active bowel sounds; Urologic system: CVA negative; Psychiatric: appropriate insight; Neurologic: gait within normal limits pessary in good pin position.  No evidence of ulceration good reduction cystocele    Laboratory Data:   Lab Results - Last 18 Months   Lab Units 02/11/19  0814 11/19/18  0810 08/10/18  0752 06/14/18  0745 05/07/18  0817   GLUCOSE mg/dL " 101* 87 83 94 88   BUN mg/dL 13 17 12 14 9   CREATININE mg/dL 0.80 1.15* 0.88 0.81 0.72   SODIUM mmol/L 139 138 140 142 143   POTASSIUM mmol/L 4.3 5.0 4.6 4.5 4.6   CHLORIDE mmol/L 103 101 104 103 103   CO2 mmol/L 27.0 25.0 28.0 29.0 30.0   CALCIUM mg/dL 9.9 9.7 9.7 9.6 9.8   TOTAL PROTEIN g/dL 6.9 7.7 7.2 7.4 7.1   ALBUMIN g/dL 4.40 4.60 4.20 4.40 4.10   ALT (SGPT) U/L 15 19 22 25 20   AST (SGOT) U/L 40* 47* 28 32 32   ALK PHOS U/L 66 84 59 72 63   BILIRUBIN mg/dL 0.6 0.7 0.6 0.6 0.4   EGFR IF NONAFRICN AM mL/min/1.73 70 46 63 69 79   GLOBULIN gm/dL 2.5 3.1 3.0 3.0 3.0   A/G RATIO g/dL 1.8 1.5 1.4 1.5 1.4   BUN / CREAT RATIO  16.3 14.8 13.6 17.3 12.5   ANION GAP mmol/L 9.0 12.0 8.0 10.0 10.0     Lab Results - Last 18 Months   Lab Units 02/11/19  0814 11/19/18  0810 08/10/18  0752 06/14/18  0745 05/07/18  0817   WBC 10*3/mm3 4.99 6.91 4.77 4.79 4.91   RBC 10*6/mm3 4.48 4.54 4.39 4.59 4.42   HEMOGLOBIN g/dL 13.4 14.0 13.4 13.9 13.4   HEMATOCRIT % 40.1 40.3 39.5 40.4 39.3   MCV fL 89.5 88.8 90.0 88.0 88.9   MCH pg 29.9 30.8 30.5 30.3 30.3   MCHC g/dL 33.4 34.7 33.9 34.4 34.1   RDW % 11.9* 12.5 12.5 12.5 12.4   RDW-SD fl 38.7 39.8 41.5 40.5 39.8   MPV fL 10.7 9.5 9.7 9.8 10.2   PLATELETS 10*3/mm3 289 303 267 243 250     No results for input(s): HCGQUAL in the last 47151 hours.    Assessment        Diagnosis Plan   1. Female cystocele     2. Acute cystitis without hematuria           Plan         New Medications Ordered This Visit   Medications   • cefuroxime (CEFTIN) 500 MG tablet     Sig: Take 1 tablet by mouth 2 (Two) Times a Day for 10 days.     Dispense:  20 tablet     Refill:  0             This document has been electronically signed by Nav Barber MD on July 21, 2019 3:23 PM

## 2019-08-06 ENCOUNTER — APPOINTMENT (OUTPATIENT)
Dept: LAB | Facility: HOSPITAL | Age: 74
End: 2019-08-06

## 2019-08-06 LAB
25(OH)D3 SERPL-MCNC: 30.8 NG/ML (ref 30–100)
ALBUMIN SERPL-MCNC: 4.2 G/DL (ref 3.5–5.2)
ALBUMIN/GLOB SERPL: 1.5 G/DL
ALP SERPL-CCNC: 68 U/L (ref 39–117)
ALT SERPL W P-5'-P-CCNC: 21 U/L (ref 1–33)
ANION GAP SERPL CALCULATED.3IONS-SCNC: 9.7 MMOL/L (ref 5–15)
AST SERPL-CCNC: 26 U/L (ref 1–32)
BASOPHILS # BLD AUTO: 0.05 10*3/MM3 (ref 0–0.2)
BASOPHILS NFR BLD AUTO: 1.1 % (ref 0–1.5)
BILIRUB SERPL-MCNC: 0.6 MG/DL (ref 0.2–1.2)
BUN BLD-MCNC: 14 MG/DL (ref 8–23)
BUN/CREAT SERPL: 16.7 (ref 7–25)
CALCIUM SPEC-SCNC: 9.9 MG/DL (ref 8.6–10.5)
CHLORIDE SERPL-SCNC: 102 MMOL/L (ref 98–107)
CHOLEST SERPL-MCNC: 190 MG/DL (ref 0–200)
CO2 SERPL-SCNC: 29.3 MMOL/L (ref 22–29)
CREAT BLD-MCNC: 0.84 MG/DL (ref 0.57–1)
DEPRECATED RDW RBC AUTO: 39.8 FL (ref 37–54)
EOSINOPHIL # BLD AUTO: 0.32 10*3/MM3 (ref 0–0.4)
EOSINOPHIL NFR BLD AUTO: 6.8 % (ref 0.3–6.2)
ERYTHROCYTE [DISTWIDTH] IN BLOOD BY AUTOMATED COUNT: 12.2 % (ref 12.3–15.4)
GFR SERPL CREATININE-BSD FRML MDRD: 66 ML/MIN/1.73
GLOBULIN UR ELPH-MCNC: 2.8 GM/DL
GLUCOSE BLD-MCNC: 93 MG/DL (ref 65–99)
HCT VFR BLD AUTO: 38.4 % (ref 34–46.6)
HDLC SERPL-MCNC: 58 MG/DL (ref 40–60)
HGB BLD-MCNC: 12.7 G/DL (ref 12–15.9)
IMM GRANULOCYTES # BLD AUTO: 0.01 10*3/MM3 (ref 0–0.05)
IMM GRANULOCYTES NFR BLD AUTO: 0.2 % (ref 0–0.5)
LDLC SERPL CALC-MCNC: 94 MG/DL (ref 0–100)
LDLC/HDLC SERPL: 1.63 {RATIO}
LYMPHOCYTES # BLD AUTO: 1.39 10*3/MM3 (ref 0.7–3.1)
LYMPHOCYTES NFR BLD AUTO: 29.4 % (ref 19.6–45.3)
MCH RBC QN AUTO: 29.7 PG (ref 26.6–33)
MCHC RBC AUTO-ENTMCNC: 33.1 G/DL (ref 31.5–35.7)
MCV RBC AUTO: 89.7 FL (ref 79–97)
MONOCYTES # BLD AUTO: 0.46 10*3/MM3 (ref 0.1–0.9)
MONOCYTES NFR BLD AUTO: 9.7 % (ref 5–12)
NEUTROPHILS # BLD AUTO: 2.49 10*3/MM3 (ref 1.7–7)
NEUTROPHILS NFR BLD AUTO: 52.8 % (ref 42.7–76)
NRBC BLD AUTO-RTO: 0 /100 WBC (ref 0–0.2)
PLATELET # BLD AUTO: 288 10*3/MM3 (ref 140–450)
PMV BLD AUTO: 10.4 FL (ref 6–12)
POTASSIUM BLD-SCNC: 4.4 MMOL/L (ref 3.5–5.2)
PROT SERPL-MCNC: 7 G/DL (ref 6–8.5)
RBC # BLD AUTO: 4.28 10*6/MM3 (ref 3.77–5.28)
SODIUM BLD-SCNC: 141 MMOL/L (ref 136–145)
T3FREE SERPL-MCNC: 3.15 PG/ML (ref 2–4.4)
T4 FREE SERPL-MCNC: 1.48 NG/DL (ref 0.93–1.7)
TRIGL SERPL-MCNC: 188 MG/DL (ref 0–150)
TSH SERPL DL<=0.05 MIU/L-ACNC: 2.03 MIU/ML (ref 0.27–4.2)
VIT B12 BLD-MCNC: 1106 PG/ML (ref 211–946)
VLDLC SERPL-MCNC: 37.6 MG/DL (ref 5–40)
WBC NRBC COR # BLD: 4.72 10*3/MM3 (ref 3.4–10.8)

## 2019-08-06 PROCEDURE — 82306 VITAMIN D 25 HYDROXY: CPT | Performed by: INTERNAL MEDICINE

## 2019-08-06 PROCEDURE — 80061 LIPID PANEL: CPT | Performed by: INTERNAL MEDICINE

## 2019-08-06 PROCEDURE — 84439 ASSAY OF FREE THYROXINE: CPT | Performed by: INTERNAL MEDICINE

## 2019-08-06 PROCEDURE — 82607 VITAMIN B-12: CPT | Performed by: INTERNAL MEDICINE

## 2019-08-06 PROCEDURE — 80053 COMPREHEN METABOLIC PANEL: CPT | Performed by: INTERNAL MEDICINE

## 2019-08-06 PROCEDURE — 85025 COMPLETE CBC W/AUTO DIFF WBC: CPT | Performed by: INTERNAL MEDICINE

## 2019-08-06 PROCEDURE — 36415 COLL VENOUS BLD VENIPUNCTURE: CPT | Performed by: INTERNAL MEDICINE

## 2019-08-06 PROCEDURE — 84443 ASSAY THYROID STIM HORMONE: CPT | Performed by: INTERNAL MEDICINE

## 2019-08-06 PROCEDURE — 84481 FREE ASSAY (FT-3): CPT | Performed by: INTERNAL MEDICINE

## 2019-08-16 ENCOUNTER — OFFICE VISIT (OUTPATIENT)
Dept: OBSTETRICS AND GYNECOLOGY | Facility: CLINIC | Age: 74
End: 2019-08-16

## 2019-08-16 ENCOUNTER — APPOINTMENT (OUTPATIENT)
Dept: LAB | Facility: HOSPITAL | Age: 74
End: 2019-08-16

## 2019-08-16 VITALS
SYSTOLIC BLOOD PRESSURE: 122 MMHG | BODY MASS INDEX: 30.25 KG/M2 | HEIGHT: 68 IN | DIASTOLIC BLOOD PRESSURE: 74 MMHG | WEIGHT: 199.6 LBS

## 2019-08-16 DIAGNOSIS — R32 URINARY INCONTINENCE IN FEMALE: ICD-10-CM

## 2019-08-16 DIAGNOSIS — R35.0 URINARY FREQUENCY: Primary | ICD-10-CM

## 2019-08-16 PROBLEM — E78.00 HYPERCHOLESTEROLEMIA: Status: ACTIVE | Noted: 2019-03-12

## 2019-08-16 PROBLEM — R10.84 GENERALIZED ABDOMINAL PAIN: Status: ACTIVE | Noted: 2019-08-16

## 2019-08-16 PROBLEM — C44.222 SQUAMOUS CELL CARCINOMA OF SKIN OF RIGHT EAR AND EXTERNAL AURICULAR CANAL: Status: ACTIVE | Noted: 2019-08-16

## 2019-08-16 PROBLEM — K83.4 SPASM OF SPHINCTER OF ODDI: Status: ACTIVE | Noted: 2019-08-16

## 2019-08-16 PROBLEM — K57.30 DIVERTICULOSIS OF LARGE INTESTINE WITHOUT PERFORATION OR ABSCESS WITHOUT BLEEDING: Status: ACTIVE | Noted: 2019-08-16

## 2019-08-16 PROBLEM — L40.9 PSORIASIS: Status: ACTIVE | Noted: 2019-03-12

## 2019-08-16 PROBLEM — Z86.010 HISTORY OF COLONIC POLYPS: Status: ACTIVE | Noted: 2019-08-16

## 2019-08-16 PROBLEM — K91.5 POSTCHOLECYSTECTOMY SYNDROME: Status: ACTIVE | Noted: 2019-08-16

## 2019-08-16 PROBLEM — T81.89XA OTHER POSTOPERATIVE FUNCTIONAL DISORDERS: Status: ACTIVE | Noted: 2019-08-16

## 2019-08-16 PROBLEM — B96.81 HELICOBACTER PYLORI (H. PYLORI) AS THE CAUSE OF DISEASES CLASSIFIED ELSEWHERE: Status: ACTIVE | Noted: 2019-08-16

## 2019-08-16 PROBLEM — M79.7 FIBROMYALGIA: Status: ACTIVE | Noted: 2019-03-12

## 2019-08-16 PROCEDURE — 99213 OFFICE O/P EST LOW 20 MIN: CPT | Performed by: OBSTETRICS & GYNECOLOGY

## 2019-08-18 NOTE — PROGRESS NOTES
Carla Best is a 74 y.o. y/o female.     Chief Complaint: Urinary urgency and frequency had been responding to pessary    HPI:   74 y.o. .  No LMP recorded. Patient is postmenopausal..  Patient with urinary frequency and urgency had been responding to Pastorini having problems now appropriate size pessary.  Patient feels urgency need to go.  Goes relatively small amount frequently is relieved by pessary in place but pessary also has been causing irritation we have not been able to find          Review of Systems   ROS:  CNS: No history of brain malignancy.  HEENT: No history of throat cancer.  Eye: No history of retinal cancer.  Pulmonary: No history of lung cancer.                                                                                 Cardiovascular: No history of cardiac tumors.  Gastrointestinal: No history of small bowel tumors.  Renal: No history of kidney malignancy.  Musculoskeletal: No history of smooth muscle tumors.  Lymphatics: No history of Hodgkin's disease.  Endocrine: No history of thyroid malignancy.    The following portions of the patient's history were reviewed and updated as appropriate: allergies, current medications, past family history, past medical history, past social history, past surgical history and problem list.    Allergies   Allergen Reactions   • Augmentin [Amoxicillin-Pot Clavulanate] Diarrhea   • Ciprofloxacin Hives   • Codeine Other (See Comments)     headache   • Demerol [Meperidine] Other (See Comments)     Drop in blood pressure   • Dexlansoprazole Nausea Only   • Lipitor [Atorvastatin Calcium] Myalgia   • Tape Other (See Comments)     Burns skin      • Phenazopyridine Rash        Outpatient Medications Prior to Visit   Medication Sig Dispense Refill   • aspirin 81 MG EC tablet Take 81 mg by mouth daily.     • Biotin (BIOTIN MAXIMUM STRENGTH) 5 MG capsule Take 1 tablet by mouth Daily.     • Calcium Carbonate-Vitamin D (CALCIUM 600+D) 600-200 MG-UNIT tablet  Take 1 tablet by mouth Daily.     • clobetasol (TEMOVATE) 0.05 % external solution Apply 1 application topically to the appropriate area as directed Daily. 50 mL 3   • diphenoxylate-atropine (LOMOTIL) 2.5-0.025 MG per tablet TK 1 T PO SIX TIMES PER DAY PRN  5   • hydroxychloroquine (PLAQUENIL) 200 MG tablet TAKE 1 TABLET BY MOUTH TWICE DAILY     • meclizine (ANTIVERT) 12.5 MG tablet Take 12.5 mg by mouth 3 (Three) Times a Day As Needed for dizziness.     • metoprolol succinate XL (TOPROL-XL) 50 MG 24 hr tablet Take 1 tablet by mouth Daily With Dinner. 90 tablet 6   • Multiple Vitamins-Minerals (MULTIVITAMIN ADULT PO) Take 1 tablet by mouth Daily.     • Omega-3 Fatty Acids (FISH OIL) 1000 MG capsule capsule Take 2,000 mg by mouth 2 (Two) Times a Day.     • pravastatin (PRAVACHOL) 20 MG tablet Take 1 tablet by mouth Daily. 90 tablet 3   • Probiotic Product (PROBIOTIC-10 PO) Take  by mouth.     • SYNTHROID 88 MCG tablet TAKE 1 TABLET BY MOUTH DAILY ON MONDAY TO SATURDAY AND 1.5 TABLETS ON SUNDAY. 32 tablet 1   • triamcinolone (KENALOG) 0.1 % cream Apply 1 application topically to the appropriate area as directed 2 (Two) Times a Day.     • vitamin B-12 (CYANOCOBALAMIN) 1000 MCG tablet Take 1,000 mcg by mouth Every Other Day.     • vitamin D (ERGOCALCIFEROL) 68224 units capsule capsule Take 1 capsule by mouth Every 14 (Fourteen) Days. 6 capsule 0     Facility-Administered Medications Prior to Visit   Medication Dose Route Frequency Provider Last Rate Last Dose   • triamcinolone acetonide (KENALOG-40) injection 80 mg  80 mg Intramuscular Once Aditya Falk, ZACHARY            The patient has a family history of   Family History   Problem Relation Age of Onset   • Heart disease Mother    • Arthritis Mother    • Osteoporosis Mother    • Cataracts Mother    • Heart disease Father    • Hypertension Sister    • Arthritis Sister    • Diabetes Sister    • Fuchs' dystrophy Sister    • Breast cancer Paternal Grandmother    •  Diabetes Other    • Heart disease Other    • Hypertension Other    • Stroke Other    • Thyroid disease Other    • Lung disease Other    • Other Other         Gallstones, Bleeding Tendency, Colon Problems.   • Fuchs' dystrophy Maternal Grandfather         Past Medical History:   Diagnosis Date   • Chest pain    • Fibrocystic disease of breast    • Hashimoto's thyroiditis    • History of screening mammography 2014    SCREENING MAMMOGRAPHY DIGITAL  (Medicare) (1)    • Hyperlipidemia    • Hypothyroidism due to Hashimoto's thyroiditis 12/3/2016   • Keratoconjunctivitis sicca (CMS/HCC)    • Nuclear senile cataract    • On long term drug therapy    • Osteoarthritis    • Osteopenia    • Osteopenia 12/3/2016   • Photopsia     Right   • Postmenopausal bleeding    • Sinus tachycardia    • Sjogren's syndrome (CMS/HCC)    • Vitamin D deficiency    • Vitamin D deficiency 12/3/2016   • Vitreous detachment of right eye         OB History      Para Term  AB Living    3 3 3          SAB TAB Ectopic Molar Multiple Live Births                          Social History     Socioeconomic History   • Marital status:      Spouse name: Not on file   • Number of children: Not on file   • Years of education: Not on file   • Highest education level: Not on file   Tobacco Use   • Smoking status: Never Smoker   • Smokeless tobacco: Never Used   Substance and Sexual Activity   • Alcohol use: No   • Drug use: No   • Sexual activity: Defer        Past Surgical History:   Procedure Laterality Date   • CATARACT EXTRACTION, BILATERAL     • CHOLECYSTECTOMY  1997    with operative cholangiogram. Chronic cholecystitis & cholelithiasis. HX of passed common duct stone   • CYSTOSCOPY  1962    urethral dilatation.Recurrent pyelonephritis. urethritis   • ENDOSCOPY N/A 2017    Procedure: ESOPHAGOGASTRODUODENOSCOPY;  Surgeon: Cisco Mason DO;  Location: [facility] ENDOSCOPY;  Service:    • ENDOSCOPY N/A  "10/31/2018    Procedure: ESOPHAGOGASTRODUODENOSCOPY;  Surgeon: Cisco Mason DO;  Location: [facility] ENDOSCOPY;  Service: Gastroenterology   • ENDOSCOPY AND COLONOSCOPY  09/30/1985    Normal colon to left transverse colon   • GALLBLADDER SURGERY     • PAP SMEAR  2009   • TONSILLECTOMY  1950   • VAGINAL DELIVERY      x3        Patient Active Problem List   Diagnosis   • Osteopenia   • Hypothyroidism due to Hashimoto's thyroiditis   • Vitamin D deficiency   • Keratoconjunctivitis sicca (CMS/HCC)   • Long term use of drug   • Cataract   • Primary osteoarthritis of right knee   • B12 deficiency   • Right hip pain   • Encounter for screening for osteoporosis   • GERD (gastroesophageal reflux disease)   • Hip pain, acute, right   • Myofascial pain   • Neck pain   • Osteoarthritis   • Spondylosis of cervical joint   • Palpitations   • Acquired hypothyroidism   • Pre-operative clearance   • Vaginal discharge   • Vaginal irritation   • Urinary incontinence in female   • UTI (urinary tract infection)   • Female cystocele   • Acute cystitis without hematuria   • Overweight   • Diverticulosis of large intestine without perforation or abscess without bleeding   • Generalized abdominal pain   • Helicobacter pylori (H. 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    Physical Exam  Constitutional: Appears to be in no acute distress; Eyes: sclera normal; Endocrine system: thyroid palpate is normal; Pulmonary system: lungs clear; Cardiovascular system: heart regular rate and rhythm; " Gastrointestinal system: abdomen soft nontender, active bowel sounds; Urologic system: CVA negative; Psychiatric: appropriate insight; Neurologic: gait within normal limits we changed her out to a 2 ring without she seemed to do better with this    Laboratory Data:   Lab Results - Last 18 Months   Lab Units 08/06/19  0802 02/11/19  0814 11/19/18  0810 08/10/18  0752 06/14/18  0745   GLUCOSE mg/dL 93 101* 87 83 94   BUN mg/dL 14 13 17 12 14   CREATININE mg/dL 0.84 0.80 1.15* 0.88 0.81   SODIUM mmol/L 141 139 138 140 142   POTASSIUM mmol/L 4.4 4.3 5.0 4.6 4.5   CHLORIDE mmol/L 102 103 101 104 103   CO2 mmol/L 29.3* 27.0 25.0 28.0 29.0   CALCIUM mg/dL 9.9 9.9 9.7 9.7 9.6   TOTAL PROTEIN g/dL 7.0 6.9 7.7 7.2 7.4   ALBUMIN g/dL 4.20 4.40 4.60 4.20 4.40   ALT (SGPT) U/L 21 15 19 22 25   AST (SGOT) U/L 26 40* 47* 28 32   ALK PHOS U/L 68 66 84 59 72   BILIRUBIN mg/dL 0.6 0.6 0.7 0.6 0.6   EGFR IF NONAFRICN AM mL/min/1.73 66 70 46 63 69   GLOBULIN gm/dL 2.8 2.5 3.1 3.0 3.0   A/G RATIO g/dL 1.5 1.8 1.5 1.4 1.5   BUN / CREAT RATIO  16.7 16.3 14.8 13.6 17.3   ANION GAP mmol/L 9.7 9.0 12.0 8.0 10.0     Lab Results - Last 18 Months   Lab Units 08/06/19  0802 02/11/19  0814 11/19/18  0810 08/10/18  0752 06/14/18  0745   WBC 10*3/mm3 4.72 4.99 6.91 4.77 4.79   RBC 10*6/mm3 4.28 4.48 4.54 4.39 4.59   HEMOGLOBIN g/dL 12.7 13.4 14.0 13.4 13.9   HEMATOCRIT % 38.4 40.1 40.3 39.5 40.4   MCV fL 89.7 89.5 88.8 90.0 88.0   MCH pg 29.7 29.9 30.8 30.5 30.3   MCHC g/dL 33.1 33.4 34.7 33.9 34.4   RDW % 12.2* 11.9* 12.5 12.5 12.5   RDW-SD fl 39.8 38.7 39.8 41.5 40.5   MPV fL 10.4 10.7 9.5 9.7 9.8   PLATELETS 10*3/mm3 288 289 303 267 243     No results for input(s): HCGQUAL in the last 96832 hours.    Assessment        Diagnosis Plan   1. Urinary frequency  Urinalysis With Culture If Indicated - Urine, Clean Catch try changing pessary out to 1 without green knob         Plan       No orders of the defined types were placed in this  encounter.            This document has been electronically signed by Nav Barber MD on August 18, 2019 5:56 PM    Please note that portions of this note were completed with a voice recognition program.

## 2019-08-19 ENCOUNTER — OFFICE VISIT (OUTPATIENT)
Dept: ENDOCRINOLOGY | Facility: CLINIC | Age: 74
End: 2019-08-19

## 2019-08-19 ENCOUNTER — LAB (OUTPATIENT)
Dept: LAB | Facility: HOSPITAL | Age: 74
End: 2019-08-19

## 2019-08-19 VITALS
SYSTOLIC BLOOD PRESSURE: 124 MMHG | HEIGHT: 68 IN | WEIGHT: 199.3 LBS | OXYGEN SATURATION: 98 % | DIASTOLIC BLOOD PRESSURE: 72 MMHG | HEART RATE: 71 BPM | BODY MASS INDEX: 30.2 KG/M2

## 2019-08-19 DIAGNOSIS — E55.9 VITAMIN D DEFICIENCY: Primary | ICD-10-CM

## 2019-08-19 DIAGNOSIS — M81.0 AGE-RELATED OSTEOPOROSIS WITHOUT CURRENT PATHOLOGICAL FRACTURE: ICD-10-CM

## 2019-08-19 DIAGNOSIS — G47.33 OBSTRUCTIVE SLEEP APNEA: ICD-10-CM

## 2019-08-19 DIAGNOSIS — R35.0 URINARY FREQUENCY: ICD-10-CM

## 2019-08-19 DIAGNOSIS — E89.0 POSTABLATIVE HYPOTHYROIDISM: ICD-10-CM

## 2019-08-19 DIAGNOSIS — E53.8 B12 DEFICIENCY: ICD-10-CM

## 2019-08-19 DIAGNOSIS — E78.5 DYSLIPIDEMIA: ICD-10-CM

## 2019-08-19 LAB
BILIRUB UR QL STRIP: NEGATIVE
CLARITY UR: CLEAR
COLOR UR: YELLOW
GLUCOSE UR STRIP-MCNC: NEGATIVE MG/DL
HGB UR QL STRIP.AUTO: NEGATIVE
KETONES UR QL STRIP: NEGATIVE
LEUKOCYTE ESTERASE UR QL STRIP.AUTO: NEGATIVE
NITRITE UR QL STRIP: NEGATIVE
PH UR STRIP.AUTO: 6.5 [PH] (ref 5–8)
PROT UR QL STRIP: NEGATIVE
SP GR UR STRIP: 1.01 (ref 1–1.03)
UROBILINOGEN UR QL STRIP: NORMAL

## 2019-08-19 PROCEDURE — 81003 URINALYSIS AUTO W/O SCOPE: CPT

## 2019-08-19 PROCEDURE — 99214 OFFICE O/P EST MOD 30 MIN: CPT | Performed by: INTERNAL MEDICINE

## 2019-08-19 RX ORDER — LEVOTHYROXINE SODIUM 88 MCG
TABLET ORAL
Qty: 96 TABLET | Refills: 3 | Status: SHIPPED | OUTPATIENT
Start: 2019-08-19 | End: 2020-09-04 | Stop reason: SDUPTHER

## 2019-08-19 NOTE — PROGRESS NOTES
Bridget Best is a 74 y.o. female. she is here today for follow-up.    Hypothyroidism   Associated symptoms include arthralgias, myalgias and weakness. Pertinent negatives include no abdominal pain, chest pain, chills, congestion, coughing, diaphoresis, fatigue, headaches, joint swelling, nausea, neck pain, numbness, rash, sore throat or vomiting.   Thyroid Problem   Symptoms include anxiety. Patient reports no cold intolerance, constipation, diaphoresis, diarrhea, fatigue, heat intolerance, palpitations or tremors.               74 y.o.    female comes for fu , very pleasant       history of subacute thyroiditis with subsequent permanent hypothyroidism      duration - 5+ years  timing - constant  quality - biochemically controlled but still symptomatic        severity - moderate     symptoms - multiple , detailed under ROS    Fatigue, hair loss, cold intolerance, weigh gain, myalgias, muscle weakness  alleviating factors - brand name synthroid and cytomel but lately not equally effective     she states that if no changes are needed then she will accept that it is not her thyroid, she only wants to be certain there isn't anything that has been missed.      she has fibromylgia and neck pain has worsened since last appt, most importantly for her , hair loss     On cerave       The following portions of the patient's history were reviewed and updated as appropriate:   Past Medical History:   Diagnosis Date   • Chest pain    • Fibrocystic disease of breast    • Hashimoto's thyroiditis    • History of screening mammography 08/27/2014    SCREENING MAMMOGRAPHY DIGITAL  (Medicare) (1)    • Hyperlipidemia    • Hypothyroidism due to Hashimoto's thyroiditis 12/3/2016   • Keratoconjunctivitis sicca (CMS/HCC)    • Nuclear senile cataract    • On long term drug therapy    • Osteoarthritis    • Osteopenia    • Osteopenia 12/3/2016   • Photopsia     Right   • Postmenopausal bleeding    • Sinus tachycardia     • Sjogren's syndrome (CMS/HCC)    • Vitamin D deficiency    • Vitamin D deficiency 12/3/2016   • Vitreous detachment of right eye      Past Surgical History:   Procedure Laterality Date   • CATARACT EXTRACTION, BILATERAL     • CHOLECYSTECTOMY  1997    with operative cholangiogram. Chronic cholecystitis & cholelithiasis. HX of passed common duct stone   • CYSTOSCOPY  1962    urethral dilatation.Recurrent pyelonephritis. urethritis   • ENDOSCOPY N/A 2017    Procedure: ESOPHAGOGASTRODUODENOSCOPY;  Surgeon: Cisco Mason DO;  Location: [facility] ENDOSCOPY;  Service:    • ENDOSCOPY N/A 10/31/2018    Procedure: ESOPHAGOGASTRODUODENOSCOPY;  Surgeon: Cisco Mason DO;  Location: [facility] ENDOSCOPY;  Service: Gastroenterology   • ENDOSCOPY AND COLONOSCOPY  1985    Normal colon to left transverse colon   • GALLBLADDER SURGERY     • PAP SMEAR     • TONSILLECTOMY  1950   • VAGINAL DELIVERY      x3     Family History   Problem Relation Age of Onset   • Heart disease Mother    • Arthritis Mother    • Osteoporosis Mother    • Cataracts Mother    • Heart disease Father    • Hypertension Sister    • Arthritis Sister    • Diabetes Sister    • Fuchs' dystrophy Sister    • Breast cancer Paternal Grandmother    • Diabetes Other    • Heart disease Other    • Hypertension Other    • Stroke Other    • Thyroid disease Other    • Lung disease Other    • Other Other         Gallstones, Bleeding Tendency, Colon Problems.   • Fuchs' dystrophy Maternal Grandfather      OB History      Para Term  AB Living    3 3 3          SAB TAB Ectopic Molar Multiple Live Births                       Current Outpatient Medications   Medication Sig Dispense Refill   • aspirin 81 MG EC tablet Take 81 mg by mouth daily.     • Biotin (BIOTIN MAXIMUM STRENGTH) 5 MG capsule Take 1 tablet by mouth Daily.     • Calcium Carbonate-Vitamin D (CALCIUM 600+D) 600-200 MG-UNIT tablet Take 1 tablet by mouth Daily.     •  clobetasol (TEMOVATE) 0.05 % external solution Apply 1 application topically to the appropriate area as directed Daily. 50 mL 3   • diphenoxylate-atropine (LOMOTIL) 2.5-0.025 MG per tablet TK 1 T PO SIX TIMES PER DAY PRN  5   • hydroxychloroquine (PLAQUENIL) 200 MG tablet TAKE 1 TABLET BY MOUTH TWICE DAILY     • meclizine (ANTIVERT) 12.5 MG tablet Take 12.5 mg by mouth 3 (Three) Times a Day As Needed for dizziness.     • metoprolol succinate XL (TOPROL-XL) 50 MG 24 hr tablet Take 1 tablet by mouth Daily With Dinner. 90 tablet 6   • Multiple Vitamins-Minerals (MULTIVITAMIN ADULT PO) Take 1 tablet by mouth Daily.     • Omega-3 Fatty Acids (FISH OIL) 1000 MG capsule capsule Take 2,000 mg by mouth 2 (Two) Times a Day.     • pravastatin (PRAVACHOL) 20 MG tablet Take 1 tablet by mouth Daily. 90 tablet 3   • Probiotic Product (PROBIOTIC-10 PO) Take  by mouth.     • SYNTHROID 88 MCG tablet TAKE 1 TABLET BY MOUTH DAILY ON MONDAY TO SATURDAY AND 1.5 TABLETS ON SUNDAY. 32 tablet 1   • triamcinolone (KENALOG) 0.1 % cream Apply 1 application topically to the appropriate area as directed 2 (Two) Times a Day.     • vitamin B-12 (CYANOCOBALAMIN) 1000 MCG tablet Take 1,000 mcg by mouth Every Other Day.     • vitamin D (ERGOCALCIFEROL) 84821 units capsule capsule Take 1 capsule by mouth Every 14 (Fourteen) Days. 6 capsule 0     Current Facility-Administered Medications   Medication Dose Route Frequency Provider Last Rate Last Dose   • triamcinolone acetonide (KENALOG-40) injection 80 mg  80 mg Intramuscular Once Aditya Falk APRN         Allergies   Allergen Reactions   • Augmentin [Amoxicillin-Pot Clavulanate] Diarrhea   • Ciprofloxacin Hives   • Codeine Other (See Comments)     headache   • Demerol [Meperidine] Other (See Comments)     Drop in blood pressure   • Dexlansoprazole Nausea Only   • Lipitor [Atorvastatin Calcium] Myalgia   • Tape Other (See Comments)     Burns skin      • Phenazopyridine Rash     Social History      Socioeconomic History   • Marital status:      Spouse name: Not on file   • Number of children: Not on file   • Years of education: Not on file   • Highest education level: Not on file   Tobacco Use   • Smoking status: Never Smoker   • Smokeless tobacco: Never Used   Substance and Sexual Activity   • Alcohol use: No   • Drug use: No   • Sexual activity: Defer       Review of Systems  Review of Systems   Constitutional: Negative for activity change, appetite change, chills, diaphoresis and fatigue.         Daytime sleepiness       Nonrestorative sleep       Loud snoring       Witnessed apneas by roommate       Awakening with choking or gasping       Nocturnal restlessness       Insomnia with frequent awakenings       Lack of concentration       Cognitive deficits       Changes in mood       Morning headaches       Nocturia           HENT: Negative for congestion, dental problem, drooling, ear discharge, ear pain, facial swelling, sneezing, sore throat, tinnitus, trouble swallowing and voice change.    Eyes: Negative for photophobia, pain, discharge, redness, itching and visual disturbance.   Respiratory: Positive for apnea. Negative for cough, choking, chest tightness and shortness of breath.    Cardiovascular: Negative for chest pain, palpitations and leg swelling.   Gastrointestinal: Negative for abdominal distention, abdominal pain, constipation, diarrhea, nausea and vomiting.   Endocrine: Negative for cold intolerance, heat intolerance, polydipsia, polyphagia and polyuria.   Genitourinary: Negative for difficulty urinating, dysuria, frequency, hematuria and urgency.   Musculoskeletal: Positive for arthralgias and myalgias. Negative for back pain, gait problem, joint swelling, neck pain and neck stiffness.   Skin: Negative for color change, pallor, rash and wound.   Allergic/Immunologic: Negative for environmental allergies, food allergies and immunocompromised state.   Neurological: Positive for  "weakness. Negative for dizziness, tremors, facial asymmetry, light-headedness, numbness and headaches.   Hematological: Negative for adenopathy. Does not bruise/bleed easily.   Psychiatric/Behavioral: Positive for decreased concentration. Negative for agitation, behavioral problems, confusion and sleep disturbance. The patient is nervous/anxious.         Objective    /72   Pulse 71   Ht 172.7 cm (68\")   Wt 90.4 kg (199 lb 4.8 oz)   SpO2 98%   BMI 30.30 kg/m²   Physical Exam   Constitutional: She is oriented to person, place, and time. She appears well-developed and well-nourished. No distress.   HENT:   Head: Normocephalic and atraumatic.   Right Ear: External ear normal.   Left Ear: External ear normal.   Nose: Nose normal.   Eyes: Conjunctivae and EOM are normal. Pupils are equal, round, and reactive to light.   Neck: Normal range of motion. Neck supple. No tracheal deviation present. No thyromegaly present.   Cardiovascular: Normal rate, regular rhythm and normal heart sounds.   No murmur heard.  Pulmonary/Chest: Effort normal and breath sounds normal. No respiratory distress. She has no wheezes.   Abdominal: Soft. Bowel sounds are normal. There is no tenderness. There is no rebound and no guarding.   Musculoskeletal: Normal range of motion. She exhibits no edema, tenderness or deformity.   Neurological: She is alert and oriented to person, place, and time. No cranial nerve deficit.   Skin: Skin is warm and dry. No rash noted.   Psychiatric: She has a normal mood and affect. Her behavior is normal. Judgment and thought content normal.       Lab Review  Glucose (mg/dL)   Date Value   08/06/2019 93   02/11/2019 101 (H)   11/19/2018 87     Sodium (mmol/L)   Date Value   08/06/2019 141   02/11/2019 139   11/19/2018 138     Potassium (mmol/L)   Date Value   08/06/2019 4.4   02/11/2019 4.3   11/19/2018 5.0     Chloride (mmol/L)   Date Value   08/06/2019 102   02/11/2019 103   11/19/2018 101     CO2 (mmol/L) "   Date Value   08/06/2019 29.3 (H)   02/11/2019 27.0   11/19/2018 25.0     BUN (mg/dL)   Date Value   08/06/2019 14   02/11/2019 13   11/19/2018 17     Creatinine (mg/dL)   Date Value   08/06/2019 0.84   02/11/2019 0.80   11/19/2018 1.15 (H)     Triglycerides   Date Value   08/06/2019 188 mg/dL (H)   11/19/2018 255 mg/dL (H)   12/07/2016 228 mg/dl (H)   09/25/2014 298 mg/dl (H)     LDL Cholesterol    Date Value   08/06/2019 94 mg/dL   11/19/2018 138 mg/dL (H)   12/07/2016 152 mg/dl (H)   09/25/2014 143 mg/dl (H)   09/25/2014 123 mg/dl       Assessment/Plan      1. Vitamin D deficiency    2. B12 deficiency    3. Postablative hypothyroidism    4. Osteopenia, unspecified location    5. Dyslipidemia    .    Medications prescribed:  Outpatient Encounter Medications as of 8/19/2019   Medication Sig Dispense Refill   • aspirin 81 MG EC tablet Take 81 mg by mouth daily.     • Biotin (BIOTIN MAXIMUM STRENGTH) 5 MG capsule Take 1 tablet by mouth Daily.     • Calcium Carbonate-Vitamin D (CALCIUM 600+D) 600-200 MG-UNIT tablet Take 1 tablet by mouth Daily.     • clobetasol (TEMOVATE) 0.05 % external solution Apply 1 application topically to the appropriate area as directed Daily. 50 mL 3   • diphenoxylate-atropine (LOMOTIL) 2.5-0.025 MG per tablet TK 1 T PO SIX TIMES PER DAY PRN  5   • hydroxychloroquine (PLAQUENIL) 200 MG tablet TAKE 1 TABLET BY MOUTH TWICE DAILY     • meclizine (ANTIVERT) 12.5 MG tablet Take 12.5 mg by mouth 3 (Three) Times a Day As Needed for dizziness.     • metoprolol succinate XL (TOPROL-XL) 50 MG 24 hr tablet Take 1 tablet by mouth Daily With Dinner. 90 tablet 6   • Multiple Vitamins-Minerals (MULTIVITAMIN ADULT PO) Take 1 tablet by mouth Daily.     • Omega-3 Fatty Acids (FISH OIL) 1000 MG capsule capsule Take 2,000 mg by mouth 2 (Two) Times a Day.     • pravastatin (PRAVACHOL) 20 MG tablet Take 1 tablet by mouth Daily. 90 tablet 3   • Probiotic Product (PROBIOTIC-10 PO) Take  by mouth.     • SYNTHROID 88  MCG tablet TAKE 1 TABLET BY MOUTH DAILY ON MONDAY TO SATURDAY AND 1.5 TABLETS ON SUNDAY. 32 tablet 1   • triamcinolone (KENALOG) 0.1 % cream Apply 1 application topically to the appropriate area as directed 2 (Two) Times a Day.     • vitamin B-12 (CYANOCOBALAMIN) 1000 MCG tablet Take 1,000 mcg by mouth Every Other Day.     • vitamin D (ERGOCALCIFEROL) 56565 units capsule capsule Take 1 capsule by mouth Every 14 (Fourteen) Days. 6 capsule 0     Facility-Administered Encounter Medications as of 8/19/2019   Medication Dose Route Frequency Provider Last Rate Last Dose   • triamcinolone acetonide (KENALOG-40) injection 80 mg  80 mg Intramuscular Once Aditya Falk APRN           Orders placed during this encounter include:  No orders of the defined types were placed in this encounter.    Hypothyroidism  developed after episode of subacute thyroiditis     On synthroid 88 mcgs x7.5 tabs per week     Failed armour and cytomel     She stops biotin        Lab Results   Component Value Date    TSH 2.030 08/06/2019       Wants T3 and T4 measured        neg celiac panel     she also has sjogren's    =====     Osteoporosis     Rheumatology Following    Last DXA in 2017     On Vit D     Lab Results   Component Value Date    AFHO40HL 30.8 08/06/2019    DQLA24FT 38.6 02/11/2019    BWUV90ZV 45.3 11/19/2018              =====      Alopecia     Thyroid as above  Dr. Baig added aldactone, she can't tolerate  Added iron even though levels are normal       I added finasteride 1 mg daily - preferred not to     Lab Results   Component Value Date    GLUCOSE 93 08/06/2019    BUN 14 08/06/2019    CREATININE 0.84 08/06/2019    EGFRIFNONA 66 08/06/2019    BCR 16.7 08/06/2019    K 4.4 08/06/2019    CO2 29.3 (H) 08/06/2019    CALCIUM 9.9 08/06/2019    ALBUMIN 4.20 08/06/2019    AST 26 08/06/2019    ALT 21 08/06/2019       Gained weight due to elavil , she had to stop due to side effects  Fasting glucose 101 and mild elevation in LFT      ===    Dyslipidemia    Lab Results   Component Value Date    CHOL 190 08/06/2019    CHLPL 249 (H) 12/07/2016    TRIG 188 (H) 08/06/2019    HDL 58 08/06/2019    LDL 94 08/06/2019     Lab Results   Component Value Date    LDL 94 08/06/2019     (H) 11/19/2018     (H) 12/07/2016         Side effects to statins in the past     Tolerating pravastatin     Strong fam hx of CAD    ==    Apnea    Refer for sleep study     4. Follow-up: No Follow-up on file.      Labs in 1 month and talk over the phone and appt in 4 months , orders entered as monthly x 3

## 2019-08-21 ENCOUNTER — OFFICE VISIT (OUTPATIENT)
Dept: OBSTETRICS AND GYNECOLOGY | Facility: CLINIC | Age: 74
End: 2019-08-21

## 2019-08-21 VITALS
BODY MASS INDEX: 30.52 KG/M2 | SYSTOLIC BLOOD PRESSURE: 124 MMHG | DIASTOLIC BLOOD PRESSURE: 80 MMHG | HEIGHT: 68 IN | WEIGHT: 201.4 LBS

## 2019-08-21 DIAGNOSIS — N81.4 CYSTOCELE WITH PROLAPSE: Primary | ICD-10-CM

## 2019-08-21 PROCEDURE — 99213 OFFICE O/P EST LOW 20 MIN: CPT | Performed by: OBSTETRICS & GYNECOLOGY

## 2019-08-21 RX ORDER — CLOBETASOL PROPIONATE 0.5 MG/G
OINTMENT TOPICAL 2 TIMES DAILY
Qty: 60 G | Refills: 20 | Status: SHIPPED | OUTPATIENT
Start: 2019-08-21 | End: 2021-04-12 | Stop reason: SDUPTHER

## 2019-08-23 ENCOUNTER — HOSPITAL ENCOUNTER (OUTPATIENT)
Dept: SLEEP MEDICINE | Facility: HOSPITAL | Age: 74
Discharge: HOME | End: 2019-08-23
Admitting: INTERNAL MEDICINE

## 2019-08-23 VITALS — BODY MASS INDEX: 30.46 KG/M2 | WEIGHT: 201 LBS | HEIGHT: 68 IN

## 2019-08-23 PROCEDURE — 95811 POLYSOM 6/>YRS CPAP 4/> PARM: CPT

## 2019-08-23 PROCEDURE — 95811 POLYSOM 6/>YRS CPAP 4/> PARM: CPT | Performed by: INTERNAL MEDICINE

## 2019-08-26 PROBLEM — N81.4 CYSTOCELE WITH PROLAPSE: Status: ACTIVE | Noted: 2019-08-26

## 2019-08-26 NOTE — PROGRESS NOTES
Carla Best is a 74 y.o. y/o female.     Chief Complaint: Follow-up pessary    HPI:   74 y.o. .  No LMP recorded. Patient is postmenopausal..  Patient pessary in place reports good support no bleeding no pain no other difficulties          Review of Systems   ROS:  CNS: No history of brain malignancy.  HEENT: No history of throat cancer.  Eye: No history of retinal cancer.  Pulmonary: No history of lung cancer.                                                                                 Cardiovascular: No history of cardiac tumors.  Gastrointestinal: No history of small bowel tumors.  Renal: No history of kidney malignancy.  Musculoskeletal: No history of smooth muscle tumors.  Lymphatics: No history of Hodgkin's disease.  Endocrine: No history of thyroid malignancy.    The following portions of the patient's history were reviewed and updated as appropriate: allergies, current medications, past family history, past medical history, past social history, past surgical history and problem list.    Allergies   Allergen Reactions   • Augmentin [Amoxicillin-Pot Clavulanate] Diarrhea   • Ciprofloxacin Hives   • Codeine Other (See Comments)     headache   • Demerol [Meperidine] Other (See Comments)     Drop in blood pressure   • Dexlansoprazole Nausea Only   • Lipitor [Atorvastatin Calcium] Myalgia   • Tape Other (See Comments)     Burns skin      • Phenazopyridine Rash        Outpatient Medications Prior to Visit   Medication Sig Dispense Refill   • aspirin 81 MG EC tablet Take 81 mg by mouth daily.     • Biotin (BIOTIN MAXIMUM STRENGTH) 5 MG capsule Take 1 tablet by mouth Daily.     • Calcium Carbonate-Vitamin D (CALCIUM 600+D) 600-200 MG-UNIT tablet Take 1 tablet by mouth Daily.     • diphenoxylate-atropine (LOMOTIL) 2.5-0.025 MG per tablet TK 1 T PO SIX TIMES PER DAY PRN  5   • hydroxychloroquine (PLAQUENIL) 200 MG tablet TAKE 1 TABLET BY MOUTH TWICE DAILY     • meclizine (ANTIVERT) 12.5 MG tablet Take  12.5 mg by mouth 3 (Three) Times a Day As Needed for dizziness.     • metoprolol succinate XL (TOPROL-XL) 50 MG 24 hr tablet Take 1 tablet by mouth Daily With Dinner. 90 tablet 6   • Multiple Vitamins-Minerals (MULTIVITAMIN ADULT PO) Take 1 tablet by mouth Daily.     • Omega-3 Fatty Acids (FISH OIL) 1000 MG capsule capsule Take 2,000 mg by mouth 2 (Two) Times a Day.     • pravastatin (PRAVACHOL) 20 MG tablet Take 1 tablet by mouth Daily. 90 tablet 3   • Probiotic Product (PROBIOTIC-10 PO) Take  by mouth.     • SYNTHROID 88 MCG tablet TAKE 1 TABLET BY MOUTH DAILY ON MONDAY TO SATURDAY AND 1.5 TABLETS ON SUNDAY. 96 tablet 3   • triamcinolone (KENALOG) 0.1 % cream Apply 1 application topically to the appropriate area as directed 2 (Two) Times a Day.     • vitamin B-12 (CYANOCOBALAMIN) 1000 MCG tablet Take 1,000 mcg by mouth Every Other Day.     • vitamin D (ERGOCALCIFEROL) 17019 units capsule capsule Take 1 capsule by mouth Every 14 (Fourteen) Days. 6 capsule 0   • clobetasol (TEMOVATE) 0.05 % external solution Apply 1 application topically to the appropriate area as directed Daily. 50 mL 3     Facility-Administered Medications Prior to Visit   Medication Dose Route Frequency Provider Last Rate Last Dose   • triamcinolone acetonide (KENALOG-40) injection 80 mg  80 mg Intramuscular Once Aditya Falk APRN            The patient has a family history of   Family History   Problem Relation Age of Onset   • Heart disease Mother    • Arthritis Mother    • Osteoporosis Mother    • Cataracts Mother    • Heart disease Father    • Hypertension Sister    • Arthritis Sister    • Diabetes Sister    • Fuchs' dystrophy Sister    • Breast cancer Paternal Grandmother    • Diabetes Other    • Heart disease Other    • Hypertension Other    • Stroke Other    • Thyroid disease Other    • Lung disease Other    • Other Other         Gallstones, Bleeding Tendency, Colon Problems.   • Fuchs' dystrophy Maternal Grandfather         Past  Medical History:   Diagnosis Date   • Chest pain    • Fibrocystic disease of breast    • Hashimoto's thyroiditis    • History of screening mammography 2014    SCREENING MAMMOGRAPHY DIGITAL  (Medicare) (1)    • Hyperlipidemia    • Hypothyroidism due to Hashimoto's thyroiditis 12/3/2016   • Keratoconjunctivitis sicca (CMS/HCC)    • Nuclear senile cataract    • On long term drug therapy    • Osteoarthritis    • Osteopenia    • Osteopenia 12/3/2016   • Photopsia     Right   • Postmenopausal bleeding    • Sinus tachycardia    • Sjogren's syndrome (CMS/HCC)    • Vitamin D deficiency    • Vitamin D deficiency 12/3/2016   • Vitreous detachment of right eye         OB History      Para Term  AB Living    3 3 3          SAB TAB Ectopic Molar Multiple Live Births                          Social History     Socioeconomic History   • Marital status:      Spouse name: Not on file   • Number of children: Not on file   • Years of education: Not on file   • Highest education level: Not on file   Tobacco Use   • Smoking status: Never Smoker   • Smokeless tobacco: Never Used   Substance and Sexual Activity   • Alcohol use: No   • Drug use: No   • Sexual activity: Defer        Past Surgical History:   Procedure Laterality Date   • CATARACT EXTRACTION, BILATERAL     • CHOLECYSTECTOMY  1997    with operative cholangiogram. Chronic cholecystitis & cholelithiasis. HX of passed common duct stone   • CYSTOSCOPY  1962    urethral dilatation.Recurrent pyelonephritis. urethritis   • ENDOSCOPY N/A 2017    Procedure: ESOPHAGOGASTRODUODENOSCOPY;  Surgeon: Cisco Mason DO;  Location: [facility] ENDOSCOPY;  Service:    • ENDOSCOPY N/A 10/31/2018    Procedure: ESOPHAGOGASTRODUODENOSCOPY;  Surgeon: Cisco Mason DO;  Location: [facility] ENDOSCOPY;  Service: Gastroenterology   • ENDOSCOPY AND COLONOSCOPY  1985    Normal colon to left transverse colon   • GALLBLADDER SURGERY     • PAP SMEAR   "2009   • TONSILLECTOMY  1950   • VAGINAL DELIVERY      x3        Patient Active Problem List   Diagnosis   • Osteopenia   • Hypothyroidism due to Hashimoto's thyroiditis   • Vitamin D deficiency   • Keratoconjunctivitis sicca (CMS/HCC)   • Long term use of drug   • Cataract   • Primary osteoarthritis of right knee   • B12 deficiency   • Right hip pain   • Encounter for screening for osteoporosis   • GERD (gastroesophageal reflux disease)   • Hip pain, acute, right   • Myofascial pain   • Neck pain   • Osteoarthritis   • Spondylosis of cervical joint   • Palpitations   • Acquired hypothyroidism   • Pre-operative clearance   • Vaginal discharge   • Vaginal irritation   • Urinary incontinence in female   • UTI (urinary tract infection)   • Female cystocele   • Acute cystitis without hematuria   • Overweight   • Diverticulosis of large intestine without perforation or abscess without bleeding   • Generalized abdominal pain   • Helicobacter pylori (H. 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    Physical Exam  Constitutional: Appears to be in no acute distress; Eyes: sclera normal; Endocrine system: thyroid palpate is normal; Pulmonary system: lungs clear; Cardiovascular system: heart regular rate and rhythm; Gastrointestinal system: abdomen soft nontender, active bowel sounds; Urologic system: CVA negative; Psychiatric: appropriate insight; Neurologic: gait within normal limits pessary giving good support no pressure ulcerations noted vaginal " wall    Laboratory Data:   Lab Results - Last 18 Months   Lab Units 08/06/19  0802 02/11/19  0814 11/19/18  0810 08/10/18  0752 06/14/18  0745   GLUCOSE mg/dL 93 101* 87 83 94   BUN mg/dL 14 13 17 12 14   CREATININE mg/dL 0.84 0.80 1.15* 0.88 0.81   SODIUM mmol/L 141 139 138 140 142   POTASSIUM mmol/L 4.4 4.3 5.0 4.6 4.5   CHLORIDE mmol/L 102 103 101 104 103   CO2 mmol/L 29.3* 27.0 25.0 28.0 29.0   CALCIUM mg/dL 9.9 9.9 9.7 9.7 9.6   TOTAL PROTEIN g/dL 7.0 6.9 7.7 7.2 7.4   ALBUMIN g/dL 4.20 4.40 4.60 4.20 4.40   ALT (SGPT) U/L 21 15 19 22 25   AST (SGOT) U/L 26 40* 47* 28 32   ALK PHOS U/L 68 66 84 59 72   BILIRUBIN mg/dL 0.6 0.6 0.7 0.6 0.6   EGFR IF NONAFRICN AM mL/min/1.73 66 70 46 63 69   GLOBULIN gm/dL 2.8 2.5 3.1 3.0 3.0   A/G RATIO g/dL 1.5 1.8 1.5 1.4 1.5   BUN / CREAT RATIO  16.7 16.3 14.8 13.6 17.3   ANION GAP mmol/L 9.7 9.0 12.0 8.0 10.0     Lab Results - Last 18 Months   Lab Units 08/06/19  0802 02/11/19  0814 11/19/18  0810 08/10/18  0752 06/14/18  0745   WBC 10*3/mm3 4.72 4.99 6.91 4.77 4.79   RBC 10*6/mm3 4.28 4.48 4.54 4.39 4.59   HEMOGLOBIN g/dL 12.7 13.4 14.0 13.4 13.9   HEMATOCRIT % 38.4 40.1 40.3 39.5 40.4   MCV fL 89.7 89.5 88.8 90.0 88.0   MCH pg 29.7 29.9 30.8 30.5 30.3   MCHC g/dL 33.1 33.4 34.7 33.9 34.4   RDW % 12.2* 11.9* 12.5 12.5 12.5   RDW-SD fl 39.8 38.7 39.8 41.5 40.5   MPV fL 10.4 10.7 9.5 9.7 9.8   PLATELETS 10*3/mm3 288 289 303 267 243     No results for input(s): HCGQUAL in the last 32700 hours.    Assessment        Diagnosis Plan   1. Cystocele with prolapse   she wants to remove the pessary herself recheck 8 weeks contact me for difficulties         Plan         New Medications Ordered This Visit   Medications   • clobetasol (TEMOVATE) 0.05 % ointment     Sig: Apply  topically to the appropriate area as directed 2 (Two) Times a Day.     Dispense:  60 g     Refill:  20             This document has been electronically signed by Nav Barber MD on August 26, 2019 1:01  AM    Please note that portions of this note were completed with a voice recognition program.

## 2019-08-27 RX ORDER — ERGOCALCIFEROL 1.25 MG/1
50000 CAPSULE ORAL
Qty: 6 CAPSULE | Refills: 11 | Status: SHIPPED | OUTPATIENT
Start: 2019-08-27 | End: 2020-02-19 | Stop reason: SDUPTHER

## 2019-08-29 DIAGNOSIS — G47.33 OBSTRUCTIVE SLEEP APNEA, ADULT: Primary | ICD-10-CM

## 2019-09-10 ENCOUNTER — CONSULT (OUTPATIENT)
Dept: SLEEP MEDICINE | Facility: HOSPITAL | Age: 74
End: 2019-09-10

## 2019-09-10 VITALS
HEIGHT: 68 IN | SYSTOLIC BLOOD PRESSURE: 144 MMHG | WEIGHT: 199.6 LBS | DIASTOLIC BLOOD PRESSURE: 81 MMHG | HEART RATE: 88 BPM | BODY MASS INDEX: 30.25 KG/M2 | OXYGEN SATURATION: 95 %

## 2019-09-10 DIAGNOSIS — G47.33 OBSTRUCTIVE SLEEP APNEA, ADULT: Primary | ICD-10-CM

## 2019-09-10 DIAGNOSIS — F51.04 PSYCHOPHYSIOLOGICAL INSOMNIA: ICD-10-CM

## 2019-09-10 PROCEDURE — 99203 OFFICE O/P NEW LOW 30 MIN: CPT | Performed by: INTERNAL MEDICINE

## 2019-09-10 NOTE — PROGRESS NOTES
New Patient Sleep Medicine Consultation    Encounter Date: 9/10/2019         Patient's PCP: Provider, No Known  Referring provider: Andre Jacques *  Reason for consultation chief complaint: recently diagnosed with KIMMIE - here to establish care     Carla Best is a 74 y.o. female who presents after having Split night PSG on 08/23/2019 -   1. Severe  obstructive sleep apnea hypopnea syndrome with an AHI of 44.  2. Adequate control of sleep disordered breathing on 8-11 cm H2O    She has not been started on therapy. She has a medical history that includes Hashimoto's hypothyroidism, Sjogren's syndrome, and daytime fatigue. Her bedtime is ~ 1371-2208. She  Has difficulty falling asleep and then reads!!! She gets up and goes to study and reads until she falls asleep. She gets up 2-3 times per night. She wakes up ~ 7629-2227. She endorses 4-8 hours of sleep. She drinks 0-1 cups of coffee, 0 teas (unless out at dinner), and decaf sodas per day. She drinks 0 alcoholic beverages per week.    Carla Best admits to snoring, unrestful sleep, excessive daytime sleepiness, morning headaches, frequent unexplained arousals, sleeping less than 6 hours per night, Disturbed or restless sleep, choking duing sleep, Up to the bathroom at night, night sweats, difficulty falling asleep and difficulty staying asleep. She denies cataplexy, sleep paralysis, or hypnagogic hallucinations. She is not a current smoker. She does not take sedatives or hypnotics. She has some sleepiness with driving. She naps daily.     Past Medical History:   Diagnosis Date   • Chest pain    • Fibrocystic disease of breast    • Hashimoto's thyroiditis    • History of screening mammography 08/27/2014    SCREENING MAMMOGRAPHY DIGITAL  (Medicare) (1)    • Hyperlipidemia    • Hypothyroidism due to Hashimoto's thyroiditis 12/3/2016   • Keratoconjunctivitis sicca (CMS/HCC)    • Nuclear senile cataract    • On long term drug therapy    •  Osteoarthritis    • Osteopenia    • Osteopenia 12/3/2016   • Photopsia     Right   • Postmenopausal bleeding    • Sinus tachycardia    • Sjogren's syndrome (CMS/HCC)    • Vitamin D deficiency    • Vitamin D deficiency 12/3/2016   • Vitreous detachment of right eye      Social History     Socioeconomic History   • Marital status:      Spouse name: Not on file   • Number of children: Not on file   • Years of education: Not on file   • Highest education level: Not on file   Tobacco Use   • Smoking status: Never Smoker   • Smokeless tobacco: Never Used   Substance and Sexual Activity   • Alcohol use: No   • Drug use: No   • Sexual activity: Defer     Family History   Problem Relation Age of Onset   • Heart disease Mother    • Arthritis Mother    • Osteoporosis Mother    • Cataracts Mother    • Heart disease Father    • Hypertension Sister    • Arthritis Sister    • Diabetes Sister    • Fuchs' dystrophy Sister    • Breast cancer Paternal Grandmother    • Diabetes Other    • Heart disease Other    • Hypertension Other    • Stroke Other    • Thyroid disease Other    • Lung disease Other    • Other Other         Gallstones, Bleeding Tendency, Colon Problems.   • Fuchs' dystrophy Maternal Grandfather    , 3 children  Housewife, had decoration busines  0 brothers and 1 sisters ( from lung cancer) - also had DM  Other family history + for: CVA, HTN, aneurysm (both mom and dad)  Smoking history: smoked never   FH of sleep disorders: now self    [place] - 11    Review of Systems:  Constitutional: negative  Eyes: negative  Ears, nose, mouth, throat, and face: negative  Respiratory: negative  Cardiovascular: negative  Gastrointestinal: negative  Genitourinary:negative  Integument/breast: negative  Hematologic/lymphatic: negative  Musculoskeletal:negative  Neurological: negative  Behavioral/Psych: negative  Endocrine: negative  Allergic/Immunologic: negative    Patient advised to discuss any positive ROS with  "PCP.      Vitals:    09/10/19 1313   BP: 144/81   Pulse: 88   SpO2: 95%           09/10/19  1313   Weight: 90.5 kg (199 lb 9.6 oz)       Body mass index is 30.36 kg/m². Patient's Body mass index is 30.36 kg/m². BMI is above normal parameters. Recommendations include: referral to primary care.  Neck circumference: 15.5\"          General: Alert. Cooperative. Well developed. No acute distress.             Head:  Normocephalic. Symmetrical. Atraumatic.              Eyes: Sclera clear. No icterus. PERRLA. Normal EOM.             Ears: No deformities. Normal hearing.             Nose: No septal deviation. No drainage.          Throat: No oral lesions. No thrush. Moist mucous membranes. Trachea midline    Tongue is enlarged    Dentition is good with mild overbite and extensive dental work       Pharynx: Posterior pharyngeal pillars are narrow    Mallampati score of IV (only hard palate visible)    Pharynx is normal with unrermarkable tonsils   Chest Wall:  Normal shape. Symmetric expansion with respiration. No tenderness.          Lungs:  Clear to auscultation bilaterally. No wheezes. No rhonchi. No rales. Respirations regular, even and unlabored.            Heart:  Regular rhythm and normal rate. Normal S1 and S2. No murmur.     Abdomen:  Soft, non-tender and non-distended. Normal bowel sounds. No masses.  Extremities:  Moves all extremities well. No edema.           Pulses: Pulses palpable and equal bilaterally.               Skin: Dry. Intact. No bleeding. No rash.           Neuro: Moves all 4 extremities and cranial nerves grossly intact.  Psychiatric: Normal mood and affect.      Current Outpatient Medications:   •  aspirin 81 MG EC tablet, Take 81 mg by mouth daily., Disp: , Rfl:   •  Biotin (BIOTIN MAXIMUM STRENGTH) 5 MG capsule, Take 1 tablet by mouth Daily., Disp: , Rfl:   •  Calcium Carbonate-Vitamin D (CALCIUM 600+D) 600-200 MG-UNIT tablet, Take 1 tablet by mouth Daily., Disp: , Rfl:   •  clobetasol (TEMOVATE) " 0.05 % ointment, Apply  topically to the appropriate area as directed 2 (Two) Times a Day., Disp: 60 g, Rfl: 20  •  diphenoxylate-atropine (LOMOTIL) 2.5-0.025 MG per tablet, TK 1 T PO SIX TIMES PER DAY PRN, Disp: , Rfl: 5  •  hydroxychloroquine (PLAQUENIL) 200 MG tablet, TAKE 1 TABLET BY MOUTH TWICE DAILY, Disp: , Rfl:   •  meclizine (ANTIVERT) 12.5 MG tablet, Take 12.5 mg by mouth 3 (Three) Times a Day As Needed for dizziness., Disp: , Rfl:   •  metoprolol succinate XL (TOPROL-XL) 50 MG 24 hr tablet, Take 1 tablet by mouth Daily With Dinner., Disp: 90 tablet, Rfl: 6  •  Multiple Vitamins-Minerals (MULTIVITAMIN ADULT PO), Take 1 tablet by mouth Daily., Disp: , Rfl:   •  Omega-3 Fatty Acids (FISH OIL) 1000 MG capsule capsule, Take 2,000 mg by mouth 2 (Two) Times a Day., Disp: , Rfl:   •  pravastatin (PRAVACHOL) 20 MG tablet, Take 1 tablet by mouth Daily., Disp: 90 tablet, Rfl: 3  •  Probiotic Product (PROBIOTIC-10 PO), Take  by mouth., Disp: , Rfl:   •  SYNTHROID 88 MCG tablet, TAKE 1 TABLET BY MOUTH DAILY ON MONDAY TO SATURDAY AND 1.5 TABLETS ON SUNDAY., Disp: 96 tablet, Rfl: 3  •  triamcinolone (KENALOG) 0.1 % cream, Apply 1 application topically to the appropriate area as directed 2 (Two) Times a Day., Disp: , Rfl:   •  vitamin B-12 (CYANOCOBALAMIN) 1000 MCG tablet, Take 1,000 mcg by mouth Every Other Day., Disp: , Rfl:   •  vitamin D (ERGOCALCIFEROL) 11393 units capsule capsule, Take 1 capsule by mouth Every 14 (Fourteen) Days., Disp: 6 capsule, Rfl: 11    Current Facility-Administered Medications:   •  triamcinolone acetonide (KENALOG-40) injection 80 mg, 80 mg, Intramuscular, Once, Aditya Falk APRN    Lab Results   Component Value Date    WBC 4.72 08/06/2019    HGB 12.7 08/06/2019    HCT 38.4 08/06/2019    MCV 89.7 08/06/2019     08/06/2019     Lab Results   Component Value Date    GLUCOSE 93 08/06/2019    CALCIUM 9.9 08/06/2019     08/06/2019    K 4.4 08/06/2019    CO2 29.3 (H) 08/06/2019      08/06/2019    BUN 14 08/06/2019    CREATININE 0.84 08/06/2019    EGFRIFNONA 66 08/06/2019    BCR 16.7 08/06/2019    ANIONGAP 9.7 08/06/2019     No results found for: INR, PROTIME  No results found for: CKTOTAL, CKMB, CKMBINDEX, TROPONINI, TROPONINT      ASSESSMENT:  1. Obstructive sleep apnea Established, not controlled (2)  1. Script for autocpap   2. RTC in 31-90 days with compliance report  2. Psychophysiologic Insomnia - New, further workup planned (4)  1. Consider meditation at night  3. Poor sleep hygiene New, no further w/u planned (3)   1. Discussed reading vs. Non-stimulating activity      Patient to follow up in 31-90 days with compliance report         This document has been electronically signed by Dm Cook MD on September 10, 2019         CC: Provider, No Known          Andre Jacques *

## 2019-12-12 ENCOUNTER — OFFICE VISIT (OUTPATIENT)
Dept: SLEEP MEDICINE | Facility: HOSPITAL | Age: 74
End: 2019-12-12

## 2019-12-12 VITALS
BODY MASS INDEX: 31.07 KG/M2 | OXYGEN SATURATION: 96 % | HEIGHT: 68 IN | WEIGHT: 205 LBS | SYSTOLIC BLOOD PRESSURE: 138 MMHG | DIASTOLIC BLOOD PRESSURE: 97 MMHG | HEART RATE: 98 BPM

## 2019-12-12 DIAGNOSIS — G47.33 OBSTRUCTIVE SLEEP APNEA, ADULT: Primary | ICD-10-CM

## 2019-12-12 DIAGNOSIS — F51.04 PSYCHOPHYSIOLOGICAL INSOMNIA: ICD-10-CM

## 2019-12-12 PROCEDURE — 99214 OFFICE O/P EST MOD 30 MIN: CPT | Performed by: NURSE PRACTITIONER

## 2019-12-12 RX ORDER — ALENDRONATE SODIUM 70 MG/1
70 TABLET ORAL
COMMUNITY
Start: 2019-10-18 | End: 2020-10-18

## 2019-12-12 NOTE — PROGRESS NOTES
Sleep Clinic Follow Up    Date: 2019  Primary Care Physician: Provider, No Known    Last office visit: 09/10/2019 (I reviewed this note)    CC: Follow up: KIMMIE started on CPAP      Interim History:  Since the last visit:    1) severe KIMMIE -  Carla Best has remained compliant with CPAP. She denies machine issues, dry mouth, headaches, or pressures intolerance. She denies abnormal dreams, sleep paralysis, nasal congestion, URI sx. Since starting CPAP therapy, she reports less daytime sleepiness, slightly more refreshing sleep, very minimal napping, and no snoring. She has been having some trouble with her mask fit and mask staying in place.    Sleep Testin. Split night PSG on 2019, AHI of 44   2. CPAP titration recommended 8-11 cm H2O   3. Currently on 8-20 cm H2O    PAP Data:    Time frame: 2019-2019   Compliance 97.2 %  Average use on days used: 6 hrs 11 min  Percent of days with usage greater than or equal to 4 hours: 84.7%  PAP range : 8-20 cm H2O  Average 90% pressure: 9.2 cmH2O  Leak: 2 minutes  Average AHI 1.5 events/hr  Mask type: Nasal mask  [facility]    Bed time: 6725-8620  Sleep latency:  minutes  Number of times awakens during the night: 1-3  Wake time: 4148-5410  Estimated total sleep time at night: 4-6 hours  Caffeine intake: 0 cups of coffee, 0 cups of tea, and 0 sodas per day  Alcohol intake: 0 drinks per week  Nap time: very rare - improved with CPAP therapy   Sleepiness with Driving: none, does not drive much      2) Patient denies RLS symptoms.     PMHx, FH, SH reviewed and pertinent changes are: unchanged from last office visit on 09/10/2019 - pending hip replacement after the beginning of the year.      REVIEW OF SYSTEMS:   +occasional dyspnea on exertion  Negative for chest pain, SOA, fever, chills, cough, N/V/D, abdominal pain.    Smoking:none      Exam:  Vitals:    19 1432   BP: 138/97   Pulse: 98   SpO2: 96%           19  1432   Weight:  93 kg (205 lb)     Body mass index is 31.18 kg/m². Patient's Body mass index is 31.18 kg/m². BMI is above normal parameters. Recommendations include: referral to primary care.      Gen:                No distress, conversant, pleasant, appears stated age, alert, oriented  Eyes:               Anicteric sclera, moist conjunctiva, no lid lag                           PERRL, EOMI   Heent:             NC/AT                          Oropharynx clear                          Normal hearing  Lungs:             Normal effort, non-labored breathing                          Clear to auscultation bilaterally          CV:                  Normal S1/S2, no murmur                          No lower extremity edema  ABD:               Soft, rounded, non-distended                          Normal bowel sounds                    Psych:             Appropriate affect  Neuro:             CN 2-12 appear intact      Past Medical History:   Diagnosis Date   • Chest pain    • Fibrocystic disease of breast    • Hashimoto's thyroiditis    • History of screening mammography 08/27/2014    SCREENING MAMMOGRAPHY DIGITAL  (Medicare) (1)    • Hyperlipidemia    • Hypothyroidism due to Hashimoto's thyroiditis 12/3/2016   • Keratoconjunctivitis sicca (CMS/HCC)    • Nuclear senile cataract    • On long term drug therapy    • Osteoarthritis    • Osteopenia    • Osteopenia 12/3/2016   • Photopsia     Right   • Postmenopausal bleeding    • Sinus tachycardia    • Sjogren's syndrome (CMS/HCC)    • Vitamin D deficiency    • Vitamin D deficiency 12/3/2016   • Vitreous detachment of right eye        Current Outpatient Medications:   •  alendronate (FOSAMAX) 70 MG tablet, Take 70 mg by mouth., Disp: , Rfl:   •  aspirin 81 MG EC tablet, Take 81 mg by mouth daily., Disp: , Rfl:   •  Biotin (BIOTIN MAXIMUM STRENGTH) 5 MG capsule, Take 1 tablet by mouth Daily., Disp: , Rfl:   •  Calcium Carbonate-Vitamin D (CALCIUM 600+D) 600-200 MG-UNIT tablet, Take 1 tablet  by mouth Daily., Disp: , Rfl:   •  clobetasol (TEMOVATE) 0.05 % ointment, Apply  topically to the appropriate area as directed 2 (Two) Times a Day., Disp: 60 g, Rfl: 20  •  diphenoxylate-atropine (LOMOTIL) 2.5-0.025 MG per tablet, TK 1 T PO SIX TIMES PER DAY PRN, Disp: , Rfl: 5  •  hydroxychloroquine (PLAQUENIL) 200 MG tablet, TAKE 1 TABLET BY MOUTH TWICE DAILY, Disp: , Rfl:   •  meclizine (ANTIVERT) 12.5 MG tablet, Take 12.5 mg by mouth 3 (Three) Times a Day As Needed for dizziness., Disp: , Rfl:   •  metoprolol succinate XL (TOPROL-XL) 50 MG 24 hr tablet, Take 1 tablet by mouth Daily With Dinner., Disp: 90 tablet, Rfl: 6  •  Multiple Vitamins-Minerals (MULTIVITAMIN ADULT PO), Take 1 tablet by mouth Daily., Disp: , Rfl:   •  Omega-3 Fatty Acids (FISH OIL) 1000 MG capsule capsule, Take 2,000 mg by mouth 2 (Two) Times a Day., Disp: , Rfl:   •  pravastatin (PRAVACHOL) 20 MG tablet, Take 1 tablet by mouth Daily., Disp: 90 tablet, Rfl: 3  •  Probiotic Product (PROBIOTIC-10 PO), Take  by mouth., Disp: , Rfl:   •  SYNTHROID 88 MCG tablet, TAKE 1 TABLET BY MOUTH DAILY ON MONDAY TO SATURDAY AND 1.5 TABLETS ON SUNDAY., Disp: 96 tablet, Rfl: 3  •  triamcinolone (KENALOG) 0.1 % cream, Apply 1 application topically to the appropriate area as directed 2 (Two) Times a Day., Disp: , Rfl:   •  vitamin B-12 (CYANOCOBALAMIN) 1000 MCG tablet, Take 1,000 mcg by mouth Every Other Day., Disp: , Rfl:   •  vitamin D (ERGOCALCIFEROL) 24921 units capsule capsule, Take 1 capsule by mouth Every 14 (Fourteen) Days., Disp: 6 capsule, Rfl: 11    Current Facility-Administered Medications:   •  triamcinolone acetonide (KENALOG-40) injection 80 mg, 80 mg, Intramuscular, Once, Aditya Falk, APRN      Assessment and Plan:    1. Obstructive sleep apnea Established, stable (1)  1. Compliant with PAP therapy  2. Continue PAP as prescribed - increase pressure to 10-15 cm H2O  3. Script for PAP supplies  4. Discussed mask options - wants to try  Sridhar DreamWear nasal pillows  5. Return to clinic in 1 year with compliance report unless change in symptoms in interim period  3. Insomnia - sleep onset and or maintenance Established, stable (1)   1. Slowly improving with CPAP therapy  6. Poor sleep hygiene Established, stable (1)  1. Still gets up to read at night but not as often      18 of 25 minutes spent face-to-face counseling patient extensively regarding:   PAP therapy, PAP compliance, PAP maintenance and Sleep hygiene      RTC in 12 months. Patient agrees to return sooner if changes in symptoms.        This document has been electronically signed by ZACHARY Pinto on December 12, 2019 2:43 PM          CC: Provider, No Known          No ref. provider found

## 2020-01-07 ENCOUNTER — TRANSCRIBE ORDERS (OUTPATIENT)
Dept: CARDIAC SURGERY | Facility: CLINIC | Age: 75
End: 2020-01-07

## 2020-01-07 DIAGNOSIS — R00.2 PALPITATIONS: Primary | ICD-10-CM

## 2020-01-07 DIAGNOSIS — I10 ESSENTIAL HYPERTENSION, MALIGNANT: ICD-10-CM

## 2020-02-12 ENCOUNTER — APPOINTMENT (OUTPATIENT)
Dept: LAB | Facility: HOSPITAL | Age: 75
End: 2020-02-12

## 2020-02-12 LAB
25(OH)D3 SERPL-MCNC: 47.5 NG/ML (ref 30–100)
ALBUMIN SERPL-MCNC: 4.5 G/DL (ref 3.5–5.2)
ALBUMIN/GLOB SERPL: 1.9 G/DL
ALP SERPL-CCNC: 58 U/L (ref 39–117)
ALT SERPL W P-5'-P-CCNC: 12 U/L (ref 1–33)
ANION GAP SERPL CALCULATED.3IONS-SCNC: 12.2 MMOL/L (ref 5–15)
AST SERPL-CCNC: 20 U/L (ref 1–32)
BASOPHILS # BLD AUTO: 0.05 10*3/MM3 (ref 0–0.2)
BASOPHILS NFR BLD AUTO: 1.1 % (ref 0–1.5)
BILIRUB SERPL-MCNC: 0.6 MG/DL (ref 0.2–1.2)
BUN BLD-MCNC: 10 MG/DL (ref 8–23)
BUN/CREAT SERPL: 12.8 (ref 7–25)
CALCIUM SPEC-SCNC: 10.3 MG/DL (ref 8.6–10.5)
CHLORIDE SERPL-SCNC: 98 MMOL/L (ref 98–107)
CHOLEST SERPL-MCNC: 177 MG/DL (ref 0–200)
CO2 SERPL-SCNC: 25.8 MMOL/L (ref 22–29)
CREAT BLD-MCNC: 0.78 MG/DL (ref 0.57–1)
DEPRECATED RDW RBC AUTO: 41.5 FL (ref 37–54)
EOSINOPHIL # BLD AUTO: 0.24 10*3/MM3 (ref 0–0.4)
EOSINOPHIL NFR BLD AUTO: 5.5 % (ref 0.3–6.2)
ERYTHROCYTE [DISTWIDTH] IN BLOOD BY AUTOMATED COUNT: 12.7 % (ref 12.3–15.4)
GFR SERPL CREATININE-BSD FRML MDRD: 72 ML/MIN/1.73
GLOBULIN UR ELPH-MCNC: 2.4 GM/DL
GLUCOSE BLD-MCNC: 85 MG/DL (ref 65–99)
HCT VFR BLD AUTO: 39.4 % (ref 34–46.6)
HDLC SERPL-MCNC: 53 MG/DL (ref 40–60)
HGB BLD-MCNC: 13.1 G/DL (ref 12–15.9)
IMM GRANULOCYTES # BLD AUTO: 0.01 10*3/MM3 (ref 0–0.05)
IMM GRANULOCYTES NFR BLD AUTO: 0.2 % (ref 0–0.5)
LDLC SERPL CALC-MCNC: 99 MG/DL (ref 0–100)
LDLC/HDLC SERPL: 1.86 {RATIO}
LYMPHOCYTES # BLD AUTO: 1.19 10*3/MM3 (ref 0.7–3.1)
LYMPHOCYTES NFR BLD AUTO: 27.1 % (ref 19.6–45.3)
MCH RBC QN AUTO: 29.7 PG (ref 26.6–33)
MCHC RBC AUTO-ENTMCNC: 33.2 G/DL (ref 31.5–35.7)
MCV RBC AUTO: 89.3 FL (ref 79–97)
MONOCYTES # BLD AUTO: 0.44 10*3/MM3 (ref 0.1–0.9)
MONOCYTES NFR BLD AUTO: 10 % (ref 5–12)
NEUTROPHILS # BLD AUTO: 2.46 10*3/MM3 (ref 1.7–7)
NEUTROPHILS NFR BLD AUTO: 56.1 % (ref 42.7–76)
NRBC BLD AUTO-RTO: 0 /100 WBC (ref 0–0.2)
PLATELET # BLD AUTO: 300 10*3/MM3 (ref 140–450)
PMV BLD AUTO: 10.5 FL (ref 6–12)
POTASSIUM BLD-SCNC: 4.3 MMOL/L (ref 3.5–5.2)
PROT SERPL-MCNC: 6.9 G/DL (ref 6–8.5)
RBC # BLD AUTO: 4.41 10*6/MM3 (ref 3.77–5.28)
SODIUM BLD-SCNC: 136 MMOL/L (ref 136–145)
T3FREE SERPL-MCNC: 3.42 PG/ML (ref 2–4.4)
T4 FREE SERPL-MCNC: 1.97 NG/DL (ref 0.93–1.7)
TRIGL SERPL-MCNC: 127 MG/DL (ref 0–150)
TSH SERPL DL<=0.05 MIU/L-ACNC: 0.15 UIU/ML (ref 0.27–4.2)
VIT B12 BLD-MCNC: 976 PG/ML (ref 211–946)
VLDLC SERPL-MCNC: 25.4 MG/DL (ref 5–40)
WBC NRBC COR # BLD: 4.39 10*3/MM3 (ref 3.4–10.8)

## 2020-02-12 PROCEDURE — 82306 VITAMIN D 25 HYDROXY: CPT | Performed by: INTERNAL MEDICINE

## 2020-02-12 PROCEDURE — 36415 COLL VENOUS BLD VENIPUNCTURE: CPT | Performed by: INTERNAL MEDICINE

## 2020-02-12 PROCEDURE — 84481 FREE ASSAY (FT-3): CPT | Performed by: INTERNAL MEDICINE

## 2020-02-12 PROCEDURE — 84439 ASSAY OF FREE THYROXINE: CPT | Performed by: INTERNAL MEDICINE

## 2020-02-12 PROCEDURE — 80061 LIPID PANEL: CPT | Performed by: INTERNAL MEDICINE

## 2020-02-12 PROCEDURE — 80053 COMPREHEN METABOLIC PANEL: CPT | Performed by: INTERNAL MEDICINE

## 2020-02-12 PROCEDURE — 82607 VITAMIN B-12: CPT | Performed by: INTERNAL MEDICINE

## 2020-02-12 PROCEDURE — 84443 ASSAY THYROID STIM HORMONE: CPT | Performed by: INTERNAL MEDICINE

## 2020-02-12 PROCEDURE — 85025 COMPLETE CBC W/AUTO DIFF WBC: CPT | Performed by: INTERNAL MEDICINE

## 2020-02-19 ENCOUNTER — OFFICE VISIT (OUTPATIENT)
Dept: ENDOCRINOLOGY | Facility: CLINIC | Age: 75
End: 2020-02-19

## 2020-02-19 VITALS
BODY MASS INDEX: 29.22 KG/M2 | HEART RATE: 84 BPM | WEIGHT: 192.8 LBS | OXYGEN SATURATION: 98 % | DIASTOLIC BLOOD PRESSURE: 80 MMHG | HEIGHT: 68 IN | SYSTOLIC BLOOD PRESSURE: 126 MMHG

## 2020-02-19 DIAGNOSIS — K21.00 GASTROESOPHAGEAL REFLUX DISEASE WITH ESOPHAGITIS: ICD-10-CM

## 2020-02-19 DIAGNOSIS — E53.8 B12 DEFICIENCY: ICD-10-CM

## 2020-02-19 DIAGNOSIS — E55.9 VITAMIN D DEFICIENCY: Primary | ICD-10-CM

## 2020-02-19 DIAGNOSIS — R11.0 NAUSEA: ICD-10-CM

## 2020-02-19 DIAGNOSIS — E89.0 POSTABLATIVE HYPOTHYROIDISM: ICD-10-CM

## 2020-02-19 DIAGNOSIS — E78.5 DYSLIPIDEMIA: ICD-10-CM

## 2020-02-19 DIAGNOSIS — G47.33 OBSTRUCTIVE SLEEP APNEA: ICD-10-CM

## 2020-02-19 DIAGNOSIS — M81.0 AGE-RELATED OSTEOPOROSIS WITHOUT CURRENT PATHOLOGICAL FRACTURE: ICD-10-CM

## 2020-02-19 PROCEDURE — 99214 OFFICE O/P EST MOD 30 MIN: CPT | Performed by: INTERNAL MEDICINE

## 2020-02-19 RX ORDER — ERGOCALCIFEROL 1.25 MG/1
50000 CAPSULE ORAL
Qty: 6 CAPSULE | Refills: 11 | Status: SHIPPED | OUTPATIENT
Start: 2020-02-19 | End: 2021-01-29 | Stop reason: SDUPTHER

## 2020-02-19 NOTE — PROGRESS NOTES
Bridget Best is a 74 y.o. female. she is here today for follow-up.    Hypothyroidism   Associated symptoms include arthralgias, myalgias and weakness. Pertinent negatives include no abdominal pain, chest pain, chills, congestion, coughing, diaphoresis, fatigue, headaches, joint swelling, nausea, neck pain, numbness, rash, sore throat or vomiting.   Thyroid Problem   Symptoms include anxiety. Patient reports no cold intolerance, constipation, diaphoresis, diarrhea, fatigue, heat intolerance, palpitations or tremors.               74 y.o.    female comes for fu , very pleasant       history of subacute thyroiditis with subsequent permanent hypothyroidism      duration - 5+ years  timing - constant  quality - biochemically controlled but still symptomatic        severity - moderate     symptoms - multiple , detailed under ROS    Fatigue, hair loss, cold intolerance, weigh gain, myalgias, muscle weakness  alleviating factors - brand name synthroid and cytomel but lately not equally effective     she states that if no changes are needed then she will accept that it is not her thyroid, she only wants to be certain there isn't anything that has been missed.      she has fibromylgia and neck pain has worsened since last appt, most importantly for her , hair loss     On cerave       The following portions of the patient's history were reviewed and updated as appropriate:   Past Medical History:   Diagnosis Date   • Chest pain    • Fibrocystic disease of breast    • Hashimoto's thyroiditis    • History of screening mammography 08/27/2014    SCREENING MAMMOGRAPHY DIGITAL  (Medicare) (1)    • Hyperlipidemia    • Hypothyroidism due to Hashimoto's thyroiditis 12/3/2016   • Keratoconjunctivitis sicca (CMS/HCC)    • Nuclear senile cataract    • On long term drug therapy    • Osteoarthritis    • Osteopenia    • Osteopenia 12/3/2016   • Photopsia     Right   • Postmenopausal bleeding    • Sinus tachycardia     • Sjogren's syndrome (CMS/HCC)    • Vitamin D deficiency    • Vitamin D deficiency 12/3/2016   • Vitreous detachment of right eye      Past Surgical History:   Procedure Laterality Date   • CATARACT EXTRACTION, BILATERAL     • CHOLECYSTECTOMY  1997    with operative cholangiogram. Chronic cholecystitis & cholelithiasis. HX of passed common duct stone   • CYSTOSCOPY  1962    urethral dilatation.Recurrent pyelonephritis. urethritis   • ENDOSCOPY N/A 2017    Procedure: ESOPHAGOGASTRODUODENOSCOPY;  Surgeon: Cisco Mason DO;  Location: [facility] ENDOSCOPY;  Service:    • ENDOSCOPY N/A 10/31/2018    Procedure: ESOPHAGOGASTRODUODENOSCOPY;  Surgeon: Cisco Mason DO;  Location: [facility] ENDOSCOPY;  Service: Gastroenterology   • ENDOSCOPY AND COLONOSCOPY  1985    Normal colon to left transverse colon   • GALLBLADDER SURGERY     • PAP SMEAR     • TONSILLECTOMY  1950   • VAGINAL DELIVERY      x3     Family History   Problem Relation Age of Onset   • Heart disease Mother    • Arthritis Mother    • Osteoporosis Mother    • Cataracts Mother    • Heart disease Father    • Hypertension Sister    • Arthritis Sister    • Diabetes Sister    • Fuchs' dystrophy Sister    • Breast cancer Paternal Grandmother    • Diabetes Other    • Heart disease Other    • Hypertension Other    • Stroke Other    • Thyroid disease Other    • Lung disease Other    • Other Other         Gallstones, Bleeding Tendency, Colon Problems.   • Fuchs' dystrophy Maternal Grandfather      OB History        3    Para   3    Term   3            AB        Living           SAB        TAB        Ectopic        Molar        Multiple        Live Births                  Current Outpatient Medications   Medication Sig Dispense Refill   • alendronate (FOSAMAX) 70 MG tablet Take 70 mg by mouth.     • aspirin 81 MG EC tablet Take 81 mg by mouth daily.     • Biotin (BIOTIN MAXIMUM STRENGTH) 5 MG capsule Take 1 tablet by mouth  Daily.     • Calcium Carbonate-Vitamin D (CALCIUM 600+D) 600-200 MG-UNIT tablet Take 1 tablet by mouth Daily.     • clobetasol (TEMOVATE) 0.05 % ointment Apply  topically to the appropriate area as directed 2 (Two) Times a Day. 60 g 20   • diphenoxylate-atropine (LOMOTIL) 2.5-0.025 MG per tablet TK 1 T PO SIX TIMES PER DAY PRN  5   • hydroxychloroquine (PLAQUENIL) 200 MG tablet TAKE 1 TABLET BY MOUTH TWICE DAILY     • meclizine (ANTIVERT) 12.5 MG tablet Take 12.5 mg by mouth 3 (Three) Times a Day As Needed for dizziness.     • metoprolol succinate XL (TOPROL-XL) 50 MG 24 hr tablet Take 1 tablet by mouth Daily With Dinner. 90 tablet 6   • Multiple Vitamins-Minerals (MULTIVITAMIN ADULT PO) Take 1 tablet by mouth Daily.     • Omega-3 Fatty Acids (FISH OIL) 1000 MG capsule capsule Take 2,000 mg by mouth 2 (Two) Times a Day.     • pravastatin (PRAVACHOL) 20 MG tablet Take 1 tablet by mouth Daily. 90 tablet 3   • Probiotic Product (PROBIOTIC-10 PO) Take  by mouth.     • SYNTHROID 88 MCG tablet TAKE 1 TABLET BY MOUTH DAILY ON MONDAY TO SATURDAY AND 1.5 TABLETS ON SUNDAY. 96 tablet 3   • triamcinolone (KENALOG) 0.1 % cream Apply 1 application topically to the appropriate area as directed 2 (Two) Times a Day.     • vitamin B-12 (CYANOCOBALAMIN) 1000 MCG tablet Take 1,000 mcg by mouth Every Other Day.     • vitamin D (ERGOCALCIFEROL) 19580 units capsule capsule Take 1 capsule by mouth Every 14 (Fourteen) Days. 6 capsule 11     Current Facility-Administered Medications   Medication Dose Route Frequency Provider Last Rate Last Dose   • triamcinolone acetonide (KENALOG-40) injection 80 mg  80 mg Intramuscular Once Aditya Falk APRN         Allergies   Allergen Reactions   • Augmentin [Amoxicillin-Pot Clavulanate] Diarrhea   • Ciprofloxacin Hives   • Codeine Other (See Comments)     headache   • Demerol [Meperidine] Other (See Comments)     Drop in blood pressure   • Dexlansoprazole Nausea Only   • Lipitor [Atorvastatin  Calcium] Myalgia   • Tape Other (See Comments)     Burns skin      • Phenazopyridine Rash     Social History     Socioeconomic History   • Marital status:      Spouse name: Not on file   • Number of children: Not on file   • Years of education: Not on file   • Highest education level: Not on file   Tobacco Use   • Smoking status: Never Smoker   • Smokeless tobacco: Never Used   Substance and Sexual Activity   • Alcohol use: No   • Drug use: No   • Sexual activity: Defer       Review of Systems  Review of Systems   Constitutional: Negative for activity change, appetite change, chills, diaphoresis and fatigue.         Daytime sleepiness       Nonrestorative sleep       Loud snoring       Witnessed apneas by roommate       Awakening with choking or gasping       Nocturnal restlessness       Insomnia with frequent awakenings       Lack of concentration       Cognitive deficits       Changes in mood       Morning headaches       Nocturia           HENT: Negative for congestion, dental problem, drooling, ear discharge, ear pain, facial swelling, sneezing, sore throat, tinnitus, trouble swallowing and voice change.    Eyes: Negative for photophobia, pain, discharge, redness, itching and visual disturbance.   Respiratory: Positive for apnea. Negative for cough, choking, chest tightness and shortness of breath.    Cardiovascular: Negative for chest pain, palpitations and leg swelling.   Gastrointestinal: Negative for abdominal distention, abdominal pain, constipation, diarrhea, nausea and vomiting.   Endocrine: Negative for cold intolerance, heat intolerance, polydipsia, polyphagia and polyuria.   Genitourinary: Negative for difficulty urinating, dysuria, frequency, hematuria and urgency.   Musculoskeletal: Positive for arthralgias and myalgias. Negative for back pain, gait problem, joint swelling, neck pain and neck stiffness.   Skin: Negative for color change, pallor, rash and wound.   Allergic/Immunologic: Negative  "for environmental allergies, food allergies and immunocompromised state.   Neurological: Positive for weakness. Negative for dizziness, tremors, facial asymmetry, light-headedness, numbness and headaches.   Hematological: Negative for adenopathy. Does not bruise/bleed easily.   Psychiatric/Behavioral: Positive for decreased concentration. Negative for agitation, behavioral problems, confusion and sleep disturbance. The patient is nervous/anxious.         Objective    /80   Pulse 84   Ht 172.7 cm (68\")   Wt 87.5 kg (192 lb 12.8 oz)   SpO2 98%   BMI 29.32 kg/m²   Physical Exam   Constitutional: She is oriented to person, place, and time. She appears well-developed and well-nourished. No distress.   HENT:   Head: Normocephalic and atraumatic.   Right Ear: External ear normal.   Left Ear: External ear normal.   Nose: Nose normal.   Eyes: Pupils are equal, round, and reactive to light. Conjunctivae and EOM are normal.   Neck: Normal range of motion. Neck supple. No tracheal deviation present. No thyromegaly present.   Cardiovascular: Normal rate, regular rhythm and normal heart sounds.   No murmur heard.  Pulmonary/Chest: Effort normal and breath sounds normal. No respiratory distress. She has no wheezes.   Abdominal: Soft. Bowel sounds are normal. There is no tenderness. There is no rebound and no guarding.   Musculoskeletal: Normal range of motion. She exhibits no edema, tenderness or deformity.   Neurological: She is alert and oriented to person, place, and time. No cranial nerve deficit.   Skin: Skin is warm and dry. No rash noted.   Psychiatric: She has a normal mood and affect. Her behavior is normal. Judgment and thought content normal.       Lab Review  Glucose (mg/dL)   Date Value   02/12/2020 85   08/06/2019 93   02/11/2019 101 (H)     Sodium   Date Value   02/12/2020 136 mmol/L   10/22/2019 137 MMOL/L   08/06/2019 141 mmol/L   02/11/2019 139 mmol/L     Potassium   Date Value   02/12/2020 4.3 mmol/L "   10/22/2019 3.7 MMOL/L   08/06/2019 4.4 mmol/L   02/11/2019 4.3 mmol/L     Chloride   Date Value   02/12/2020 98 mmol/L   10/22/2019 98 MMOL/L   08/06/2019 102 mmol/L   02/11/2019 103 mmol/L     CO2   Date Value   02/12/2020 25.8 mmol/L   10/22/2019 28 MMOL/L   08/06/2019 29.3 mmol/L (H)   02/11/2019 27.0 mmol/L     BUN   Date Value   02/12/2020 10 mg/dL   10/22/2019 14 MG/DL   08/06/2019 14 mg/dL   02/11/2019 13 mg/dL     Creatinine   Date Value   02/12/2020 0.78 mg/dL   10/22/2019 0.7 MG/DL   08/06/2019 0.84 mg/dL   02/11/2019 0.80 mg/dL     Triglycerides (mg/dL)   Date Value   02/12/2020 127   08/06/2019 188 (H)   11/19/2018 255 (H)     LDL Cholesterol    Date Value   02/12/2020 99 mg/dL   08/06/2019 94 mg/dL   11/19/2018 138 mg/dL (H)   12/07/2016 152 mg/dl (H)   09/25/2014 143 mg/dl (H)   09/25/2014 123 mg/dl       Assessment/Plan      1. Vitamin D deficiency    2. B12 deficiency    3. Postablative hypothyroidism    4. Dyslipidemia    5. Obstructive sleep apnea    6. Age-related osteoporosis without current pathological fracture     .    Medications prescribed:  Outpatient Encounter Medications as of 2/19/2020   Medication Sig Dispense Refill   • alendronate (FOSAMAX) 70 MG tablet Take 70 mg by mouth.     • aspirin 81 MG EC tablet Take 81 mg by mouth daily.     • Biotin (BIOTIN MAXIMUM STRENGTH) 5 MG capsule Take 1 tablet by mouth Daily.     • Calcium Carbonate-Vitamin D (CALCIUM 600+D) 600-200 MG-UNIT tablet Take 1 tablet by mouth Daily.     • clobetasol (TEMOVATE) 0.05 % ointment Apply  topically to the appropriate area as directed 2 (Two) Times a Day. 60 g 20   • diphenoxylate-atropine (LOMOTIL) 2.5-0.025 MG per tablet TK 1 T PO SIX TIMES PER DAY PRN  5   • hydroxychloroquine (PLAQUENIL) 200 MG tablet TAKE 1 TABLET BY MOUTH TWICE DAILY     • meclizine (ANTIVERT) 12.5 MG tablet Take 12.5 mg by mouth 3 (Three) Times a Day As Needed for dizziness.     • metoprolol succinate XL (TOPROL-XL) 50 MG 24 hr tablet  Take 1 tablet by mouth Daily With Dinner. 90 tablet 6   • Multiple Vitamins-Minerals (MULTIVITAMIN ADULT PO) Take 1 tablet by mouth Daily.     • Omega-3 Fatty Acids (FISH OIL) 1000 MG capsule capsule Take 2,000 mg by mouth 2 (Two) Times a Day.     • pravastatin (PRAVACHOL) 20 MG tablet Take 1 tablet by mouth Daily. 90 tablet 3   • Probiotic Product (PROBIOTIC-10 PO) Take  by mouth.     • SYNTHROID 88 MCG tablet TAKE 1 TABLET BY MOUTH DAILY ON MONDAY TO SATURDAY AND 1.5 TABLETS ON SUNDAY. 96 tablet 3   • triamcinolone (KENALOG) 0.1 % cream Apply 1 application topically to the appropriate area as directed 2 (Two) Times a Day.     • vitamin B-12 (CYANOCOBALAMIN) 1000 MCG tablet Take 1,000 mcg by mouth Every Other Day.     • vitamin D (ERGOCALCIFEROL) 65622 units capsule capsule Take 1 capsule by mouth Every 14 (Fourteen) Days. 6 capsule 11     Facility-Administered Encounter Medications as of 2/19/2020   Medication Dose Route Frequency Provider Last Rate Last Dose   • triamcinolone acetonide (KENALOG-40) injection 80 mg  80 mg Intramuscular Once Aditya Falk APRN           Orders placed during this encounter include:  No orders of the defined types were placed in this encounter.    Hypothyroidism  developed after episode of subacute thyroiditis     On synthroid 88 mcgs x7.5 tabs per week     Failed armour and cytomel          No change, took biotin, retest in 1 week         Lab Results   Component Value Date    TSH 0.149 (L) 02/12/2020       Wants T3 and T4 measured        neg celiac panel     she also has sjogren's    =====     Osteoporosis     Rheumatology Following    Last DXA in 2017     On Vit D     Lab Results   Component Value Date    NEWM04OF 47.5 02/12/2020    HQJZ80FP 30.8 08/06/2019    MLQE72EP 38.6 02/11/2019              =====      Alopecia     Thyroid as above  Dr. Baig added aldactone, she can't tolerate  Added iron even though levels are normal       I added finasteride 1 mg daily - preferred not  to     Lab Results   Component Value Date    GLUCOSE 85 02/12/2020    BUN 10 02/12/2020    CREATININE 0.78 02/12/2020    EGFRIFNONA 72 02/12/2020    BCR 12.8 02/12/2020    K 4.3 02/12/2020    CO2 25.8 02/12/2020    CALCIUM 10.3 02/12/2020    ALBUMIN 4.50 02/12/2020    LABIL2 1.6 10/22/2019    AST 20 02/12/2020    ALT 12 02/12/2020       Gained weight due to elavil , she had to stop due to side effects  Fasting glucose 101 and mild elevation in LFT     ===    Dyslipidemia    Lab Results   Component Value Date    CHOL 177 02/12/2020    CHLPL 249 (H) 12/07/2016    TRIG 127 02/12/2020    HDL 53 02/12/2020    LDL 99 02/12/2020     Lab Results   Component Value Date    LDL 99 02/12/2020    LDL 94 08/06/2019     (H) 11/19/2018         Side effects to statins in the past     Tolerating pravastatin     Strong fam hx of CAD    ==    Apnea    Refer for sleep study     ==    sjogren , OA    On chloroquine    Had dxa, osteopenia w high FRAX now on fosamax = osteoporosis    Intolerant to fosamax - GERD , nausea     Approve for prolia  4. Follow-up: No follow-ups on file.

## 2020-02-26 ENCOUNTER — APPOINTMENT (OUTPATIENT)
Dept: LAB | Facility: HOSPITAL | Age: 75
End: 2020-02-26

## 2020-02-26 LAB
T4 FREE SERPL-MCNC: 1.83 NG/DL (ref 0.93–1.7)
TSH SERPL DL<=0.05 MIU/L-ACNC: 0.1 UIU/ML (ref 0.27–4.2)

## 2020-02-26 PROCEDURE — 36415 COLL VENOUS BLD VENIPUNCTURE: CPT | Performed by: INTERNAL MEDICINE

## 2020-02-26 PROCEDURE — 84439 ASSAY OF FREE THYROXINE: CPT | Performed by: INTERNAL MEDICINE

## 2020-02-26 PROCEDURE — 84443 ASSAY THYROID STIM HORMONE: CPT | Performed by: INTERNAL MEDICINE

## 2020-03-02 DIAGNOSIS — E89.0 POSTABLATIVE HYPOTHYROIDISM: Primary | ICD-10-CM

## 2020-03-04 ENCOUNTER — APPOINTMENT (OUTPATIENT)
Dept: INFUSION THERAPY | Facility: HOSPITAL | Age: 75
End: 2020-03-04

## 2020-03-10 ENCOUNTER — OFFICE VISIT (OUTPATIENT)
Dept: OTOLARYNGOLOGY | Facility: CLINIC | Age: 75
End: 2020-03-10

## 2020-03-10 VITALS — BODY MASS INDEX: 28.16 KG/M2 | HEIGHT: 68 IN | HEART RATE: 96 BPM | WEIGHT: 185.8 LBS | OXYGEN SATURATION: 95 %

## 2020-03-10 DIAGNOSIS — H60.63 CHRONIC NON-INFECTIVE OTITIS EXTERNA OF BOTH EARS, UNSPECIFIED TYPE: Primary | ICD-10-CM

## 2020-03-10 PROCEDURE — 99212 OFFICE O/P EST SF 10 MIN: CPT | Performed by: OTOLARYNGOLOGY

## 2020-03-10 PROCEDURE — 92504 EAR MICROSCOPY EXAMINATION: CPT | Performed by: OTOLARYNGOLOGY

## 2020-03-12 NOTE — PROGRESS NOTES
Subjective   Carla Jose Best is a 74 y.o. female.       History of Present Illness   Patient has a history of bilateral sensorineural hearing loss and also recurring cerumen impactions.  Is not having any otorrhea but says she does feel like her ears need to be cleaned.      The following portions of the patient's history were reviewed and updated as appropriate: allergies, current medications, past family history, past medical history, past social history, past surgical history and problem list.     reports that she has never smoked. She has never used smokeless tobacco. She reports that she does not drink alcohol or use drugs.   Patient is not a tobacco user and has not been counseled for use of tobacco products      Review of Systems        Objective   Physical Exam  Ears: External ears no deformity.  Both ear canals show a good bit of squamous debris that is cleaned under the microscope using instrumentation.  Beyond this tympanic membranes are intact with no infection or effusion      Assessment/Plan   Carla was seen today for follow-up.    Diagnoses and all orders for this visit:    Chronic non-infective otitis externa of both ears, unspecified type        Plan: Ears cleaned as described above.  Return in 10 months.  Call sooner for problems.

## 2020-06-02 ENCOUNTER — LAB (OUTPATIENT)
Dept: LAB | Facility: HOSPITAL | Age: 75
End: 2020-06-02

## 2020-06-02 LAB
T4 FREE SERPL-MCNC: 1.47 NG/DL (ref 0.93–1.7)
TSH SERPL DL<=0.05 MIU/L-ACNC: 0.85 UIU/ML (ref 0.27–4.2)

## 2020-06-02 PROCEDURE — 36415 COLL VENOUS BLD VENIPUNCTURE: CPT | Performed by: INTERNAL MEDICINE

## 2020-06-02 PROCEDURE — 84439 ASSAY OF FREE THYROXINE: CPT | Performed by: INTERNAL MEDICINE

## 2020-06-02 PROCEDURE — 84443 ASSAY THYROID STIM HORMONE: CPT | Performed by: INTERNAL MEDICINE

## 2020-08-04 ENCOUNTER — OFFICE VISIT (OUTPATIENT)
Dept: WOUND CARE | Facility: HOSPITAL | Age: 75
End: 2020-08-04

## 2020-08-04 PROCEDURE — G0463 HOSPITAL OUTPT CLINIC VISIT: HCPCS

## 2020-08-10 ENCOUNTER — OFFICE VISIT (OUTPATIENT)
Dept: WOUND CARE | Facility: HOSPITAL | Age: 75
End: 2020-08-10

## 2020-08-17 ENCOUNTER — OFFICE VISIT (OUTPATIENT)
Dept: WOUND CARE | Facility: HOSPITAL | Age: 75
End: 2020-08-17

## 2020-08-19 ENCOUNTER — TELEPHONE (OUTPATIENT)
Dept: ENDOCRINOLOGY | Facility: CLINIC | Age: 75
End: 2020-08-19

## 2020-08-19 DIAGNOSIS — E03.8 HYPOTHYROIDISM DUE TO HASHIMOTO'S THYROIDITIS: ICD-10-CM

## 2020-08-19 DIAGNOSIS — E55.9 VITAMIN D DEFICIENCY: Primary | ICD-10-CM

## 2020-08-19 DIAGNOSIS — E06.3 HYPOTHYROIDISM DUE TO HASHIMOTO'S THYROIDITIS: ICD-10-CM

## 2020-08-19 DIAGNOSIS — E53.8 B12 DEFICIENCY: ICD-10-CM

## 2020-08-21 ENCOUNTER — LAB (OUTPATIENT)
Dept: LAB | Facility: HOSPITAL | Age: 75
End: 2020-08-21

## 2020-08-21 LAB
25(OH)D3 SERPL-MCNC: 40.5 NG/ML (ref 30–100)
ALBUMIN SERPL-MCNC: 4.4 G/DL (ref 3.5–5.2)
ALBUMIN/GLOB SERPL: 2.1 G/DL
ALP SERPL-CCNC: 95 U/L (ref 39–117)
ALT SERPL W P-5'-P-CCNC: 12 U/L (ref 1–33)
ANION GAP SERPL CALCULATED.3IONS-SCNC: 13.4 MMOL/L (ref 5–15)
AST SERPL-CCNC: 15 U/L (ref 1–32)
BASOPHILS # BLD AUTO: 0.05 10*3/MM3 (ref 0–0.2)
BASOPHILS NFR BLD AUTO: 0.9 % (ref 0–1.5)
BILIRUB SERPL-MCNC: 0.4 MG/DL (ref 0–1.2)
BUN SERPL-MCNC: 14 MG/DL (ref 8–23)
BUN/CREAT SERPL: 18.2 (ref 7–25)
CALCIUM SPEC-SCNC: 9.6 MG/DL (ref 8.6–10.5)
CHLORIDE SERPL-SCNC: 105 MMOL/L (ref 98–107)
CHOLEST SERPL-MCNC: 208 MG/DL (ref 0–200)
CO2 SERPL-SCNC: 23.6 MMOL/L (ref 22–29)
CREAT SERPL-MCNC: 0.77 MG/DL (ref 0.57–1)
DEPRECATED RDW RBC AUTO: 44.1 FL (ref 37–54)
EOSINOPHIL # BLD AUTO: 0.36 10*3/MM3 (ref 0–0.4)
EOSINOPHIL NFR BLD AUTO: 6.5 % (ref 0.3–6.2)
ERYTHROCYTE [DISTWIDTH] IN BLOOD BY AUTOMATED COUNT: 13.5 % (ref 12.3–15.4)
GFR SERPL CREATININE-BSD FRML MDRD: 73 ML/MIN/1.73
GLOBULIN UR ELPH-MCNC: 2.1 GM/DL
GLUCOSE SERPL-MCNC: 83 MG/DL (ref 65–99)
HCT VFR BLD AUTO: 36.6 % (ref 34–46.6)
HDLC SERPL-MCNC: 57 MG/DL (ref 40–60)
HGB BLD-MCNC: 12 G/DL (ref 12–15.9)
IMM GRANULOCYTES # BLD AUTO: 0.01 10*3/MM3 (ref 0–0.05)
IMM GRANULOCYTES NFR BLD AUTO: 0.2 % (ref 0–0.5)
LDLC SERPL CALC-MCNC: 116 MG/DL (ref 0–100)
LDLC/HDLC SERPL: 2.04 {RATIO}
LYMPHOCYTES # BLD AUTO: 1.75 10*3/MM3 (ref 0.7–3.1)
LYMPHOCYTES NFR BLD AUTO: 31.4 % (ref 19.6–45.3)
MCH RBC QN AUTO: 29.5 PG (ref 26.6–33)
MCHC RBC AUTO-ENTMCNC: 32.8 G/DL (ref 31.5–35.7)
MCV RBC AUTO: 89.9 FL (ref 79–97)
MONOCYTES # BLD AUTO: 0.44 10*3/MM3 (ref 0.1–0.9)
MONOCYTES NFR BLD AUTO: 7.9 % (ref 5–12)
NEUTROPHILS NFR BLD AUTO: 2.96 10*3/MM3 (ref 1.7–7)
NEUTROPHILS NFR BLD AUTO: 53.1 % (ref 42.7–76)
NRBC BLD AUTO-RTO: 0 /100 WBC (ref 0–0.2)
PLATELET # BLD AUTO: 306 10*3/MM3 (ref 140–450)
PMV BLD AUTO: 10.1 FL (ref 6–12)
POTASSIUM SERPL-SCNC: 4.4 MMOL/L (ref 3.5–5.2)
PROT SERPL-MCNC: 6.5 G/DL (ref 6–8.5)
RBC # BLD AUTO: 4.07 10*6/MM3 (ref 3.77–5.28)
SODIUM SERPL-SCNC: 142 MMOL/L (ref 136–145)
T3FREE SERPL-MCNC: 2.7 PG/ML (ref 2–4.4)
T4 FREE SERPL-MCNC: 1.36 NG/DL (ref 0.93–1.7)
TRIGL SERPL-MCNC: 174 MG/DL (ref 0–150)
TSH SERPL DL<=0.05 MIU/L-ACNC: 5.75 UIU/ML (ref 0.27–4.2)
VIT B12 BLD-MCNC: 714 PG/ML (ref 211–946)
VLDLC SERPL-MCNC: 34.8 MG/DL (ref 5–40)
WBC # BLD AUTO: 5.57 10*3/MM3 (ref 3.4–10.8)

## 2020-08-21 PROCEDURE — 85025 COMPLETE CBC W/AUTO DIFF WBC: CPT | Performed by: INTERNAL MEDICINE

## 2020-08-21 PROCEDURE — 84443 ASSAY THYROID STIM HORMONE: CPT | Performed by: INTERNAL MEDICINE

## 2020-08-21 PROCEDURE — 80053 COMPREHEN METABOLIC PANEL: CPT | Performed by: INTERNAL MEDICINE

## 2020-08-21 PROCEDURE — 84481 FREE ASSAY (FT-3): CPT | Performed by: INTERNAL MEDICINE

## 2020-08-21 PROCEDURE — 82306 VITAMIN D 25 HYDROXY: CPT | Performed by: INTERNAL MEDICINE

## 2020-08-21 PROCEDURE — 36415 COLL VENOUS BLD VENIPUNCTURE: CPT | Performed by: INTERNAL MEDICINE

## 2020-08-21 PROCEDURE — 82607 VITAMIN B-12: CPT | Performed by: INTERNAL MEDICINE

## 2020-08-21 PROCEDURE — 84439 ASSAY OF FREE THYROXINE: CPT | Performed by: INTERNAL MEDICINE

## 2020-08-21 PROCEDURE — 80061 LIPID PANEL: CPT | Performed by: INTERNAL MEDICINE

## 2020-08-24 ENCOUNTER — OFFICE VISIT (OUTPATIENT)
Dept: WOUND CARE | Facility: HOSPITAL | Age: 75
End: 2020-08-24

## 2020-08-26 DIAGNOSIS — E55.9 VITAMIN D DEFICIENCY: Primary | ICD-10-CM

## 2020-08-26 DIAGNOSIS — E53.8 B12 DEFICIENCY: ICD-10-CM

## 2020-08-26 DIAGNOSIS — E06.3 HYPOTHYROIDISM DUE TO HASHIMOTO'S THYROIDITIS: ICD-10-CM

## 2020-08-26 DIAGNOSIS — E03.8 HYPOTHYROIDISM DUE TO HASHIMOTO'S THYROIDITIS: ICD-10-CM

## 2020-08-31 ENCOUNTER — OFFICE VISIT (OUTPATIENT)
Dept: WOUND CARE | Facility: HOSPITAL | Age: 75
End: 2020-08-31

## 2020-09-04 DIAGNOSIS — E55.9 VITAMIN D DEFICIENCY: ICD-10-CM

## 2020-09-04 DIAGNOSIS — E89.0 POSTABLATIVE HYPOTHYROIDISM: ICD-10-CM

## 2020-09-04 RX ORDER — LEVOTHYROXINE SODIUM 88 MCG
TABLET ORAL
Qty: 96 TABLET | Refills: 3 | Status: SHIPPED | OUTPATIENT
Start: 2020-09-04 | End: 2020-09-09

## 2020-09-05 DIAGNOSIS — E55.9 VITAMIN D DEFICIENCY: ICD-10-CM

## 2020-09-05 DIAGNOSIS — E89.0 POSTABLATIVE HYPOTHYROIDISM: ICD-10-CM

## 2020-09-09 RX ORDER — LEVOTHYROXINE SODIUM 88 MCG
TABLET ORAL
Qty: 96 TABLET | Refills: 3 | Status: SHIPPED | OUTPATIENT
Start: 2020-09-09 | End: 2020-09-30

## 2020-09-14 ENCOUNTER — OFFICE VISIT (OUTPATIENT)
Dept: WOUND CARE | Facility: HOSPITAL | Age: 75
End: 2020-09-14

## 2020-09-14 PROCEDURE — G0463 HOSPITAL OUTPT CLINIC VISIT: HCPCS

## 2020-09-30 ENCOUNTER — OFFICE VISIT (OUTPATIENT)
Dept: ENDOCRINOLOGY | Facility: CLINIC | Age: 75
End: 2020-09-30

## 2020-09-30 VITALS
HEIGHT: 68 IN | BODY MASS INDEX: 27.05 KG/M2 | OXYGEN SATURATION: 98 % | DIASTOLIC BLOOD PRESSURE: 88 MMHG | WEIGHT: 178.5 LBS | SYSTOLIC BLOOD PRESSURE: 130 MMHG | HEART RATE: 90 BPM

## 2020-09-30 DIAGNOSIS — E53.8 B12 DEFICIENCY: ICD-10-CM

## 2020-09-30 DIAGNOSIS — E78.5 DYSLIPIDEMIA: ICD-10-CM

## 2020-09-30 DIAGNOSIS — E03.8 HYPOTHYROIDISM DUE TO HASHIMOTO'S THYROIDITIS: ICD-10-CM

## 2020-09-30 DIAGNOSIS — E06.3 HYPOTHYROIDISM DUE TO HASHIMOTO'S THYROIDITIS: ICD-10-CM

## 2020-09-30 DIAGNOSIS — E55.9 VITAMIN D DEFICIENCY: ICD-10-CM

## 2020-09-30 DIAGNOSIS — E89.0 POSTABLATIVE HYPOTHYROIDISM: Primary | ICD-10-CM

## 2020-09-30 PROCEDURE — 99214 OFFICE O/P EST MOD 30 MIN: CPT | Performed by: INTERNAL MEDICINE

## 2020-09-30 RX ORDER — LEVOTHYROXINE SODIUM 112 MCG
112 TABLET ORAL DAILY
Qty: 30 TABLET | Refills: 11 | Status: SHIPPED | OUTPATIENT
Start: 2020-09-30 | End: 2021-01-29

## 2020-09-30 NOTE — PROGRESS NOTES
Bridget Best is a 75 y.o. female. she is here today for follow-up.    Hypothyroidism  Associated symptoms include arthralgias, myalgias and weakness. Pertinent negatives include no abdominal pain, chest pain, chills, congestion, coughing, diaphoresis, fatigue, headaches, joint swelling, nausea, neck pain, numbness, rash, sore throat or vomiting.   Thyroid Problem  Symptoms include anxiety. Patient reports no cold intolerance, constipation, diaphoresis, diarrhea, fatigue, heat intolerance, palpitations or tremors.               75 y.o.    female comes for fu , very pleasant       history of subacute thyroiditis with subsequent permanent hypothyroidism      duration - 5+ years  timing - constant  quality - biochemically controlled but still symptomatic        severity - moderate     symptoms - multiple , detailed under ROS    Fatigue, hair loss, cold intolerance, weigh gain, myalgias, muscle weakness  alleviating factors - brand name synthroid and cytomel but lately not equally effective     she states that if no changes are needed then she will accept that it is not her thyroid, she only wants to be certain there isn't anything that has been missed.      she has fibromylgia and neck pain has worsened since last appt, most importantly for her , hair loss    Recent hip replacement with long recovery      On cerave       The following portions of the patient's history were reviewed and updated as appropriate:   Past Medical History:   Diagnosis Date   • Chest pain    • Fibrocystic disease of breast    • Hashimoto's thyroiditis    • History of screening mammography 08/27/2014    SCREENING MAMMOGRAPHY DIGITAL  (Medicare) (1)    • Hyperlipidemia    • Hypothyroidism due to Hashimoto's thyroiditis 12/3/2016   • Keratoconjunctivitis sicca (CMS/HCC)    • Nuclear senile cataract    • On long term drug therapy    • Osteoarthritis    • Osteopenia    • Osteopenia 12/3/2016   • Photopsia     Right   •  Postmenopausal bleeding    • Sinus tachycardia    • Sjogren's syndrome (CMS/HCC)    • Vitamin D deficiency    • Vitamin D deficiency 12/3/2016   • Vitreous detachment of right eye      Past Surgical History:   Procedure Laterality Date   • CATARACT EXTRACTION, BILATERAL     • CHOLECYSTECTOMY  1997    with operative cholangiogram. Chronic cholecystitis & cholelithiasis. HX of passed common duct stone   • CYSTOSCOPY  1962    urethral dilatation.Recurrent pyelonephritis. urethritis   • ENDOSCOPY N/A 2017    Procedure: ESOPHAGOGASTRODUODENOSCOPY;  Surgeon: Cisco Mason DO;  Location: [facility] ENDOSCOPY;  Service:    • ENDOSCOPY N/A 10/31/2018    Procedure: ESOPHAGOGASTRODUODENOSCOPY;  Surgeon: Cisco Mason DO;  Location: [facility] ENDOSCOPY;  Service: Gastroenterology   • ENDOSCOPY AND COLONOSCOPY  1985    Normal colon to left transverse colon   • GALLBLADDER SURGERY     • PAP SMEAR     • TONSILLECTOMY  1950   • VAGINAL DELIVERY      x3     Family History   Problem Relation Age of Onset   • Heart disease Mother    • Arthritis Mother    • Osteoporosis Mother    • Cataracts Mother    • Heart disease Father    • Hypertension Sister    • Arthritis Sister    • Diabetes Sister    • Fuchs' dystrophy Sister    • Breast cancer Paternal Grandmother    • Diabetes Other    • Heart disease Other    • Hypertension Other    • Stroke Other    • Thyroid disease Other    • Lung disease Other    • Other Other         Gallstones, Bleeding Tendency, Colon Problems.   • Fuchs' dystrophy Maternal Grandfather      OB History        3    Para   3    Term   3            AB        Living           SAB        TAB        Ectopic        Molar        Multiple        Live Births                  Current Outpatient Medications   Medication Sig Dispense Refill   • aspirin 81 MG EC tablet Take 81 mg by mouth daily.     • Biotin (BIOTIN MAXIMUM STRENGTH) 5 MG capsule Take 1 tablet by mouth Daily.     •  Calcium Carbonate-Vitamin D (CALCIUM 600+D) 600-200 MG-UNIT tablet Take 1 tablet by mouth Daily.     • clobetasol (TEMOVATE) 0.05 % ointment Apply  topically to the appropriate area as directed 2 (Two) Times a Day. 60 g 20   • diphenoxylate-atropine (LOMOTIL) 2.5-0.025 MG per tablet TK 1 T PO SIX TIMES PER DAY PRN  5   • meclizine (ANTIVERT) 12.5 MG tablet Take 12.5 mg by mouth 3 (Three) Times a Day As Needed for dizziness.     • metoprolol succinate XL (TOPROL-XL) 50 MG 24 hr tablet Take 1 tablet by mouth Daily With Dinner. 90 tablet 6   • Multiple Vitamins-Minerals (MULTIVITAMIN ADULT PO) Take 1 tablet by mouth Daily.     • pravastatin (PRAVACHOL) 20 MG tablet Take 1 tablet by mouth Daily. 90 tablet 3   • SYNTHROID 88 MCG tablet TAKE 1 TABLET BY MOUTH EVERY DAY MONDAY THROUGH SATURDAY. ON SUNDAY TAKE 1 1/2 TABLETS (Patient taking differently: TAKE 1 TABLET BY MOUTH EVERY DAY MONDAY THROUGH SATURDAY. ON SUNDAY TAKE 2 TABLETS) 96 tablet 3   • triamcinolone (KENALOG) 0.1 % cream Apply 1 application topically to the appropriate area as directed 2 (Two) Times a Day.     • vitamin B-12 (CYANOCOBALAMIN) 1000 MCG tablet Take 1,000 mcg by mouth Every Other Day.     • vitamin D (ERGOCALCIFEROL) 1.25 MG (78495 UT) capsule capsule Take 1 capsule by mouth Every 14 (Fourteen) Days. 6 capsule 11   • alendronate (FOSAMAX) 70 MG tablet Take 70 mg by mouth.     • hydroxychloroquine (PLAQUENIL) 200 MG tablet TAKE 1 TABLET BY MOUTH TWICE DAILY     • Omega-3 Fatty Acids (FISH OIL) 1000 MG capsule capsule Take 2,000 mg by mouth 2 (Two) Times a Day.     • Probiotic Product (PROBIOTIC-10 PO) Take  by mouth.       Current Facility-Administered Medications   Medication Dose Route Frequency Provider Last Rate Last Dose   • triamcinolone acetonide (KENALOG-40) injection 80 mg  80 mg Intramuscular Once Aditya Falk APRN         Allergies   Allergen Reactions   • Ciprofloxacin Hives   • Codeine Other (See Comments)     headache   •  Demerol [Meperidine] Other (See Comments)     Drop in blood pressure   • Dexlansoprazole Nausea Only   • Lipitor [Atorvastatin Calcium] Myalgia   • Tape Other (See Comments)     Burns skin      • Phenazopyridine Rash     Social History     Socioeconomic History   • Marital status:      Spouse name: Not on file   • Number of children: Not on file   • Years of education: Not on file   • Highest education level: Not on file   Tobacco Use   • Smoking status: Never Smoker   • Smokeless tobacco: Never Used   Substance and Sexual Activity   • Alcohol use: No   • Drug use: No   • Sexual activity: Defer       Review of Systems  Review of Systems   Constitutional: Negative for activity change, appetite change, chills, diaphoresis and fatigue.         Daytime sleepiness       Nonrestorative sleep       Loud snoring       Witnessed apneas by roommate       Awakening with choking or gasping       Nocturnal restlessness       Insomnia with frequent awakenings       Lack of concentration       Cognitive deficits       Changes in mood       Morning headaches       Nocturia           HENT: Negative for congestion, dental problem, drooling, ear discharge, ear pain, facial swelling, sneezing, sore throat, tinnitus, trouble swallowing and voice change.    Eyes: Negative for photophobia, pain, discharge, redness, itching and visual disturbance.   Respiratory: Positive for apnea. Negative for cough, choking, chest tightness and shortness of breath.    Cardiovascular: Negative for chest pain, palpitations and leg swelling.   Gastrointestinal: Negative for abdominal distention, abdominal pain, constipation, diarrhea, nausea and vomiting.   Endocrine: Negative for cold intolerance, heat intolerance, polydipsia, polyphagia and polyuria.   Genitourinary: Negative for difficulty urinating, dysuria, frequency, hematuria and urgency.   Musculoskeletal: Positive for arthralgias and myalgias. Negative for back pain, gait problem, joint  "swelling, neck pain and neck stiffness.   Skin: Negative for color change, pallor, rash and wound.   Allergic/Immunologic: Negative for environmental allergies, food allergies and immunocompromised state.   Neurological: Positive for weakness. Negative for dizziness, tremors, facial asymmetry, light-headedness, numbness and headaches.   Hematological: Negative for adenopathy. Does not bruise/bleed easily.   Psychiatric/Behavioral: Positive for decreased concentration. Negative for agitation, behavioral problems, confusion and sleep disturbance. The patient is nervous/anxious.         Objective    /88 (BP Location: Left arm, Patient Position: Sitting, Cuff Size: Large Adult)   Pulse 90   Ht 172.7 cm (68\")   Wt 81 kg (178 lb 8 oz)   SpO2 98%   BMI 27.14 kg/m²   Physical Exam   Constitutional: She is oriented to person, place, and time. She appears well-developed. No distress.   HENT:   Head: Normocephalic and atraumatic.   Right Ear: External ear normal.   Left Ear: External ear normal.   Nose: Nose normal.   Eyes: Pupils are equal, round, and reactive to light. Conjunctivae are normal.   Neck: Normal range of motion. Neck supple. No tracheal deviation present. No thyromegaly present.   Cardiovascular: Normal rate, regular rhythm and normal heart sounds.   No murmur heard.  Pulmonary/Chest: Effort normal and breath sounds normal. No respiratory distress. She has no wheezes.   Abdominal: Soft. Bowel sounds are normal. There is no abdominal tenderness. There is no rebound and no guarding.   Musculoskeletal: Normal range of motion. No tenderness or deformity.      Comments: Using walker    Neurological: She is alert and oriented to person, place, and time. No cranial nerve deficit.   Skin: Skin is warm and dry. No rash noted.   Psychiatric: Her behavior is normal. Judgment and thought content normal.       Lab Review  Glucose (mg/dL)   Date Value   08/21/2020 83   02/12/2020 85   08/06/2019 93     Sodium "   Date Value   08/21/2020 142 mmol/L   02/12/2020 136 mmol/L   10/22/2019 137 MMOL/L   08/06/2019 141 mmol/L     Potassium   Date Value   08/21/2020 4.4 mmol/L   02/12/2020 4.3 mmol/L   10/22/2019 3.7 MMOL/L   08/06/2019 4.4 mmol/L     Chloride   Date Value   08/21/2020 105 mmol/L   02/12/2020 98 mmol/L   10/22/2019 98 MMOL/L   08/06/2019 102 mmol/L     CO2   Date Value   08/21/2020 23.6 mmol/L   02/12/2020 25.8 mmol/L   10/22/2019 28 MMOL/L   08/06/2019 29.3 mmol/L (H)     BUN   Date Value   08/21/2020 14 mg/dL   02/12/2020 10 mg/dL   10/22/2019 14 MG/DL   08/06/2019 14 mg/dL     Creatinine   Date Value   08/21/2020 0.77 mg/dL   02/12/2020 0.78 mg/dL   10/22/2019 0.7 MG/DL   08/06/2019 0.84 mg/dL     Triglycerides (mg/dL)   Date Value   08/21/2020 174 (H)   02/12/2020 127   08/06/2019 188 (H)     LDL Cholesterol  (mg/dL)   Date Value   08/21/2020 116 (H)   02/12/2020 99   08/06/2019 94       Assessment/Plan      1. Postablative hypothyroidism    2. Vitamin D deficiency    3. B12 deficiency    4. Hypothyroidism due to Hashimoto's thyroiditis    5. Dyslipidemia    .    Medications prescribed:  Outpatient Encounter Medications as of 9/30/2020   Medication Sig Dispense Refill   • aspirin 81 MG EC tablet Take 81 mg by mouth daily.     • Biotin (BIOTIN MAXIMUM STRENGTH) 5 MG capsule Take 1 tablet by mouth Daily.     • Calcium Carbonate-Vitamin D (CALCIUM 600+D) 600-200 MG-UNIT tablet Take 1 tablet by mouth Daily.     • clobetasol (TEMOVATE) 0.05 % ointment Apply  topically to the appropriate area as directed 2 (Two) Times a Day. 60 g 20   • diphenoxylate-atropine (LOMOTIL) 2.5-0.025 MG per tablet TK 1 T PO SIX TIMES PER DAY PRN  5   • meclizine (ANTIVERT) 12.5 MG tablet Take 12.5 mg by mouth 3 (Three) Times a Day As Needed for dizziness.     • metoprolol succinate XL (TOPROL-XL) 50 MG 24 hr tablet Take 1 tablet by mouth Daily With Dinner. 90 tablet 6   • Multiple Vitamins-Minerals (MULTIVITAMIN ADULT PO) Take 1 tablet by  mouth Daily.     • pravastatin (PRAVACHOL) 20 MG tablet Take 1 tablet by mouth Daily. 90 tablet 3   • SYNTHROID 88 MCG tablet TAKE 1 TABLET BY MOUTH EVERY DAY MONDAY THROUGH SATURDAY. ON SUNDAY TAKE 1 1/2 TABLETS (Patient taking differently: TAKE 1 TABLET BY MOUTH EVERY DAY MONDAY THROUGH SATURDAY. ON SUNDAY TAKE 2 TABLETS) 96 tablet 3   • triamcinolone (KENALOG) 0.1 % cream Apply 1 application topically to the appropriate area as directed 2 (Two) Times a Day.     • vitamin B-12 (CYANOCOBALAMIN) 1000 MCG tablet Take 1,000 mcg by mouth Every Other Day.     • vitamin D (ERGOCALCIFEROL) 1.25 MG (10013 UT) capsule capsule Take 1 capsule by mouth Every 14 (Fourteen) Days. 6 capsule 11   • alendronate (FOSAMAX) 70 MG tablet Take 70 mg by mouth.     • hydroxychloroquine (PLAQUENIL) 200 MG tablet TAKE 1 TABLET BY MOUTH TWICE DAILY     • Omega-3 Fatty Acids (FISH OIL) 1000 MG capsule capsule Take 2,000 mg by mouth 2 (Two) Times a Day.     • Probiotic Product (PROBIOTIC-10 PO) Take  by mouth.       Facility-Administered Encounter Medications as of 9/30/2020   Medication Dose Route Frequency Provider Last Rate Last Dose   • triamcinolone acetonide (KENALOG-40) injection 80 mg  80 mg Intramuscular Once Aditya Falk APRN           Orders placed during this encounter include:  No orders of the defined types were placed in this encounter.    Hypothyroidism  developed after episode of subacute thyroiditis     On synthroid 88 mcgs x7.5 tabs per week - 8 tabs per week -   Change to 112    she has plenty of 88 so in the interim she will take 9 tabs per week     Failed armour and cytomel          No change, took biotin, retest in 1 week         Lab Results   Component Value Date    TSH 5.750 (H) 08/21/2020       Wants T3 and T4 measured        neg celiac panel     she also has sjogren's    =====     Osteoporosis     Rheumatology Following    Last DXA in 2017     On Vit D     Lab Results   Component Value Date    KPFK20YC 40.5  08/21/2020    CDLP58GA 47.5 02/12/2020    CYVH45IE 30.8 08/06/2019              =====      Alopecia     Thyroid as above  Dr. Baig added aldactone, she can't tolerate  Added iron even though levels are normal       I added finasteride 1 mg daily - preferred not to     Lab Results   Component Value Date    GLUCOSE 83 08/21/2020    BUN 14 08/21/2020    CREATININE 0.77 08/21/2020    EGFRIFNONA 73 08/21/2020    BCR 18.2 08/21/2020    K 4.4 08/21/2020    CO2 23.6 08/21/2020    CALCIUM 9.6 08/21/2020    ALBUMIN 4.40 08/21/2020    LABIL2 1.6 10/22/2019    AST 15 08/21/2020    ALT 12 08/21/2020       Gained weight due to elavil , she had to stop due to side effects  Fasting glucose 101 and mild elevation in LFT     ===    Dyslipidemia    Lab Results   Component Value Date    CHOL 208 (H) 08/21/2020    CHLPL 249 (H) 12/07/2016    TRIG 174 (H) 08/21/2020    HDL 57 08/21/2020     (H) 08/21/2020     Lab Results   Component Value Date     (H) 08/21/2020    LDL 99 02/12/2020    LDL 94 08/06/2019         Side effects to statins in the past     Tolerating pravastatin     Strong fam hx of CAD    ==    Apnea    Refer for sleep study     ==    sjogren , OA    On chloroquine    Had dxa, osteopenia w high FRAX now on fosamax = osteoporosis    Intolerant to fosamax - GERD , nausea     Approve for prolia  4. Follow-up: No follow-ups on file.

## 2020-10-29 ENCOUNTER — HOSPITAL ENCOUNTER (OUTPATIENT)
Dept: PHYSICAL THERAPY | Facility: HOSPITAL | Age: 75
Setting detail: THERAPIES SERIES
Discharge: HOME | End: 2020-10-29

## 2020-10-29 DIAGNOSIS — Z96.641 HISTORY OF TOTAL HIP REPLACEMENT, RIGHT: Primary | ICD-10-CM

## 2020-10-29 DIAGNOSIS — M25.559 HIP PAIN: ICD-10-CM

## 2020-10-29 PROCEDURE — 97162 PT EVAL MOD COMPLEX 30 MIN: CPT

## 2020-10-29 NOTE — THERAPY EVALUATION
Outpatient Physical Therapy Ortho Initial Evaluation  [facility]     Patient Name: Carla Best  : 1945  MRN: [id number]  Today's Date: 10/29/2020      Visit Date: 10/29/2020     Visits attended: 1/1  % improvement: N/A  Next MD appointment: TBD  Recheck due date: 2020    Therapy Diagnosis: History of R ant ROSALINE 2020    Patient Active Problem List   Diagnosis   • Osteopenia   • Hypothyroidism due to Hashimoto's thyroiditis   • Vitamin D deficiency   • Keratoconjunctivitis sicca (CMS/HCC)   • Long term use of drug   • Cataract   • Primary osteoarthritis of right knee   • B12 deficiency   • Right hip pain   • Encounter for screening for osteoporosis   • GERD (gastroesophageal reflux disease)   • Hip pain, acute, right   • Myofascial pain   • Neck pain   • Osteoarthritis   • Spondylosis of cervical joint   • Palpitations   • Acquired hypothyroidism   • Pre-operative clearance   • Vaginal discharge   • Vaginal irritation   • Urinary incontinence in female   • UTI (urinary tract infection)   • Female cystocele   • Acute cystitis without hematuria   • Overweight   • Diverticulosis of large intestine without perforation or abscess without bleeding   • Generalized abdominal pain   • Helicobacter pylori (H. 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   • Fibromyalgia   • Cystocele with prolapse   • Obstructive sleep apnea, adult   • Age-related osteoporosis without current pathological fracture        Past Medical History:   Diagnosis Date   • Chest pain    • Fibrocystic disease of breast    • Hashimoto's thyroiditis    • History of screening mammography 2014    SCREENING MAMMOGRAPHY DIGITAL  (Medicare) (1)    •  Hyperlipidemia    • Hypothyroidism due to Hashimoto's thyroiditis 12/3/2016   • Keratoconjunctivitis sicca (CMS/HCC)    • Nuclear senile cataract    • On long term drug therapy    • Osteoarthritis    • Osteopenia    • Osteopenia 12/3/2016   • Photopsia     Right   • Postmenopausal bleeding    • Sinus tachycardia    • Sjogren's syndrome (CMS/HCC)    • Vitamin D deficiency    • Vitamin D deficiency 12/3/2016   • Vitreous detachment of right eye         Past Surgical History:   Procedure Laterality Date   • CATARACT EXTRACTION, BILATERAL  2018   • CHOLECYSTECTOMY  06/09/1997    with operative cholangiogram. Chronic cholecystitis & cholelithiasis. HX of passed common duct stone   • CYSTOSCOPY  11/26/1962    urethral dilatation.Recurrent pyelonephritis. urethritis   • ENDOSCOPY N/A 2/17/2017    Procedure: ESOPHAGOGASTRODUODENOSCOPY;  Surgeon: Cisco Mason DO;  Location: [facility] ENDOSCOPY;  Service:    • ENDOSCOPY N/A 10/31/2018    Procedure: ESOPHAGOGASTRODUODENOSCOPY;  Surgeon: Cisco Mason DO;  Location: [facility] ENDOSCOPY;  Service: Gastroenterology   • ENDOSCOPY AND COLONOSCOPY  09/30/1985    Normal colon to left transverse colon   • GALLBLADDER SURGERY     • JOINT REPLACEMENT      R Ant ROSALINE 7/6/2020   • PAP SMEAR  2009   • TONSILLECTOMY  1950   • VAGINAL DELIVERY      x3       Visit Dx:     ICD-10-CM ICD-9-CM   1. History of total hip replacement, right  Z96.641 V43.64   2. Hip pain  M25.559 719.45         Patient History     Row Name 10/29/20 0800 10/28/20 0642 10/26/20 1430       History    Chief Complaint  Balance Problems;Difficulty Walking;Difficulty with daily activities;Falls/history of falls;Joint stiffness;Muscle weakness;Pain  -KW  Balance Problems;Difficulty Walking;Difficulty with daily activities;Dizziness;Falls/history of falls;Fatigue/poor endurance;Joint stiffness;Muscle tenderness;Muscle weakness;Numbness;Pain  (Pended)   (Patient-Rptd)   -patient  Balance Problems;Difficulty  "Walking;Difficulty with daily activities;Dizziness;Falls/history of falls;Fatigue/poor endurance;Joint stiffness;Muscle tenderness;Muscle weakness;Numbness;Pain  (Pended)   (Patient-Rptd)   -patient    Type of Pain  Hip pain  -KW  Foot pain;Hip pain;Lower Extremity / Leg  (Pended)   (Patient-Rptd)   -patient  Foot pain;Hip pain;Lower Extremity / Leg  (Pended)   (Patient-Rptd)   -patient    Date Current Problem(s) Began  07/06/20  -KW  07/06/20  (Pended)   (Patient-Rptd)   -patient  07/06/20  (Pended)   (Patient-Rptd)   -patient    Brief Description of Current Complaint  Pt underwent R anterior ROSALINE 7/6/2020 secondary to arthritis with failed conservative management. Pt was NWB for 4 weeks post op, progressing to WBAT currently. Uses a RW for all ambulation. Reports weakness in RLE and pain that increases throughout the day with activity. Pt states she \"pretty much stays at home\" currently. Imaging revealed danny-prosthetic fx 2 weeks post op, MD treating conservatively. Has a diagnosis of Osteopenia. Also reports altered sensation around R hip. Pt reports most difficulty standing for long periods and performing self care below the knee. Pt uses a stair lift to ascend/descend flight of stairs to her bedroom, has for 3 years. Has 2 steps to enter home. Good assistance from spouse.   -KW  Hip replacement  surgery resulted in 2 fractures.   Must use a walker, problems getting up and down and doing everyday chores  (Pended)   (Patient-Rptd)   -patient  Hip replacement  surgery resulted in 2 fractures. This has caused still using a walker, problems getting up and down and doing everyday chores  (Pended)   (Patient-Rptd)   -patient    Patient/Caregiver Goals  Relieve pain;Return to prior level of function;Improve mobility;Improve strength  -KW  Relieve pain;Return to prior level of function;Improve mobility;Improve strength;Relief from dizziness  (Pended)   (Patient-Rptd)   -patient  Relieve pain;Return to prior level of " function;Improve mobility;Improve strength;Relief from dizziness  (Pended)   (Patient-Rptd)   -patient    Patient/Caregiver Goals Comment  To no longer need an AD.  -KW  --  --    Patient's Rating of General Health  Good  -KW  --  --    Hand Dominance  right-handed  -KW  right-handed  (Pended)   (Patient-Rptd)   -patient  right-handed  (Pended)   (Patient-Rptd)   -patient    Occupation/sports/leisure activities  Reading, decorating, use to enjoy walking before I couldnt do it  -KW  Reading, decorating, use to enjoy walking before I couldnt do it  (Pended)   (Patient-Rptd)   -patient  Reading, decorating, use to enjoy walking before I couldnt do it  (Pended)   (Patient-Rptd)   -patient    Patient seeing anyone else for problem(s)?  --  No, just Dr Queen and his assistant  (Pended)   (Patient-Rptd)   -patient  No, just Dr Queen and his assistant  (Pended)   (Patient-Rptd)   -patient    What clinical tests have you had for this problem?  X-ray  -KW  X-ray  (Pended)   (Patient-Rptd)   -patient  X-ray  (Pended)   (Patient-Rptd)   -patient    Results of Clinical Tests  Karyna-prosthetic fx per pt  -KW  --  --    Related/Recent Hospitalizations  Yes  -KW  --  --    Date of Hospitalization  07/06/20  -KW  --  --    Surgery/Hospitalization  R ant ROSALINE  -KW  --  --    Are you or can you be pregnant  --  No  (Pended)   (Patient-Rptd)   -patient  No  (Pended)   (Patient-Rptd)   -patient    Location (Hospital Name)  [facility]  -KW  --  --       Pain     Pain Location  Hip;Leg  -KW  --  --    Pain at Present  4  -KW  --  --    Pain at Best  3  -KW  --  --    Pain at Worst  7  -KW  --  --    Pain Frequency  Intermittent  -KW  --  --    Pain Description  Burning;Aching;Other (Comment) stinging  -KW  --  --    What Performance Factors Make the Current Problem(s) WORSE?  standing, sitting on hard surfaces  -KW  --  --    What Performance Factors Make the Current Problem(s) BETTER?  Reclined resting position with RLE extended and  elevated  -KW  --  --    Tolerance Time- Standing  5 mins  -KW  --  --    Tolerance Time- Sitting  30 mins  -KW  --  --    Tolerance Time- Walking  10 mins  -KW  --  --    Is your sleep disturbed?  Yes  -KW  --  --    Is medication used to assist with sleep?  No  -KW  --  --    Total hours of sleep per night  5  -KW  --  --       Fall Risk Assessment    Any falls in the past year:  No  -KW  No  (Pended)   (Patient-Rptd)   -patient  No  (Pended)   (Patient-Rptd)   -patient    Does patient have a fear of falling  Yes (comment)  -KW  --  --    Previous Functional Level  Ambulation  -KW  --  --       Services    Prior Rehab/Home Health Experiences  --  No  (Pended)   (Patient-Rptd)   -patient  No  (Pended)   (Patient-Rptd)   -patient    Are you currently receiving Home Health services  --  No  (Pended)   (Patient-Rptd)   -patient  No  (Pended)   (Patient-Rptd)   -patient    Do you plan to receive Home Health services in the near future  --  No  (Pended)   (Patient-Rptd)   -patient  No  (Pended)   (Patient-Rptd)   -patient       Daily Activities    Primary Language  --  English  (Pended)   (Patient-Rptd)   -patient  English  (Pended)   (Patient-Rptd)   -patient    Are you able to read  --  Yes  (Pended)   (Patient-Rptd)   -patient  Yes  (Pended)   (Patient-Rptd)   -patient    Are you able to write  --  Yes  (Pended)   (Patient-Rptd)   -patient  Yes  (Pended)   (Patient-Rptd)   -patient    How does patient learn best?  --  Listening;Reading;Demonstration;Pictures/Video  (Pended)   (Patient-Rptd)   -patient  Listening;Reading;Demonstration;Pictures/Video  (Pended)   (Patient-Rptd)   -patient       Safety    Are you being hurt, hit, or frightened by anyone at home or in your life?  No  -KW  No  (Pended)   (Patient-Rptd)   -patient  No  (Pended)   (Patient-Rptd)   -patient    Are you being neglected by a caregiver  No  -KW  No  (Pended)   (Patient-Rptd)   -patient  No  (Pended)   (Patient-Rptd)   -patient    Have you had any  of the following issues with  N/A  -KW  N/A  (Pended)   (Patient-Rptd)   -patient  N/A  (Pended)   (Patient-Rptd)   -patient      User Key  (r) = Recorded By, (t) = Taken By, (c) = Cosigned By    Initials Name Provider Type    Esequiel Ferrer PT Physical Therapist    patient Carla Best --          PT Ortho     Row Name 10/29/20 0800       Subjective Comments    Subjective Comments  See hx  -KW       Precautions and Contraindications    Precautions/Limitations  other (see comments) Osteopenia, Karyna-prosthetic fx, R ROSALINE, FM, Hypothyroid  -KW       Subjective Pain    Pre-Treatment Pain Level  4  -KW    Post-Treatment Pain Level  4  -KW       Posture/Observations    Posture/Observations Comments  Pt leans L away from painful hip in both sitting and standing posture. Ambulates with a RW, increased toe out on R, decreased gait speed but no instability or LOB noted. Decreasesed eccentric control RLE.  -KW       General ROM    GENERAL ROM COMMENTS  All other BLE ROM WFL  -KW       Right Lower Ext    Rt Hip Flexion AROM  75 degrees  -KW    RT Lower Extremity Comments  painful  -KW       MMT Right Lower Ext    Rt Lower Extremity Comments   did not resist this date 2/2 karyna-prosthetic fx, demonstrated 3/5 grossly but painful  -KW       MMT Left Lower Ext    Lt Hip Flexion MMT, Gross Movement  (4/5) good  -KW    Lt Hip Extension MMT, Gross Movement  (4+/5) good plus  -KW    Lt Hip ABduction MMT, Gross Movement  (4+/5) good plus  -KW       Sensation    Additional Comments  decreased LT sensation R groin/hip  -KW       Transfers    Sit-Stand Sawyer (Transfers)  modified independence  -KW    Stand-Sit Sawyer (Transfers)  modified independence  -KW    Transfers, Sit-Stand-Sit, Assist Device  rolling walker  -KW    Comment (Transfers)  Slow transfer with deviation L, slight LOB upon initial standing when not using AD  -KW       Gait/Stairs (Locomotion)    Sawyer Level (Gait)  modified independence  -KW     Assistive Device (Gait)  walker, front-wheeled  -KW    Distance in Feet (Gait)  100'  -KW    Pattern (Gait)  step-through  -KW    Deviations/Abnormal Patterns (Gait)  gait speed decreased  -KW    Comment (Gait/Stairs)  Decreased eccentric control in terminal stance on RLE, toe out greater R than L  -KW      User Key  (r) = Recorded By, (t) = Taken By, (c) = Cosigned By    Initials Name Provider Type    KW Esequiel Osei, PT Physical Therapist                            PT OP Goals     Row Name 10/29/20 0800          PT Short Term Goals    STG Date to Achieve  11/29/20  -KW     STG 1  Pt will demonstrate RLE AROM WFL  -KW     STG 1 Progress  New  -KW     STG 2  Pt will report 50% improvement in functional ability  -KW     STG 2 Progress  New  -KW     STG 3  Pt will be independent with self care and HEP.   -KW     STG 3 Progress  New  -KW     STG 4  Pt will ambulate community distances with SPC independently c/o increased pain.   -KW     STG 4 Progress  New  -KW        Long Term Goals    LTG Date to Achieve  12/29/20  -KW     LTG 1  Pt will demonstrate RLE MMT 4+/5 grossly  -KW     LTG 1 Progress  New  -KW     LTG 2  Pt will demonstrate ability to ambulate community distances with stable, symmetrical gait pattern with no AD.  -KW     LTG 2 Progress  New  -KW     LTG 3  Pt will ascend/descend 2 stairs independently without use of handrails.   -KW     LTG 3 Progress  New  -KW     LTG 4  Pt will achieve an LEFS score of 40/80 indicating improved perceived functional ability.   -KW     LTG 4 Progress  New  -KW        Time Calculation    PT Goal Re-Cert Due Date  11/19/20  -KW       User Key  (r) = Recorded By, (t) = Taken By, (c) = Cosigned By    Initials Name Provider Type    KW Esequiel Osei, PT Physical Therapist          PT Assessment/Plan     Row Name 10/29/20 0800          PT Assessment    Functional Limitations  Impaired gait;Limitation in home management;Performance in leisure activities;Limitations in community  activities;Impaired locomotion;Limitations in functional capacity and performance;Performance in self-care ADL  -KW     Impairments  Balance;Gait;Impaired muscle power;Impaired muscle endurance;Joint mobility;Locomotion;Muscle strength;Pain;Sensation;Range of motion;Posture  -KW     Assessment Comments  Pt presents to OP PT clinic this date s/p R ant ROSALINE performed 7/6/2020. Pt was initially NWB and has since progressed to WBAT with RW. Pt reports presence of danny-prosthetic fx in which MD elects to tx conservatively. Pt has a dx of Osteopenia and FM as well. Pt reports difficulty standing/ambulating for long durations 2/2 pain and decreased ability to perform self-care/ADL's below the knee. Decreased R hip flexion noted with pain, pain also with active abd/ext but ROM WFL. Pt demonstrates postural deviation L away from painful hip in both sitting and standing. No instability noted with gait with RW but pt demonstrates decreased gait speed, increased toe out on RLE, and dereased eccentric control in terminal stance on RLE. Will opt for gentle strengthening, stretching and functional mobility training until more clarity provided regarding presence of danny-prosthetic fx by MD. Pt is appropriate for skilled PT to improve strength and stability in functional mobillity and ADL's.  -KW     Please refer to paper survey for additional self-reported information  Yes  -KW     Rehab Potential  Good  -KW     Patient/caregiver participated in establishment of treatment plan and goals  Yes  -KW     Patient would benefit from skilled therapy intervention  Yes  -KW        PT Plan    PT Frequency  2x/week  -KW     Predicted Duration of Therapy Intervention (PT)  8 weeks  -KW     Planned CPT's?  PT EVAL MOD COMPLELITY: 15596;PT RE-EVAL: 64533;PT THER PROC EA 15 MIN: 63854;PT THER ACT EA 15 MIN: 59666;PT MANUAL THERAPY EA 15 MIN: 76047;PT NEUROMUSC RE-EDUCATION EA 15 MIN: 94171;PT GAIT TRAINING EA 15 MIN: 66966;PT HOT OR COLD PACK  TREAT MCARE;PT ELECTRICAL STIM UNATTEND: ;PT THER SUPP EA 15 MIN;PT SELF CARE/MGMT/TRAIN 15 MIN: 34501  -KW     Physical Therapy Interventions (Optional Details)  balance training;gait training;gross motor skills;home exercise program;manual therapy techniques;modalities;motor coordination training;neuromuscular re-education;patient/family education;postural re-education;ROM (Range of Motion);stair training;strengthening;stretching;transfer training  -KW     PT Plan Comments  Gentle strengthening and stretching of RLE, gait training, transfer training, strair training, manual and modalities as needed but be cautious of osteopenia and danny-prosthetic fx, per pt. Pt also has FM.   -KW       User Key  (r) = Recorded By, (t) = Taken By, (c) = Cosigned By    Initials Name Provider Type    Esequiel Ferrer PT Physical Therapist            OP Exercises     Row Name 10/29/20 0800             Precautions    Existing Precautions/Restrictions  other (see comments) Osteopenia, Danny-prosthetic fx, R ROSALINE, FM, Hypothyroid  -KW         Subjective Comments    Subjective Comments  See hx  -KW         Subjective Pain    Pre-Treatment Pain Level  4  -KW      Post-Treatment Pain Level  4  -KW         Exercise 1    Exercise Name 1  Supine heel slides  -KW      Reps 1  20  -KW      Additional Comments  HEP  -KW         Exercise 2    Exercise Name 2  Supine hip ABD  -KW      Reps 2  20  -KW      Additional Comments  HEP  -KW         Exercise 3    Exercise Name 3  Standing hip ext at counter  -KW      Reps 3  20  -KW      Additional Comments  HEP; no resistance  -KW        User Key  (r) = Recorded By, (t) = Taken By, (c) = Cosigned By    Initials Name Provider Type    Esequiel Ferrer PT Physical Therapist                        Outcome Measure Options: Lower Extremity Functional Scale (LEFS)  Lower Extremity Functional Index  Any of your usual work, housework or school activities: Quite a bit of difficulty  Your usual hobbies,  recreational or sporting activities: Quite a bit of difficulty  Getting into or out of the bath: Quite a bit of difficulty  Walking between rooms: Moderate difficulty  Putting on your shoes or socks: Quite a bit of difficulty  Squatting: Extreme difficulty or unable to perform activity  Lifting an object, like a bag of groceries from the floor: Extreme difficulty or unable to perform activity  Performing light activities around your home: Moderate difficulty  Performing heavy activities around your home: Extreme difficulty or unable to perform activity  Getting into or out of a car: Moderate difficulty  Walking 2 blocks: Extreme difficulty or unable to perform activity  Walking a mile: Extreme difficulty or unable to perform activity  Going up or down 10 stairs (about 1 flight of stairs): Extreme difficulty or unable to perform activity  Standing for 1 hour: Extreme difficulty or unable to perform activity  Sitting for 1 hour: Quite a bit of difficulty  Running on even ground: Extreme difficulty or unable to perform activity  Running on uneven ground: Extreme difficulty or unable to perform activity  Making sharp turns while running fast: Extreme difficulty or unable to perform activity  Hopping: Extreme difficulty or unable to perform activity  Rolling over in bed: Moderate difficulty  Total: 13      Time Calculation:     Start Time: 0847  Stop Time: 0931  Time Calculation (min): 44 min     Therapy Charges for Today     Code Description Service Date Service Provider Modifiers Qty    37562445722 HC PT EVAL MOD COMPLEXITY 3 10/29/2020 Esequiel Osei, PT GP 1          PT G-Codes  Outcome Measure Options: Lower Extremity Functional Scale (LEFS)  Total: 13         Esequiel Osei PT  10/29/2020

## 2020-11-03 ENCOUNTER — APPOINTMENT (OUTPATIENT)
Dept: PHYSICAL THERAPY | Facility: HOSPITAL | Age: 75
End: 2020-11-03

## 2020-11-04 ENCOUNTER — TRANSCRIBE ORDERS (OUTPATIENT)
Dept: PHYSICAL THERAPY | Facility: HOSPITAL | Age: 75
End: 2020-11-04

## 2020-11-04 DIAGNOSIS — Z96.641 PRESENCE OF RIGHT ARTIFICIAL HIP JOINT: Primary | ICD-10-CM

## 2020-11-04 DIAGNOSIS — M25.551 RIGHT HIP PAIN: ICD-10-CM

## 2020-11-05 ENCOUNTER — HOSPITAL ENCOUNTER (OUTPATIENT)
Dept: PHYSICAL THERAPY | Facility: HOSPITAL | Age: 75
Setting detail: THERAPIES SERIES
Discharge: HOME | End: 2020-11-05

## 2020-11-05 DIAGNOSIS — M25.559 HIP PAIN: ICD-10-CM

## 2020-11-05 DIAGNOSIS — Z96.641 HISTORY OF TOTAL HIP REPLACEMENT, RIGHT: Primary | ICD-10-CM

## 2020-11-05 PROCEDURE — 97110 THERAPEUTIC EXERCISES: CPT

## 2020-11-05 NOTE — THERAPY TREATMENT NOTE
Outpatient Physical Therapy Ortho Treatment Note  [facility]     Patient Name: Carla Best  : 1945  MRN: [id number]  Today's Date: 2020      Visit Date: 2020   Attendance: 2/2 (medicare approved)  Subjective Improvement: n/a  Next MD Visit: TBD  Recert Date: 2020    Therapy Diagnosis: History of R ant ROSALINE 2020      Visit Dx:    ICD-10-CM ICD-9-CM   1. History of total hip replacement, right  Z96.641 V43.64   2. Hip pain  M25.559 719.45       Patient Active Problem List   Diagnosis   • Osteopenia   • Hypothyroidism due to Hashimoto's thyroiditis   • Vitamin D deficiency   • Keratoconjunctivitis sicca (CMS/HCC)   • Long term use of drug   • Cataract   • Primary osteoarthritis of right knee   • B12 deficiency   • Right hip pain   • Encounter for screening for osteoporosis   • GERD (gastroesophageal reflux disease)   • Hip pain, acute, right   • Myofascial pain   • Neck pain   • Osteoarthritis   • Spondylosis of cervical joint   • Palpitations   • Acquired hypothyroidism   • Pre-operative clearance   • Vaginal discharge   • Vaginal irritation   • Urinary incontinence in female   • UTI (urinary tract infection)   • Female cystocele   • Acute cystitis without hematuria   • Overweight   • Diverticulosis of large intestine without perforation or abscess without bleeding   • Generalized abdominal pain   • Helicobacter pylori (H. 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   • Fibromyalgia   • Cystocele with prolapse   • Obstructive sleep apnea, adult   • Age-related osteoporosis without current pathological fracture        Past Medical History:   Diagnosis Date   • Chest pain    • Fibrocystic disease of breast    •  "Hashimoto's thyroiditis    • History of screening mammography 08/27/2014    SCREENING MAMMOGRAPHY DIGITAL  (Medicare) (1)    • Hyperlipidemia    • Hypothyroidism due to Hashimoto's thyroiditis 12/3/2016   • Keratoconjunctivitis sicca (CMS/HCC)    • Nuclear senile cataract    • On long term drug therapy    • Osteoarthritis    • Osteopenia    • Osteopenia 12/3/2016   • Photopsia     Right   • Postmenopausal bleeding    • Sinus tachycardia    • Sjogren's syndrome (CMS/HCC)    • Vitamin D deficiency    • Vitamin D deficiency 12/3/2016   • Vitreous detachment of right eye         Past Surgical History:   Procedure Laterality Date   • CATARACT EXTRACTION, BILATERAL  2018   • CHOLECYSTECTOMY  06/09/1997    with operative cholangiogram. Chronic cholecystitis & cholelithiasis. HX of passed common duct stone   • CYSTOSCOPY  11/26/1962    urethral dilatation.Recurrent pyelonephritis. urethritis   • ENDOSCOPY N/A 2/17/2017    Procedure: ESOPHAGOGASTRODUODENOSCOPY;  Surgeon: Cisco Mason DO;  Location: [facility] ENDOSCOPY;  Service:    • ENDOSCOPY N/A 10/31/2018    Procedure: ESOPHAGOGASTRODUODENOSCOPY;  Surgeon: Cisco Mason DO;  Location: [facility] ENDOSCOPY;  Service: Gastroenterology   • ENDOSCOPY AND COLONOSCOPY  09/30/1985    Normal colon to left transverse colon   • GALLBLADDER SURGERY     • JOINT REPLACEMENT      R Ant ROSALINE 7/6/2020   • PAP SMEAR  2009   • TONSILLECTOMY  1950   • VAGINAL DELIVERY      x3       PT Ortho     Row Name 11/05/20 1000       Subjective Comments    Subjective Comments  Pt stated that she has been okay since her initial evaluation. She has not any \"serious issues\" since the evaluation but does have lateral hip pain.   -MM       Precautions and Contraindications    Precautions/Limitations  other (see comments) Osteopenia, Karyna-prosthetic fx, R ROSALINE, FM, Hypothyroid  -MM    Contraindications  (S) Fracture, Fibromyalgia  -MM       Subjective Pain    Able to rate subjective pain?  yes  -MM    " Pre-Treatment Pain Level  3  -MM       Posture/Observations    Posture/Observations Comments  Left shoulder hike. leaning towards left side in sitting and standing.  -MM       Gait/Stairs (Locomotion)    [place] Level (Gait)  modified independence  -MM    Assistive Device (Gait)  walker, front-wheeled  -MM    Distance in Feet (Gait)  270  -MM    Deviations/Abnormal Patterns (Gait)  gait speed decreased  -MM    Comment (Gait/Stairs)  Decreased heel strike/DF on right.  -MM      User Key  (r) = Recorded By, (t) = Taken By, (c) = Cosigned By    Initials Name Provider Type    MM Elisabeth Zepeda, PT Physical Therapist                      PT Assessment/Plan     Row Name 11/05/20 1000          PT Assessment    Functional Limitations  Impaired gait;Limitation in home management;Performance in leisure activities;Limitations in community activities;Impaired locomotion;Limitations in functional capacity and performance;Performance in self-care ADL  -MM     Impairments  Balance;Gait;Impaired muscle power;Impaired muscle endurance;Joint mobility;Locomotion;Muscle strength;Pain;Sensation;Range of motion;Posture  -MM     Assessment Comments  Carla tolerated today's session well with some increase in hip pain post session. Initiated hip, knee, and ankle ROM and light strengthening this visit. Pt was provided with a written handout of her HEP which was also updated this visit.  -MM     Rehab Potential  Good  -MM     Patient/caregiver participated in establishment of treatment plan and goals  Yes  -MM     Patient would benefit from skilled therapy intervention  Yes  -MM        PT Plan    PT Frequency  2x/week  -MM     Predicted Duration of Therapy Intervention (PT)  8 weeks  -MM     PT Plan Comments  Cont with gentle stretching and strengthening of R LE as well as gait training. Modalities for pain management as needed.  -MM       User Key  (r) = Recorded By, (t) = Taken By, (c) = Cosigned By    Initials Name Provider Type    DISHA  "Elisabeth Zepeda, OJ Physical Therapist          Modalities     Row Name 11/05/20 1000             Ice    Patient denies application of Ice  Yes  -MM      Patient reports will apply ice at home to involved area  Yes  -MM        User Key  (r) = Recorded By, (t) = Taken By, (c) = Cosigned By    Initials Name Provider Type    MM Elisabeth Zepeda, PT Physical Therapist        OP Exercises     Row Name 11/05/20 1000             Subjective Comments    Subjective Comments  Pt stated that she has been okay since her initial evaluation. She has not any \"serious issues\" since the evaluation but does have lateral hip pain.   -MM         Subjective Pain    Able to rate subjective pain?  yes  -MM      Pre-Treatment Pain Level  3  -MM      Post-Treatment Pain Level  5  -MM         Exercise 1    Exercise Name 1  Supine heel slides  -MM      Cueing 1  Verbal;Tactile  -MM      Sets 1  2  -MM      Reps 1  10  -MM         Exercise 2    Exercise Name 2  Supine hip ABd slides  -MM      Cueing 2  Verbal  -MM      Sets 2  2  -MM      Reps 2  10  -MM         Exercise 3    Exercise Name 3  Supine hip add ball squeeze  -MM      Cueing 3  Verbal  -MM      Sets 3  1  -MM      Reps 3  10  -MM      Time 3  5 sec  -MM         Exercise 4    Exercise Name 4  Seated alt marches  -MM      Cueing 4  Verbal  -MM      Sets 4  1  -MM      Reps 4  20  -MM         Exercise 5    Exercise Name 5  LAQ  -MM      Cueing 5  Verbal;Demo  -MM      Sets 5  1  -MM      Reps 5  20  -MM         Exercise 6    Exercise Name 6  Standing hip 3-way  -MM      Cueing 6  Verbal;Demo  -MM      Sets 6  1  -MM      Reps 6  20 each  -MM      Additional Comments  HEP  -MM         Exercise 7    Exercise Name 7  Seated HR/TR  -MM      Cueing 7  Verbal;Demo  -MM      Sets 7  1  -MM      Reps 7  20  -MM         Exercise 8    Exercise Name 8  Gait training with FWRW  -MM      Additional Comments  270 feet  -MM         Exercise 9    Exercise Name 9  Glute sets  -MM      Cueing 9  " Verbal;Demo  -MM      Sets 9  1  -MM      Reps 9  20  -MM      Time 9  3 sec  -MM        User Key  (r) = Recorded By, (t) = Taken By, (c) = Cosigned By    Initials Name Provider Type    Elisabeth Toledo PT Physical Therapist                       PT OP Goals     Row Name 11/05/20 1000          PT Short Term Goals    STG Date to Achieve  11/29/20  -MM     STG 1  Pt will demonstrate RLE AROM WFL  -MM     STG 1 Progress  Ongoing  -MM     STG 2  Pt will report 50% improvement in functional ability  -MM     STG 2 Progress  Ongoing  -MM     STG 3  Pt will be independent with self care and HEP.   -MM     STG 3 Progress  Ongoing  -MM     STG 4  Pt will ambulate community distances with SPC independently c/o increased pain.   -MM     STG 4 Progress  Ongoing  -MM        Long Term Goals    LTG Date to Achieve  12/29/20  -MM     LTG 1  Pt will demonstrate RLE MMT 4+/5 grossly  -MM     LTG 1 Progress  Ongoing  -MM     LTG 2  Pt will demonstrate ability to ambulate community distances with stable, symmetrical gait pattern with no AD.  -MM     LTG 2 Progress  Ongoing  -MM     LTG 3  Pt will ascend/descend 2 stairs independently without use of handrails.   -MM     LTG 3 Progress  Ongoing  -MM     LTG 4  Pt will achieve an LEFS score of 40/80 indicating improved perceived functional ability.   -MM     LTG 4 Progress  Ongoing  -MM        Time Calculation    PT Goal Re-Cert Due Date  11/19/20  -MM       User Key  (r) = Recorded By, (t) = Taken By, (c) = Cosigned By    Initials Name Provider Type    Elisabeth Toledo PT Physical Therapist          Therapy Education  Education Details: HEP: standing hip 3-way, supine heel slides, supine hip abd, seated marching,  Given: HEP  Program: New  How Provided: Verbal, Demonstration, Written  Provided to: Patient  Level of Understanding: Verbalized, Demonstrated              Time Calculation:   Start Time: 1015  Stop Time: 1050  Time Calculation (min): 35 min  Therapy Charges for Today      Code Description Service Date Service Provider Modifiers Qty    46105986881 HC PT THER PROC EA 15 MIN 11/5/2020 Elisabeth Zepeda, PT GP 2                    Elisabeth Zepeda, PT  11/5/2020

## 2020-11-06 ENCOUNTER — LAB (OUTPATIENT)
Dept: LAB | Facility: HOSPITAL | Age: 75
End: 2020-11-06

## 2020-11-06 LAB
25(OH)D3 SERPL-MCNC: 41.3 NG/ML (ref 30–100)
ALBUMIN SERPL-MCNC: 4.3 G/DL (ref 3.5–5.2)
ALBUMIN/GLOB SERPL: 1.7 G/DL
ALP SERPL-CCNC: 98 U/L (ref 39–117)
ALT SERPL W P-5'-P-CCNC: 12 U/L (ref 1–33)
ANION GAP SERPL CALCULATED.3IONS-SCNC: 8.5 MMOL/L (ref 5–15)
AST SERPL-CCNC: 20 U/L (ref 1–32)
BASOPHILS # BLD AUTO: 0.05 10*3/MM3 (ref 0–0.2)
BASOPHILS NFR BLD AUTO: 0.8 % (ref 0–1.5)
BILIRUB SERPL-MCNC: 0.6 MG/DL (ref 0–1.2)
BUN SERPL-MCNC: 11 MG/DL (ref 8–23)
BUN/CREAT SERPL: 18 (ref 7–25)
CALCIUM SPEC-SCNC: 10 MG/DL (ref 8.6–10.5)
CHLORIDE SERPL-SCNC: 104 MMOL/L (ref 98–107)
CHOLEST SERPL-MCNC: 227 MG/DL (ref 0–200)
CO2 SERPL-SCNC: 27.5 MMOL/L (ref 22–29)
CREAT SERPL-MCNC: 0.61 MG/DL (ref 0.57–1)
DEPRECATED RDW RBC AUTO: 39.1 FL (ref 37–54)
EOSINOPHIL # BLD AUTO: 0.39 10*3/MM3 (ref 0–0.4)
EOSINOPHIL NFR BLD AUTO: 6.2 % (ref 0.3–6.2)
ERYTHROCYTE [DISTWIDTH] IN BLOOD BY AUTOMATED COUNT: 12.9 % (ref 12.3–15.4)
GFR SERPL CREATININE-BSD FRML MDRD: 95 ML/MIN/1.73
GLOBULIN UR ELPH-MCNC: 2.5 GM/DL
GLUCOSE SERPL-MCNC: 92 MG/DL (ref 65–99)
HCT VFR BLD AUTO: 40.6 % (ref 34–46.6)
HDLC SERPL-MCNC: 54 MG/DL (ref 40–60)
HGB BLD-MCNC: 13.7 G/DL (ref 12–15.9)
IMM GRANULOCYTES # BLD AUTO: 0.03 10*3/MM3 (ref 0–0.05)
IMM GRANULOCYTES NFR BLD AUTO: 0.5 % (ref 0–0.5)
LDLC SERPL CALC-MCNC: 138 MG/DL (ref 0–100)
LDLC/HDLC SERPL: 2.47 {RATIO}
LYMPHOCYTES # BLD AUTO: 1.62 10*3/MM3 (ref 0.7–3.1)
LYMPHOCYTES NFR BLD AUTO: 25.6 % (ref 19.6–45.3)
MCH RBC QN AUTO: 28.5 PG (ref 26.6–33)
MCHC RBC AUTO-ENTMCNC: 33.7 G/DL (ref 31.5–35.7)
MCV RBC AUTO: 84.6 FL (ref 79–97)
MONOCYTES # BLD AUTO: 0.56 10*3/MM3 (ref 0.1–0.9)
MONOCYTES NFR BLD AUTO: 8.8 % (ref 5–12)
NEUTROPHILS NFR BLD AUTO: 3.69 10*3/MM3 (ref 1.7–7)
NEUTROPHILS NFR BLD AUTO: 58.1 % (ref 42.7–76)
NRBC BLD AUTO-RTO: 0 /100 WBC (ref 0–0.2)
PLATELET # BLD AUTO: 296 10*3/MM3 (ref 140–450)
PMV BLD AUTO: 10.3 FL (ref 6–12)
POTASSIUM SERPL-SCNC: 4.6 MMOL/L (ref 3.5–5.2)
PROT SERPL-MCNC: 6.8 G/DL (ref 6–8.5)
RBC # BLD AUTO: 4.8 10*6/MM3 (ref 3.77–5.28)
SODIUM SERPL-SCNC: 140 MMOL/L (ref 136–145)
T3FREE SERPL-MCNC: 3.36 PG/ML (ref 2–4.4)
T4 FREE SERPL-MCNC: 1.65 NG/DL (ref 0.93–1.7)
TRIGL SERPL-MCNC: 199 MG/DL (ref 0–150)
TSH SERPL DL<=0.05 MIU/L-ACNC: 0.22 UIU/ML (ref 0.27–4.2)
VIT B12 BLD-MCNC: 943 PG/ML (ref 211–946)
VLDLC SERPL-MCNC: 35 MG/DL (ref 5–40)
WBC # BLD AUTO: 6.34 10*3/MM3 (ref 3.4–10.8)

## 2020-11-06 PROCEDURE — 82607 VITAMIN B-12: CPT | Performed by: INTERNAL MEDICINE

## 2020-11-06 PROCEDURE — 84481 FREE ASSAY (FT-3): CPT | Performed by: INTERNAL MEDICINE

## 2020-11-06 PROCEDURE — 84439 ASSAY OF FREE THYROXINE: CPT | Performed by: INTERNAL MEDICINE

## 2020-11-06 PROCEDURE — 36415 COLL VENOUS BLD VENIPUNCTURE: CPT | Performed by: INTERNAL MEDICINE

## 2020-11-06 PROCEDURE — 80061 LIPID PANEL: CPT | Performed by: INTERNAL MEDICINE

## 2020-11-06 PROCEDURE — 80053 COMPREHEN METABOLIC PANEL: CPT | Performed by: INTERNAL MEDICINE

## 2020-11-06 PROCEDURE — 82306 VITAMIN D 25 HYDROXY: CPT | Performed by: INTERNAL MEDICINE

## 2020-11-06 PROCEDURE — 84443 ASSAY THYROID STIM HORMONE: CPT | Performed by: INTERNAL MEDICINE

## 2020-11-06 PROCEDURE — 85025 COMPLETE CBC W/AUTO DIFF WBC: CPT | Performed by: INTERNAL MEDICINE

## 2020-11-09 ENCOUNTER — HOSPITAL ENCOUNTER (OUTPATIENT)
Dept: PHYSICAL THERAPY | Facility: HOSPITAL | Age: 75
Setting detail: THERAPIES SERIES
Discharge: HOME | End: 2020-11-09

## 2020-11-09 DIAGNOSIS — M25.559 HIP PAIN: ICD-10-CM

## 2020-11-09 DIAGNOSIS — Z96.641 HISTORY OF TOTAL HIP REPLACEMENT, RIGHT: Primary | ICD-10-CM

## 2020-11-09 DIAGNOSIS — E55.9 VITAMIN D DEFICIENCY: ICD-10-CM

## 2020-11-09 DIAGNOSIS — E53.8 B12 DEFICIENCY: ICD-10-CM

## 2020-11-09 DIAGNOSIS — E89.0 POSTABLATIVE HYPOTHYROIDISM: Primary | ICD-10-CM

## 2020-11-09 PROCEDURE — 97110 THERAPEUTIC EXERCISES: CPT

## 2020-11-09 NOTE — THERAPY TREATMENT NOTE
Outpatient Physical Therapy Ortho Treatment Note  [facility]     Patient Name: Carla Best  : 1945  MRN: [id number]  Today's Date: 2020      Visit Date: 2020     Attendance: 3/3 (medicare approved)  Subjective Improvement: 0%  Next MD Visit: PANKAJ  Recert Date: 2020     Therapy Diagnosis: History of R ant ROSALINE 2020    Visit Dx:    ICD-10-CM ICD-9-CM   1. History of total hip replacement, right  Z96.641 V43.64   2. Hip pain  M25.559 719.45       Patient Active Problem List   Diagnosis   • Osteopenia   • Hypothyroidism due to Hashimoto's thyroiditis   • Vitamin D deficiency   • Keratoconjunctivitis sicca (CMS/HCC)   • Long term use of drug   • Cataract   • Primary osteoarthritis of right knee   • B12 deficiency   • Right hip pain   • Encounter for screening for osteoporosis   • GERD (gastroesophageal reflux disease)   • Hip pain, acute, right   • Myofascial pain   • Neck pain   • Osteoarthritis   • Spondylosis of cervical joint   • Palpitations   • Acquired hypothyroidism   • Pre-operative clearance   • Vaginal discharge   • Vaginal irritation   • Urinary incontinence in female   • UTI (urinary tract infection)   • Female cystocele   • Acute cystitis without hematuria   • Overweight   • Diverticulosis of large intestine without perforation or abscess without bleeding   • Generalized abdominal pain   • Helicobacter pylori (H. 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   • Fibromyalgia   • Cystocele with prolapse   • Obstructive sleep apnea, adult   • Age-related osteoporosis without current pathological fracture        Past Medical History:   Diagnosis Date   • Chest pain    • Fibrocystic disease of breast    •  Hashimoto's thyroiditis    • History of screening mammography 08/27/2014    SCREENING MAMMOGRAPHY DIGITAL  (Medicare) (1)    • Hyperlipidemia    • Hypothyroidism due to Hashimoto's thyroiditis 12/3/2016   • Keratoconjunctivitis sicca (CMS/HCC)    • Nuclear senile cataract    • On long term drug therapy    • Osteoarthritis    • Osteopenia    • Osteopenia 12/3/2016   • Photopsia     Right   • Postmenopausal bleeding    • Sinus tachycardia    • Sjogren's syndrome (CMS/[facility])    • Vitamin D deficiency    • Vitamin D deficiency 12/3/2016   • Vitreous detachment of right eye         Past Surgical History:   Procedure Laterality Date   • CATARACT EXTRACTION, BILATERAL  2018   • CHOLECYSTECTOMY  06/09/1997    with operative cholangiogram. Chronic cholecystitis & cholelithiasis. HX of passed common duct stone   • CYSTOSCOPY  11/26/1962    urethral dilatation.Recurrent pyelonephritis. urethritis   • ENDOSCOPY N/A 2/17/2017    Procedure: ESOPHAGOGASTRODUODENOSCOPY;  Surgeon: Cisco Mason DO;  Location: [facility] ENDOSCOPY;  Service:    • ENDOSCOPY N/A 10/31/2018    Procedure: ESOPHAGOGASTRODUODENOSCOPY;  Surgeon: Cisco Mason DO;  Location: [facility] ENDOSCOPY;  Service: Gastroenterology   • ENDOSCOPY AND COLONOSCOPY  09/30/1985    Normal colon to left transverse colon   • GALLBLADDER SURGERY     • JOINT REPLACEMENT      R Ant ROSALINE 7/6/2020   • PAP SMEAR  2009   • TONSILLECTOMY  1950   • VAGINAL DELIVERY      x3       PT Ortho     Row Name 11/09/20 1100       Precautions and Contraindications    Precautions/Limitations  other (see comments) See exercise flow sheet  -KALPANA    Contraindications  Fracture, Fibromyalgia  -KALPANA       Posture/Observations    Posture/Observations Comments  gait with good stride; non antalgic with RW  -      User Key  (r) = Recorded By, (t) = Taken By, (c) = Cosigned By    Initials Name Provider Type    Eula Caro PTA Physical Therapy Assistant                      PT Assessment/Plan     Row  Name 11/09/20 1200          PT Assessment    Assessment Comments  slight increased pain with treatment; most with clam shells this date; otherwise progressing well towards goals.   -KALPANA        PT Plan    PT Frequency  2x/week  -KALPANA     Predicted Duration of Therapy Intervention (PT)  8 weeks  -     PT Plan Comments  Possible pro ll next; progress CKC as able.   -KALPANA       User Key  (r) = Recorded By, (t) = Taken By, (c) = Cosigned By    Initials Name Provider Type    Eula Caro PTA Physical Therapy Assistant          Modalities     Row Name 11/09/20 1100             Ice    Patient reports will apply ice at home to involved area  Yes  -KALPANA        User Key  (r) = Recorded By, (t) = Taken By, (c) = Cosigned By    Initials Name Provider Type    Eula Caro PTA Physical Therapy Assistant        OP Exercises     Row Name 11/09/20 1100             Precautions    Existing Precautions/Restrictions  other (see comments) Osteopenia, Karyna-prosthetic fx, R ROSALINE, FM, Hypothyroid  -         Subjective Comments    Subjective Comments  Patient reports that she is feeling some better; still has some pain and difficulty with ADLs but is improving.   -         Subjective Pain    Able to rate subjective pain?  yes  -KH      Pre-Treatment Pain Level  2  -KH      Post-Treatment Pain Level  4  -KH         Exercise 1    Exercise Name 1  Supine heel slides  -KH      Cueing 1  Verbal  -KH      Sets 1  2  -KH      Reps 1  10  -KH         Exercise 2    Exercise Name 2  Bridging  -KH      Cueing 2  Verbal  -KH      Sets 2  2  -KH      Reps 2  10  -KH         Exercise 3    Exercise Name 3  [place] marches  -KH      Cueing 3  Verbal  -KH      Sets 3  2  -KH      Reps 3  10  -KH         Exercise 4    Exercise Name 4  [place] add  -KH      Cueing 4  Verbal  -KH      Sets 4  2  -KH      Reps 4  10  -KH         Exercise 5    Exercise Name 5  Clamshells   -KH      Cueing 5  Verbal  -KH      Sets 5  2  -KH      Reps 5  10  -KH          "Exercise 6    Exercise Name 6  Single knee to chest stretch  -KH      Cueing 6  Verbal  -KH      Sets 6  3  -KH      Time 6  30\"  -KH         Exercise 7    Exercise Name 7  Piriformis stretch  -KH      Cueing 7  Verbal  -KH      Sets 7  3  -KH      Time 7  30\"  -KH         Exercise 8    Exercise Name 8  Seated HR/TR  -KH      Sets 8  2  -KH      Reps 8  10 each  -KH         Exercise 9    Exercise Name 9  Gait training with FWRW  -KH      Additional Comments  700 feet  -        User Key  (r) = Recorded By, (t) = Taken By, (c) = Cosigned By    Initials Name Provider Type    Eula Caro PTA Physical Therapy Assistant                       PT OP Goals     Row Name 11/09/20 1200          PT Short Term Goals    STG Date to Achieve  11/29/20  -     STG 1  Pt will demonstrate RLE AROM WFL  -KH     STG 1 Progress  Ongoing  -     STG 2  Pt will report 50% improvement in functional ability  -     STG 2 Progress  Ongoing  -     STG 3  Pt will be independent with self care and HEP.   -     STG 3 Progress  Ongoing  -     STG 4  Pt will ambulate community distances with SPC independently c/o increased pain.   -     STG 4 Progress  Ongoing  -        Long Term Goals    LTG Date to Achieve  12/29/20  -KH     LTG 1  Pt will demonstrate RLE MMT 4+/5 grossly  -     LTG 1 Progress  Ongoing  -     LTG 2  Pt will demonstrate ability to ambulate community distances with stable, symmetrical gait pattern with no AD.  -     LTG 2 Progress  Ongoing  -     LTG 3  Pt will ascend/descend 2 stairs independently without use of handrails.   -     LTG 3 Progress  Ongoing  -     LTG 4  Pt will achieve an LEFS score of 40/80 indicating improved perceived functional ability.   -     LTG 4 Progress  Ongoing  -        Time Calculation    PT Goal Re-Cert Due Date  11/19/20  -       User Key  (r) = Recorded By, (t) = Taken By, (c) = Cosigned By    Initials Name Provider Type    Eula Caro PTA Physical Therapy " Assistant                         Time Calculation:   Start Time: 1100  Stop Time: 1140  Time Calculation (min): 40 min  Total Timed Code Minutes- PT: 40 minute(s)  Therapy Charges for Today     Code Description Service Date Service Provider Modifiers Qty    14830473053 HC PT THER PROC EA 15 MIN 11/9/2020 Eula Tsai, PTA GP 3                    Eula Tsai PTA  11/9/2020

## 2020-11-12 ENCOUNTER — HOSPITAL ENCOUNTER (OUTPATIENT)
Dept: PHYSICAL THERAPY | Facility: HOSPITAL | Age: 75
Setting detail: THERAPIES SERIES
Discharge: HOME | End: 2020-11-12

## 2020-11-12 DIAGNOSIS — M25.559 HIP PAIN: ICD-10-CM

## 2020-11-12 DIAGNOSIS — Z96.641 HISTORY OF TOTAL HIP REPLACEMENT, RIGHT: Primary | ICD-10-CM

## 2020-11-12 PROCEDURE — 97110 THERAPEUTIC EXERCISES: CPT

## 2020-11-12 NOTE — THERAPY TREATMENT NOTE
Outpatient Physical Therapy Ortho Treatment Note  [facility]     Patient Name: Carla Best  : 1945  MRN: [id number]  Today's Date: 2020      Visit Date: 2020   Attendance:  (medicare approved)  Subjective Improvement: 40%  Next MD Visit: TBBRENNEN  Recert Date: 2020     Therapy Diagnosis: History of R ant ROSALINE 2020    Visit Dx:    ICD-10-CM ICD-9-CM   1. History of total hip replacement, right  Z96.641 V43.64   2. Hip pain  M25.559 719.45       Patient Active Problem List   Diagnosis   • Osteopenia   • Hypothyroidism due to Hashimoto's thyroiditis   • Vitamin D deficiency   • Keratoconjunctivitis sicca (CMS/HCC)   • Long term use of drug   • Cataract   • Primary osteoarthritis of right knee   • B12 deficiency   • Right hip pain   • Encounter for screening for osteoporosis   • GERD (gastroesophageal reflux disease)   • Hip pain, acute, right   • Myofascial pain   • Neck pain   • Osteoarthritis   • Spondylosis of cervical joint   • Palpitations   • Acquired hypothyroidism   • Pre-operative clearance   • Vaginal discharge   • Vaginal irritation   • Urinary incontinence in female   • UTI (urinary tract infection)   • Female cystocele   • Acute cystitis without hematuria   • Overweight   • Diverticulosis of large intestine without perforation or abscess without bleeding   • Generalized abdominal pain   • Helicobacter pylori (H. 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   • Fibromyalgia   • Cystocele with prolapse   • Obstructive sleep apnea, adult   • Age-related osteoporosis without current pathological fracture        Past Medical History:   Diagnosis Date   • Chest pain    • Fibrocystic disease of breast    •  Hashimoto's thyroiditis    • History of screening mammography 08/27/2014    SCREENING MAMMOGRAPHY DIGITAL  (Medicare) (1)    • Hyperlipidemia    • Hypothyroidism due to Hashimoto's thyroiditis 12/3/2016   • Keratoconjunctivitis sicca (CMS/HCC)    • Nuclear senile cataract    • On long term drug therapy    • Osteoarthritis    • Osteopenia    • Osteopenia 12/3/2016   • Photopsia     Right   • Postmenopausal bleeding    • Sinus tachycardia    • Sjogren's syndrome (CMS/[facility])    • Vitamin D deficiency    • Vitamin D deficiency 12/3/2016   • Vitreous detachment of right eye         Past Surgical History:   Procedure Laterality Date   • CATARACT EXTRACTION, BILATERAL  2018   • CHOLECYSTECTOMY  06/09/1997    with operative cholangiogram. Chronic cholecystitis & cholelithiasis. HX of passed common duct stone   • CYSTOSCOPY  11/26/1962    urethral dilatation.Recurrent pyelonephritis. urethritis   • ENDOSCOPY N/A 2/17/2017    Procedure: ESOPHAGOGASTRODUODENOSCOPY;  Surgeon: Cisco Mason DO;  Location: [facility] ENDOSCOPY;  Service:    • ENDOSCOPY N/A 10/31/2018    Procedure: ESOPHAGOGASTRODUODENOSCOPY;  Surgeon: Cisco Mason DO;  Location: [facility] ENDOSCOPY;  Service: Gastroenterology   • ENDOSCOPY AND COLONOSCOPY  09/30/1985    Normal colon to left transverse colon   • GALLBLADDER SURGERY     • JOINT REPLACEMENT      R Ant ROSALINE 7/6/2020   • PAP SMEAR  2009   • TONSILLECTOMY  1950   • VAGINAL DELIVERY      x3       PT Ortho     Row Name 11/12/20 1000       Subjective Comments    Subjective Comments  Pt stated that her pain is a little elevated today. She went on a walk which she thinks is the cause of her pain. 40% subjective improvement. Difficulty with bending, squatting, and lifting leg.  -MM       Precautions and Contraindications    Precautions/Limitations  other (see comments)  -MM    Contraindications  Fracture, Fibromyalgia  -MM       Subjective Pain    Able to rate subjective pain?  yes  -MM    Pre-Treatment  Pain Level  5  -MM    Post-Treatment Pain Level  5  -MM       Posture/Observations    Posture/Observations Comments  No lateral leaning in sitting, standing, or with gait this visit. Improved right hip mobility.  -MM       Gait/Stairs (Locomotion)    Assistive Device (Gait)  walker, front-wheeled  -MM    Comment (Gait/Stairs)  Ambulating with FWRW. Decreased heel strike (mid foot contact) on R LE. Corrected with verbal cueing.  -MM      User Key  (r) = Recorded By, (t) = Taken By, (c) = Cosigned By    Initials Name Provider Type    Elisabeth Toledo PT Physical Therapist                      PT Assessment/Plan     Row Name 11/12/20 1000          PT Assessment    Functional Limitations  Impaired gait;Limitation in home management;Performance in leisure activities;Limitations in community activities;Impaired locomotion;Limitations in functional capacity and performance;Performance in self-care ADL  -MM     Impairments  Balance;Gait;Impaired muscle power;Impaired muscle endurance;Joint mobility;Locomotion;Muscle strength;Pain;Sensation;Range of motion;Posture  -MM     Assessment Comments  Initiated pro II this visit. No reports of discomfort or increased pain. Improved gait mechanics. Decreased lateral leaning in sitting, standing, and with gait. Pt has a tendency to walk with mid foot initial contact but is able to heel strike with verbal cuing and decreasing gait speed.  -MM     Rehab Potential  Good  -MM     Patient/caregiver participated in establishment of treatment plan and goals  Yes  -MM     Patient would benefit from skilled therapy intervention  Yes  -MM        PT Plan    PT Frequency  2x/week  -MM     Predicted Duration of Therapy Intervention (PT)  8 weeks  -MM     PT Plan Comments  Weight shifts, standing balance, attempt mini squats.  -MM       User Key  (r) = Recorded By, (t) = Taken By, (c) = Cosigned By    Initials Name Provider Type    Elisabeth Toledo PT Physical Therapist            OP Exercises      Row Name 11/12/20 1000             Subjective Comments    Subjective Comments  Pt stated that her pain is a little elevated today. She went on a walk which she thinks is the cause of her pain. 40% subjective improvement. Difficulty with bending, squatting, and lifting leg.  -MM         Subjective Pain    Able to rate subjective pain?  yes  -MM      Pre-Treatment Pain Level  5  -MM      Post-Treatment Pain Level  5  -MM         Exercise 1    Exercise Name 1  Supine hip Abd  -MM      Cueing 1  Verbal  -MM      Sets 1  1  -MM      Reps 1  20  -MM         Exercise 2    Exercise Name 2  Supine heel slides with strap  -MM      Cueing 2  Verbal  -MM      Sets 2  1  -MM      Reps 2  20  -MM         Exercise 3    Exercise Name 3  HL alt marches  -MM      Cueing 3  Verbal  -MM      Sets 3  1  -MM      Reps 3  20  -MM         Exercise 4    Exercise Name 4  Bridges  -MM      Cueing 4  Verbal  -MM      Sets 4  2  -MM      Reps 4  10  -MM      Time 4  3 sec hold  -MM         Exercise 5    Exercise Name 5  Single knee to chest stretch  -MM      Cueing 5  Verbal  -MM      Sets 5  3  -MM      Time 5  30 sec  -MM         Exercise 6    Exercise Name 6  Clamshells  -MM      Cueing 6  Verbal  -MM      Sets 6  2  -MM      Reps 6  10  -MM         Exercise 7    Exercise Name 7  Seated HR/TR  -MM      Cueing 7  Verbal  -MM      Sets 7  1  -MM      Reps 7  20  -MM         Exercise 8    Exercise Name 8  Hip add ball squeeze  -MM      Cueing 8  Verbal  -MM      Sets 8  1  -MM      Reps 8  10  -MM      Time 8  3 sec  -MM         Exercise 9    Exercise Name 9  Gait training with FWRW  -MM      Additional Comments  1 lap  -MM         Exercise 10    Exercise Name 10  PRO II LE  -MM      Cueing 10  Verbal  -MM      Time 10  10 min  -MM      Additional Comments  seat 10; lvl 1 for ROM  -MM        User Key  (r) = Recorded By, (t) = Taken By, (c) = Cosigned By    Initials Name Provider Type    Elisabeth Toledo, PT Physical Therapist                        PT OP Goals     Row Name 11/12/20 1000          PT Short Term Goals    STG Date to Achieve  11/29/20  -MM     STG 1  Pt will demonstrate RLE AROM WFL  -MM     STG 1 Progress  Ongoing  -MM     STG 2  Pt will report 50% improvement in functional ability  -MM     STG 2 Progress  Ongoing  -MM     STG 3  Pt will be independent with self care and HEP.   -MM     STG 3 Progress  Ongoing  -MM     STG 4  Pt will ambulate community distances with SPC independently c/o increased pain.   -MM     STG 4 Progress  Ongoing  -MM        Long Term Goals    LTG Date to Achieve  12/29/20  -MM     LTG 1  Pt will demonstrate RLE MMT 4+/5 grossly  -MM     LTG 1 Progress  Ongoing  -MM     LTG 2  Pt will demonstrate ability to ambulate community distances with stable, symmetrical gait pattern with no AD.  -MM     LTG 2 Progress  Ongoing  -MM     LTG 3  Pt will ascend/descend 2 stairs independently without use of handrails.   -MM     LTG 3 Progress  Ongoing  -MM     LTG 4  Pt will achieve an LEFS score of 40/80 indicating improved perceived functional ability.   -MM     LTG 4 Progress  Ongoing  -MM        Time Calculation    PT Goal Re-Cert Due Date  11/19/20  -MM       User Key  (r) = Recorded By, (t) = Taken By, (c) = Cosigned By    Initials Name Provider Type    Elisabeth Toledo, PT Physical Therapist                         Time Calculation:   Start Time: 1015  Stop Time: 1100  Time Calculation (min): 45 min  Therapy Charges for Today     Code Description Service Date Service Provider Modifiers Qty    33486428841 HC PT THER PROC EA 15 MIN 11/12/2020 Elisabeth Zepeda, PT GP 3                    Elisabeth Zepeda PT  11/12/2020

## 2020-11-16 ENCOUNTER — HOSPITAL ENCOUNTER (OUTPATIENT)
Dept: PHYSICAL THERAPY | Facility: HOSPITAL | Age: 75
Setting detail: THERAPIES SERIES
Discharge: HOME | End: 2020-11-16

## 2020-11-16 DIAGNOSIS — M25.559 HIP PAIN: ICD-10-CM

## 2020-11-16 DIAGNOSIS — Z96.641 HISTORY OF TOTAL HIP REPLACEMENT, RIGHT: Primary | ICD-10-CM

## 2020-11-16 PROCEDURE — 97110 THERAPEUTIC EXERCISES: CPT

## 2020-11-16 NOTE — THERAPY TREATMENT NOTE
Outpatient Physical Therapy Ortho Treatment Note  [facility]     Patient Name: Carla Best  : 1945  MRN: [id number]  Today's Date: 2020      Visit Date: 2020     Subjective improvement 40%  Visits 5/5  Visits approved Medicare  RTMD PRN  Recert Date 2020    Visit Dx:    ICD-10-CM ICD-9-CM   1. History of total hip replacement, right  Z96.641 V43.64   2. Hip pain  M25.559 719.45       Patient Active Problem List   Diagnosis   • Osteopenia   • Hypothyroidism due to Hashimoto's thyroiditis   • Vitamin D deficiency   • Keratoconjunctivitis sicca (CMS/HCC)   • Long term use of drug   • Cataract   • Primary osteoarthritis of right knee   • B12 deficiency   • Right hip pain   • Encounter for screening for osteoporosis   • GERD (gastroesophageal reflux disease)   • Hip pain, acute, right   • Myofascial pain   • Neck pain   • Osteoarthritis   • Spondylosis of cervical joint   • Palpitations   • Acquired hypothyroidism   • Pre-operative clearance   • Vaginal discharge   • Vaginal irritation   • Urinary incontinence in female   • UTI (urinary tract infection)   • Female cystocele   • Acute cystitis without hematuria   • Overweight   • Diverticulosis of large intestine without perforation or abscess without bleeding   • Generalized abdominal pain   • Helicobacter pylori (H. 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   • Fibromyalgia   • Cystocele with prolapse   • Obstructive sleep apnea, adult   • Age-related osteoporosis without current pathological fracture        Past Medical History:   Diagnosis Date   • Chest pain    • Fibrocystic disease of breast    • Hashimoto's thyroiditis    • History of screening mammography  08/27/2014    SCREENING MAMMOGRAPHY DIGITAL  (Medicare) (1)    • Hyperlipidemia    • Hypothyroidism due to Hashimoto's thyroiditis 12/3/2016   • Keratoconjunctivitis sicca (CMS/[facility])    • Nuclear senile cataract    • On long term drug therapy    • Osteoarthritis    • Osteopenia    • Osteopenia 12/3/2016   • Photopsia     Right   • Postmenopausal bleeding    • Sinus tachycardia    • Sjogren's syndrome (CMS/[facility])    • Vitamin D deficiency    • Vitamin D deficiency 12/3/2016   • Vitreous detachment of right eye         Past Surgical History:   Procedure Laterality Date   • CATARACT EXTRACTION, BILATERAL  2018   • CHOLECYSTECTOMY  06/09/1997    with operative cholangiogram. Chronic cholecystitis & cholelithiasis. HX of passed common duct stone   • CYSTOSCOPY  11/26/1962    urethral dilatation.Recurrent pyelonephritis. urethritis   • ENDOSCOPY N/A 2/17/2017    Procedure: ESOPHAGOGASTRODUODENOSCOPY;  Surgeon: Cisco Mason DO;  Location: [facility] ENDOSCOPY;  Service:    • ENDOSCOPY N/A 10/31/2018    Procedure: ESOPHAGOGASTRODUODENOSCOPY;  Surgeon: Cisco Mason DO;  Location: [facility] ENDOSCOPY;  Service: Gastroenterology   • ENDOSCOPY AND COLONOSCOPY  09/30/1985    Normal colon to left transverse colon   • GALLBLADDER SURGERY     • JOINT REPLACEMENT      R Ant ROSALINE 7/6/2020   • PAP SMEAR  2009   • TONSILLECTOMY  1950   • VAGINAL DELIVERY      x3       PT Ortho     Row Name 11/16/20 1300       Subjective Comments    Subjective Comments  Patient states that she was very sore from the last therapy visit.  She is concerned that she may make the fracture in her hip worse.  She also reports not sleeping well.  -CP       Precautions and Contraindications    Contraindications  (S) Fracture, Fibromyalgia  -CP       Subjective Pain    Able to rate subjective pain?  yes  -CP    Pre-Treatment Pain Level  6  -CP    Post-Treatment Pain Level  7  -CP       Posture/Observations    Posture/Observations Comments  No lateral leaning  "in sitting, standing, or with gait this visit. Improved right hip mobility.  -CP       Gait/Stairs (Locomotion)    Assistive Device (Gait)  walker, front-wheeled  -CP      User Key  (r) = Recorded By, (t) = Taken By, (c) = Cosigned By    Initials Name Provider Type    Marisela Mascorro PTA Physical Therapy Assistant                      PT Assessment/Plan     Row Name 11/16/20 1343          PT Assessment    Assessment Comments  patient reports increase pain today.  Treatment today consisted of non weight bearing ther ex.  She does have increase pain with attempted seated marching.  -CP        PT Plan    PT Frequency  2x/week  -CP     Predicted Duration of Therapy Intervention (PT)  8 weeks  -CP     PT Plan Comments  Cont with POC.  try bridging again if no increase pain  -CP       User Key  (r) = Recorded By, (t) = Taken By, (c) = Cosigned By    Initials Name Provider Type    Marisela Mascorro, JAIDEN Physical Therapy Assistant            OP Exercises     Row Name 11/16/20 1300             Subjective Comments    Subjective Comments  Patient states that she was very sore from the last therapy visit.  She is concerned that she may make the fracture in her hip worse.  She also reports not sleeping well.  -CP         Subjective Pain    Able to rate subjective pain?  yes  -CP      Pre-Treatment Pain Level  6  -CP      Post-Treatment Pain Level  7  -CP         Exercise 1    Exercise Name 1  Pro II level 1  -CP      Time 1  6  -CP         Exercise 2    Exercise Name 2  LAQ  -CP      Cueing 2  Verbal;Demo  -CP      Sets 2  2  -CP      Reps 2  10  -CP      Time 2  5\" holds  -CP         Exercise 3    Exercise Name 3  long sitting heelsldes  -CP      Cueing 3  Verbal;Tactile  -CP      Reps 3  20  -CP         Exercise 4    Exercise Name 4  hooklying hip AD squeezes  -CP      Cueing 4  Verbal;Tactile  -CP      Sets 4  2  -CP      Reps 4  10  -CP      Time 4  5\" holds  -CP         Exercise 5    Exercise Name 5  supine hip AB  " "-CP      Cueing 5  Verbal;Tactile  -CP      Sets 5  2  -CP      Reps 5  10  -CP         Exercise 6    Exercise Name 6  supine BKFO  -CP      Cueing 6  Verbal;Tactile  -CP      Reps 6  20  -CP         Exercise 7    Exercise Name 7  SAQ  -CP      Cueing 7  Verbal;Tactile  -CP      Sets 7  2  -CP      Reps 7  10  -CP      Time 7  5\" holds  -CP         Exercise 8    Exercise Name 8  supine TA  -CP      Cueing 8  Verbal;Tactile  -CP      Sets 8  2  -CP      Reps 8  10  -CP      Time 8  5: holds  -CP         Exercise 9    Exercise Name 9  gait in clinic with w/w  -CP      Cueing 9  Verbal;Tactile  -CP      Reps 9  270 ft  -CP        User Key  (r) = Recorded By, (t) = Taken By, (c) = Cosigned By    Initials Name Provider Type    CP Marisela Macias, PTA Physical Therapy Assistant                       PT OP Goals     Row Name 11/16/20 1300          PT Short Term Goals    STG Date to Achieve  11/29/20  -CP     STG 1  Pt will demonstrate RLE AROM WFL  -CP     STG 1 Progress  Ongoing  -CP     STG 2  Pt will report 50% improvement in functional ability  -CP     STG 2 Progress  Ongoing  -CP     STG 3  Pt will be independent with self care and HEP.   -CP     STG 3 Progress  Ongoing  -CP     STG 4  Pt will ambulate community distances with SPC independently c/o increased pain.   -CP     STG 4 Progress  Ongoing  -CP        Long Term Goals    LTG Date to Achieve  12/29/20  -CP     LTG 1  Pt will demonstrate RLE MMT 4+/5 grossly  -CP     LTG 1 Progress  Ongoing  -CP     LTG 2  Pt will demonstrate ability to ambulate community distances with stable, symmetrical gait pattern with no AD.  -CP     LTG 2 Progress  Ongoing  -CP     LTG 3  Pt will ascend/descend 2 stairs independently without use of handrails.   -CP     LTG 3 Progress  Ongoing  -CP     LTG 4  Pt will achieve an LEFS score of 40/80 indicating improved perceived functional ability.   -CP     LTG 4 Progress  Ongoing  -CP        Time Calculation    PT Goal Re-Cert Due Date  " 11/19/20  -CP       User Key  (r) = Recorded By, (t) = Taken By, (c) = Cosigned By    Initials Name Provider Type    CP Marisela Macias PTA Physical Therapy Assistant                         Time Calculation:   Start Time: 1102  Stop Time: 1140  Time Calculation (min): 38 min  Total Timed Code Minutes- PT: 38 minute(s)  Therapy Charges for Today     Code Description Service Date Service Provider Modifiers Qty    45188458061 HC PT THER PROC EA 15 MIN 11/16/2020 Marisela Macias PTA GP 3                    Marisela Macias PTA  11/16/2020

## 2020-11-19 ENCOUNTER — HOSPITAL ENCOUNTER (OUTPATIENT)
Dept: PHYSICAL THERAPY | Facility: HOSPITAL | Age: 75
Setting detail: THERAPIES SERIES
Discharge: HOME | End: 2020-11-19

## 2020-11-19 DIAGNOSIS — Z96.641 HISTORY OF TOTAL HIP REPLACEMENT, RIGHT: Primary | ICD-10-CM

## 2020-11-19 DIAGNOSIS — M25.559 HIP PAIN: ICD-10-CM

## 2020-11-19 PROCEDURE — 97116 GAIT TRAINING THERAPY: CPT

## 2020-11-19 PROCEDURE — 97110 THERAPEUTIC EXERCISES: CPT

## 2020-11-19 NOTE — THERAPY TREATMENT NOTE
Outpatient Physical Therapy Ortho Treatment Note  [facility]     Patient Name: Carla Best  : 1945  MRN: [id number]  Today's Date: 2020      Visit Date: 2020   Attendance:  (medicare approved)  Subjective Improvement: 40%  Next MD Visit: 2020  Recert Date: 2020     Therapy Diagnosis: History of R ant ROSALINE 2020    Visit Dx:    ICD-10-CM ICD-9-CM   1. History of total hip replacement, right  Z96.641 V43.64   2. Hip pain  M25.559 719.45       Patient Active Problem List   Diagnosis   • Osteopenia   • Hypothyroidism due to Hashimoto's thyroiditis   • Vitamin D deficiency   • Keratoconjunctivitis sicca (CMS/HCC)   • Long term use of drug   • Cataract   • Primary osteoarthritis of right knee   • B12 deficiency   • Right hip pain   • Encounter for screening for osteoporosis   • GERD (gastroesophageal reflux disease)   • Hip pain, acute, right   • Myofascial pain   • Neck pain   • Osteoarthritis   • Spondylosis of cervical joint   • Palpitations   • Acquired hypothyroidism   • Pre-operative clearance   • Vaginal discharge   • Vaginal irritation   • Urinary incontinence in female   • UTI (urinary tract infection)   • Female cystocele   • Acute cystitis without hematuria   • Overweight   • Diverticulosis of large intestine without perforation or abscess without bleeding   • Generalized abdominal pain   • Helicobacter pylori (H. 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   • Fibromyalgia   • Cystocele with prolapse   • Obstructive sleep apnea, adult   • Age-related osteoporosis without current pathological fracture        Past Medical History:   Diagnosis Date   • Chest pain    • Fibrocystic disease of breast    •  Hashimoto's thyroiditis    • History of screening mammography 08/27/2014    SCREENING MAMMOGRAPHY DIGITAL  (Medicare) (1)    • Hyperlipidemia    • Hypothyroidism due to Hashimoto's thyroiditis 12/3/2016   • Keratoconjunctivitis sicca (CMS/HCC)    • Nuclear senile cataract    • On long term drug therapy    • Osteoarthritis    • Osteopenia    • Osteopenia 12/3/2016   • Photopsia     Right   • Postmenopausal bleeding    • Sinus tachycardia    • Sjogren's syndrome (CMS/HCC)    • Vitamin D deficiency    • Vitamin D deficiency 12/3/2016   • Vitreous detachment of right eye         Past Surgical History:   Procedure Laterality Date   • CATARACT EXTRACTION, BILATERAL  2018   • CHOLECYSTECTOMY  06/09/1997    with operative cholangiogram. Chronic cholecystitis & cholelithiasis. HX of passed common duct stone   • CYSTOSCOPY  11/26/1962    urethral dilatation.Recurrent pyelonephritis. urethritis   • ENDOSCOPY N/A 2/17/2017    Procedure: ESOPHAGOGASTRODUODENOSCOPY;  Surgeon: Cisco Mason DO;  Location: [facility] ENDOSCOPY;  Service:    • ENDOSCOPY N/A 10/31/2018    Procedure: ESOPHAGOGASTRODUODENOSCOPY;  Surgeon: Cisco Mason DO;  Location: [facility] ENDOSCOPY;  Service: Gastroenterology   • ENDOSCOPY AND COLONOSCOPY  09/30/1985    Normal colon to left transverse colon   • GALLBLADDER SURGERY     • JOINT REPLACEMENT      R Ant ROSALINE 7/6/2020   • PAP SMEAR  2009   • TONSILLECTOMY  1950   • VAGINAL DELIVERY      x3       PT Ortho     Row Name 11/19/20 1100       Subjective Comments    Subjective Comments  Pt stated that she continues to have a difficult time picking items off of the floor and that this is her main complaint. She stated that she is seeing improvements with physical therapy. Pt voiced concerns about coming to therapy 2x/week and being around people due to COVID-19 rise.  -MM       Precautions and Contraindications    Contraindications  Fracture, Fibromyalgia  -MM       Subjective Pain    Post-Treatment Pain  Level  6  -MM       Posture/Observations    Posture/Observations Comments  Pt arrived with FWRW. Improved standing and gait weight shift.  -MM       Gait/Stairs (Locomotion)    Assistive Device (Gait)  walker, front-wheeled  -MM    Comment (Gait/Stairs)  Improved gait mechanics. Occasional decreased right heel strike. Returns/improved with verbal cuing. Improved gait speed.  -MM      User Key  (r) = Recorded By, (t) = Taken By, (c) = Cosigned By    Initials Name Provider Type    Elisabeth Toledo PT Physical Therapist                      PT Assessment/Plan     Row Name 11/19/20 1100          PT Assessment    Functional Limitations  Impaired gait;Limitation in home management;Performance in leisure activities;Limitations in community activities;Impaired locomotion;Limitations in functional capacity and performance;Performance in self-care ADL  -MM     Impairments  Balance;Gait;Impaired muscle power;Impaired muscle endurance;Joint mobility;Locomotion;Muscle strength;Pain;Sensation;Range of motion;Posture  -MM     Assessment Comments  Pt did well this visit and has demonstrated improved gait mechanics and speed. Initiated SLR flex,  mini squats and standing marches. No increase in pain with new exercises. Mild increase in pain post session. Pt offered modalities but wishes to ice at home.  -MM     Rehab Potential  Good  -MM     Patient/caregiver participated in establishment of treatment plan and goals  Yes  -MM     Patient would benefit from skilled therapy intervention  Yes  -MM        PT Plan    PT Frequency  2x/week  -MM     Predicted Duration of Therapy Intervention (PT)  8 weeks  -MM     PT Plan Comments  Recheck next, update HEP  -MM       User Key  (r) = Recorded By, (t) = Taken By, (c) = Cosigned By    Initials Name Provider Type    Elisabeth Toledo PT Physical Therapist          Modalities     Row Name 11/19/20 1100             Ice    Patient denies application of Ice  Yes  -MM      Patient reports will  apply ice at home to involved area  Yes  -MM        User Key  (r) = Recorded By, (t) = Taken By, (c) = Cosigned By    Initials Name Provider Type    MM Elisabeth Zepeda, PT Physical Therapist        OP Exercises     Row Name 11/19/20 1100             Subjective Comments    Subjective Comments  Pt stated that she continues to have a difficult time picking items off of the floor and that this is her main complaint. She stated that she is seeing improvements with physical therapy. Pt voiced concerns about coming to therapy 2x/week and being around people due to COVID-19 rise.  -MM         Subjective Pain    Able to rate subjective pain?  yes  -MM      Pre-Treatment Pain Level  5  -MM      Post-Treatment Pain Level  6  -MM         Exercise 1    Exercise Name 1  PRO II LE  -MM      Time 1  10 min  -MM      Additional Comments  lvl 1 for ROM  -MM         Exercise 2    Exercise Name 2  LAQ  -MM      Cueing 2  Verbal  -MM      Sets 2  1  -MM      Reps 2  20  -MM      Time 2  5 sec hold  -MM         Exercise 3    Exercise Name 3  Long sitting heel slides  -MM      Cueing 3  Verbal  -MM      Sets 3  1  -MM      Reps 3  20  -MM         Exercise 4    Exercise Name 4  Hip add ball squeeze  -MM      Cueing 4  Verbal  -MM      Sets 4  1  -MM      Reps 4  20  -MM      Time 4  5 sec hold  -MM         Exercise 5    Exercise Name 5  SLR flex  -MM      Cueing 5  Verbal;Tactile  -MM      Sets 5  4  -MM      Reps 5  5  -MM         Exercise 6    Exercise Name 6  SAQ  -MM      Cueing 6  Verbal  -MM      Sets 6  1  -MM      Reps 6  20  -MM         Exercise 7    Exercise Name 7  Mini squats  -MM      Cueing 7  Verbal;Demo  -MM      Sets 7  2  -MM      Reps 7  10  -MM         Exercise 8    Exercise Name 8  Standing alt marches  -MM      Cueing 8  Verbal;Demo  -MM      Sets 8  2  -MM      Reps 8  10  -MM         Exercise 9    Exercise Name 9  Gait training with FWRW  -MM      Reps 9  540 feet  -MM        User Key  (r) = Recorded By, (t) = Taken  By, (c) = Cosigned By    Initials Name Provider Type    Elisabeth Toledo, PT Physical Therapist                       PT OP Goals     Row Name 11/19/20 1100       PT Short Term Goals    STG Date to Achieve  11/29/20  -MM    STG 1  Pt will demonstrate RLE AROM WFL  -MM    STG 1 Progress  Ongoing  -MM    STG 2  Pt will report 50% improvement in functional ability  -MM    STG 2 Progress  Ongoing  -MM    STG 3  Pt will be independent with self care and HEP.   -MM    STG 3 Progress  Ongoing  -MM    STG 4  Pt will ambulate community distances with SPC independently c/o increased pain.   -MM    STG 4 Progress  Ongoing  -MM       Long Term Goals    LTG Date to Achieve  12/29/20  -MM    LTG 1  Pt will demonstrate RLE MMT 4+/5 grossly  -MM    LTG 1 Progress  Ongoing  -MM    LTG 2  Pt will demonstrate ability to ambulate community distances with stable, symmetrical gait pattern with no AD.  -MM    LTG 2 Progress  Ongoing  -MM    LTG 3  Pt will ascend/descend 2 stairs independently without use of handrails.   -MM    LTG 3 Progress  Ongoing  -MM    LTG 4  Pt will achieve an LEFS score of 40/80 indicating improved perceived functional ability.   -MM    LTG 4 Progress  Ongoing  -MM       Time Calculation    PT Goal Re-Cert Due Date  11/19/20  -MM      User Key  (r) = Recorded By, (t) = Taken By, (c) = Cosigned By    Initials Name Provider Type    Elisabeth Toledo PT Physical Therapist                         Time Calculation:   Start Time: 1100  Stop Time: 1148  Time Calculation (min): 48 min  Therapy Charges for Today     Code Description Service Date Service Provider Modifiers Qty    41770877706  GAIT TRAINING EA 15 MIN 11/19/2020 Elisabeth Zepeda, PT GP 1    62133834397 HC PT THER PROC EA 15 MIN 11/19/2020 Elisabeth Zepeda, PT GP 2                    Elisabeth Zepeda PT  11/19/2020

## 2020-11-23 ENCOUNTER — HOSPITAL ENCOUNTER (OUTPATIENT)
Dept: PHYSICAL THERAPY | Facility: HOSPITAL | Age: 75
Setting detail: THERAPIES SERIES
Discharge: HOME | End: 2020-11-23

## 2020-11-23 DIAGNOSIS — M25.559 HIP PAIN: ICD-10-CM

## 2020-11-23 DIAGNOSIS — Z96.641 HISTORY OF TOTAL HIP REPLACEMENT, RIGHT: Primary | ICD-10-CM

## 2020-11-23 PROCEDURE — 97110 THERAPEUTIC EXERCISES: CPT

## 2020-11-23 NOTE — THERAPY PROGRESS REPORT/RE-CERT
Outpatient Physical Therapy Ortho Progress Note  [facility]     Patient Name: Carla Best  : 1945  MRN: [id number]  Today's Date: 2020      Visit Date: 2020   Attendance:  (medicare approved)  Subjective Improvement: 65%  Next MD Visit: 2020  Recert Date: 2020     Therapy Diagnosis: History of R ant ROSALINE 2020    Visit Dx:    ICD-10-CM ICD-9-CM   1. History of total hip replacement, right  Z96.641 V43.64   2. Hip pain  M25.559 719.45       Patient Active Problem List   Diagnosis   • Osteopenia   • Hypothyroidism due to Hashimoto's thyroiditis   • Vitamin D deficiency   • Keratoconjunctivitis sicca (CMS/HCC)   • Long term use of drug   • Cataract   • Primary osteoarthritis of right knee   • B12 deficiency   • Right hip pain   • Encounter for screening for osteoporosis   • GERD (gastroesophageal reflux disease)   • Hip pain, acute, right   • Myofascial pain   • Neck pain   • Osteoarthritis   • Spondylosis of cervical joint   • Palpitations   • Acquired hypothyroidism   • Pre-operative clearance   • Vaginal discharge   • Vaginal irritation   • Urinary incontinence in female   • UTI (urinary tract infection)   • Female cystocele   • Acute cystitis without hematuria   • Overweight   • Diverticulosis of large intestine without perforation or abscess without bleeding   • Generalized abdominal pain   • Helicobacter pylori (H. 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   • Fibromyalgia   • Cystocele with prolapse   • Obstructive sleep apnea, adult   • Age-related osteoporosis without current pathological fracture        Past Medical History:   Diagnosis Date   • Chest pain    • Fibrocystic disease of breast    •  "Hashimoto's thyroiditis    • History of screening mammography 08/27/2014    SCREENING MAMMOGRAPHY DIGITAL  (Medicare) (1)    • Hyperlipidemia    • Hypothyroidism due to Hashimoto's thyroiditis 12/3/2016   • Keratoconjunctivitis sicca (CMS/HCC)    • Nuclear senile cataract    • On long term drug therapy    • Osteoarthritis    • Osteopenia    • Osteopenia 12/3/2016   • Photopsia     Right   • Postmenopausal bleeding    • Sinus tachycardia    • Sjogren's syndrome (CMS/HCC)    • Vitamin D deficiency    • Vitamin D deficiency 12/3/2016   • Vitreous detachment of right eye         Past Surgical History:   Procedure Laterality Date   • CATARACT EXTRACTION, BILATERAL  2018   • CHOLECYSTECTOMY  06/09/1997    with operative cholangiogram. Chronic cholecystitis & cholelithiasis. HX of passed common duct stone   • CYSTOSCOPY  11/26/1962    urethral dilatation.Recurrent pyelonephritis. urethritis   • ENDOSCOPY N/A 2/17/2017    Procedure: ESOPHAGOGASTRODUODENOSCOPY;  Surgeon: Cisco Mason DO;  Location: [facility] ENDOSCOPY;  Service:    • ENDOSCOPY N/A 10/31/2018    Procedure: ESOPHAGOGASTRODUODENOSCOPY;  Surgeon: Cisco Mason DO;  Location: [facility] ENDOSCOPY;  Service: Gastroenterology   • ENDOSCOPY AND COLONOSCOPY  09/30/1985    Normal colon to left transverse colon   • GALLBLADDER SURGERY     • JOINT REPLACEMENT      R Ant ROSALINE 7/6/2020   • PAP SMEAR  2009   • TONSILLECTOMY  1950   • VAGINAL DELIVERY      x3       PT Ortho     Row Name 11/23/20 1300       Subjective Comments    Subjective Comments  Pt stated that she has been \"real well\" and has been up more around the house. She states she has been pleased with her progression but still is unable to put her sock on. 65% subjective improvement.  -MM       Precautions and Contraindications    Contraindications  Fracture, Fibromyalgia  -MM       Posture/Observations    Posture/Observations Comments  Pt arrived with FWRW. Decreased gait speed. Cautious with standing " exercises.  -MM       Right Lower Ext    Rt Hip ABduction AROM  20 deg  -MM    Rt Hip Flexion AROM  90 deg  -MM       MMT (Manual Muscle Testing)    Rt Lower Ext  Rt Hip Flexion;Rt Hip Extension;Rt Hip ABduction;Rt Hip ADduction;Rt Hip Internal (Medial) Rotation;Rt Hip External (Lateral) Rotation;Rt Knee Extension;Rt Knee Flexion;Rt Ankle Dorsiflexion  -MM    Lt Lower Ext  Lt Hip Flexion;Lt Hip Extension;Lt Hip ABduction;Lt Hip ADduction;Lt Hip Internal (Medial) Rotation;Lt Hip External (Lateral) Rotation;Lt Knee Extension;Lt Knee Flexion;Lt Ankle Dorsiflexion  -MM       MMT Right Lower Ext    Rt Hip Flexion MMT, Gross Movement  (3/5) fair  -MM    Rt Hip Extension MMT, Gross Movement  (3/5) fair  -MM    Rt Hip ABduction MMT, Gross Movement  (3/5) fair  -MM    Rt Hip ADduction MMT, Gross Movement  (3/5) fair  -MM    Rt Hip Internal (Medial) Rotation MMT, Gross Movement  (4/5) good  -MM    Rt Hip External (Lateral) Rotation MMT, Gross Movement  (4/5) good  -MM    Rt Knee Extension MMT, Gross Movement  (5/5) normal  -MM    Rt Knee Flexion MMT, Gross Movement  (4/5) good  -MM    Rt Ankle Dorsiflexion MMT, Gross Movement  (5/5) normal  -MM       MMT Left Lower Ext    Lt Hip Flexion MMT, Gross Movement  (4/5) good  -MM    Lt Hip Internal (Medial) Rotation MMT, Gross Movement  (4+/5) good plus  -MM    Lt Hip External (Lateral) Rotation MMT, Gross Movement  (4+/5) good plus  -MM    Lt Knee Extension MMT, Gross Movement  (5/5) normal  -MM    Lt Knee Flexion MMT, Gross Movement  (5/5) normal  -MM    Lt Ankle Dorsiflexion MMT, Gross Movement  (5/5) normal  -MM       Gait/Stairs (Locomotion)    Assistive Device (Gait)  walker, front-wheeled  -MM    Comment (Gait/Stairs)  Pt ambulating with FWRW. Decreased gait speed, cautious gait.  -MM      User Key  (r) = Recorded By, (t) = Taken By, (c) = Cosigned By    Initials Name Provider Type    Elisabeth Toledo PT Physical Therapist                      PT Assessment/Plan     Row  "Name 11/23/20 1300          PT Assessment    Functional Limitations  Impaired gait;Limitation in home management;Performance in leisure activities;Limitations in community activities;Impaired locomotion;Limitations in functional capacity and performance;Performance in self-care ADL  -MM     Impairments  Balance;Gait;Impaired muscle power;Impaired muscle endurance;Joint mobility;Locomotion;Muscle strength;Pain;Sensation;Range of motion;Posture  -MM     Assessment Comments  Recheck completed this visit. Pt has shown improvements in ROM, strength, standing balance, and gait mechanics. She continues to have hip weakness and balance issues. Discussed potentially initiating gait training with cane next visit as pt stated she feels confident with it. 1/8 goals met to date  -MM     Rehab Potential  Good  -MM     Patient/caregiver participated in establishment of treatment plan and goals  Yes  -MM     Patient would benefit from skilled therapy intervention  Yes  -MM        PT Plan    PT Frequency  2x/week  -MM     Predicted Duration of Therapy Intervention (PT)  5 more weeks  -MM     PT Plan Comments  CARLSO, 4\" step up, possibly gait with cane next visit.  -MM       User Key  (r) = Recorded By, (t) = Taken By, (c) = Cosigned By    Initials Name Provider Type    MM Elisabeth Zepeda, PT Physical Therapist            OP Exercises     Row Name 11/23/20 1300             Subjective Comments    Subjective Comments  Pt stated that she has been \"real well\" and has been up more around the house. She states she has been pleased with her progression but still is unable to put her sock on. 65% subjective improvement.  -MM         Subjective Pain    Able to rate subjective pain?  yes  -MM      Pre-Treatment Pain Level  3  -MM      Post-Treatment Pain Level  3  -MM         Exercise 1    Exercise Name 1  PRO II LE  -MM      Time 1  10 min  -MM      Additional Comments  lvl 2 for ROM and light strengthening  -MM         Exercise 2    Exercise " Name 2  Recheck  -MM         Exercise 3    Exercise Name 3  Long sitting heel slides with strap  -MM      Cueing 3  Verbal  -MM      Sets 3  1  -MM      Reps 3  20  -MM         Exercise 4    Exercise Name 4  SLR flex  -MM      Cueing 4  Verbal  -MM      Sets 4  4  -MM      Reps 4  5  -MM         Exercise 5    Exercise Name 5  Hip add ball squeeze  -MM      Cueing 5  Verbal  -MM      Sets 5  1  -MM      Reps 5  20  -MM      Time 5  5 sec hold  -MM         Exercise 6    Exercise Name 6  Clamshells  -MM      Cueing 6  Verbal;Tactile  -MM      Sets 6  1  -MM      Reps 6  20  -MM         Exercise 7    Exercise Name 7  Standing alt marches  -MM      Cueing 7  Verbal  -MM      Sets 7  1  -MM      Reps 7  20  -MM         Exercise 8    Exercise Name 8  Standing mini squats  -MM      Cueing 8  Verbal  -MM      Sets 8  2  -MM      Reps 8  10  -MM        User Key  (r) = Recorded By, (t) = Taken By, (c) = Cosigned By    Initials Name Provider Type    Elisabeth Toledo, PT Physical Therapist                       PT OP Goals     Row Name 11/23/20 1300          PT Short Term Goals    STG Date to Achieve  12/14/20  -MM     STG 1  Pt will demonstrate RLE AROM WFL  -MM     STG 1 Progress  Not Met  -MM     STG 2  Pt will report 50% improvement in functional ability  -MM     STG 2 Progress  (S) Met  -MM     STG 3  Pt will be independent with self care and HEP.   -MM     STG 3 Progress  Ongoing  -MM     STG 4  Pt will ambulate community distances with SPC independently c/o increased pain.   -MM     STG 4 Progress  Not Met  -MM        Long Term Goals    LTG Date to Achieve  12/29/20  -MM     LTG 1  Pt will demonstrate RLE MMT 4+/5 grossly  -MM     LTG 1 Progress  Not Met  -MM     LTG 2  Pt will demonstrate ability to ambulate community distances with stable, symmetrical gait pattern with no AD.  -MM     LTG 2 Progress  Not Met  -MM     LTG 3  Pt will ascend/descend 2 stairs independently without use of handrails.   -MM     LTG 3 Progress   Not Met  -MM     LTG 4  Pt will achieve an LEFS score of 40/80 indicating improved perceived functional ability.   -MM     LTG 4 Progress  Not Met  -MM        Time Calculation    PT Goal Re-Cert Due Date  12/14/20  -MM       User Key  (r) = Recorded By, (t) = Taken By, (c) = Cosigned By    Initials Name Provider Type    Elisabeth Toledo, PT Physical Therapist          Therapy Education  Education Details: HEP: SLR flex, clamshells, standing alt marches, mini squats  Given: HEP  Program: Reinforced, Progressed  How Provided: Verbal, Demonstration, Written  Provided to: Patient  Level of Understanding: Verbalized, Demonstrated           Time Calculation:   Start Time: 1300  Stop Time: 1345  Time Calculation (min): 45 min  Therapy Charges for Today     Code Description Service Date Service Provider Modifiers Qty    [id number]  PT THER PROC EA 15 MIN 11/23/2020 Elisabeth Zepeda, PT GP 3          PT G-Codes  Outcome Measure Options: Lower Extremity Functional Scale (LEFS)         Elisabeth Zepeda PT  11/23/2020

## 2020-11-25 ENCOUNTER — HOSPITAL ENCOUNTER (OUTPATIENT)
Dept: PHYSICAL THERAPY | Facility: HOSPITAL | Age: 75
Setting detail: THERAPIES SERIES
Discharge: HOME | End: 2020-11-25

## 2020-11-25 DIAGNOSIS — M25.559 HIP PAIN: ICD-10-CM

## 2020-11-25 DIAGNOSIS — Z96.641 HISTORY OF TOTAL HIP REPLACEMENT, RIGHT: Primary | ICD-10-CM

## 2020-11-25 PROCEDURE — 97116 GAIT TRAINING THERAPY: CPT

## 2020-11-25 PROCEDURE — 97110 THERAPEUTIC EXERCISES: CPT

## 2020-11-25 NOTE — THERAPY TREATMENT NOTE
Outpatient Physical Therapy Ortho Treatment Note  [facility]     Patient Name: Carla Best  : 1945  MRN: [id number]  Today's Date: 2020      Visit Date: 2020   Attendance:  (medicare approved)  Subjective Improvement: 65%  Next MD Visit: 2020  Recert Date: 2020     Therapy Diagnosis: History of R ant ROSALINE 2020    Visit Dx:    ICD-10-CM ICD-9-CM   1. History of total hip replacement, right  Z96.641 V43.64   2. Hip pain  M25.559 719.45       Patient Active Problem List   Diagnosis   • Osteopenia   • Hypothyroidism due to Hashimoto's thyroiditis   • Vitamin D deficiency   • Keratoconjunctivitis sicca (CMS/HCC)   • Long term use of drug   • Cataract   • Primary osteoarthritis of right knee   • B12 deficiency   • Right hip pain   • Encounter for screening for osteoporosis   • GERD (gastroesophageal reflux disease)   • Hip pain, acute, right   • Myofascial pain   • Neck pain   • Osteoarthritis   • Spondylosis of cervical joint   • Palpitations   • Acquired hypothyroidism   • Pre-operative clearance   • Vaginal discharge   • Vaginal irritation   • Urinary incontinence in female   • UTI (urinary tract infection)   • Female cystocele   • Acute cystitis without hematuria   • Overweight   • Diverticulosis of large intestine without perforation or abscess without bleeding   • Generalized abdominal pain   • Helicobacter pylori (H. 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   • Fibromyalgia   • Cystocele with prolapse   • Obstructive sleep apnea, adult   • Age-related osteoporosis without current pathological fracture        Past Medical History:   Diagnosis Date   • Chest pain    • Fibrocystic disease of breast    •  Hashimoto's thyroiditis    • History of screening mammography 08/27/2014    SCREENING MAMMOGRAPHY DIGITAL  (Medicare) (1)    • Hyperlipidemia    • Hypothyroidism due to Hashimoto's thyroiditis 12/3/2016   • Keratoconjunctivitis sicca (CMS/HCC)    • Nuclear senile cataract    • On long term drug therapy    • Osteoarthritis    • Osteopenia    • Osteopenia 12/3/2016   • Photopsia     Right   • Postmenopausal bleeding    • Sinus tachycardia    • Sjogren's syndrome (CMS/[facility])    • Vitamin D deficiency    • Vitamin D deficiency 12/3/2016   • Vitreous detachment of right eye         Past Surgical History:   Procedure Laterality Date   • CATARACT EXTRACTION, BILATERAL  2018   • CHOLECYSTECTOMY  06/09/1997    with operative cholangiogram. Chronic cholecystitis & cholelithiasis. HX of passed common duct stone   • CYSTOSCOPY  11/26/1962    urethral dilatation.Recurrent pyelonephritis. urethritis   • ENDOSCOPY N/A 2/17/2017    Procedure: ESOPHAGOGASTRODUODENOSCOPY;  Surgeon: Cisco Mason DO;  Location: [facility] ENDOSCOPY;  Service:    • ENDOSCOPY N/A 10/31/2018    Procedure: ESOPHAGOGASTRODUODENOSCOPY;  Surgeon: Cisco Mason DO;  Location: [facility] ENDOSCOPY;  Service: Gastroenterology   • ENDOSCOPY AND COLONOSCOPY  09/30/1985    Normal colon to left transverse colon   • GALLBLADDER SURGERY     • JOINT REPLACEMENT      R Ant ROSALINE 7/6/2020   • PAP SMEAR  2009   • TONSILLECTOMY  1950   • VAGINAL DELIVERY      x3       PT Ortho     Row Name 11/25/20 1100       Precautions and Contraindications    Contraindications  Fracture, Fibromyalgia  -MM       Subjective Pain    Post-Treatment Pain Level  6  -MM       Posture/Observations    Posture/Observations Comments  Pt arrived ambulating with FWRW and brought her quad cane along with her. Improved motor control with right LE during exercises. Improved weight distribution in standing and with gait.  -MM       Gait/Stairs (Locomotion)    Avery Level (Gait)  modified  "independence  -MM    Assistive Device (Gait)  cane, quad  -MM    Comment (Gait/Stairs)  Gait with QC: Decreased stability, not using cane to full potential, no gait antalgics, adequate LE strength and control.  -MM      User Key  (r) = Recorded By, (t) = Taken By, (c) = Cosigned By    Initials Name Provider Type    MM Elisabeth Zepeda, PT Physical Therapist                      PT Assessment/Plan     Row Name 11/25/20 1100          PT Assessment    Functional Limitations  Impaired gait;Limitation in home management;Performance in leisure activities;Limitations in community activities;Impaired locomotion;Limitations in functional capacity and performance;Performance in self-care ADL  -MM     Impairments  Balance;Gait;Impaired muscle power;Impaired muscle endurance;Joint mobility;Locomotion;Muscle strength;Pain;Sensation;Range of motion;Posture  -MM     Assessment Comments  Carla did fantastic today. Initiated gait with quad cane this visit. Cane is a little unsteady but no gait antalgics present. Pt was able to complete all activities this visit without an increase in hip pain. Her incision sight is a bit bothersome. Educated pt on desensitization around incision.   -MM     Rehab Potential  Good  -MM     Patient/caregiver participated in establishment of treatment plan and goals  Yes  -MM     Patient would benefit from skilled therapy intervention  Yes  -MM        PT Plan    PT Frequency  3x/week  -MM     Predicted Duration of Therapy Intervention (PT)  5 weeks  -MM     PT Plan Comments  4\" step up next  -MM       User Key  (r) = Recorded By, (t) = Taken By, (c) = Cosigned By    Initials Name Provider Type    MM Elisabeth Zepeda, PT Physical Therapist            OP Exercises     Row Name 11/25/20 1100             Subjective Comments    Subjective Comments  Pt stated that her incision site is really painful today. Otherwise she said she is doing well.  -MM         Subjective Pain    Able to rate subjective pain?  yes  -MM "      Pre-Treatment Pain Level  6  -MM      Post-Treatment Pain Level  6  -MM         Exercise 1    Exercise Name 1  PRO II LE; seat 11  -MM      Time 1  10 min  -MM      Additional Comments  lvl 2.5 for strength  -MM         Exercise 2    Exercise Name 2  Gait with quad cane.  -MM      Additional Comments  270 feet  -MM         Exercise 3    Exercise Name 3  SLR: flex  -MM      Cueing 3  Verbal  -MM      Sets 3  1  -MM      Reps 3  20  -MM         Exercise 4    Exercise Name 4  Clamshells  -MM      Cueing 4  Verbal  -MM      Sets 4  2  -MM      Reps 4  10  -MM         Exercise 5    Exercise Name 5  Hip add ball squeeze  -MM      Cueing 5  Verbal  -MM      Sets 5  1  -MM      Reps 5  20  -MM      Time 5  5 sec hold  -MM         Exercise 6    Exercise Name 6  Long sitting LAQ  -MM      Cueing 6  Verbal  -MM      Sets 6  1  -MM      Reps 6  20  -MM         Exercise 7    Exercise Name 7  Seated HS curl with tband  -MM      Cueing 7  Verbal  -MM      Sets 7  2  -MM      Reps 7  10  -MM      Additional Comments  green tband  -MM         Exercise 8    Exercise Name 8  Standing mini squats  -MM      Cueing 8  Verbal  -MM      Sets 8  2  -MM      Reps 8  10  -MM         Exercise 9    Exercise Name 9  Standing alt marches on foam mat  -MM      Cueing 9  Verbal  -MM      Sets 9  1  -MM      Reps 9  20  -MM         Exercise 10    Exercise Name 10  Standing HR  -MM      Cueing 10  Verbal  -MM      Sets 10  2  -MM      Reps 10  10  -MM        User Key  (r) = Recorded By, (t) = Taken By, (c) = Cosigned By    Initials Name Provider Type    Elisabeth Toledo, PT Physical Therapist                       PT OP Goals     Row Name 11/25/20 1100          PT Short Term Goals    STG Date to Achieve  12/14/20  -MM     STG 1  Pt will demonstrate RLE AROM WFL  -MM     STG 1 Progress  Ongoing  -MM     STG 2  Pt will report 50% improvement in functional ability  -MM     STG 2 Progress  Met  -MM     STG 3  Pt will be independent with self care  and HEP.   -MM     STG 3 Progress  Ongoing  -MM     STG 4  Pt will ambulate community distances with SPC independently c/o increased pain.   -MM     STG 4 Progress  Ongoing  -MM        Long Term Goals    LTG Date to Achieve  12/29/20  -MM     LTG 1  Pt will demonstrate RLE MMT 4+/5 grossly  -MM     LTG 1 Progress  Not Met  -MM     LTG 2  Pt will demonstrate ability to ambulate community distances with stable, symmetrical gait pattern with no AD.  -MM     LTG 2 Progress  Ongoing  -MM     LTG 3  Pt will ascend/descend 2 stairs independently without use of handrails.   -MM     LTG 3 Progress  Ongoing  -MM     LTG 4  Pt will achieve an LEFS score of 40/80 indicating improved perceived functional ability.   -MM     LTG 4 Progress  Ongoing  -MM        Time Calculation    PT Goal Re-Cert Due Date  12/14/20  -MM       User Key  (r) = Recorded By, (t) = Taken By, (c) = Cosigned By    Initials Name Provider Type    MM Elisabeth Zepeda, OJ Physical Therapist                         Time Calculation:   Start Time: 1100  Stop Time: 1150  Time Calculation (min): 50 min  Therapy Charges for Today     Code Description Service Date Service Provider Modifiers Qty    49443422424 HC GAIT TRAINING EA 15 MIN 11/25/2020 Elisabeth Zepeda, PT GP 1    89486410566 HC PT THER PROC EA 15 MIN 11/25/2020 Elisabeth Zepeda, PT GP 2                    Elisabeth Zepeda PT  11/25/2020

## 2020-11-30 ENCOUNTER — HOSPITAL ENCOUNTER (OUTPATIENT)
Dept: PHYSICAL THERAPY | Facility: HOSPITAL | Age: 75
Setting detail: THERAPIES SERIES
Discharge: HOME | End: 2020-11-30

## 2020-11-30 DIAGNOSIS — Z96.641 HISTORY OF TOTAL HIP REPLACEMENT, RIGHT: Primary | ICD-10-CM

## 2020-11-30 DIAGNOSIS — M25.559 HIP PAIN: ICD-10-CM

## 2020-11-30 PROCEDURE — 97110 THERAPEUTIC EXERCISES: CPT

## 2020-12-03 ENCOUNTER — HOSPITAL ENCOUNTER (OUTPATIENT)
Dept: PHYSICAL THERAPY | Facility: HOSPITAL | Age: 75
Setting detail: THERAPIES SERIES
Discharge: HOME | End: 2020-12-03

## 2020-12-03 DIAGNOSIS — Z96.641 HISTORY OF TOTAL HIP REPLACEMENT, RIGHT: Primary | ICD-10-CM

## 2020-12-03 DIAGNOSIS — M25.559 HIP PAIN: ICD-10-CM

## 2020-12-03 PROCEDURE — 97110 THERAPEUTIC EXERCISES: CPT

## 2020-12-03 NOTE — THERAPY TREATMENT NOTE
Outpatient Physical Therapy Ortho Treatment Note  [facility]     Patient Name: Carla Best  : 1945  MRN: [id number]  Today's Date: 12/3/2020      Visit Date: 2020     Subjective Improvement 80%  Visits 10/10  Visits approved medicare  RTMD 2020  Recert Date 2020    S/P R ant ROSALINE 2020    Visit Dx:    ICD-10-CM ICD-9-CM   1. History of total hip replacement, right  Z96.641 V43.64   2. Hip pain  M25.559 719.45       Patient Active Problem List   Diagnosis   • Osteopenia   • Hypothyroidism due to Hashimoto's thyroiditis   • Vitamin D deficiency   • Keratoconjunctivitis sicca (CMS/HCC)   • Long term use of drug   • Cataract   • Primary osteoarthritis of right knee   • B12 deficiency   • Right hip pain   • Encounter for screening for osteoporosis   • GERD (gastroesophageal reflux disease)   • Hip pain, acute, right   • Myofascial pain   • Neck pain   • Osteoarthritis   • Spondylosis of cervical joint   • Palpitations   • Acquired hypothyroidism   • Pre-operative clearance   • Vaginal discharge   • Vaginal irritation   • Urinary incontinence in female   • UTI (urinary tract infection)   • Female cystocele   • Acute cystitis without hematuria   • Overweight   • Diverticulosis of large intestine without perforation or abscess without bleeding   • Generalized abdominal pain   • Helicobacter pylori (H. 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   • Fibromyalgia   • Cystocele with prolapse   • Obstructive sleep apnea, adult   • Age-related osteoporosis without current pathological fracture        Past Medical History:   Diagnosis Date   • Chest pain    • Fibrocystic disease of breast    • Hashimoto's thyroiditis    •  History of screening mammography 08/27/2014    SCREENING MAMMOGRAPHY DIGITAL  (Medicare) (1)    • Hyperlipidemia    • Hypothyroidism due to Hashimoto's thyroiditis 12/3/2016   • Keratoconjunctivitis sicca (CMS/HCC)    • Nuclear senile cataract    • On long term drug therapy    • Osteoarthritis    • Osteopenia    • Osteopenia 12/3/2016   • Photopsia     Right   • Postmenopausal bleeding    • Sinus tachycardia    • Sjogren's syndrome (CMS/HCC)    • Vitamin D deficiency    • Vitamin D deficiency 12/3/2016   • Vitreous detachment of right eye         Past Surgical History:   Procedure Laterality Date   • CATARACT EXTRACTION, BILATERAL  2018   • CHOLECYSTECTOMY  06/09/1997    with operative cholangiogram. Chronic cholecystitis & cholelithiasis. HX of passed common duct stone   • CYSTOSCOPY  11/26/1962    urethral dilatation.Recurrent pyelonephritis. urethritis   • ENDOSCOPY N/A 2/17/2017    Procedure: ESOPHAGOGASTRODUODENOSCOPY;  Surgeon: Cisco Mason DO;  Location: [facility] ENDOSCOPY;  Service:    • ENDOSCOPY N/A 10/31/2018    Procedure: ESOPHAGOGASTRODUODENOSCOPY;  Surgeon: Cisco Mason DO;  Location: [facility] ENDOSCOPY;  Service: Gastroenterology   • ENDOSCOPY AND COLONOSCOPY  09/30/1985    Normal colon to left transverse colon   • GALLBLADDER SURGERY     • JOINT REPLACEMENT      R Ant ROSALINE 7/6/2020   • PAP SMEAR  2009   • TONSILLECTOMY  1950   • VAGINAL DELIVERY      x3                       PT Assessment/Plan     Row Name 12/03/20 4473          PT Assessment    Assessment Comments  Patient demo safe amb independent.  Patient does not feel safe to amb for longer distance without her cane.  Patient was advised to cont to use SPC for longer community distanced.  No increase in pain with CKC ex.  -CP        PT Plan    PT Frequency  3x/week  -CP     Predicted Duration of Therapy Intervention (PT)  5 weeks  -CP     PT Plan Comments  Cont wtih POC.  balance activities and right hip fl strengthening.  -CP        User Key  (r) = Recorded By, (t) = Taken By, (c) = Cosigned By    Initials Name Provider Type    CP Marisela Macias, JAIDEN Physical Therapy Assistant            OP Exercises     Row Name 12/03/20 1300             Subjective Comments    Subjective Comments  Patient states that her fracture is completely healed  -CP         Subjective Pain    Able to rate subjective pain?  yes  -CP      Pre-Treatment Pain Level  4  -CP      Post-Treatment Pain Level  4  -CP         Exercise 1    Exercise Name 1  Pro II Level 2  -CP      Time 1  10  -CP         Exercise 2    Exercise Name 2  gait independent  -CP      Reps 2  270 ft x 2  -CP         Exercise 3    Exercise Name 3  incline stretch  -CP      Cueing 3  Verbal;Demo  -CP      Sets 3  3  -CP      Time 3  30  -CP         Exercise 4    Exercise Name 4  standing HS stretch  -CP      Cueing 4  Verbal;Demo  -CP      Sets 4  3  -CP      Time 4  30  -CP         Exercise 5    Exercise Name 5  lat step up 4'  -CP      Cueing 5  Verbal;Demo  -CP      Sets 5  2  -CP      Reps 5  10  -CP      Time 5  handrail assist  -CP         Exercise 6    Exercise Name 6  step up 6'  -CP      Cueing 6  Verbal;Demo  -CP      Sets 6  2  -CP      Reps 6  10  -CP      Time 6  handrail assist  -CP         Exercise 7    Exercise Name 7  side stepping on foam  -CP      Cueing 7  Verbal;Demo  -CP      Reps 7  5  -CP      Time 7  handrail assist  -CP         Exercise 8    Exercise Name 8  high kick marching with pause  -CP      Cueing 8  Verbal;Demo  -CP      Reps 8  3 laps in // bars  -CP      Time 8  handrail assist  -CP         Exercise 9    Exercise Name 9  CR/TR on aire ex  -CP      Cueing 9  Verbal  -CP         Exercise 10    Exercise Name 10  seated hip ir/er  -CP      Cueing 10  Verbal;Tactile;Demo  -CP      Sets 10  1  -CP      Reps 10  10  -CP        User Key  (r) = Recorded By, (t) = Taken By, (c) = Cosigned By    Initials Name Provider Type    CP Marisela Macias, JAIDEN Physical Therapy Assistant                        PT OP Goals     Row Name 12/03/20 1400          PT Short Term Goals    STG Date to Achieve  12/14/20  -CP     STG 1  Pt will demonstrate RLE AROM WFL  -CP     STG 1 Progress  Ongoing  -CP     STG 2  Pt will report 50% improvement in functional ability  -CP     STG 2 Progress  Met  -CP     STG 3  Pt will be independent with self care and HEP.   -CP     STG 3 Progress  Ongoing  -CP     STG 4  Pt will ambulate community distances with SPC independently c/o increased pain.   -CP     STG 4 Progress  Met  -CP        Long Term Goals    LTG Date to Achieve  12/29/20  -CP     LTG 1  Pt will demonstrate RLE MMT 4+/5 grossly  -CP     LTG 1 Progress  Not Met  -CP     LTG 2  Pt will demonstrate ability to ambulate community distances with stable, symmetrical gait pattern with no AD.  -CP     LTG 2 Progress  Ongoing  -CP     LTG 3  Pt will ascend/descend 2 stairs independently without use of handrails.   -CP     LTG 3 Progress  Ongoing  -CP     LTG 4  Pt will achieve an LEFS score of 40/80 indicating improved perceived functional ability.   -CP     LTG 4 Progress  Ongoing  -CP        Time Calculation    PT Goal Re-Cert Due Date  12/14/20  -CP       User Key  (r) = Recorded By, (t) = Taken By, (c) = Cosigned By    Initials Name Provider Type    Marisela Mascorro, PTA Physical Therapy Assistant          Therapy Education  Education Details: Patient is safe to amb inside her home without cane and for short distances in pubic  Given: Symptoms/condition management, Fall prevention and home safety  Program: New  How Provided: Verbal, Demonstration  Provided to: Patient  Level of Understanding: Teach back education performed, Verbalized, Demonstrated              Time Calculation:   Start Time: 1300  Stop Time: 1342  Time Calculation (min): 42 min  Total Timed Code Minutes- PT: 42 minute(s)  Therapy Charges for Today     Code Description Service Date Service Provider Modifiers Qty    43816174453 HC PT THER PROC  EA 15 MIN 12/3/2020 Marisela Macias, PTA GP 3                    Marisela Macias, JAIDEN  12/3/2020

## 2020-12-07 ENCOUNTER — HOSPITAL ENCOUNTER (OUTPATIENT)
Dept: PHYSICAL THERAPY | Facility: HOSPITAL | Age: 75
Setting detail: THERAPIES SERIES
Discharge: HOME | End: 2020-12-07

## 2020-12-07 DIAGNOSIS — Z96.641 HISTORY OF TOTAL HIP REPLACEMENT, RIGHT: Primary | ICD-10-CM

## 2020-12-07 DIAGNOSIS — M25.559 HIP PAIN: ICD-10-CM

## 2020-12-07 PROCEDURE — 97110 THERAPEUTIC EXERCISES: CPT

## 2020-12-07 NOTE — THERAPY TREATMENT NOTE
Outpatient Physical Therapy Ortho Treatment Note  [facility]     Patient Name: Carla Best  : 1945  MRN: [id number]  Today's Date: 2020      Visit Date: 2020    Subjective Improvement: Not Assessed This Date  Visits:   Visits Approved by Medicare RTMD 2020  Recert Date 2020    S/P R ant ROSALINE 2020    Visit Dx:    ICD-10-CM ICD-9-CM   1. History of total hip replacement, right  Z96.641 V43.64   2. Hip pain  M25.559 719.45       Patient Active Problem List   Diagnosis   • Osteopenia   • Hypothyroidism due to Hashimoto's thyroiditis   • Vitamin D deficiency   • Keratoconjunctivitis sicca (CMS/HCC)   • Long term use of drug   • Cataract   • Primary osteoarthritis of right knee   • B12 deficiency   • Right hip pain   • Encounter for screening for osteoporosis   • GERD (gastroesophageal reflux disease)   • Hip pain, acute, right   • Myofascial pain   • Neck pain   • Osteoarthritis   • Spondylosis of cervical joint   • Palpitations   • Acquired hypothyroidism   • Pre-operative clearance   • Vaginal discharge   • Vaginal irritation   • Urinary incontinence in female   • UTI (urinary tract infection)   • Female cystocele   • Acute cystitis without hematuria   • Overweight   • Diverticulosis of large intestine without perforation or abscess without bleeding   • Generalized abdominal pain   • Helicobacter pylori (H. 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   • Fibromyalgia   • Cystocele with prolapse   • Obstructive sleep apnea, adult   • Age-related osteoporosis without current pathological fracture        Past Medical History:   Diagnosis Date   • Chest pain    • Fibrocystic disease of breast    •  Hashimoto's thyroiditis    • History of screening mammography 08/27/2014    SCREENING MAMMOGRAPHY DIGITAL  (Medicare) (1)    • Hyperlipidemia    • Hypothyroidism due to Hashimoto's thyroiditis 12/3/2016   • Keratoconjunctivitis sicca (CMS/HCC)    • Nuclear senile cataract    • On long term drug therapy    • Osteoarthritis    • Osteopenia    • Osteopenia 12/3/2016   • Photopsia     Right   • Postmenopausal bleeding    • Sinus tachycardia    • Sjogren's syndrome (CMS/HCC)    • Vitamin D deficiency    • Vitamin D deficiency 12/3/2016   • Vitreous detachment of right eye         Past Surgical History:   Procedure Laterality Date   • CATARACT EXTRACTION, BILATERAL  2018   • CHOLECYSTECTOMY  06/09/1997    with operative cholangiogram. Chronic cholecystitis & cholelithiasis. HX of passed common duct stone   • CYSTOSCOPY  11/26/1962    urethral dilatation.Recurrent pyelonephritis. urethritis   • ENDOSCOPY N/A 2/17/2017    Procedure: ESOPHAGOGASTRODUODENOSCOPY;  Surgeon: Cisco Mason DO;  Location: [facility] ENDOSCOPY;  Service:    • ENDOSCOPY N/A 10/31/2018    Procedure: ESOPHAGOGASTRODUODENOSCOPY;  Surgeon: Cisco Mason DO;  Location: [facility] ENDOSCOPY;  Service: Gastroenterology   • ENDOSCOPY AND COLONOSCOPY  09/30/1985    Normal colon to left transverse colon   • GALLBLADDER SURGERY     • JOINT REPLACEMENT      R Ant ROSALINE 7/6/2020   • PAP SMEAR  2009   • TONSILLECTOMY  1950   • VAGINAL DELIVERY      x3                       PT Assessment/Plan     Row Name 12/07/20 1354          PT Assessment    Assessment Comments  Pt. tolerated tx well. Demo quad weakness & slight pain with seated marches, which were discontinued. Advised pt. to use cane to unload right hip and to not overdo it at home.  -CP        PT Plan    PT Frequency  2x/week  -CP     Predicted Duration of Therapy Intervention (PT)  5 Weeks  -CP     PT Plan Comments  Continue POC. Monitor response to today's treatment and continue strengthening exercises  "as tolerated. Initiate balancing exercises.  -CP       User Key  (r) = Recorded By, (t) = Taken By, (c) = Cosigned By    Initials Name Provider Type    Marisela Mascorro, PTA Physical Therapy Assistant            OP Exercises     Row Name 12/07/20 1300             Subjective Comments    Subjective Comments  Pt. states that she thinks pain increase is due to her \"doing more\" as she improves.  Attempted to ascend stairs in home, but couldn't make it up the first step with only one handrail.  -CP         Subjective Pain    Able to rate subjective pain?  yes  -CP      Pre-Treatment Pain Level  6  -CP      Post-Treatment Pain Level  6  -CP         Exercise 1    Exercise Name 1  Pro II Level 3  -CP      Time 1  10  -CP         Exercise 2    Exercise Name 2  Incline Stretch  -CP      Sets 2  3  -CP      Time 2  30  -CP         Exercise 3    Exercise Name 3  standing HS stretch RLE  -CP      Cueing 3  Verbal;Demo  -CP      Sets 3  3  -CP      Time 3  30  -CP         Exercise 4    Exercise Name 4  FWD Step Ups 4\"  -CP      Cueing 4  Verbal;Tactile;Demo  -CP      Sets 4  1  -CP      Time 4  20  -CP         Exercise 5    Exercise Name 5  lat step up 4'  -CP      Cueing 5  Tactile;Verbal;Demo  -CP      Sets 5  1  -CP      Reps 5  20  -CP      Time 5  handrail assist  -CP         Exercise 6    Exercise Name 6  Heel Slides  -CP      Sets 6  1  -CP      Reps 6  20  -CP      Additional Comments  1lb RLE ankle weight  -CP         Exercise 7    Exercise Name 7  LAQ  -CP      Cueing 7  Verbal;Tactile  -CP      Sets 7  1  -CP      Reps 7  20  -CP      Additional Comments  1lb RLE ankle weight  -CP         Exercise 8    Exercise Name 8  Seated RLE PNF Tapping on board  -CP      Cueing 8  Verbal;Tactile;Demo  -CP      Sets 8  1  -CP      Time 8  20  -CP         Exercise 9    Exercise Name 9  Supine Bridges w/ Adduction Ball Squeezes  -CP      Cueing 9  Verbal;Tactile  -CP      Sets 9  1  -CP      Reps 9  20  -CP        User Key  (r) = " Recorded By, (t) = Taken By, (c) = Cosigned By    Initials Name Provider Type    CP Marisela Macias PTA Physical Therapy Assistant                       PT OP Goals     Row Name 12/07/20 1300          PT Short Term Goals    STG Date to Achieve  12/14/20  -CP     STG 1  Pt will demonstrate RLE AROM WFL  -CP     STG 1 Progress  Ongoing  -CP     STG 2  Pt will report 50% improvement in functional ability  -CP     STG 2 Progress  Met  -CP     STG 3  Pt will be independent with self care and HEP.   -CP     STG 3 Progress  Ongoing  -CP     STG 4  Pt will ambulate community distances with SPC independently c/o increased pain.   -CP     STG 4 Progress  Met  -CP        Long Term Goals    LTG Date to Achieve  12/29/20  -CP     LTG 1  Pt will demonstrate RLE MMT 4+/5 grossly  -CP     LTG 1 Progress  Not Met  -CP     LTG 2  Pt will demonstrate ability to ambulate community distances with stable, symmetrical gait pattern with no AD.  -CP     LTG 2 Progress  Ongoing  -CP     LTG 3  Pt will ascend/descend 2 stairs independently without use of handrails.   -CP     LTG 3 Progress  Ongoing  -CP     LTG 4  Pt will achieve an LEFS score of 40/80 indicating improved perceived functional ability.   -CP     LTG 4 Progress  Ongoing  -CP        Time Calculation    PT Goal Re-Cert Due Date  12/14/20  -CP       User Key  (r) = Recorded By, (t) = Taken By, (c) = Cosigned By    Initials Name Provider Type    CP Marisela Macias PTA Physical Therapy Assistant                         Time Calculation:   Start Time: 1300  Stop Time: 1345  Time Calculation (min): 45 min  Total Timed Code Minutes- PT: 45 minute(s)  Therapy Charges for Today     Code Description Service Date Service Provider Modifiers Qty    62106581675 HC PT THER PROC EA 15 MIN 12/7/2020 Marisela Macias PTA GP 3                    Marisela Macias PTA  12/7/2020

## 2020-12-10 ENCOUNTER — APPOINTMENT (OUTPATIENT)
Dept: PHYSICAL THERAPY | Facility: HOSPITAL | Age: 75
End: 2020-12-10

## 2020-12-14 ENCOUNTER — HOSPITAL ENCOUNTER (OUTPATIENT)
Dept: PHYSICAL THERAPY | Facility: HOSPITAL | Age: 75
Setting detail: THERAPIES SERIES
Discharge: HOME | End: 2020-12-14

## 2020-12-14 DIAGNOSIS — Z96.641 HISTORY OF TOTAL HIP REPLACEMENT, RIGHT: Primary | ICD-10-CM

## 2020-12-14 DIAGNOSIS — M25.559 HIP PAIN: ICD-10-CM

## 2020-12-14 PROCEDURE — 97110 THERAPEUTIC EXERCISES: CPT

## 2020-12-14 NOTE — THERAPY TREATMENT NOTE
Outpatient Physical Therapy Ortho Treatment Note  [facility]     Patient Name: Carla Best  : 1945  MRN: [id number]  Today's Date: 2020      Visit Date: 2020     Subjective Improvement 80% in right hip  Visits   Visits approved medicare  RTMD 2 months  Recert Date 2020    S/P R ant ROSALINE on 2020 with hip fracture after surgery    Visit Dx:    ICD-10-CM ICD-9-CM   1. History of total hip replacement, right  Z96.641 V43.64   2. Hip pain  M25.559 719.45       Patient Active Problem List   Diagnosis   • Osteopenia   • Hypothyroidism due to Hashimoto's thyroiditis   • Vitamin D deficiency   • Keratoconjunctivitis sicca (CMS/HCC)   • Long term use of drug   • Cataract   • Primary osteoarthritis of right knee   • B12 deficiency   • Right hip pain   • Encounter for screening for osteoporosis   • GERD (gastroesophageal reflux disease)   • Hip pain, acute, right   • Myofascial pain   • Neck pain   • Osteoarthritis   • Spondylosis of cervical joint   • Palpitations   • Acquired hypothyroidism   • Pre-operative clearance   • Vaginal discharge   • Vaginal irritation   • Urinary incontinence in female   • UTI (urinary tract infection)   • Female cystocele   • Acute cystitis without hematuria   • Overweight   • Diverticulosis of large intestine without perforation or abscess without bleeding   • Generalized abdominal pain   • Helicobacter pylori (H. 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   • Fibromyalgia   • Cystocele with prolapse   • Obstructive sleep apnea, adult   • Age-related osteoporosis without current pathological fracture        Past Medical History:   Diagnosis Date   • Chest pain    • Fibrocystic disease  of breast    • Hashimoto's thyroiditis    • History of screening mammography 08/27/2014    SCREENING MAMMOGRAPHY DIGITAL  (Medicare) (1)    • Hyperlipidemia    • Hypothyroidism due to Hashimoto's thyroiditis 12/3/2016   • Keratoconjunctivitis sicca (CMS/HCC)    • Nuclear senile cataract    • On long term drug therapy    • Osteoarthritis    • Osteopenia    • Osteopenia 12/3/2016   • Photopsia     Right   • Postmenopausal bleeding    • Sinus tachycardia    • Sjogren's syndrome (CMS/[facility])    • Vitamin D deficiency    • Vitamin D deficiency 12/3/2016   • Vitreous detachment of right eye         Past Surgical History:   Procedure Laterality Date   • CATARACT EXTRACTION, BILATERAL  2018   • CHOLECYSTECTOMY  06/09/1997    with operative cholangiogram. Chronic cholecystitis & cholelithiasis. HX of passed common duct stone   • CYSTOSCOPY  11/26/1962    urethral dilatation.Recurrent pyelonephritis. urethritis   • ENDOSCOPY N/A 2/17/2017    Procedure: ESOPHAGOGASTRODUODENOSCOPY;  Surgeon: Cisco Mason DO;  Location: [facility] ENDOSCOPY;  Service:    • ENDOSCOPY N/A 10/31/2018    Procedure: ESOPHAGOGASTRODUODENOSCOPY;  Surgeon: Cisco Mason DO;  Location: [facility] ENDOSCOPY;  Service: Gastroenterology   • ENDOSCOPY AND COLONOSCOPY  09/30/1985    Normal colon to left transverse colon   • GALLBLADDER SURGERY     • JOINT REPLACEMENT      R Ant ROSALINE 7/6/2020   • PAP SMEAR  2009   • TONSILLECTOMY  1950   • VAGINAL DELIVERY      x3       PT Ortho     Row Name 12/14/20 1400       Right Lower Ext    Rt Hip Flexion AROM  90 semi reclined  -CP    Row Name 12/14/20 1300       Subjective Comments    Subjective Comments  Patient states that she broke her left toe last week.  she reports hitting her left toe while in the bathroom..  patient has avoided standing ex since the toe fracture.  She is amb with the cane and cont to have pain in the right hip  -CP       Precautions and Contraindications    Precautions  R ROSALINE 7-  -CP     Contraindications  R hip FX after surgery, Fracture healed per patient  -CP       Subjective Pain    Able to rate subjective pain?  yes  -CP    Pre-Treatment Pain Level  5  -CP    Post-Treatment Pain Level  5  -CP       Posture/Observations    Posture/Observations Comments  Amb with SPC  -CP      User Key  (r) = Recorded By, (t) = Taken By, (c) = Cosigned By    Initials Name Provider Type    Marisela Mascorro PTA Physical Therapy Assistant                      PT Assessment/Plan     Row Name 12/14/20 1433          PT Assessment    Assessment Comments  Patients ther ex adjusted today to reflex patients fracture left toe.  No CKC secondary to increase left toe pain.  patient is compliant with HEP.  She reports that she applies ice to right hip while at home.  -CP        PT Plan    PT Frequency  2x/week  -CP     Predicted Duration of Therapy Intervention (PT)  5 weeks  -CP     PT Plan Comments  Cont with POC.  progress as tolerated.  -CP       User Key  (r) = Recorded By, (t) = Taken By, (c) = Cosigned By    Initials Name Provider Type    Marisela Mascorro PTA Physical Therapy Assistant          Modalities     Row Name 12/14/20 1300             Ice    Patient reports will apply ice at home to involved area  Yes  -CP        User Key  (r) = Recorded By, (t) = Taken By, (c) = Cosigned By    Initials Name Provider Type    Marisela Mascorro PTA Physical Therapy Assistant        OP Exercises     Row Name 12/14/20 1300             Subjective Comments    Subjective Comments  Patient states that she broke her left toe last week.  she reports hitting her left toe while in the bathroom..  patient has avoided standing ex since the toe fracture.  She is amb with the cane and cont to have pain in the right hip  -CP         Subjective Pain    Able to rate subjective pain?  yes  -CP      Pre-Treatment Pain Level  5  -CP      Post-Treatment Pain Level  5  -CP         Exercise 1    Exercise Name 1  Pro II level 3  -CP    "   Time 1  10  -CP         Exercise 2    Exercise Name 2  LAQ  -CP      Cueing 2  Verbal;Demo  -CP      Sets 2  2  -CP      Reps 2  10  -CP      Time 2  5\" holds  -CP      Additional Comments  2 lb AW  -CP         Exercise 3    Exercise Name 3  Seated knee fl with tband  -CP      Cueing 3  Verbal;Tactile  -CP      Sets 3  2  -CP      Reps 3  10  -CP      Time 3  green  -CP         Exercise 4    Exercise Name 4  recline heelslides  -CP      Cueing 4  Verbal;Tactile  -CP      Sets 4  2  -CP      Reps 4  10  -CP      Time 4  1 lb AW  -CP         Exercise 5    Exercise Name 5  SL'ing hip AB  -CP      Cueing 5  Verbal;Tactile  -CP      Sets 5  2  -CP      Reps 5  10  -CP         Exercise 6    Exercise Name 6  bridging with tband  -CP      Cueing 6  Verbal;Tactile  -CP      Sets 6  2  -CP      Reps 6  10  -CP      Time 6  green  -CP         Exercise 7    Exercise Name 7  reclined heelslides  -CP      Cueing 7  Verbal;Tactile  -CP         Exercise 8    Exercise Name 8  supine SLR  -CP      Cueing 8  Verbal;Tactile  -CP      Sets 8  2  -CP      Reps 8  10  -CP      Time 8  5\" holds  -CP      Additional Comments  1 lb AW  -CP        User Key  (r) = Recorded By, (t) = Taken By, (c) = Cosigned By    Initials Name Provider Type    CP Marisela Macias, PTA Physical Therapy Assistant                       PT OP Goals     Row Name 12/14/20 1400          PT Short Term Goals    STG Date to Achieve  12/14/20  -CP     STG 1  Pt will demonstrate RLE AROM WFL  -CP     STG 1 Progress  Ongoing  -CP     STG 2  Pt will report 50% improvement in functional ability  -CP     STG 2 Progress  Met  -CP     STG 3  Pt will be independent with self care and HEP.   -CP     STG 3 Progress  Met  -CP     STG 4  Pt will ambulate community distances with SPC independently c/o increased pain.   -CP     STG 4 Progress  Met  -CP        Long Term Goals    LTG Date to Achieve  12/29/20  -CP     LTG 1  Pt will demonstrate RLE MMT 4+/5 grossly  -CP     LTG 1 " Progress  Not Met  -CP     LTG 2  Pt will demonstrate ability to ambulate community distances with stable, symmetrical gait pattern with no AD.  -CP     LTG 2 Progress  Ongoing  -CP     LTG 3  Pt will ascend/descend 2 stairs independently without use of handrails.   -CP     LTG 3 Progress  Ongoing  -CP     LTG 4  Pt will achieve an LEFS score of 40/80 indicating improved perceived functional ability.   -CP     LTG 4 Progress  Ongoing  -CP        Time Calculation    PT Goal Re-Cert Due Date  12/14/20  -CP       User Key  (r) = Recorded By, (t) = Taken By, (c) = Cosigned By    Initials Name Provider Type    CP Marisela Macias PTA Physical Therapy Assistant          Therapy Education  Education Details: SD'ing hip AB  Given: HEP  Program: New  How Provided: Verbal, Demonstration, Written  Provided to: Patient  Level of Understanding: Teach back education performed, Verbalized, Demonstrated              Time Calculation:   Start Time: 1350  Stop Time: 1430  Time Calculation (min): 40 min  Total Timed Code Minutes- PT: 40 minute(s)  Therapy Charges for Today     Code Description Service Date Service Provider Modifiers Qty    37306831331 HC PT THER PROC EA 15 MIN 12/14/2020 Marisela Macias PTA GP 3                    Marisela Macias PTA  12/14/2020

## 2020-12-17 ENCOUNTER — HOSPITAL ENCOUNTER (OUTPATIENT)
Dept: PHYSICAL THERAPY | Facility: HOSPITAL | Age: 75
Setting detail: THERAPIES SERIES
Discharge: HOME | End: 2020-12-17

## 2020-12-17 DIAGNOSIS — Z96.641 HISTORY OF TOTAL HIP REPLACEMENT, RIGHT: Primary | ICD-10-CM

## 2020-12-17 DIAGNOSIS — M25.559 HIP PAIN: ICD-10-CM

## 2020-12-17 PROCEDURE — 97110 THERAPEUTIC EXERCISES: CPT

## 2020-12-17 PROCEDURE — 97530 THERAPEUTIC ACTIVITIES: CPT

## 2020-12-17 NOTE — THERAPY PROGRESS REPORT/RE-CERT
Outpatient Physical Therapy Ortho Progress Note  [facility]     Patient Name: Carla Best  : 1945  MRN: [id number]  Today's Date: 2020      Visit Date: 2020     Visits attended: 13/14  % improvement: 80%  Next MD appointment: 2021  Recheck due date: 2021    Therapy Diagnosis: S/P R ant ROSALINE on 2020 with hip fracture after surgery    Patient Active Problem List   Diagnosis   • Osteopenia   • Hypothyroidism due to Hashimoto's thyroiditis   • Vitamin D deficiency   • Keratoconjunctivitis sicca (CMS/HCC)   • Long term use of drug   • Cataract   • Primary osteoarthritis of right knee   • B12 deficiency   • Right hip pain   • Encounter for screening for osteoporosis   • GERD (gastroesophageal reflux disease)   • Hip pain, acute, right   • Myofascial pain   • Neck pain   • Osteoarthritis   • Spondylosis of cervical joint   • Palpitations   • Acquired hypothyroidism   • Pre-operative clearance   • Vaginal discharge   • Vaginal irritation   • Urinary incontinence in female   • UTI (urinary tract infection)   • Female cystocele   • Acute cystitis without hematuria   • Overweight   • Diverticulosis of large intestine without perforation or abscess without bleeding   • Generalized abdominal pain   • Helicobacter pylori (H. 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   • Fibromyalgia   • Cystocele with prolapse   • Obstructive sleep apnea, adult   • Age-related osteoporosis without current pathological fracture        Past Medical History:   Diagnosis Date   • Chest pain    • Fibrocystic disease of breast    • Hashimoto's thyroiditis    • History of screening mammography 2014    SCREENING MAMMOGRAPHY DIGITAL   "(Medicare) (1)    • Hyperlipidemia    • Hypothyroidism due to Hashimoto's thyroiditis 12/3/2016   • Keratoconjunctivitis sicca (CMS/[facility])    • Nuclear senile cataract    • On long term drug therapy    • Osteoarthritis    • Osteopenia    • Osteopenia 12/3/2016   • Photopsia     Right   • Postmenopausal bleeding    • Sinus tachycardia    • Sjogren's syndrome (CMS/[facility])    • Vitamin D deficiency    • Vitamin D deficiency 12/3/2016   • Vitreous detachment of right eye         Past Surgical History:   Procedure Laterality Date   • CATARACT EXTRACTION, BILATERAL  2018   • CHOLECYSTECTOMY  06/09/1997    with operative cholangiogram. Chronic cholecystitis & cholelithiasis. HX of passed common duct stone   • CYSTOSCOPY  11/26/1962    urethral dilatation.Recurrent pyelonephritis. urethritis   • ENDOSCOPY N/A 2/17/2017    Procedure: ESOPHAGOGASTRODUODENOSCOPY;  Surgeon: Cisco Mason DO;  Location: [facility] ENDOSCOPY;  Service:    • ENDOSCOPY N/A 10/31/2018    Procedure: ESOPHAGOGASTRODUODENOSCOPY;  Surgeon: Cisco Mason DO;  Location: [facility] ENDOSCOPY;  Service: Gastroenterology   • ENDOSCOPY AND COLONOSCOPY  09/30/1985    Normal colon to left transverse colon   • GALLBLADDER SURGERY     • JOINT REPLACEMENT      R Ant ROSALINE 7/6/2020   • PAP SMEAR  2009   • TONSILLECTOMY  1950   • VAGINAL DELIVERY      x3       Visit Dx:     ICD-10-CM ICD-9-CM   1. History of total hip replacement, right  Z96.641 V43.64   2. Hip pain  M25.559 719.45             PT Ortho     Row Name 12/17/20 1300       Subjective Comments    Subjective Comments  Pt states this week has been \"a little rough.\" Reports feeling more unsteady on her feet and occasional use of RW at home to increase stability. Notes she has noticed the most difficulty getting in/out of a chair.   -KW       Precautions and Contraindications    Precautions  R ROSALINE 7-  -KW    Contraindications  R hip FX after surgery, Fracture healed per patient  -KW       Subjective Pain    " Able to rate subjective pain?  yes  -KW    Pre-Treatment Pain Level  5  -KW       Posture/Observations    Posture/Observations Comments  Amb with SPC, non antalgic gait pattern but decreased eccentric control noted on LLE.  -KW       MMT (Manual Muscle Testing)    General MMT Comments  R hip flexion 110 deg; WFL  -KW       MMT Right Lower Ext    Rt Hip Flexion MMT, Gross Movement  (4-/5) good minus  -KW    Rt Hip ABduction MMT, Gross Movement  (4+/5) good plus  -KW    Rt Hip ADduction MMT, Gross Movement  (4+/5) good plus  -KW    Rt Knee Extension MMT, Gross Movement  (5/5) normal  -KW    Rt Knee Flexion MMT, Gross Movement  (4+/5) good plus  -KW    Rt Ankle Dorsiflexion MMT, Gross Movement  (5/5) normal  -KW       MMT Left Lower Ext    Lt Hip Flexion MMT, Gross Movement  (4-/5) good minus  -KW    Lt Hip ABduction MMT, Gross Movement  (4+/5) good plus  -KW    Lt Hip ADduction MMT, Gross Movement  (4/5) good  -KW    Lt Knee Extension MMT, Gross Movement  (5/5) normal  -KW    Lt Knee Flexion MMT, Gross Movement  (4+/5) good plus  -KW    Lt Ankle Dorsiflexion MMT, Gross Movement  (5/5) normal  -KW       Gait/Stairs (Locomotion)    [place] Level (Gait)  modified independence  -KW    Assistive Device (Gait)  cane, straight  -KW    [place] Level (Stairs)  modified independence  -KW    Handrail Location (Stairs)  right side (ascending)  -KW    Ascending Technique (Stairs)  step-to-step  -KW    Descending Technique (Stairs)  step-to-step  -KW      User Key  (r) = Recorded By, (t) = Taken By, (c) = Cosigned By    Initials Name Provider Type    Esequiel Ferrer PT Physical Therapist                      Therapy Education  Education Details: stair training, HEP: bridges, marches and STS training with green tband  Given: HEP, Posture/body mechanics, Mobility training  Program: New, Reinforced  How Provided: Verbal, Demonstration, Written  Provided to: Patient  Level of Understanding: Teach back education performed,  Verbalized, Demonstrated     PT OP Goals     Row Name 12/17/20 1300          PT Short Term Goals    STG Date to Achieve  12/14/20  -KW     STG 1  Pt will demonstrate RLE AROM WFL  -KW     STG 1 Progress  Met  -KW     STG 2  Pt will report 50% improvement in functional ability  -KW     STG 2 Progress  Met  -KW     STG 3  Pt will be independent with self care and HEP.   -KW     STG 3 Progress  Met  -KW     STG 4  Pt will ambulate community distances with SPC independently c/o increased pain.   -KW     STG 4 Progress  Met  -KW        Long Term Goals    LTG Date to Achieve  12/29/20  -KW     LTG 1  Pt will demonstrate RLE MMT 4+/5 grossly  -KW     LTG 1 Progress  Progressing  -KW     LTG 2  Pt will demonstrate ability to ambulate community distances with stable, symmetrical gait pattern with no AD.  -KW     LTG 2 Progress  Ongoing  -KW     LTG 3  Pt will ascend/descend 2 stairs independently without use of handrails.   -KW     LTG 3 Progress  Progressing  -KW     LTG 4  Pt will achieve an LEFS score of 40/80 indicating improved perceived functional ability.   -KW     LTG 4 Progress  Ongoing  -KW        Time Calculation    PT Goal Re-Cert Due Date  01/07/21  -KW       User Key  (r) = Recorded By, (t) = Taken By, (c) = Cosigned By    Initials Name Provider Type    Esequiel Ferrer, PT Physical Therapist          PT Assessment/Plan     Row Name 12/17/20 1300          PT Assessment    Functional Limitations  Impaired gait;Limitation in home management;Performance in leisure activities;Limitations in community activities;Impaired locomotion;Limitations in functional capacity and performance;Performance in self-care ADL  -KW     Impairments  Balance;Gait;Impaired muscle power;Impaired muscle endurance;Joint mobility;Locomotion;Muscle strength;Pain;Sensation;Range of motion;Posture  -KW     Assessment Comments  PT recheck completed this date. Pt tolerated WB activities well this date with minimal complaints of pain. Ambulates  "with SPC, non antalgic gait pattern, no instability or LOB noted. Demonstrated improved R hip ROM and MMT this date. Deficits still present in bilateral hip flexor strength.  Pt reports she has felt more \"unsteady\" on her feet the past week due to not performing standing exs secondary to pain from recent L toe fx. Pt is appropriate for continued skilled PT to improve strength and stability in gait and transfers without complaints of increased hip pain to decrease fall risk and improve overal ability to perform functional mobility and ADL's.   -KW     Rehab Potential  Good  -KW     Patient/caregiver participated in establishment of treatment plan and goals  Yes  -KW     Patient would benefit from skilled therapy intervention  Yes  -KW        PT Plan    PT Frequency  2x/week  -KW     Predicted Duration of Therapy Intervention (PT)  4 weeks  -KW     PT Plan Comments  Cont PT POC incorperating increased balance and functional mobility training activities in WB as tolerated.   -KW       User Key  (r) = Recorded By, (t) = Taken By, (c) = Cosigned By    Initials Name Provider Type    Esequiel Ferrer, PT Physical Therapist            OP Exercises     Row Name 12/17/20 1300             Subjective Comments    Subjective Comments  Pt states this week has been \"a little rough.\" Reports feeling more unsteady on her feet and occasional use of RW at home to increase stability. Notes she has noticed the most difficulty getting in/out of a chair.   -KW         Subjective Pain    Able to rate subjective pain?  yes  -KW      Pre-Treatment Pain Level  5  -KW      Post-Treatment Pain Level  -- not rated  -KW         Exercise 1    Exercise Name 1  Pro II level 3  -KW      Time 1  10  -KW         Exercise 2    Exercise Name 2  Recheck  -KW         Exercise 3    Exercise Name 3  Step ups: L  -KW      Cueing 3  Verbal;Demo  -KW      Sets 3  1  -KW      Reps 3  10  -KW         Exercise 4    Exercise Name 4  Stair Training  -KW      Cueing " 4  Verbal  -KW      Time 4  5'  -KW      Additional Comments  step to pattern with R ascending HR  -KW         Exercise 5    Exercise Name 5  sit<>stand with green tband around knees  -KW      Sets 5  2  -KW      Reps 5  10  -KW      Additional Comments  8# wt  -KW         Exercise 6    Exercise Name 6  bridging with tband  -KW      Cueing 6  Verbal  -KW      Sets 6  2  -KW      Reps 6  10  -KW      Additional Comments  green  -KW         Exercise 7    Exercise Name 7  SL'ing hip AB  -KW      Cueing 7  Verbal;Tactile  -KW      Sets 7  2  -KW      Reps 7  10  -KW      Additional Comments  corrected mechanics  -KW         Exercise 8    Exercise Name 8  Supine hooklying marches with tband  -KW      Cueing 8  Verbal  -KW      Sets 8  2  -KW      Reps 8  20  -KW      Additional Comments  green  -KW        User Key  (r) = Recorded By, (t) = Taken By, (c) = Cosigned By    Initials Name Provider Type    Esequiel Ferrer, PT Physical Therapist                                  Time Calculation:     Start Time: 1302  Stop Time: 1344  Time Calculation (min): 42 min     Therapy Charges for Today     Code Description Service Date Service Provider Modifiers Qty    82803192964  PT THERAPEUTIC ACT EA 15 MIN 12/17/2020 Esequiel Osei, PT GP 2    38610196696 HC PT THER PROC EA 15 MIN 12/17/2020 Esequiel Osei, PT GP 1                    Esequiel Osei PT  12/17/2020

## 2020-12-18 ENCOUNTER — OFFICE VISIT (OUTPATIENT)
Dept: SLEEP MEDICINE | Facility: HOSPITAL | Age: 75
End: 2020-12-18

## 2020-12-18 VITALS
OXYGEN SATURATION: 96 % | WEIGHT: 184.3 LBS | HEART RATE: 77 BPM | BODY MASS INDEX: 27.93 KG/M2 | DIASTOLIC BLOOD PRESSURE: 77 MMHG | SYSTOLIC BLOOD PRESSURE: 150 MMHG | HEIGHT: 68 IN

## 2020-12-18 DIAGNOSIS — G47.33 OBSTRUCTIVE SLEEP APNEA, ADULT: Primary | ICD-10-CM

## 2020-12-18 PROCEDURE — 99213 OFFICE O/P EST LOW 20 MIN: CPT | Performed by: NURSE PRACTITIONER

## 2020-12-18 RX ORDER — LOSARTAN POTASSIUM 25 MG/1
TABLET ORAL
COMMUNITY
Start: 2019-02-04 | End: 2021-07-29 | Stop reason: SDUPTHER

## 2020-12-18 NOTE — PROGRESS NOTES
Sleep Clinic Follow Up    Date: 2020  Primary Care Provider: System, Provider Not In    Last office visit: 2019 (I reviewed this note)    CC: Follow up: KIMMIE on CPAP      Interim History:  Since the last visit:    1) severe KIMMIE -  Carla Best has remained mostly compliant with CPAP.  However,  She reports mask and machine issues,  and headaches. Denies dry mouth or  pressures intolerance. She denies abnormal dreams, sleep paralysis, nasal congestion, URI sx.  At first when she was using CPAP she did not have many issues. But states over the last 6-7 months she can barely tolerate wearing her mask. She states she wakes up most mornings and the mask is on the floor. She states she was using the nasal cushion but she switched to nasal pillows several months ago. She states pillows are some better. She states she wears a sleep cap because the mask straps were making her head sore. She also states she feels like she wakes up and there is air blowing in her mouth. She thinks something is wrong with her machine.     Sleep Testin. Split night PSG on 2019, AHI of 44   2. CPAP titration, recommended 8-11 cm H2O   3. Currently on 10/15 cm H2O    PAP Data:    Time frame: 2020-2020   Compliance: 88.9 %  Average use on days used: 3hrs 22 min  Percent of days with usage greater than or equal to 4 hours: 37.8%  PAP range: 10-15 cm H2O  Average 90% pressure: 11.2 cmH2O  Leak: 15 minutes  Average AHI: 2.9 events/hr  Mask type: Nasal mask  DME: [facility]    Bed time: 3523-8662  Sleep latency: 60 minutes  Number of times awakens during the night: 2-3  Wake time: 3065-7056  Estimated total sleep time at night: 3-5 hours  Caffeine intake: 1 cups of coffee, 1 cups of tea, and 1 sodas per day  Alcohol intake: 0 drinks per week  Nap time: denies   Sleepiness with Driving: denies     [place] - 11    2) Patient denies RLS symptoms.     PMHx, FH, SH reviewed and pertinent changes are:  Right total hip  replacement, going to physical therapy       REVIEW OF SYSTEMS:   Negative for chest pain, SOA, fever, chills, cough, N/V/D, abdominal pain.    Smoking:none           Exam:  Vitals:    12/18/20 1001   BP: 150/77   Pulse: 77   SpO2: 96%           12/18/20  1001   Weight: 83.6 kg (184 lb 4.8 oz)     Body mass index is 28.03 kg/m². Patient's Body mass index is 28.03 kg/m². BMI is above normal parameters. Recommendations include: referral to primary care.      Gen:                No distress, conversant, pleasant, appears stated age, alert, oriented  Eyes:               Anicteric sclera, moist conjunctiva, no lid lag                           PERRL, EOMI   Heent:             NC/AT                          Oropharynx clear                          Normal hearing  Lungs:             normal effort, non-labored breathing                          Clear to auscultation bilaterally          CV:                  Normal S1/S2, no murmur                          no lower extremity edema            Psych:             Appropriate affect  Neuro:             CN 2-12 appear intact    Past Medical History:   Diagnosis Date   • Chest pain    • Fibrocystic disease of breast    • Hashimoto's thyroiditis    • History of screening mammography 08/27/2014    SCREENING MAMMOGRAPHY DIGITAL  (Medicare) (1)    • Hyperlipidemia    • Hypothyroidism due to Hashimoto's thyroiditis 12/3/2016   • Keratoconjunctivitis sicca (CMS/HCC)    • Nuclear senile cataract    • On long term drug therapy    • Osteoarthritis    • Osteopenia    • Osteopenia 12/3/2016   • Photopsia     Right   • Postmenopausal bleeding    • Sinus tachycardia    • Sjogren's syndrome (CMS/HCC)    • Vitamin D deficiency    • Vitamin D deficiency 12/3/2016   • Vitreous detachment of right eye        Current Outpatient Medications:   •  aspirin 81 MG EC tablet, Take 81 mg by mouth daily., Disp: , Rfl:   •  Biotin (BIOTIN MAXIMUM STRENGTH) 5 MG capsule, Take 1 tablet by mouth Daily.,  Disp: , Rfl:   •  Calcium Carbonate-Vitamin D (CALCIUM 600+D) 600-200 MG-UNIT tablet, Take 1 tablet by mouth Daily., Disp: , Rfl:   •  clobetasol (TEMOVATE) 0.05 % ointment, Apply  topically to the appropriate area as directed 2 (Two) Times a Day., Disp: 60 g, Rfl: 20  •  diphenoxylate-atropine (LOMOTIL) 2.5-0.025 MG per tablet, TK 1 T PO SIX TIMES PER DAY PRN, Disp: , Rfl: 5  •  losartan (COZAAR) 25 MG tablet, TAKE ONE TABLET BY MOUTH DAILY, Disp: , Rfl:   •  meclizine (ANTIVERT) 12.5 MG tablet, Take 12.5 mg by mouth 3 (Three) Times a Day As Needed for dizziness., Disp: , Rfl:   •  metoprolol succinate XL (TOPROL-XL) 50 MG 24 hr tablet, Take 1 tablet by mouth Daily With Dinner., Disp: 90 tablet, Rfl: 6  •  Multiple Vitamins-Minerals (MULTIVITAMIN ADULT PO), Take 1 tablet by mouth Daily., Disp: , Rfl:   •  pravastatin (PRAVACHOL) 20 MG tablet, Take 1 tablet by mouth Daily., Disp: 90 tablet, Rfl: 3  •  Synthroid 112 MCG tablet, Take 1 tablet by mouth Daily., Disp: 30 tablet, Rfl: 11  •  triamcinolone (KENALOG) 0.1 % cream, Apply 1 application topically to the appropriate area as directed 2 (Two) Times a Day., Disp: , Rfl:   •  vitamin B-12 (CYANOCOBALAMIN) 1000 MCG tablet, Take 1,000 mcg by mouth Every Other Day., Disp: , Rfl:   •  vitamin D (ERGOCALCIFEROL) 1.25 MG (95831 UT) capsule capsule, Take 1 capsule by mouth Every 14 (Fourteen) Days., Disp: 6 capsule, Rfl: 11  •  hydroxychloroquine (PLAQUENIL) 200 MG tablet, TAKE 1 TABLET BY MOUTH TWICE DAILY, Disp: , Rfl:   •  Omega-3 Fatty Acids (FISH OIL) 1000 MG capsule capsule, Take 2,000 mg by mouth 2 (Two) Times a Day., Disp: , Rfl:   •  Probiotic Product (PROBIOTIC-10 PO), Take  by mouth., Disp: , Rfl:     Current Facility-Administered Medications:   •  triamcinolone acetonide (KENALOG-40) injection 80 mg, 80 mg, Intramuscular, Once, Aditya Falk APRN      Assessment and Plan:    1. Obstructive sleep apnea  -Established, stable (1)  1. Mostly compliant with PAP  therapy  2. Will schedule appointment with sleep educator to help resolve mask and machine issues   3. For now, continue PAP as prescribed  4. Script for PAP supplies  5. Return to clinic in 2 months with compliance report unless change in symptoms in interim period      12 of 20 minutes spent face-to-face counseling patient extensively regarding:   PAP therapy, PAP compliance and PAP maintenance      Return to clinic in 2 months.  Patient agrees to return sooner if changes in symptoms.          This document has been electronically signed by ZACHARY Higuera on December 18, 2020 10:04 CST            CC: System, Provider Not In          No ref. provider found

## 2020-12-21 ENCOUNTER — HOSPITAL ENCOUNTER (OUTPATIENT)
Dept: PHYSICAL THERAPY | Facility: HOSPITAL | Age: 75
Setting detail: THERAPIES SERIES
Discharge: HOME | End: 2020-12-21

## 2020-12-21 DIAGNOSIS — M25.559 HIP PAIN: ICD-10-CM

## 2020-12-21 DIAGNOSIS — Z96.641 HISTORY OF TOTAL HIP REPLACEMENT, RIGHT: Primary | ICD-10-CM

## 2020-12-21 PROCEDURE — 97110 THERAPEUTIC EXERCISES: CPT

## 2020-12-21 NOTE — THERAPY TREATMENT NOTE
Outpatient Physical Therapy Ortho Treatment Note  [facility]     Patient Name: Carla Best  : 1945  MRN: [id number]  Today's Date: 2020      Visit Date: 2020     Subjective Improvement 80%  Visits 14/15  Visits approved medicare  RTMD 2021  Recert Date 2021    S/P R ant ROSALINE on 2020 with hip fracture after surgery    Visit Dx:    ICD-10-CM ICD-9-CM   1. History of total hip replacement, right  Z96.641 V43.64   2. Hip pain  M25.559 719.45       Patient Active Problem List   Diagnosis   • Osteopenia   • Hypothyroidism due to Hashimoto's thyroiditis   • Vitamin D deficiency   • Keratoconjunctivitis sicca (CMS/HCC)   • Long term use of drug   • Cataract   • Primary osteoarthritis of right knee   • B12 deficiency   • Right hip pain   • Encounter for screening for osteoporosis   • GERD (gastroesophageal reflux disease)   • Hip pain, acute, right   • Myofascial pain   • Neck pain   • Osteoarthritis   • Spondylosis of cervical joint   • Palpitations   • Acquired hypothyroidism   • Pre-operative clearance   • Vaginal discharge   • Vaginal irritation   • Urinary incontinence in female   • UTI (urinary tract infection)   • Female cystocele   • Acute cystitis without hematuria   • Overweight   • Diverticulosis of large intestine without perforation or abscess without bleeding   • Generalized abdominal pain   • Helicobacter pylori (H. 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   • Fibromyalgia   • Cystocele with prolapse   • Obstructive sleep apnea, adult   • Age-related osteoporosis without current pathological fracture        Past Medical History:   Diagnosis Date   • Chest pain    • Fibrocystic disease of breast    •  Hashimoto's thyroiditis    • History of screening mammography 08/27/2014    SCREENING MAMMOGRAPHY DIGITAL  (Medicare) (1)    • Hyperlipidemia    • Hypothyroidism due to Hashimoto's thyroiditis 12/3/2016   • Keratoconjunctivitis sicca (CMS/HCC)    • Nuclear senile cataract    • On long term drug therapy    • Osteoarthritis    • Osteopenia    • Osteopenia 12/3/2016   • Photopsia     Right   • Postmenopausal bleeding    • Sinus tachycardia    • Sjogren's syndrome (CMS/[facility])    • Vitamin D deficiency    • Vitamin D deficiency 12/3/2016   • Vitreous detachment of right eye         Past Surgical History:   Procedure Laterality Date   • CATARACT EXTRACTION, BILATERAL  2018   • CHOLECYSTECTOMY  06/09/1997    with operative cholangiogram. Chronic cholecystitis & cholelithiasis. HX of passed common duct stone   • CYSTOSCOPY  11/26/1962    urethral dilatation.Recurrent pyelonephritis. urethritis   • ENDOSCOPY N/A 2/17/2017    Procedure: ESOPHAGOGASTRODUODENOSCOPY;  Surgeon: Cisco Mason DO;  Location: [facility] ENDOSCOPY;  Service:    • ENDOSCOPY N/A 10/31/2018    Procedure: ESOPHAGOGASTRODUODENOSCOPY;  Surgeon: Cisco Mason DO;  Location: [facility] ENDOSCOPY;  Service: Gastroenterology   • ENDOSCOPY AND COLONOSCOPY  09/30/1985    Normal colon to left transverse colon   • GALLBLADDER SURGERY     • JOINT REPLACEMENT      R Ant ROSALINE 7/6/2020   • PAP SMEAR  2009   • TONSILLECTOMY  1950   • VAGINAL DELIVERY      x3       PT Ortho     Row Name 12/21/20 1400       Subjective Comments    Subjective Comments  Patient states that her toe is feeling better.  She does get dizzy going from sit to supine and back.  Her pain remains unchanged at 5/10.  She does keep a w/w at her bed for when she needs to use the bathroom in the middle of the night.  -CP       Precautions and Contraindications    Precautions  R ROSALINE 7-  -CP    Contraindications  R hip FX after surgery, Fracture healed per patient  -CP       Subjective Pain     "Able to rate subjective pain?  yes  -CP    Pre-Treatment Pain Level  5  -CP    Post-Treatment Pain Level  5  -CP       Posture/Observations    Posture/Observations Comments  Amb with SPC, non antalgic gait pattern but decreased eccentric control noted on LLE.  -CP       MMT Right Lower Ext    Rt Hip Flexion MMT, Gross Movement  (4-/5) good minus  -CP    Rt Hip ABduction MMT, Gross Movement  (4+/5) good plus  -CP    Rt Hip ADduction MMT, Gross Movement  (4+/5) good plus  -CP    Rt Knee Extension MMT, Gross Movement  (5/5) normal  -CP    Rt Knee Flexion MMT, Gross Movement  (4+/5) good plus  -CP    Rt Ankle Dorsiflexion MMT, Gross Movement  (5/5) normal  -CP       MMT Left Lower Ext    Lt Hip Flexion MMT, Gross Movement  (4-/5) good minus  -CP    Lt Hip ABduction MMT, Gross Movement  (4+/5) good plus  -CP    Lt Hip ADduction MMT, Gross Movement  (4/5) good  -CP    Lt Knee Extension MMT, Gross Movement  (5/5) normal  -CP    Lt Knee Flexion MMT, Gross Movement  (4+/5) good plus  -CP    Lt Ankle Dorsiflexion MMT, Gross Movement  (5/5) normal  -CP       Gait/Stairs (Locomotion)    [place] Level (Gait)  modified independence  -CP    Assistive Device (Gait)  cane, straight  -CP    [place] Level (Stairs)  modified independence  -CP    Handrail Location (Stairs)  right side (ascending)  -CP    Ascending Technique (Stairs)  step-to-step  -CP    Descending Technique (Stairs)  step-to-step  -CP      User Key  (r) = Recorded By, (t) = Taken By, (c) = Cosigned By    Initials Name Provider Type    CP Marisela Macias, PTA Physical Therapy Assistant                      PT Assessment/Plan     Row Name 12/21/20 4868          PT Assessment    Assessment Comments  Patient unable to perform step up 6\" secondary to increase pain.  Sit to stand with B UE assist.  -CP        PT Plan    PT Frequency  2x/week  -CP     Predicted Duration of Therapy Intervention (PT)  4 weeks  -CP     PT Plan Comments  Cont with POC.  balance " "activites and LE strengthening.  -CP       User Key  (r) = Recorded By, (t) = Taken By, (c) = Cosigned By    Initials Name Provider Type    Marisela Mascorro, PTA Physical Therapy Assistant            OP Exercises     Row Name 12/21/20 1400             Subjective Comments    Subjective Comments  Patient states that her toe is feeling better.  She does get dizzy going from sit to supine and back.  Her pain remains unchanged at 5/10.  She does keep a w/w at her bed for when she needs to use the bathroom in the middle of the night.  -CP         Subjective Pain    Able to rate subjective pain?  yes  -CP      Pre-Treatment Pain Level  5  -CP      Post-Treatment Pain Level  5  -CP         Exercise 1    Exercise Name 1  Pro II level 3  -CP      Time 1  10  -CP         Exercise 2    Exercise Name 2  standing HS stretch  -CP      Cueing 2  Verbal;Demo  -CP      Sets 2  3  -CP      Time 2  30  -CP         Exercise 3    Exercise Name 3  gentle SL;ng hip fl stretch with knee extended.  -CP      Cueing 3  Verbal;Tactile  -CP      Sets 3  3  -CP      Time 3  20  -CP         Exercise 4    Exercise Name 4  step up 6'  -CP      Reps 4  1  -CP      Additional Comments  increase pain D/C  -CP         Exercise 5    Exercise Name 5  step up 4\"  -CP      Cueing 5  Verbal;Demo  -CP      Sets 5  2  -CP      Reps 5  10  -CP         Exercise 6    Exercise Name 6  supported hip EXT  -CP      Cueing 6  Verbal;Demo  -CP      Sets 6  2  -CP      Reps 6  10  -CP         Exercise 7    Exercise Name 7  side stepping on foam  -CP      Cueing 7  Verbal;Demo  -CP      Reps 7  5  -CP         Exercise 8    Exercise Name 8  heel/toe gait on foam  -CP      Cueing 8  Verbal;Demo  -CP      Reps 8  5  -CP         Exercise 9    Exercise Name 9  sit to stand from high low table at different heights  -CP      Cueing 9  Verbal;Demo  -CP      Sets 9  3  -CP      Reps 9  5  -CP      Time 9  independnet  -CP         Exercise 10    Exercise Name 10  SL'ing hip AB "  -CP      Cueing 10  Verbal;Tactile  -CP      Sets 10  2  -CP      Reps 10  10  -CP        User Key  (r) = Recorded By, (t) = Taken By, (c) = Cosigned By    Initials Name Provider Type    CP Marisela Macias PTA Physical Therapy Assistant                       PT OP Goals     Row Name 12/21/20 1400          PT Short Term Goals    STG Date to Achieve  12/14/20  -CP     STG 1  Pt will demonstrate RLE AROM WFL  -CP     STG 1 Progress  Met  -CP     STG 2  Pt will report 50% improvement in functional ability  -CP     STG 2 Progress  Met  -CP     STG 3  Pt will be independent with self care and HEP.   -CP     STG 3 Progress  Met  -CP     STG 4  Pt will ambulate community distances with SPC independently c/o increased pain.   -CP     STG 4 Progress  Met  -CP        Long Term Goals    LTG Date to Achieve  12/29/20  -CP     LTG 1  Pt will demonstrate RLE MMT 4+/5 grossly  -CP     LTG 1 Progress  Progressing  -CP     LTG 2  Pt will demonstrate ability to ambulate community distances with stable, symmetrical gait pattern with no AD.  -CP     LTG 2 Progress  Ongoing  -CP     LTG 3  Pt will ascend/descend 2 stairs independently without use of handrails.   -CP     LTG 3 Progress  Progressing  -CP     LTG 4  Pt will achieve an LEFS score of 40/80 indicating improved perceived functional ability.   -CP     LTG 4 Progress  Ongoing  -CP        Time Calculation    PT Goal Re-Cert Due Date  01/07/21  -CP       User Key  (r) = Recorded By, (t) = Taken By, (c) = Cosigned By    Initials Name Provider Type    CP Marisela Macias PTA Physical Therapy Assistant                         Time Calculation:   Start Time: 1345  Stop Time: 1429  Time Calculation (min): 44 min  Total Timed Code Minutes- PT: 44 minute(s)  Therapy Charges for Today     Code Description Service Date Service Provider Modifiers Qty    81269467851 HC PT THER PROC EA 15 MIN 12/21/2020 Marisela Macias PTA GP 3                    Marisela Macias PTA  12/21/2020

## 2020-12-23 ENCOUNTER — HOSPITAL ENCOUNTER (OUTPATIENT)
Dept: PHYSICAL THERAPY | Facility: HOSPITAL | Age: 75
Setting detail: THERAPIES SERIES
Discharge: HOME | End: 2020-12-23

## 2020-12-23 DIAGNOSIS — M25.559 HIP PAIN: ICD-10-CM

## 2020-12-23 DIAGNOSIS — Z96.641 HISTORY OF TOTAL HIP REPLACEMENT, RIGHT: Primary | ICD-10-CM

## 2020-12-23 PROCEDURE — 97110 THERAPEUTIC EXERCISES: CPT

## 2020-12-23 NOTE — THERAPY TREATMENT NOTE
Outpatient Physical Therapy Ortho Treatment Note  [facility]     Patient Name: Carla Best  : 1945  MRN: [id number]  Today's Date: 2020      Visit Date: 2020     Subjective Improvement 80%  Visits 15/16  Visits approved medicare  RTMD 2021  Recert Date 2021      Visit Dx:    ICD-10-CM ICD-9-CM   1. History of total hip replacement, right  Z96.641 V43.64   2. Hip pain  M25.559 719.45       Patient Active Problem List   Diagnosis   • Osteopenia   • Hypothyroidism due to Hashimoto's thyroiditis   • Vitamin D deficiency   • Keratoconjunctivitis sicca (CMS/HCC)   • Long term use of drug   • Cataract   • Primary osteoarthritis of right knee   • B12 deficiency   • Right hip pain   • Encounter for screening for osteoporosis   • GERD (gastroesophageal reflux disease)   • Hip pain, acute, right   • Myofascial pain   • Neck pain   • Osteoarthritis   • Spondylosis of cervical joint   • Palpitations   • Acquired hypothyroidism   • Pre-operative clearance   • Vaginal discharge   • Vaginal irritation   • Urinary incontinence in female   • UTI (urinary tract infection)   • Female cystocele   • Acute cystitis without hematuria   • Overweight   • Diverticulosis of large intestine without perforation or abscess without bleeding   • Generalized abdominal pain   • Helicobacter pylori (H. 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   • Fibromyalgia   • Cystocele with prolapse   • Obstructive sleep apnea, adult   • Age-related osteoporosis without current pathological fracture        Past Medical History:   Diagnosis Date   • Chest pain    • Fibrocystic disease of breast    • Hashimoto's thyroiditis    • History of screening  mammography 08/27/2014    SCREENING MAMMOGRAPHY DIGITAL  (Medicare) (1)    • Hyperlipidemia    • Hypothyroidism due to Hashimoto's thyroiditis 12/3/2016   • Keratoconjunctivitis sicca (CMS/HCC)    • Nuclear senile cataract    • On long term drug therapy    • Osteoarthritis    • Osteopenia    • Osteopenia 12/3/2016   • Photopsia     Right   • Postmenopausal bleeding    • Sinus tachycardia    • Sjogren's syndrome (CMS/[facility])    • Vitamin D deficiency    • Vitamin D deficiency 12/3/2016   • Vitreous detachment of right eye         Past Surgical History:   Procedure Laterality Date   • CATARACT EXTRACTION, BILATERAL  2018   • CHOLECYSTECTOMY  06/09/1997    with operative cholangiogram. Chronic cholecystitis & cholelithiasis. HX of passed common duct stone   • CYSTOSCOPY  11/26/1962    urethral dilatation.Recurrent pyelonephritis. urethritis   • ENDOSCOPY N/A 2/17/2017    Procedure: ESOPHAGOGASTRODUODENOSCOPY;  Surgeon: Cisco Mason DO;  Location: [facility] ENDOSCOPY;  Service:    • ENDOSCOPY N/A 10/31/2018    Procedure: ESOPHAGOGASTRODUODENOSCOPY;  Surgeon: Cisco Mason DO;  Location: [facility] ENDOSCOPY;  Service: Gastroenterology   • ENDOSCOPY AND COLONOSCOPY  09/30/1985    Normal colon to left transverse colon   • GALLBLADDER SURGERY     • JOINT REPLACEMENT      R Ant ROSALINE 7/6/2020   • PAP SMEAR  2009   • TONSILLECTOMY  1950   • VAGINAL DELIVERY      x3       PT Ortho     Row Name 12/23/20 1300       Precautions and Contraindications    Precautions  R ROSALINE 7-  -CP    Contraindications  R hip FX after surgery, Fracture healed per patient  -CP       Subjective Pain    Able to rate subjective pain?  yes  -CP    Pre-Treatment Pain Level  6  -CP       Posture/Observations    Posture/Observations Comments  AMB with SPC but carries it most of the time.  -CP       MMT Right Lower Ext    Rt Hip Flexion MMT, Gross Movement  (4-/5) good minus  -CP    Rt Hip ABduction MMT, Gross Movement  (4+/5) good plus  -CP    Rt  Hip ADduction MMT, Gross Movement  (4+/5) good plus  -CP    Rt Knee Extension MMT, Gross Movement  (5/5) normal  -CP    Rt Knee Flexion MMT, Gross Movement  (4+/5) good plus  -CP    Rt Ankle Dorsiflexion MMT, Gross Movement  (5/5) normal  -CP       MMT Left Lower Ext    Lt Hip Flexion MMT, Gross Movement  (4-/5) good minus  -CP    Lt Hip ABduction MMT, Gross Movement  (4+/5) good plus  -CP    Lt Hip ADduction MMT, Gross Movement  (4/5) good  -CP    Lt Knee Extension MMT, Gross Movement  (5/5) normal  -CP    Lt Knee Flexion MMT, Gross Movement  (4+/5) good plus  -CP    Lt Ankle Dorsiflexion MMT, Gross Movement  (5/5) normal  -CP       Gait/Stairs (Locomotion)    [place] Level (Gait)  modified independence  -CP    Assistive Device (Gait)  cane, straight  -CP    [place] Level (Stairs)  modified independence  -CP    Handrail Location (Stairs)  right side (ascending)  -CP    Ascending Technique (Stairs)  step-to-step  -CP    Descending Technique (Stairs)  step-to-step  -CP      User Key  (r) = Recorded By, (t) = Taken By, (c) = Cosigned By    Initials Name Provider Type    Marisela Mascorro PTA Physical Therapy Assistant                      PT Assessment/Plan     Row Name 12/23/20 1436          PT Assessment    Assessment Comments  Patient presents today with slight increase in hip pain.  ther ex adjusted to reflex patients increase pain.  No increase pain while performing todays ex.  Patient was advised to apply ice to right hip 2-3 day  -CP        PT Plan    PT Frequency  2x/week  -CP     Predicted Duration of Therapy Intervention (PT)  4 weeks  -CP     PT Plan Comments  Cont with POC.  Monitor patients pain and progress in hip strengthening ex as tolerated  -CP       User Key  (r) = Recorded By, (t) = Taken By, (c) = Cosigned By    Initials Name Provider Type    Marisela Mascorro PTA Physical Therapy Assistant          Modalities     Row Name 12/23/20 1300             Ice    Ice Applied  Yes  -CP       Location  right hip  -CP      Rx Minutes  15 mins  -CP      Ice S/P Rx  Yes  -CP        User Key  (r) = Recorded By, (t) = Taken By, (c) = Cosigned By    Initials Name Provider Type    Marisela Mascorro PTA Physical Therapy Assistant        OP Exercises     Row Name 12/23/20 1300             Subjective Comments    Subjective Comments  Patient states that she is a little sore from the last therapy visit.  She tried to ex at home to work it out   Patient not driving because when in sitting she is unable tlift right foot from gas pedal to brake.  However when in standing she can lift her leg just fine.  she is now able to sit and cross her her legs at knees.  -CP         Subjective Pain    Able to rate subjective pain?  yes  -CP      Pre-Treatment Pain Level  6  -CP      Post-Treatment Pain Level  -- better  -CP         Exercise 1    Exercise Name 1  Pro II level 3  -CP      Time 1  10  -CP         Exercise 2    Exercise Name 2  incline stretch  -CP      Cueing 2  Verbal;Demo  -CP      Sets 2  3  -CP      Time 2  30  -CP         Exercise 3    Exercise Name 3  standing HS Stretch  -CP      Cueing 3  Verbal  -CP      Sets 3  3  -CP      Time 3  30  -CP         Exercise 4    Exercise Name 4  step up 4'  -CP      Cueing 4  Verbal  -CP      Sets 4  2  -CP      Reps 4  10  -CP         Exercise 5    Exercise Name 5  mini squats  -CP      Cueing 5  Verbal  -CP      Sets 5  2  -CP      Reps 5  10  -CP         Exercise 6    Exercise Name 6  CR/TR  -CP      Cueing 6  Verbal  -CP      Sets 6  2  -CP      Reps 6  10  -CP         Exercise 7    Exercise Name 7  standing PNF  -CP      Cueing 7  Verbal;Demo  -CP      Sets 7  1  -CP      Reps 7  10 each  -CP      Time 7  1 lb AW  -CP         Exercise 8    Exercise Name 8  Standing HIP AB  -CP      Cueing 8  Verbal;Demo  -CP      Sets 8  1  -CP      Reps 8  15  -CP        User Key  (r) = Recorded By, (t) = Taken By, (c) = Cosigned By    Initials Name Provider Type    TRAVON Macias  Marisela DAVE PTA Physical Therapy Assistant                       PT OP Goals     Row Name 12/23/20 1400          PT Short Term Goals    STG Date to Achieve  12/14/20  -CP     STG 1  Pt will demonstrate RLE AROM WFL  -CP     STG 1 Progress  Met  -CP     STG 2  Pt will report 50% improvement in functional ability  -CP     STG 2 Progress  Met  -CP     STG 3  Pt will be independent with self care and HEP.   -CP     STG 3 Progress  Met  -CP     STG 4  Pt will ambulate community distances with SPC independently c/o increased pain.   -CP     STG 4 Progress  Met  -CP        Long Term Goals    LTG Date to Achieve  12/29/20  -CP     LTG 1  Pt will demonstrate RLE MMT 4+/5 grossly  -CP     LTG 1 Progress  Progressing  -CP     LTG 2  Pt will demonstrate ability to ambulate community distances with stable, symmetrical gait pattern with no AD.  -CP     LTG 2 Progress  Ongoing  -CP     LTG 3  Pt will ascend/descend 2 stairs independently without use of handrails.   -CP     LTG 3 Progress  Progressing  -CP     LTG 4  Pt will achieve an LEFS score of 40/80 indicating improved perceived functional ability.   -CP     LTG 4 Progress  Ongoing  -CP        Time Calculation    PT Goal Re-Cert Due Date  01/07/21  -CP       User Key  (r) = Recorded By, (t) = Taken By, (c) = Cosigned By    Initials Name Provider Type    CP Marisela Macias PTA Physical Therapy Assistant                         Time Calculation:   Start Time: 1345  Stop Time: 1445  Time Calculation (min): 60 min  Total Timed Code Minutes- PT: 45 minute(s)  Therapy Charges for Today     Code Description Service Date Service Provider Modifiers Qty    31830232719 HC PT THER SUPP EA 15 MIN 12/23/2020 Marisela Macias PTA GP 1    41315705221 HC PT THER PROC EA 15 MIN 12/23/2020 Marisela Macias PTA GP 3                    Marisela Macias PTA  12/23/2020

## 2020-12-28 ENCOUNTER — HOSPITAL ENCOUNTER (OUTPATIENT)
Dept: PHYSICAL THERAPY | Facility: HOSPITAL | Age: 75
Setting detail: THERAPIES SERIES
Discharge: HOME | End: 2020-12-28

## 2020-12-28 DIAGNOSIS — M25.559 HIP PAIN: ICD-10-CM

## 2020-12-28 DIAGNOSIS — Z96.641 HISTORY OF TOTAL HIP REPLACEMENT, RIGHT: Primary | ICD-10-CM

## 2020-12-28 PROCEDURE — 97110 THERAPEUTIC EXERCISES: CPT

## 2020-12-28 NOTE — THERAPY TREATMENT NOTE
Outpatient Physical Therapy Ortho Treatment Note  [facility]     Patient Name: Carla Best  : 1945  MRN: [id number]  Today's Date: 2020      Visit Date: 2020  Subjective Improvement: 85%  MD visit: 2021  Visit Number:   Total Approved:medicare  Recert Date: 2021  Visit Dx:    ICD-10-CM ICD-9-CM   1. History of total hip replacement, right  Z96.641 V43.64   2. Hip pain  M25.559 719.45       Patient Active Problem List   Diagnosis   • Osteopenia   • Hypothyroidism due to Hashimoto's thyroiditis   • Vitamin D deficiency   • Keratoconjunctivitis sicca (CMS/HCC)   • Long term use of drug   • Cataract   • Primary osteoarthritis of right knee   • B12 deficiency   • Right hip pain   • Encounter for screening for osteoporosis   • GERD (gastroesophageal reflux disease)   • Hip pain, acute, right   • Myofascial pain   • Neck pain   • Osteoarthritis   • Spondylosis of cervical joint   • Palpitations   • Acquired hypothyroidism   • Pre-operative clearance   • Vaginal discharge   • Vaginal irritation   • Urinary incontinence in female   • UTI (urinary tract infection)   • Female cystocele   • Acute cystitis without hematuria   • Overweight   • Diverticulosis of large intestine without perforation or abscess without bleeding   • Generalized abdominal pain   • Helicobacter pylori (H. 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   • Fibromyalgia   • Cystocele with prolapse   • Obstructive sleep apnea, adult   • Age-related osteoporosis without current pathological fracture        Past Medical History:   Diagnosis Date   • Chest pain    • Fibrocystic disease of breast    • Hashimoto's thyroiditis    • History of screening  mammography 08/27/2014    SCREENING MAMMOGRAPHY DIGITAL  (Medicare) (1)    • Hyperlipidemia    • Hypothyroidism due to Hashimoto's thyroiditis 12/3/2016   • Keratoconjunctivitis sicca (CMS/[facility])    • Nuclear senile cataract    • On long term drug therapy    • Osteoarthritis    • Osteopenia    • Osteopenia 12/3/2016   • Photopsia     Right   • Postmenopausal bleeding    • Sinus tachycardia    • Sjogren's syndrome (CMS/[facility])    • Vitamin D deficiency    • Vitamin D deficiency 12/3/2016   • Vitreous detachment of right eye         Past Surgical History:   Procedure Laterality Date   • CATARACT EXTRACTION, BILATERAL  2018   • CHOLECYSTECTOMY  06/09/1997    with operative cholangiogram. Chronic cholecystitis & cholelithiasis. HX of passed common duct stone   • CYSTOSCOPY  11/26/1962    urethral dilatation.Recurrent pyelonephritis. urethritis   • ENDOSCOPY N/A 2/17/2017    Procedure: ESOPHAGOGASTRODUODENOSCOPY;  Surgeon: Cisco Mason DO;  Location: [facility] ENDOSCOPY;  Service:    • ENDOSCOPY N/A 10/31/2018    Procedure: ESOPHAGOGASTRODUODENOSCOPY;  Surgeon: Cisco Mason DO;  Location: [facility] ENDOSCOPY;  Service: Gastroenterology   • ENDOSCOPY AND COLONOSCOPY  09/30/1985    Normal colon to left transverse colon   • GALLBLADDER SURGERY     • JOINT REPLACEMENT      R Ant ROSALINE 7/6/2020   • PAP SMEAR  2009   • TONSILLECTOMY  1950   • VAGINAL DELIVERY      x3       PT Ortho     Row Name 12/28/20 1500       Precautions and Contraindications    Precautions  R ROSALINE 7-  -TL    Contraindications  R hip FX after surgery, Fracture healed per patient  -TL       Subjective Pain    Able to rate subjective pain?  yes  -TL    Pre-Treatment Pain Level  3  -TL      User Key  (r) = Recorded By, (t) = Taken By, (c) = Cosigned By    Initials Name Provider Type    TL Yudith Sanz PTA Physical Therapy Assistant                      PT Assessment/Plan     Row Name 12/28/20 1600          PT Assessment    Assessment Comments   pt wants to be able to drive. PTA  modified treatment to help pt get the movement to drive. Pt needs to work on seated hip and knee flexion in seated position with add.. PTA also recommended sit to stands one hand support. Pt has met all short term goals but progressing toward Long term goals. No new goals met today.  -TL        PT Plan    PT Frequency  2x/week  -TL     Predicted Duration of Therapy Intervention (PT)  4 weeks  -TL     PT Plan Comments  Work on hip flexion and hip add in seated position  and standing  -TL       User Key  (r) = Recorded By, (t) = Taken By, (c) = Cosigned By    Initials Name Provider Type    Yudith Starr PTA Physical Therapy Assistant          Modalities     Row Name 12/28/20 1500             Ice    Ice Applied  Yes  -TL      Location  right hip  -TL      Rx Minutes  15 mins  -TL      Ice S/P Rx  Yes  -TL        User Key  (r) = Recorded By, (t) = Taken By, (c) = Cosigned By    Initials Name Provider Type    Yudith Starr PTA Physical Therapy Assistant        OP Exercises     Row Name 12/28/20 1500             Subjective Comments    Subjective Comments  pt reports that she still unable to drive secondary to unable to pick her foot up to do gas and brakes.  -TL         Subjective Pain    Able to rate subjective pain?  yes  -TL      Pre-Treatment Pain Level  3  -TL         Exercise 1    Exercise Name 1  Pro II level 3  -TL      Time 1  10 mins  -TL         Exercise 2    Exercise Name 2  incline stretch  -TL      Sets 2  3  -TL      Time 2  30 sec hold  -TL         Exercise 3    Exercise Name 3  standing HS Stretch  -TL      Sets 3  3  -TL      Time 3  30 sec hold  -TL         Exercise 4    Exercise Name 4  seated hip flexion  -TL      Sets 4  2  -TL      Reps 4  10  -TL         Exercise 5    Exercise Name 5  seated hip flexion with hip add modified gas and brakes for driving  -TL      Sets 5  2  -TL      Reps 5  10  -TL         Exercise 6    Exercise Name 6  sit to stands  one hand support  -TL      Reps 6  10   -TL         Exercise 7    Exercise Name 7  ice  -TL      Reps 7  15  -TL        User Key  (r) = Recorded By, (t) = Taken By, (c) = Cosigned By    Initials Name Provider Type    Yudith Starr PTA Physical Therapy Assistant                       PT OP Goals     Row Name 12/28/20 1600          PT Short Term Goals    STG Date to Achieve  12/14/20  -TL     STG 1  Pt will demonstrate RLE AROM WFL  -TL     STG 1 Progress  Met  -TL     STG 2  Pt will report 50% improvement in functional ability  -TL     STG 2 Progress  Met  -TL     STG 3  Pt will be independent with self care and HEP.   -TL     STG 3 Progress  Met  -TL     STG 4  Pt will ambulate community distances with SPC independently c/o increased pain.   -TL     STG 4 Progress  Met  -TL        Long Term Goals    LTG Date to Achieve  12/29/20  -TL     LTG 1  Pt will demonstrate RLE MMT 4+/5 grossly  -TL     LTG 1 Progress  Progressing  -TL     LTG 2  Pt will demonstrate ability to ambulate community distances with stable, symmetrical gait pattern with no AD.  -TL     LTG 2 Progress  Ongoing  -TL     LTG 3  Pt will ascend/descend 2 stairs independently without use of handrails.   -TL     LTG 3 Progress  Progressing  -TL     LTG 4  Pt will achieve an LEFS score of 40/80 indicating improved perceived functional ability.   -TL     LTG 4 Progress  Ongoing  -TL        Time Calculation    PT Goal Re-Cert Due Date  01/07/21  -TL       User Key  (r) = Recorded By, (t) = Taken By, (c) = Cosigned By    Initials Name Provider Type    Yudith Starr PTA Physical Therapy Assistant          Therapy Education  Education Details: seated hip flexion with add to modifiied gas and brakes, sit to stands one hand support  Given: HEP, Symptoms/condition management, Pain management, Posture/body mechanics  Program: New, Reinforced  How Provided: Verbal  Provided to: Patient  Level of Understanding: Teach back education performed,  Verbalized, Demonstrated              Time Calculation:   Start Time: 1513  Stop Time: 1615  Time Calculation (min): 62 min  PT Non-Billable Time (min): 15 min  Total Timed Code Minutes- PT: 47 minute(s)  Therapy Charges for Today     Code Description Service Date Service Provider Modifiers Qty    07737380767 HC PT THER PROC EA 15 MIN 12/28/2020 Yudith Sanz, JAIDEN GP 3    93374434451 HC PT THER SUPP EA 15 MIN 12/28/2020 Yudith Sanz, JAIDEN GP 1                    Yudith Sanz PTA  12/28/2020

## 2020-12-30 ENCOUNTER — HOSPITAL ENCOUNTER (OUTPATIENT)
Dept: PHYSICAL THERAPY | Facility: HOSPITAL | Age: 75
Setting detail: THERAPIES SERIES
Discharge: HOME | End: 2020-12-30

## 2020-12-30 DIAGNOSIS — Z96.641 HISTORY OF TOTAL HIP REPLACEMENT, RIGHT: Primary | ICD-10-CM

## 2020-12-30 DIAGNOSIS — M25.559 HIP PAIN: ICD-10-CM

## 2020-12-30 PROCEDURE — 97110 THERAPEUTIC EXERCISES: CPT

## 2020-12-30 NOTE — THERAPY TREATMENT NOTE
Outpatient Physical Therapy Ortho Treatment Note  [facility]     Patient Name: Carla Best  : 1945  MRN: [id number]  Today's Date: 2020      Visit Date: 2020     Subjective Improvement 85%  Visits 17/18  Visits approved medicare  RTMD PRN  Recert Date 2021        Visit Dx:    ICD-10-CM ICD-9-CM   1. History of total hip replacement, right  Z96.641 V43.64   2. Hip pain  M25.559 719.45       Patient Active Problem List   Diagnosis   • Osteopenia   • Hypothyroidism due to Hashimoto's thyroiditis   • Vitamin D deficiency   • Keratoconjunctivitis sicca (CMS/HCC)   • Long term use of drug   • Cataract   • Primary osteoarthritis of right knee   • B12 deficiency   • Right hip pain   • Encounter for screening for osteoporosis   • GERD (gastroesophageal reflux disease)   • Hip pain, acute, right   • Myofascial pain   • Neck pain   • Osteoarthritis   • Spondylosis of cervical joint   • Palpitations   • Acquired hypothyroidism   • Pre-operative clearance   • Vaginal discharge   • Vaginal irritation   • Urinary incontinence in female   • UTI (urinary tract infection)   • Female cystocele   • Acute cystitis without hematuria   • Overweight   • Diverticulosis of large intestine without perforation or abscess without bleeding   • Generalized abdominal pain   • Helicobacter pylori (H. 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   • Fibromyalgia   • Cystocele with prolapse   • Obstructive sleep apnea, adult   • Age-related osteoporosis without current pathological fracture        Past Medical History:   Diagnosis Date   • Chest pain    • Fibrocystic disease of breast    • Hashimoto's thyroiditis    • History of screening mammography  08/27/2014    SCREENING MAMMOGRAPHY DIGITAL  (Medicare) (1)    • Hyperlipidemia    • Hypothyroidism due to Hashimoto's thyroiditis 12/3/2016   • Keratoconjunctivitis sicca (CMS/[facility])    • Nuclear senile cataract    • On long term drug therapy    • Osteoarthritis    • Osteopenia    • Osteopenia 12/3/2016   • Photopsia     Right   • Postmenopausal bleeding    • Sinus tachycardia    • Sjogren's syndrome (CMS/[facility])    • Vitamin D deficiency    • Vitamin D deficiency 12/3/2016   • Vitreous detachment of right eye         Past Surgical History:   Procedure Laterality Date   • CATARACT EXTRACTION, BILATERAL  2018   • CHOLECYSTECTOMY  06/09/1997    with operative cholangiogram. Chronic cholecystitis & cholelithiasis. HX of passed common duct stone   • CYSTOSCOPY  11/26/1962    urethral dilatation.Recurrent pyelonephritis. urethritis   • ENDOSCOPY N/A 2/17/2017    Procedure: ESOPHAGOGASTRODUODENOSCOPY;  Surgeon: Cisco Mason DO;  Location: [facility] ENDOSCOPY;  Service:    • ENDOSCOPY N/A 10/31/2018    Procedure: ESOPHAGOGASTRODUODENOSCOPY;  Surgeon: Cisco Mason DO;  Location: [facility] ENDOSCOPY;  Service: Gastroenterology   • ENDOSCOPY AND COLONOSCOPY  09/30/1985    Normal colon to left transverse colon   • GALLBLADDER SURGERY     • JOINT REPLACEMENT      R Ant ROSALINE 7/6/2020   • PAP SMEAR  2009   • TONSILLECTOMY  1950   • VAGINAL DELIVERY      x3       PT Ortho     Row Name 12/30/20 1400       Subjective Comments    Subjective Comments  Patient states that she is going to practice driving tomorrow.  She believes that she would be able to do it.. She does report increase pain today.  she thinks that she over did it Monday.  -CP       Precautions and Contraindications    Precautions  R ROSALINE 7-  -CP    Contraindications  R hip FX after surgery, Fracture healed per patient  -CP       Subjective Pain    Able to rate subjective pain?  yes  -CP    Pre-Treatment Pain Level  6  -CP       Posture/Observations     Posture/Observations Comments  amb with SPC  -CP      User Key  (r) = Recorded By, (t) = Taken By, (c) = Cosigned By    Initials Name Provider Type    Marisela Mascorro PTA Physical Therapy Assistant                      PT Assessment/Plan     Row Name 12/30/20 1613          PT Assessment    Assessment Comments  patient cont to demo weak hip fl and hip rotation.  she did not have any increase pain in right hip after cybex equipment.  -CP        PT Plan    PT Frequency  2x/week  -CP     Predicted Duration of Therapy Intervention (PT)  4 weeks  -CP     PT Plan Comments  cont with POC.  monitor response to cybex equipment.  resisted walks on cc light 2 plates  -CP       User Key  (r) = Recorded By, (t) = Taken By, (c) = Cosigned By    Initials Name Provider Type    Marisela Mascorro PTA Physical Therapy Assistant          Modalities     Row Name 12/30/20 1400             Ice    Ice Applied  Yes  -CP      Location  right hip area  -CP      Rx Minutes  15 mins  -CP      Ice S/P Rx  Yes  -CP        User Key  (r) = Recorded By, (t) = Taken By, (c) = Cosigned By    Initials Name Provider Type    Marisela Mascorro PTA Physical Therapy Assistant        OP Exercises     Row Name 12/30/20 1400             Subjective Comments    Subjective Comments  Patient states that she is going to practice driving tomorrow.  She believes that she would be able to do it.. She does report increase pain today.  she thinks that she over did it Monday.  -CP         Subjective Pain    Able to rate subjective pain?  yes  -CP      Pre-Treatment Pain Level  6  -CP      Post-Treatment Pain Level  4  -CP         Exercise 1    Exercise Name 1  Pro II level 4  -CP      Time 1  10  -CP         Exercise 2    Exercise Name 2  gait in clinic  -CP      Reps 2  270 ft x 2  -CP      Time 2  2 lb AW   -CP      Additional Comments  independent  -CP         Exercise 3    Exercise Name 3  seated Hip fl and rotations from floor to BOSU  -CP      Cueing 3  " Verbal;Demo  -CP      Sets 3  2  -CP      Reps 3  10  -CP      Time 3  2 lb AW  -CP         Exercise 4    Exercise Name 4  LAQ with hip AD  -CP      Cueing 4  Verbal;Demo  -CP      Sets 4  2  -CP      Reps 4  10  -CP      Time 4  5\"holds  -CP      Additional Comments  2 lb AW  -CP         Exercise 5    Exercise Name 5  seated hip Fl  -CP      Cueing 5  Verbal;Tactile  -CP      Sets 5  2  -CP      Reps 5  10  -CP      Time 5  5\" hols  -CP      Additional Comments  2 lb AW  -CP         Exercise 6    Exercise Name 6  wall planks hip IR/ER AROM with hip fl  -CP      Cueing 6  Verbal;Demo  -CP      Sets 6  2  -CP      Reps 6  10  -CP         Exercise 7    Exercise Name 7  cybex leg press  -CP      Cueing 7  Verbal  -CP      Sets 7  3  -CP      Reps 7  10  -CP      Time 7  70 lb  -CP         Exercise 8    Exercise Name 8  cybex hip AB  -CP      Cueing 8  Verbal;Tactile  -CP      Sets 8  2  -CP      Reps 8  10  -CP      Time 8  15 lb  -CP        User Key  (r) = Recorded By, (t) = Taken By, (c) = Cosigned By    Initials Name Provider Type    CP Marisela Macias PTA Physical Therapy Assistant                                          Time Calculation:   Start Time: 1430  Stop Time: 1530  Time Calculation (min): 60 min  Total Timed Code Minutes- PT: 45 minute(s)  Therapy Charges for Today     Code Description Service Date Service Provider Modifiers Qty    63954919144 HC PT THER SUPP EA 15 MIN 12/30/2020 Marisela Macias PTA GP 1    40288064377 HC PT THER PROC EA 15 MIN 12/30/2020 Marisela Macias PTA GP 3                    Marisela Macias PTA  12/30/2020     "

## 2021-01-04 ENCOUNTER — HOSPITAL ENCOUNTER (OUTPATIENT)
Dept: PHYSICAL THERAPY | Facility: HOSPITAL | Age: 76
Setting detail: THERAPIES SERIES
Discharge: HOME | End: 2021-01-04

## 2021-01-04 DIAGNOSIS — Z96.641 STATUS POST TOTAL REPLACEMENT OF RIGHT HIP: Primary | ICD-10-CM

## 2021-01-04 PROCEDURE — 97110 THERAPEUTIC EXERCISES: CPT

## 2021-01-04 PROCEDURE — 97112 NEUROMUSCULAR REEDUCATION: CPT

## 2021-01-04 NOTE — THERAPY TREATMENT NOTE
Outpatient Physical Therapy Ortho Treatment Note  [facility]     Patient Name: Carla Best  : 1945  MRN: [id number]  Today's Date: 2021      Visit Date: 2021     ATTENDANCE:   SUBJECTIVE IMPROVEMENT: 85%  NEXT MD APPOINTMENT: PRN  RECERT DATE: 2021    THERAPY DIAGNOSIS: R ROSALINE w/ post op fx      Visit Dx:    ICD-10-CM ICD-9-CM   1. Status post total replacement of right hip  Z96.641 V43.64       Patient Active Problem List   Diagnosis   • Osteopenia   • Hypothyroidism due to Hashimoto's thyroiditis   • Vitamin D deficiency   • Keratoconjunctivitis sicca (CMS/HCC)   • Long term use of drug   • Cataract   • Primary osteoarthritis of right knee   • B12 deficiency   • Right hip pain   • Encounter for screening for osteoporosis   • GERD (gastroesophageal reflux disease)   • Hip pain, acute, right   • Myofascial pain   • Neck pain   • Osteoarthritis   • Spondylosis of cervical joint   • Palpitations   • Acquired hypothyroidism   • Pre-operative clearance   • Vaginal discharge   • Vaginal irritation   • Urinary incontinence in female   • UTI (urinary tract infection)   • Female cystocele   • Acute cystitis without hematuria   • Overweight   • Diverticulosis of large intestine without perforation or abscess without bleeding   • Generalized abdominal pain   • Helicobacter pylori (H. 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   • Fibromyalgia   • Cystocele with prolapse   • Obstructive sleep apnea, adult   • Age-related osteoporosis without current pathological fracture        Past Medical History:   Diagnosis Date   • Chest pain    • Fibrocystic disease of breast    • Hashimoto's thyroiditis    • History of screening  mammography 08/27/2014    SCREENING MAMMOGRAPHY DIGITAL  (Medicare) (1)    • Hyperlipidemia    • Hypothyroidism due to Hashimoto's thyroiditis 12/3/2016   • Keratoconjunctivitis sicca (CMS/[facility])    • Nuclear senile cataract    • On long term drug therapy    • Osteoarthritis    • Osteopenia    • Osteopenia 12/3/2016   • Photopsia     Right   • Postmenopausal bleeding    • Sinus tachycardia    • Sjogren's syndrome (CMS/[facility])    • Vitamin D deficiency    • Vitamin D deficiency 12/3/2016   • Vitreous detachment of right eye         Past Surgical History:   Procedure Laterality Date   • CATARACT EXTRACTION, BILATERAL  2018   • CHOLECYSTECTOMY  06/09/1997    with operative cholangiogram. Chronic cholecystitis & cholelithiasis. HX of passed common duct stone   • CYSTOSCOPY  11/26/1962    urethral dilatation.Recurrent pyelonephritis. urethritis   • ENDOSCOPY N/A 2/17/2017    Procedure: ESOPHAGOGASTRODUODENOSCOPY;  Surgeon: Cisco Mason DO;  Location: [facility] ENDOSCOPY;  Service:    • ENDOSCOPY N/A 10/31/2018    Procedure: ESOPHAGOGASTRODUODENOSCOPY;  Surgeon: Cisco Mason DO;  Location: [facility] ENDOSCOPY;  Service: Gastroenterology   • ENDOSCOPY AND COLONOSCOPY  09/30/1985    Normal colon to left transverse colon   • GALLBLADDER SURGERY     • JOINT REPLACEMENT      R Ant ROSALINE 7/6/2020   • PAP SMEAR  2009   • TONSILLECTOMY  1950   • VAGINAL DELIVERY      x3       PT Ortho     Row Name 01/04/21 1400       Subjective Comments    Subjective Comments  Pt notes she is doing well today. Notes that hip pain is on/off.   -AC       Precautions and Contraindications    Precautions  R ROSALINE 7/6/2020  -AC    Contraindications  R hip fx after surgery   -AC       Subjective Pain    Able to rate subjective pain?  yes  -AC    Pre-Treatment Pain Level  3  -AC    Post-Treatment Pain Level  4  -AC       Posture/Observations    Posture/Observations Comments  no AD this date.   -AC      User Key  (r) = Recorded By, (t) = Taken By, (c) =  Cosigned By    Initials Name Provider Type    AC Shruthi Vo, PT Physical Therapist                      PT Assessment/Plan     Row Name 01/04/21 1500          PT Assessment    Assessment Comments  pt tolerated well today with focus on balance and strengthening LEs with min increase in pain throughout exercises. She has increased difficulty with hip abduction this date. She used BUEs to lift LEs over/up due to weak hipflexors throughout session.   -AC        PT Plan    PT Frequency  2x/week  -AC     Predicted Duration of Therapy Intervention (PT)  4 weeks   -AC     PT Plan Comments  continue with POC for stretching/strength as tolerated.   -AC       User Key  (r) = Recorded By, (t) = Taken By, (c) = Cosigned By    Initials Name Provider Type    AC Shruthi Vo, PT Physical Therapist            OP Exercises     Row Name 01/04/21 1400             Subjective Comments    Subjective Comments  Pt notes she is doing well today. Notes that hip pain is on/off.   -AC         Subjective Pain    Able to rate subjective pain?  yes  -AC      Pre-Treatment Pain Level  3  -AC      Post-Treatment Pain Level  4  -AC         Exercise 1    Exercise Name 1  Pro II -L4  -AC      Time 1  10  -AC         Exercise 2    Exercise Name 2  incline stretch   -AC      Sets 2  3  -AC      Time 2  30s  -AC         Exercise 3    Exercise Name 3  HS stretch   -AC      Sets 3  3  -AC      Time 3  30s  -AC         Exercise 4    Exercise Name 4  aeromat   -AC      Sets 4  1  -AC      Reps 4  5 laps  -AC      Additional Comments  fdw/back heel toe and side stepping.   -AC         Exercise 5    Exercise Name 5  airex tandem   -AC      Sets 5  2  -AC      Time 5  30s  -AC         Exercise 6    Exercise Name 6  firm SLS   -AC      Sets 6  3  -AC      Time 6  10s  -AC         Exercise 7    Exercise Name 7  cybex leg press  -AC      Sets 7  3  -AC      Reps 7  10  -AC      Additional Comments  70 lbs  -AC         Exercise 8    Exercise Name 8   cybex hip abduction/adduction   -AC      Sets 8  3  -AC      Reps 8  10  -AC      Additional Comments  30 lbs  -AC         Exercise 9    Exercise Name 9  hurdles   -AC      Sets 9  1  -AC      Reps 9  3  -AC      Time 9  4 hurdles   -AC      Additional Comments  stepping over for hip flexion   -AC        User Key  (r) = Recorded By, (t) = Taken By, (c) = Cosigned By    Initials Name Provider Type    AC Shruthi Vo, PT Physical Therapist                       PT OP Goals     Row Name 01/04/21 1500          PT Short Term Goals    STG Date to Achieve  12/14/20  -AC     STG 1  Pt will demonstrate RLE AROM WFL  -AC     STG 1 Progress  Met  -AC     STG 2  Pt will report 50% improvement in functional ability  -AC     STG 2 Progress  Met  -AC     STG 3  Pt will be independent with self care and HEP.   -AC     STG 3 Progress  Met  -AC     STG 4  Pt will ambulate community distances with SPC independently c/o increased pain.   -AC     STG 4 Progress  Met  -AC        Long Term Goals    LTG Date to Achieve  12/29/20  -AC     LTG 1  Pt will demonstrate RLE MMT 4+/5 grossly  -AC     LTG 1 Progress  Progressing  -AC     LTG 2  Pt will demonstrate ability to ambulate community distances with stable, symmetrical gait pattern with no AD.  -AC     LTG 2 Progress  Ongoing  -AC     LTG 3  Pt will ascend/descend 2 stairs independently without use of handrails.   -AC     LTG 3 Progress  Progressing  -AC     LTG 4  Pt will achieve an LEFS score of 40/80 indicating improved perceived functional ability.   -AC     LTG 4 Progress  Ongoing  -AC        Time Calculation    PT Goal Re-Cert Due Date  01/07/21  -AC       User Key  (r) = Recorded By, (t) = Taken By, (c) = Cosigned By    Initials Name Provider Type    Shruthi Nance, PT Physical Therapist                         Time Calculation:   Start Time: 1430  Stop Time: 1510  Time Calculation (min): 40 min  Therapy Charges for Today     Code Description Service Date Service  Provider Modifiers Qty    90706581985 HC PT THER PROC EA 15 MIN 1/4/2021 Shruthi Vo, PT GP 2    24121111776 HC PT NEUROMUSC RE EDUCATION EA 15 MIN 1/4/2021 Shruthi Vo, PT GP 1                    Shruthi Vo, PT  1/4/2021

## 2021-01-06 ENCOUNTER — HOSPITAL ENCOUNTER (OUTPATIENT)
Dept: PHYSICAL THERAPY | Facility: HOSPITAL | Age: 76
Setting detail: THERAPIES SERIES
Discharge: HOME | End: 2021-01-06

## 2021-01-06 DIAGNOSIS — Z96.641 STATUS POST TOTAL REPLACEMENT OF RIGHT HIP: Primary | ICD-10-CM

## 2021-01-06 DIAGNOSIS — Z96.641 HISTORY OF TOTAL HIP REPLACEMENT, RIGHT: ICD-10-CM

## 2021-01-06 DIAGNOSIS — M25.559 HIP PAIN: ICD-10-CM

## 2021-01-06 PROCEDURE — 97110 THERAPEUTIC EXERCISES: CPT

## 2021-01-06 NOTE — THERAPY TREATMENT NOTE
Outpatient Physical Therapy Ortho Treatment Note  [facility]     Patient Name: Carla Best  : 1945  MRN: [id number]  Today's Date: 2021      Visit Date: 2021     Subjective Improvement 90%  Visits   Visits approved medicare  RTMD PRN  recert Date 2021    R ROSALINE on  with FX afterward    Visit Dx:    ICD-10-CM ICD-9-CM   1. Status post total replacement of right hip  Z96.641 V43.64   2. History of total hip replacement, right  Z96.641 V43.64   3. Hip pain  M25.559 719.45       Patient Active Problem List   Diagnosis   • Osteopenia   • Hypothyroidism due to Hashimoto's thyroiditis   • Vitamin D deficiency   • Keratoconjunctivitis sicca (CMS/HCC)   • Long term use of drug   • Cataract   • Primary osteoarthritis of right knee   • B12 deficiency   • Right hip pain   • Encounter for screening for osteoporosis   • GERD (gastroesophageal reflux disease)   • Hip pain, acute, right   • Myofascial pain   • Neck pain   • Osteoarthritis   • Spondylosis of cervical joint   • Palpitations   • Acquired hypothyroidism   • Pre-operative clearance   • Vaginal discharge   • Vaginal irritation   • Urinary incontinence in female   • UTI (urinary tract infection)   • Female cystocele   • Acute cystitis without hematuria   • Overweight   • Diverticulosis of large intestine without perforation or abscess without bleeding   • Generalized abdominal pain   • Helicobacter pylori (H. 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   • Fibromyalgia   • Cystocele with prolapse   • Obstructive sleep apnea, adult   • Age-related osteoporosis without current pathological fracture        Past Medical History:   Diagnosis Date   • Chest pain    •  Fibrocystic disease of breast    • Hashimoto's thyroiditis    • History of screening mammography 08/27/2014    SCREENING MAMMOGRAPHY DIGITAL  (Medicare) (1)    • Hyperlipidemia    • Hypothyroidism due to Hashimoto's thyroiditis 12/3/2016   • Keratoconjunctivitis sicca (CMS/HCC)    • Nuclear senile cataract    • On long term drug therapy    • Osteoarthritis    • Osteopenia    • Osteopenia 12/3/2016   • Photopsia     Right   • Postmenopausal bleeding    • Sinus tachycardia    • Sjogren's syndrome (CMS/[facility])    • Vitamin D deficiency    • Vitamin D deficiency 12/3/2016   • Vitreous detachment of right eye         Past Surgical History:   Procedure Laterality Date   • CATARACT EXTRACTION, BILATERAL  2018   • CHOLECYSTECTOMY  06/09/1997    with operative cholangiogram. Chronic cholecystitis & cholelithiasis. HX of passed common duct stone   • CYSTOSCOPY  11/26/1962    urethral dilatation.Recurrent pyelonephritis. urethritis   • ENDOSCOPY N/A 2/17/2017    Procedure: ESOPHAGOGASTRODUODENOSCOPY;  Surgeon: Cisco Mason DO;  Location: [facility] ENDOSCOPY;  Service:    • ENDOSCOPY N/A 10/31/2018    Procedure: ESOPHAGOGASTRODUODENOSCOPY;  Surgeon: Cisco Mason DO;  Location: [facility] ENDOSCOPY;  Service: Gastroenterology   • ENDOSCOPY AND COLONOSCOPY  09/30/1985    Normal colon to left transverse colon   • GALLBLADDER SURGERY     • JOINT REPLACEMENT      R Ant ROSALINE 7/6/2020   • PAP SMEAR  2009   • TONSILLECTOMY  1950   • VAGINAL DELIVERY      x3       PT Ortho     Row Name 01/06/21 1300       Subjective Comments    Subjective Comments  Patient states that she fell today at around 10 am.  She was wearing house slippers and got her left foot stuck on the floor.  She fell landing on her  left side.  She is now driving and able to go up and down her stairs at home.She is reported pain in right hip today since last ther appointment.  She thinks that she did too much weight with the hip machine.  -CP       Precautions and  Contraindications    Precautions  R ROSALINE 7-  -CP       Subjective Pain    Able to rate subjective pain?  yes  -CP    Pre-Treatment Pain Level  5  -CP      User Key  (r) = Recorded By, (t) = Taken By, (c) = Cosigned By    Initials Name Provider Type    Marisela Mascorro, JAIDEN Physical Therapy Assistant                      PT Assessment/Plan     Row Name 01/06/21 1355          PT Assessment    Assessment Comments  Patient is progressing to all LTG with one new goal met.  All STGS have been met.  -CP        PT Plan    PT Frequency  2x/week  -CP     Predicted Duration of Therapy Intervention (PT)  4 weeks  -CP     PT Plan Comments  Cont with POC.  possible D/C to home with HEP at end of next week  -CP       User Key  (r) = Recorded By, (t) = Taken By, (c) = Cosigned By    Initials Name Provider Type    Marisela Mascorro, JAIDEN Physical Therapy Assistant            OP Exercises     Row Name 01/06/21 1300             Subjective Comments    Subjective Comments  Patient states that she fell today at around 10 am.  She was wearing house slippers and got her left foot stuck on the floor.  She fell landing on her  left side.  She is now driving and able to go up and down her stairs at home.She is reported pain in right hip today since last ther appointment.  She thinks that she did too much weight with the hip machine.  -CP         Subjective Pain    Able to rate subjective pain?  yes  -CP      Pre-Treatment Pain Level  5  -CP      Post-Treatment Pain Level  5  -CP         Exercise 1    Exercise Name 1  Pro II level 4  -CP      Time 1  10  -CP         Exercise 2    Exercise Name 2  stairs leading upstairs to track  -CP      Sets 2  1  -CP      Additional Comments  able to ascend indpendent 2 steps  -CP         Exercise 3    Exercise Name 3  gait on track  -CP      Cueing 3  Verbal  -CP      Reps 3  2 laps  -CP      Additional Comments  independent  -CP         Exercise 4    Exercise Name 4  stairs descending from  "track  -CP      Cueing 4  Verbal  -CP      Reps 4  1  -CP      Time 4  handrail assist  -CP      Additional Comments  step/step  -CP         Exercise 5    Exercise Name 5  cybex leg press  -CP      Cueing 5  Verbal;Tactile  -CP      Sets 5  3  -CP      Reps 5  10  -CP      Time 5  75 lb  -CP         Exercise 6    Exercise Name 6  cybex hip AD  -CP      Cueing 6  Verbal  -CP      Sets 6  2  -CP      Reps 6  10  -CP      Time 6  15 lb  -CP         Exercise 7    Exercise Name 7  standing hip fl reaching above pllinth table  -CP      Cueing 7  Verbal;Demo  -CP      Sets 7  2  -CP      Reps 7  10  -CP      Time 7  1 lb AW  -CP         Exercise 8    Exercise Name 8  step down 4\"  -CP      Cueing 8  Verbal;Demo  -CP      Sets 8  2  -CP      Reps 8  10  -CP      Time 8  handrail assist  -CP        User Key  (r) = Recorded By, (t) = Taken By, (c) = Cosigned By    Initials Name Provider Type    Marisela Mascorro, PTA Physical Therapy Assistant                       PT OP Goals     Row Name 01/06/21 1300          PT Short Term Goals    STG Date to Achieve  12/14/20  -CP     STG 1  Pt will demonstrate RLE AROM WFL  -CP     STG 1 Progress  Met  -CP     STG 2  Pt will report 50% improvement in functional ability  -CP     STG 2 Progress  Met  -CP     STG 3  Pt will be independent with self care and HEP.   -CP     STG 3 Progress  Met  -CP     STG 4  Pt will ambulate community distances with SPC independently c/o increased pain.   -CP     STG 4 Progress  Met  -CP        Long Term Goals    LTG Date to Achieve  12/29/20  -CP     LTG 1  Pt will demonstrate RLE MMT 4+/5 grossly  -CP     LTG 1 Progress  Progressing  -CP     LTG 2  Pt will demonstrate ability to ambulate community distances with stable, symmetrical gait pattern with no AD.  -CP     LTG 2 Progress  Met  -CP     LTG 3  Pt will ascend/descend 2 stairs independently without use of handrails.   -CP     LTG 3 Progress  Partially Met  -CP     LTG 4  Pt will achieve an LEFS " score of 40/80 indicating improved perceived functional ability.   -CP     LTG 4 Progress  Ongoing  -CP        Time Calculation    PT Goal Re-Cert Due Date  01/07/21  -CP       User Key  (r) = Recorded By, (t) = Taken By, (c) = Cosigned By    Initials Name Provider Type    CP Marisela Macias PTA Physical Therapy Assistant                         Time Calculation:   Start Time: 1300  Stop Time: 1346  Time Calculation (min): 46 min  Total Timed Code Minutes- PT: 46 minute(s)  Therapy Charges for Today     Code Description Service Date Service Provider Modifiers Qty    01398468738 HC PT THER PROC EA 15 MIN 1/6/2021 Marisela Macias PTA GP 3                    Marisela Macias PTA  1/6/2021

## 2021-01-11 ENCOUNTER — HOSPITAL ENCOUNTER (OUTPATIENT)
Dept: PHYSICAL THERAPY | Facility: HOSPITAL | Age: 76
Setting detail: THERAPIES SERIES
Discharge: HOME | End: 2021-01-11

## 2021-01-11 DIAGNOSIS — Z96.641 STATUS POST TOTAL REPLACEMENT OF RIGHT HIP: Primary | ICD-10-CM

## 2021-01-11 PROCEDURE — 97530 THERAPEUTIC ACTIVITIES: CPT

## 2021-01-11 PROCEDURE — 97110 THERAPEUTIC EXERCISES: CPT

## 2021-01-11 NOTE — THERAPY PROGRESS REPORT/RE-CERT
Outpatient Physical Therapy Ortho Progress Note  [facility]     Patient Name: Carla Best  : 1945  MRN: [id number]  Today's Date: 2021      Visit Date: 2021   Attendance:  (med nec approved)  Subjective Improvement: 90%  Next MD Visit: PRN  Recert Date: 2021    Therapy Diagnosis: R ROSALINE on  with FX afterward    Visit Dx:    ICD-10-CM ICD-9-CM   1. Status post total replacement of right hip  Z96.641 V43.64       Patient Active Problem List   Diagnosis   • Osteopenia   • Hypothyroidism due to Hashimoto's thyroiditis   • Vitamin D deficiency   • Keratoconjunctivitis sicca (CMS/HCC)   • Long term use of drug   • Cataract   • Primary osteoarthritis of right knee   • B12 deficiency   • Right hip pain   • Encounter for screening for osteoporosis   • GERD (gastroesophageal reflux disease)   • Hip pain, acute, right   • Myofascial pain   • Neck pain   • Osteoarthritis   • Spondylosis of cervical joint   • Palpitations   • Acquired hypothyroidism   • Pre-operative clearance   • Vaginal discharge   • Vaginal irritation   • Urinary incontinence in female   • UTI (urinary tract infection)   • Female cystocele   • Acute cystitis without hematuria   • Overweight   • Diverticulosis of large intestine without perforation or abscess without bleeding   • Generalized abdominal pain   • Helicobacter pylori (H. 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   • Fibromyalgia   • Cystocele with prolapse   • Obstructive sleep apnea, adult   • Age-related osteoporosis without current pathological fracture        Past Medical History:   Diagnosis Date   • Chest pain    • Fibrocystic disease of breast    • Hashimoto's thyroiditis    •  History of screening mammography 08/27/2014    SCREENING MAMMOGRAPHY DIGITAL  (Medicare) (1)    • Hyperlipidemia    • Hypothyroidism due to Hashimoto's thyroiditis 12/3/2016   • Keratoconjunctivitis sicca (CMS/HCC)    • Nuclear senile cataract    • On long term drug therapy    • Osteoarthritis    • Osteopenia    • Osteopenia 12/3/2016   • Photopsia     Right   • Postmenopausal bleeding    • Sinus tachycardia    • Sjogren's syndrome (CMS/HCC)    • Vitamin D deficiency    • Vitamin D deficiency 12/3/2016   • Vitreous detachment of right eye         Past Surgical History:   Procedure Laterality Date   • CATARACT EXTRACTION, BILATERAL  2018   • CHOLECYSTECTOMY  06/09/1997    with operative cholangiogram. Chronic cholecystitis & cholelithiasis. HX of passed common duct stone   • CYSTOSCOPY  11/26/1962    urethral dilatation.Recurrent pyelonephritis. urethritis   • ENDOSCOPY N/A 2/17/2017    Procedure: ESOPHAGOGASTRODUODENOSCOPY;  Surgeon: Cisco Mason DO;  Location: [facility] ENDOSCOPY;  Service:    • ENDOSCOPY N/A 10/31/2018    Procedure: ESOPHAGOGASTRODUODENOSCOPY;  Surgeon: Cisco Mason DO;  Location: [facility] ENDOSCOPY;  Service: Gastroenterology   • ENDOSCOPY AND COLONOSCOPY  09/30/1985    Normal colon to left transverse colon   • GALLBLADDER SURGERY     • JOINT REPLACEMENT      R Ant ROSALINE 7/6/2020   • PAP SMEAR  2009   • TONSILLECTOMY  1950   • VAGINAL DELIVERY      x3       PT Ortho     Row Name 01/11/21 1400       Subjective Comments    Subjective Comments  Pt stated that over all she has been doing well. She stated that she is having mild pain in her thigh today. She stated that she continues to have difficulty with stair negotiation and getting up out of a chair. She also has a hard time with putting on a sock and shoe on the right foot. 90% subjective improvement.   -MM       Precautions and Contraindications    Precautions  R ROSALINE 7-  -MM    Contraindications  R hip fx after surgery   -MM        Subjective Pain    Able to rate subjective pain?  yes  -MM    Pre-Treatment Pain Level  3  -MM    Post-Treatment Pain Level  3  -MM       Posture/Observations    Posture/Observations Comments  Pt arrived without AD. TTP ant hip near incision. No edema noted.   -MM       MMT (Manual Muscle Testing)    Rt Lower Ext  Rt Hip WFL;Rt Knee WFL  -MM    Lt Lower Ext  Lt Hip WFL;Lt Knee WFL  -MM       MMT Right Lower Ext    Rt Hip Flexion MMT, Gross Movement  (4-/5) good minus  -MM    Rt Hip Extension MMT, Gross Movement  (4+/5) good plus  -MM    Rt Hip ABduction MMT, Gross Movement  (4+/5) good plus  -MM    Rt Hip ADduction MMT, Gross Movement  (4+/5) good plus  -MM    Rt Hip Internal (Medial) Rotation MMT, Gross Movement  (5/5) normal  -MM    Rt Hip External (Lateral) Rotation MMT, Gross Movement  (5/5) normal  -MM    Rt Knee Extension MMT, Gross Movement  (5/5) normal  -MM    Rt Knee Flexion MMT, Gross Movement  (5/5) normal  -MM    Rt Ankle Dorsiflexion MMT, Gross Movement  (5/5) normal  -MM       MMT Left Lower Ext    Lt Hip Flexion MMT, Gross Movement  (4-/5) good minus  -MM    Lt Hip Extension MMT, Gross Movement  (4+/5) good plus  -MM    Lt Hip ABduction MMT, Gross Movement  (4+/5) good plus  -MM    Lt Hip ADduction MMT, Gross Movement  (4+/5) good plus  -MM    Lt Hip Internal (Medial) Rotation MMT, Gross Movement  (5/5) normal  -MM    Lt Hip External (Lateral) Rotation MMT, Gross Movement  (5/5) normal  -MM    Lt Knee Extension MMT, Gross Movement  (5/5) normal  -MM    Lt Knee Flexion MMT, Gross Movement  (5/5) normal  -MM    Lt Ankle Dorsiflexion MMT, Gross Movement  (5/5) normal  -MM       Sensation    Sensation WNL?  WNL  -MM    Light Touch  No apparent deficits  -MM       Transfers    Sit-Stand [place] (Transfers)  modified independence Bilat UE use  -MM    Stand-Sit [place] (Transfers)  modified independence Bilat UE use  -MM       Gait/Stairs (Locomotion)    [place] Level (Gait)  independent   -MM    [place] Level (Stairs)  modified independence  -MM    Handrail Location (Stairs)  right side (ascending)  -MM    Ascending Technique (Stairs)  step-to-step  -MM    Descending Technique (Stairs)  step-to-step  -MM    Comment (Gait/Stairs)  No gait deviations noted this visit.  -MM      User Key  (r) = Recorded By, (t) = Taken By, (c) = Cosigned By    Initials Name Provider Type    MM Elisabeth Zepeda, PT Physical Therapist                      PT Assessment/Plan     Row Name 01/11/21 1400          PT Assessment    Functional Limitations  Impaired gait;Limitation in home management;Performance in leisure activities;Limitations in community activities;Impaired locomotion;Limitations in functional capacity and performance;Performance in self-care ADL  -MM     Impairments  Balance;Gait;Impaired muscle power;Impaired muscle endurance;Joint mobility;Locomotion;Muscle strength;Pain;Sensation;Range of motion;Posture  -MM     Assessment Comments  Recheck completed this visit. Pt has demonstrated improvements in ROM, strength, and gait mechanics since initial evaluation. She has an extremely difficult time sit to stand as she required bilat UE use, stair negotiation as she is unable to perform step over step, and balance activities. She would benefit from skilled PT 1x/week for 3-4 more weeks to address functional activities and functional strengthening in order for her to be able to participate in the community independently. 6 goals met to date. One new goal set for sit to stand.  -MM     Please refer to paper survey for additional self-reported information  Yes  -MM     Rehab Potential  Good  -MM     Patient/caregiver participated in establishment of treatment plan and goals  Yes  -MM     Patient would benefit from skilled therapy intervention  Yes  -MM        PT Plan    PT Frequency  1x/week  -MM     Predicted Duration of Therapy Intervention (PT)  3-4 weeks (3-4 more visits)  -MM     PT Plan Comments  Sit to  "stand from low chairs, stair training with step over step, balance activities, hip flex strengthening.  -MM       User Key  (r) = Recorded By, (t) = Taken By, (c) = Cosigned By    Initials Name Provider Type    Elisabeth Toledo PT Physical Therapist            OP Exercises     Row Name 01/11/21 1400             Subjective Comments    Subjective Comments  Pt stated that over all she has been doing well. She stated that she is having mild pain in her thigh today. She stated that she continues to have difficulty with stair negotiation and getting up out of a chair. She also has a hard time with putting on a sock and shoe on the right foot. 90% subjective improvement.   -MM         Subjective Pain    Able to rate subjective pain?  yes  -MM      Pre-Treatment Pain Level  3  -MM      Post-Treatment Pain Level  3  -MM         Exercise 1    Exercise Name 1  Pro II LE; strength  -MM      Time 1  10 min  -MM      Additional Comments  lvl 4  -MM         Exercise 2    Exercise Name 2  Recheck  -MM         Exercise 3    Exercise Name 3  Sit to stand from high/low  table at lowest height  -MM      Cueing 3  Verbal  -MM      Sets 3  1  -MM      Reps 3  10  -MM         Exercise 4    Exercise Name 4  Sit to stand from high/low table with foam mat under feet  -MM      Cueing 4  Verbal  -MM      Sets 4  1  -MM      Reps 4  20  -MM      Additional Comments  no UE use  -MM         Exercise 5    Exercise Name 5  Stair negotiation- step over step  -MM      Cueing 5  Verbal  -MM      Sets 5  3 trips  -MM      Reps 5  3 steps  -MM      Additional Comments  4\" step  -MM         Exercise 6    Exercise Name 6  Stair negotiation: step-to  -MM      Cueing 6  Verbal  -MM      Sets 6  2 trips  -MM      Reps 6  2 steps  -MM      Additional Comments  6\", bilat UE use  -MM        User Key  (r) = Recorded By, (t) = Taken By, (c) = Cosigned By    Initials Name Provider Type    Elisabeth Toledo PT Physical Therapist                       PT OP Goals "     Row Name 01/11/21 1400          PT Short Term Goals    STG Date to Achieve  -- All short term goals met  -MM     STG 1  Pt will demonstrate RLE AROM WFL  -MM     STG 1 Progress  Met  -MM     STG 2  Pt will report 50% improvement in functional ability  -MM     STG 2 Progress  Met  -MM     STG 3  Pt will be independent with self care and HEP.   -MM     STG 3 Progress  Met  -MM     STG 4  Pt will ambulate community distances with SPC independently c/o increased pain.   -MM     STG 4 Progress  Met  -MM        Long Term Goals    LTG Date to Achieve  12/29/20  -MM     LTG 1  Pt will demonstrate RLE MMT 4+/5 grossly  -MM     LTG 1 Progress  (S) Met  -MM     LTG 2  Pt will demonstrate ability to ambulate community distances with stable, symmetrical gait pattern with no AD.  -MM     LTG 2 Progress  Met  -MM     LTG 3  Pt will ascend/descend 2 stairs independently without use of handrails.   -MM     LTG 3 Progress  Not Met  -MM     LTG 4  Pt will achieve an LEFS score of 40/80 indicating improved perceived functional ability.   -MM     LTG 4 Progress  Ongoing  -MM     LTG 5  Pt will be able to perform sit to stand from chair without UE use 5x.  -MM     LTG 5 Progress  (S) New  -MM        Time Calculation    PT Goal Re-Cert Due Date  02/01/21  -MM       User Key  (r) = Recorded By, (t) = Taken By, (c) = Cosigned By    Initials Name Provider Type    Elisabeth Toledo, PT Physical Therapist               Outcome Measure Options: Lower Extremity Functional Scale (LEFS), 5x Sit to Stand  5 Times Sit to Stand  5 Times Sit to Stand (seconds): 27.14 seconds  5 Times Sit to Stand Comments: bilat UE use.  Lower Extremity Functional Index  Any of your usual work, housework or school activities: Moderate difficulty  Your usual hobbies, recreational or sporting activities: Quite a bit of difficulty  Getting into or out of the bath: A little bit of difficulty  Walking between rooms: No difficulty  Putting on your shoes or socks:  Moderate difficulty  Squatting: A little bit of difficulty  Lifting an object, like a bag of groceries from the floor: A little bit of difficulty  Performing light activities around your home: A little bit of difficulty  Performing heavy activities around your home: Moderate difficulty  Getting into or out of a car: A little bit of difficulty  Walking 2 blocks: Quite a bit of difficulty  Walking a mile: Extreme difficulty or unable to perform activity  Going up or down 10 stairs (about 1 flight of stairs): Moderate difficulty  Standing for 1 hour: Quite a bit of difficulty  Sitting for 1 hour: A little bit of difficulty  Running on even ground: Extreme difficulty or unable to perform activity  Running on uneven ground: Extreme difficulty or unable to perform activity  Making sharp turns while running fast: Extreme difficulty or unable to perform activity  Hopping: Extreme difficulty or unable to perform activity  Rolling over in bed: A little bit of difficulty  Total: 36      Time Calculation:   Start Time: 1430  Stop Time: 1515  Time Calculation (min): 45 min  Therapy Charges for Today     Code Description Service Date Service Provider Modifiers Qty    98824081437  PT THER PROC EA 15 MIN 1/11/2021 Elisabeth Zepeda, PT GP 2    [id number]  PT THERAPEUTIC ACT EA 15 MIN 1/11/2021 Elisabeth Zepeda, PT GP 1          PT G-Codes  Outcome Measure Options: Lower Extremity Functional Scale (LEFS), 5x Sit to Stand  Total: 36         Elisabeth Zepeda PT  1/11/2021

## 2021-01-12 ENCOUNTER — OFFICE VISIT (OUTPATIENT)
Dept: OTOLARYNGOLOGY | Facility: CLINIC | Age: 76
End: 2021-01-12

## 2021-01-12 VITALS — HEIGHT: 68 IN | BODY MASS INDEX: 27.68 KG/M2 | WEIGHT: 182.6 LBS | OXYGEN SATURATION: 95 %

## 2021-01-12 DIAGNOSIS — H60.63 CHRONIC NON-INFECTIVE OTITIS EXTERNA OF BOTH EARS, UNSPECIFIED TYPE: Primary | ICD-10-CM

## 2021-01-12 PROCEDURE — 99213 OFFICE O/P EST LOW 20 MIN: CPT | Performed by: OTOLARYNGOLOGY

## 2021-01-12 NOTE — PROGRESS NOTES
Subjective   Carla Best is a 75 y.o. female.       History of Present Illness   Patient is followed with chronic noninfectious otitis externa and says she feels like she needs her ears cleaned again.  She is not having any otorrhea.      The following portions of the patient's history were reviewed and updated as appropriate: allergies, current medications, past family history, past medical history, past social history, past surgical history and problem list.     reports that she has never smoked. She has never used smokeless tobacco. She reports that she does not drink alcohol or use drugs.   Patient is not a tobacco user and has not been counseled for use of tobacco products      Review of Systems        Objective   Physical Exam  Ears: External ears no deformity.  Both ear canals show uninfected squamous debris that is cleaned under the microscope using instrumentation.  Beyond this tympanic membranes are intact with no infection or infusion      Assessment/Plan   Diagnoses and all orders for this visit:    1. Chronic non-infective otitis externa of both ears, unspecified type (Primary)        Plan: Ears cleaned as described above.  Return in 10 months.  Call for problems.

## 2021-01-13 ENCOUNTER — APPOINTMENT (OUTPATIENT)
Dept: PHYSICAL THERAPY | Facility: HOSPITAL | Age: 76
End: 2021-01-13

## 2021-01-13 ENCOUNTER — HOSPITAL ENCOUNTER (OUTPATIENT)
Dept: SLEEP MEDICINE | Facility: HOSPITAL | Age: 76
Discharge: HOME | End: 2021-01-13
Admitting: NURSE PRACTITIONER

## 2021-01-13 DIAGNOSIS — G47.33 OBSTRUCTIVE SLEEP APNEA, ADULT: Primary | ICD-10-CM

## 2021-01-13 PROCEDURE — G0463 HOSPITAL OUTPT CLINIC VISIT: HCPCS

## 2021-01-13 NOTE — PROGRESS NOTES
Patient here due to mask issues.  Patient is currently using the ResMed Airfit P30i nasal pillows.  States that it is hurting the top of her head and she is having to wear a turbine on her had at night which is very uncomfortable.  Patient was previously on the ResMed Airfit P10 nasal pillows and stated that she had a hard time keeping them in her nose when she turned over in her sleep.  Patient was shown and fit with the small Nena nasal pillows which hooks behind the ear instead of having any kind of strap on top of her head.  Patient was also advised to adjust temperature setting on her machine to help with moisture.  [facility] which is the patient's [employer] company was made aware of her mask change and was asked to give her a Cpap hose clip in order for her to clip the tubing to her pillow to keep from pulling on her mask when she turns over in the bed.

## 2021-01-15 ENCOUNTER — IMMUNIZATION (OUTPATIENT)
Dept: VACCINE CLINIC | Facility: HOSPITAL | Age: 76
End: 2021-01-15

## 2021-01-15 PROCEDURE — 0001A: CPT | Performed by: NURSE PRACTITIONER

## 2021-01-15 PROCEDURE — 91300 HC SARSCOV02 VAC 30MCG/0.3ML IM: CPT | Performed by: NURSE PRACTITIONER

## 2021-01-15 PROCEDURE — 0002A: CPT | Performed by: NURSE PRACTITIONER

## 2021-01-18 ENCOUNTER — HOSPITAL ENCOUNTER (OUTPATIENT)
Dept: PHYSICAL THERAPY | Facility: HOSPITAL | Age: 76
Setting detail: THERAPIES SERIES
Discharge: HOME | End: 2021-01-18

## 2021-01-18 DIAGNOSIS — M25.559 HIP PAIN: ICD-10-CM

## 2021-01-18 DIAGNOSIS — Z96.641 HISTORY OF TOTAL HIP REPLACEMENT, RIGHT: ICD-10-CM

## 2021-01-18 DIAGNOSIS — Z96.641 STATUS POST TOTAL REPLACEMENT OF RIGHT HIP: Primary | ICD-10-CM

## 2021-01-18 PROCEDURE — 97110 THERAPEUTIC EXERCISES: CPT

## 2021-01-18 NOTE — THERAPY TREATMENT NOTE
Outpatient Physical Therapy Ortho Treatment Note  [facility]     Patient Name: Carla Best  : 1945  MRN: [id number]  Today's Date: 2021      Visit Date: 2021     Subjective Improvement 90%  Visits   Visits approved medicare  RTMD PRN  Recert Date 2021    R ROSALINE on  with FX afterward    Visit Dx:    ICD-10-CM ICD-9-CM   1. Status post total replacement of right hip  Z96.641 V43.64   2. History of total hip replacement, right  Z96.641 V43.64   3. Hip pain  M25.559 719.45       Patient Active Problem List   Diagnosis   • Osteopenia   • Hypothyroidism due to Hashimoto's thyroiditis   • Vitamin D deficiency   • Keratoconjunctivitis sicca (CMS/HCC)   • Long term use of drug   • Cataract   • Primary osteoarthritis of right knee   • B12 deficiency   • Right hip pain   • Encounter for screening for osteoporosis   • GERD (gastroesophageal reflux disease)   • Hip pain, acute, right   • Myofascial pain   • Neck pain   • Osteoarthritis   • Spondylosis of cervical joint   • Palpitations   • Acquired hypothyroidism   • Pre-operative clearance   • Vaginal discharge   • Vaginal irritation   • Urinary incontinence in female   • UTI (urinary tract infection)   • Female cystocele   • Acute cystitis without hematuria   • Overweight   • Diverticulosis of large intestine without perforation or abscess without bleeding   • Generalized abdominal pain   • Helicobacter pylori (H. 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   • Fibromyalgia   • Cystocele with prolapse   • Obstructive sleep apnea, adult   • Age-related osteoporosis without current pathological fracture        Past Medical History:   Diagnosis Date   • Chest pain    •  Fibrocystic disease of breast    • Hashimoto's thyroiditis    • History of screening mammography 08/27/2014    SCREENING MAMMOGRAPHY DIGITAL  (Medicare) (1)    • Hyperlipidemia    • Hypothyroidism due to Hashimoto's thyroiditis 12/3/2016   • Keratoconjunctivitis sicca (CMS/HCC)    • Nuclear senile cataract    • On long term drug therapy    • Osteoarthritis    • Osteopenia    • Osteopenia 12/3/2016   • Photopsia     Right   • Postmenopausal bleeding    • Sinus tachycardia    • Sjogren's syndrome (CMS/HCC)    • Vitamin D deficiency    • Vitamin D deficiency 12/3/2016   • Vitreous detachment of right eye         Past Surgical History:   Procedure Laterality Date   • CATARACT EXTRACTION, BILATERAL  2018   • CHOLECYSTECTOMY  06/09/1997    with operative cholangiogram. Chronic cholecystitis & cholelithiasis. HX of passed common duct stone   • CYSTOSCOPY  11/26/1962    urethral dilatation.Recurrent pyelonephritis. urethritis   • ENDOSCOPY N/A 2/17/2017    Procedure: ESOPHAGOGASTRODUODENOSCOPY;  Surgeon: Cisco Mason DO;  Location: [facility] ENDOSCOPY;  Service:    • ENDOSCOPY N/A 10/31/2018    Procedure: ESOPHAGOGASTRODUODENOSCOPY;  Surgeon: Cisco Mason DO;  Location: [facility] ENDOSCOPY;  Service: Gastroenterology   • ENDOSCOPY AND COLONOSCOPY  09/30/1985    Normal colon to left transverse colon   • GALLBLADDER SURGERY     • JOINT REPLACEMENT      R Ant ROSALINE 7/6/2020   • PAP SMEAR  2009   • TONSILLECTOMY  1950   • VAGINAL DELIVERY      x3       PT Ortho     Row Name 01/18/21 1400       Subjective Comments    Subjective Comments  patient states that she is good al long as she doesnt have to get out of a chair.  Her pain after getting out of a chair is 3/10 and 2/10 walking  -CP       Precautions and Contraindications    Precautions  R ROSALINE 7-  -CP    Contraindications  R hip fx after surgery   -CP       Subjective Pain    Able to rate subjective pain?  yes  -CP    Pre-Treatment Pain Level  3  -CP     Subjective Pain Comment  sit to stands  -CP       Posture/Observations    Posture/Observations Comments  Pt arrived without AD. TTP ant hip near incision. No edema noted.   -CP       MMT Right Lower Ext    Rt Hip Flexion MMT, Gross Movement  (4-/5) good minus  -CP    Rt Hip Extension MMT, Gross Movement  (4+/5) good plus  -CP    Rt Hip ABduction MMT, Gross Movement  (4+/5) good plus  -CP    Rt Hip ADduction MMT, Gross Movement  (4+/5) good plus  -CP    Rt Hip Internal (Medial) Rotation MMT, Gross Movement  (5/5) normal  -CP    Rt Hip External (Lateral) Rotation MMT, Gross Movement  (5/5) normal  -CP    Rt Knee Extension MMT, Gross Movement  (5/5) normal  -CP    Rt Knee Flexion MMT, Gross Movement  (5/5) normal  -CP    Rt Ankle Dorsiflexion MMT, Gross Movement  (5/5) normal  -CP       MMT Left Lower Ext    Lt Hip Flexion MMT, Gross Movement  (4-/5) good minus  -CP    Lt Hip Extension MMT, Gross Movement  (4+/5) good plus  -CP    Lt Hip ABduction MMT, Gross Movement  (4+/5) good plus  -CP    Lt Hip ADduction MMT, Gross Movement  (4+/5) good plus  -CP    Lt Hip Internal (Medial) Rotation MMT, Gross Movement  (5/5) normal  -CP    Lt Hip External (Lateral) Rotation MMT, Gross Movement  (5/5) normal  -CP    Lt Knee Extension MMT, Gross Movement  (5/5) normal  -CP    Lt Knee Flexion MMT, Gross Movement  (5/5) normal  -CP    Lt Ankle Dorsiflexion MMT, Gross Movement  (5/5) normal  -CP       Sensation    Sensation WNL?  WNL  -CP       Transfers    Sit-Stand [place] (Transfers)  modified independence Bilat UE use  -CP    Stand-Sit [place] (Transfers)  modified independence Bilat UE use  -CP       Gait/Stairs (Locomotion)    [place] Level (Gait)  independent  -CP    [place] Level (Stairs)  modified independence  -CP    Handrail Location (Stairs)  right side (ascending)  -CP    Ascending Technique (Stairs)  step-to-step  -CP    Descending Technique (Stairs)  step-to-step  -CP      User Key  (r) = Recorded  By, (t) = Taken By, (c) = Cosigned By    Initials Name Provider Type    Marisela Mascorro PTA Physical Therapy Assistant                      PT Assessment/Plan     Row Name 01/18/21 1558          PT Assessment    Assessment Comments  Patient cont to progress toward goals.  All STGs have been met.  Patient is able to independent perform sit to stand from standard chairs in the gym however she is unable to sit to stand indpendent from black chairs in the clinic.  It maybe due to the foam in the seat of the black chairs.  -CP        PT Plan    PT Frequency  1x/week  -CP     Predicted Duration of Therapy Intervention (PT)  3-4 weeks  -CP     PT Plan Comments  Cont with POC.  stairs step over step.  -CP       User Key  (r) = Recorded By, (t) = Taken By, (c) = Cosigned By    Initials Name Provider Type    Marisela Mascorro PTA Physical Therapy Assistant            OP Exercises     Row Name 01/18/21 1400             Subjective Comments    Subjective Comments  patient states that she is good al long as she doesnt have to get out of a chair.  Her pain after getting out of a chair is 3/10 and 2/10 walking  -CP         Subjective Pain    Able to rate subjective pain?  yes  -CP      Pre-Treatment Pain Level  3  -CP      Post-Treatment Pain Level  1  -CP      Subjective Pain Comment  sit to stands  -CP         Exercise 1    Exercise Name 1  Pro II level 4  -CP      Time 1  10  -CP         Exercise 2    Exercise Name 2  sit to stand from high low table ending at the lowest height  -CP      Cueing 2  Verbal;Demo  -CP      Sets 2  5  -CP      Reps 2  5  -CP      Time 2  independent  -CP         Exercise 3    Exercise Name 3  sit to stand from black chairs in clinic  -CP      Cueing 3  Verbal  -CP      Reps 3  unable to perform independnet  -CP         Exercise 4    Exercise Name 4  sit to stand from red chairs in the gym  -CP      Cueing 4  Verbal  -CP      Sets 4  1  -CP      Reps 4  3  -CP      Time 4  independent  -CP    "      Exercise 5    Exercise Name 5  step up 6\" with opposite hip fl  -CP      Cueing 5  Verbal;Demo  -CP      Sets 5  2  -CP      Reps 5  10  -CP      Time 5  handraill  -CP         Exercise 6    Exercise Name 6  lat step downs 4'  -CP      Cueing 6  Verbal;Demo  -CP      Sets 6  2  -CP      Reps 6  10  -CP         Exercise 7    Exercise Name 7  cybex leg press  -CP      Cueing 7  Verbal  -CP      Sets 7  3  -CP      Reps 7  10  -CP      Time 7  80 lb  -CP         Exercise 8    Exercise Name 8  cybex hip AB  -CP      Cueing 8  Verbal  -CP      Sets 8  3  -CP      Reps 8  10  -CP      Time 8  20 lb  -CP         Exercise 9    Exercise Name 9  cybex hip AD  -CP      Cueing 9  Verbal  -CP      Sets 9  3  -CP      Reps 9  10  -CP      Time 9  30 lb  -CP         Exercise 10    Exercise Name 10  tmill 2.0 mph  -CP      Time 10  5  -CP        User Key  (r) = Recorded By, (t) = Taken By, (c) = Cosigned By    Initials Name Provider Type    CP Marisela Macias, PTA Physical Therapy Assistant                       PT OP Goals     Row Name 01/18/21 1500          PT Short Term Goals    STG Date to Achieve  -- All short term goals met  -CP     STG 1  Pt will demonstrate RLE AROM WFL  -CP     STG 1 Progress  Met  -CP     STG 2  Pt will report 50% improvement in functional ability  -CP     STG 2 Progress  Met  -CP     STG 3  Pt will be independent with self care and HEP.   -CP     STG 3 Progress  Met  -CP     STG 4  Pt will ambulate community distances with SPC independently c/o increased pain.   -CP     STG 4 Progress  Met  -CP        Long Term Goals    LTG Date to Achieve  12/29/20  -CP     LTG 1  Pt will demonstrate RLE MMT 4+/5 grossly  -CP     LTG 1 Progress  Met  -CP     LTG 2  Pt will demonstrate ability to ambulate community distances with stable, symmetrical gait pattern with no AD.  -CP     LTG 2 Progress  Met  -CP     LTG 3  Pt will ascend/descend 2 stairs independently without use of handrails.   -CP     LTG 3 Progress "  Not Met  -CP     LTG 4  Pt will achieve an LEFS score of 40/80 indicating improved perceived functional ability.   -CP     LTG 4 Progress  Ongoing  -CP     LTG 5  Pt will be able to perform sit to stand from chair without UE use 5x.  -CP     LTG 5 Progress  New  -CP        Time Calculation    PT Goal Re-Cert Due Date  02/01/21  -CP       User Key  (r) = Recorded By, (t) = Taken By, (c) = Cosigned By    Initials Name Provider Type    CP Marisela Macias PTA Physical Therapy Assistant                         Time Calculation:   Start Time: 1430  Stop Time: 1515  Time Calculation (min): 45 min  Total Timed Code Minutes- PT: 45 minute(s)  Therapy Charges for Today     Code Description Service Date Service Provider Modifiers Qty    31069240116 HC PT THER PROC EA 15 MIN 1/18/2021 Marisela Macias PTA GP 3                    Marisela Macias PTA  1/18/2021

## 2021-01-20 ENCOUNTER — APPOINTMENT (OUTPATIENT)
Dept: PHYSICAL THERAPY | Facility: HOSPITAL | Age: 76
End: 2021-01-20

## 2021-01-20 ENCOUNTER — LAB (OUTPATIENT)
Dept: LAB | Facility: HOSPITAL | Age: 76
End: 2021-01-20

## 2021-01-20 LAB
25(OH)D3 SERPL-MCNC: 47.5 NG/ML (ref 30–100)
ALBUMIN SERPL-MCNC: 4.3 G/DL (ref 3.5–5.2)
ALBUMIN/GLOB SERPL: 1.5 G/DL
ALP SERPL-CCNC: 118 U/L (ref 39–117)
ALT SERPL W P-5'-P-CCNC: 10 U/L (ref 1–33)
ANION GAP SERPL CALCULATED.3IONS-SCNC: 9.1 MMOL/L (ref 5–15)
AST SERPL-CCNC: 19 U/L (ref 1–32)
BASOPHILS # BLD AUTO: 0.05 10*3/MM3 (ref 0–0.2)
BASOPHILS NFR BLD AUTO: 0.5 % (ref 0–1.5)
BILIRUB SERPL-MCNC: 0.5 MG/DL (ref 0–1.2)
BUN SERPL-MCNC: 17 MG/DL (ref 8–23)
BUN/CREAT SERPL: 21.3 (ref 7–25)
CALCIUM SPEC-SCNC: 10 MG/DL (ref 8.6–10.5)
CHLORIDE SERPL-SCNC: 103 MMOL/L (ref 98–107)
CO2 SERPL-SCNC: 25.9 MMOL/L (ref 22–29)
CREAT SERPL-MCNC: 0.8 MG/DL (ref 0.57–1)
DEPRECATED RDW RBC AUTO: 42 FL (ref 37–54)
EOSINOPHIL # BLD AUTO: 0.37 10*3/MM3 (ref 0–0.4)
EOSINOPHIL NFR BLD AUTO: 3.9 % (ref 0.3–6.2)
ERYTHROCYTE [DISTWIDTH] IN BLOOD BY AUTOMATED COUNT: 12.9 % (ref 12.3–15.4)
GFR SERPL CREATININE-BSD FRML MDRD: 70 ML/MIN/1.73
GLOBULIN UR ELPH-MCNC: 2.9 GM/DL
GLUCOSE SERPL-MCNC: 101 MG/DL (ref 65–99)
HCT VFR BLD AUTO: 41.8 % (ref 34–46.6)
HGB BLD-MCNC: 14 G/DL (ref 12–15.9)
IMM GRANULOCYTES # BLD AUTO: 0.03 10*3/MM3 (ref 0–0.05)
IMM GRANULOCYTES NFR BLD AUTO: 0.3 % (ref 0–0.5)
LYMPHOCYTES # BLD AUTO: 1.39 10*3/MM3 (ref 0.7–3.1)
LYMPHOCYTES NFR BLD AUTO: 14.7 % (ref 19.6–45.3)
MCH RBC QN AUTO: 29.7 PG (ref 26.6–33)
MCHC RBC AUTO-ENTMCNC: 33.5 G/DL (ref 31.5–35.7)
MCV RBC AUTO: 88.7 FL (ref 79–97)
MONOCYTES # BLD AUTO: 0.81 10*3/MM3 (ref 0.1–0.9)
MONOCYTES NFR BLD AUTO: 8.6 % (ref 5–12)
NEUTROPHILS NFR BLD AUTO: 6.82 10*3/MM3 (ref 1.7–7)
NEUTROPHILS NFR BLD AUTO: 72 % (ref 42.7–76)
NRBC BLD AUTO-RTO: 0 /100 WBC (ref 0–0.2)
PLATELET # BLD AUTO: 309 10*3/MM3 (ref 140–450)
PMV BLD AUTO: 10 FL (ref 6–12)
POTASSIUM SERPL-SCNC: 4.7 MMOL/L (ref 3.5–5.2)
PROT SERPL-MCNC: 7.2 G/DL (ref 6–8.5)
RBC # BLD AUTO: 4.71 10*6/MM3 (ref 3.77–5.28)
SODIUM SERPL-SCNC: 138 MMOL/L (ref 136–145)
TSH SERPL DL<=0.05 MIU/L-ACNC: 0.03 UIU/ML (ref 0.27–4.2)
VIT B12 BLD-MCNC: 665 PG/ML (ref 211–946)
WBC # BLD AUTO: 9.47 10*3/MM3 (ref 3.4–10.8)

## 2021-01-20 PROCEDURE — 82306 VITAMIN D 25 HYDROXY: CPT | Performed by: INTERNAL MEDICINE

## 2021-01-20 PROCEDURE — 82607 VITAMIN B-12: CPT | Performed by: INTERNAL MEDICINE

## 2021-01-20 PROCEDURE — 36415 COLL VENOUS BLD VENIPUNCTURE: CPT | Performed by: INTERNAL MEDICINE

## 2021-01-20 PROCEDURE — 85025 COMPLETE CBC W/AUTO DIFF WBC: CPT | Performed by: INTERNAL MEDICINE

## 2021-01-20 PROCEDURE — 84443 ASSAY THYROID STIM HORMONE: CPT | Performed by: INTERNAL MEDICINE

## 2021-01-20 PROCEDURE — 80053 COMPREHEN METABOLIC PANEL: CPT | Performed by: INTERNAL MEDICINE

## 2021-01-25 ENCOUNTER — HOSPITAL ENCOUNTER (OUTPATIENT)
Dept: PHYSICAL THERAPY | Facility: HOSPITAL | Age: 76
Setting detail: THERAPIES SERIES
Discharge: HOME | End: 2021-01-25

## 2021-01-25 DIAGNOSIS — Z96.641 HISTORY OF TOTAL HIP REPLACEMENT, RIGHT: ICD-10-CM

## 2021-01-25 DIAGNOSIS — Z96.641 STATUS POST TOTAL REPLACEMENT OF RIGHT HIP: Primary | ICD-10-CM

## 2021-01-25 DIAGNOSIS — M25.559 HIP PAIN: ICD-10-CM

## 2021-01-25 PROCEDURE — 97110 THERAPEUTIC EXERCISES: CPT

## 2021-01-25 NOTE — THERAPY TREATMENT NOTE
Outpatient Physical Therapy Ortho Treatment Note  [facility]     Patient Name: Carla Best  : 1945  MRN: [id number]  Today's Date: 2021      Visit Date: 2021     Subjective Improvement 90-95%  Visits   Visits approved medicare  RTMD  2021  Recert Date 2021    R ROSALINE on  with FX afterward    Visit Dx:    ICD-10-CM ICD-9-CM   1. Status post total replacement of right hip  Z96.641 V43.64   2. History of total hip replacement, right  Z96.641 V43.64   3. Hip pain  M25.559 719.45       Patient Active Problem List   Diagnosis   • Osteopenia   • Hypothyroidism due to Hashimoto's thyroiditis   • Vitamin D deficiency   • Keratoconjunctivitis sicca (CMS/HCC)   • Long term use of drug   • Cataract   • Primary osteoarthritis of right knee   • B12 deficiency   • Right hip pain   • Encounter for screening for osteoporosis   • GERD (gastroesophageal reflux disease)   • Hip pain, acute, right   • Myofascial pain   • Neck pain   • Osteoarthritis   • Spondylosis of cervical joint   • Palpitations   • Acquired hypothyroidism   • Pre-operative clearance   • Vaginal discharge   • Vaginal irritation   • Urinary incontinence in female   • UTI (urinary tract infection)   • Female cystocele   • Acute cystitis without hematuria   • Overweight   • Diverticulosis of large intestine without perforation or abscess without bleeding   • Generalized abdominal pain   • Helicobacter pylori (H. 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   • Fibromyalgia   • Cystocele with prolapse   • Obstructive sleep apnea, adult   • Age-related osteoporosis without current pathological fracture        Past Medical History:   Diagnosis Date   • Chest  pain    • Fibrocystic disease of breast    • Hashimoto's thyroiditis    • History of screening mammography 08/27/2014    SCREENING MAMMOGRAPHY DIGITAL  (Medicare) (1)    • Hyperlipidemia    • Hypothyroidism due to Hashimoto's thyroiditis 12/3/2016   • Keratoconjunctivitis sicca (CMS/HCC)    • Nuclear senile cataract    • On long term drug therapy    • Osteoarthritis    • Osteopenia    • Osteopenia 12/3/2016   • Photopsia     Right   • Postmenopausal bleeding    • Sinus tachycardia    • Sjogren's syndrome (CMS/HCC)    • Vitamin D deficiency    • Vitamin D deficiency 12/3/2016   • Vitreous detachment of right eye         Past Surgical History:   Procedure Laterality Date   • CATARACT EXTRACTION, BILATERAL  2018   • CHOLECYSTECTOMY  06/09/1997    with operative cholangiogram. Chronic cholecystitis & cholelithiasis. HX of passed common duct stone   • CYSTOSCOPY  11/26/1962    urethral dilatation.Recurrent pyelonephritis. urethritis   • ENDOSCOPY N/A 2/17/2017    Procedure: ESOPHAGOGASTRODUODENOSCOPY;  Surgeon: Cisco Mason DO;  Location: [facility] ENDOSCOPY;  Service:    • ENDOSCOPY N/A 10/31/2018    Procedure: ESOPHAGOGASTRODUODENOSCOPY;  Surgeon: Cisco Mason DO;  Location: [facility] ENDOSCOPY;  Service: Gastroenterology   • ENDOSCOPY AND COLONOSCOPY  09/30/1985    Normal colon to left transverse colon   • GALLBLADDER SURGERY     • JOINT REPLACEMENT      R Ant ROSALINE 7/6/2020   • PAP SMEAR  2009   • TONSILLECTOMY  1950   • VAGINAL DELIVERY      x3       PT Ortho     Row Name 01/25/21 1400       Subjective Comments    Subjective Comments  patient states that over the weekend, she was having some pain.  However today pain has decreased to 1/10.  She is 95% better.  -CP       Precautions and Contraindications    Precautions  R ROSALINE 7-  -CP    Contraindications  R hip fx after surgery   -CP       Subjective Pain    Able to rate subjective pain?  yes  -CP    Pre-Treatment Pain Level  1  -CP    Post-Treatment  Pain Level  3  -CP       Posture/Observations    Posture/Observations Comments  Pt arrived without AD. TTP ant hip near incision. No edema noted.   -CP       MMT Right Lower Ext    Rt Hip Flexion MMT, Gross Movement  (4-/5) good minus  -CP    Rt Hip Extension MMT, Gross Movement  (4+/5) good plus  -CP    Rt Hip ABduction MMT, Gross Movement  (4+/5) good plus  -CP    Rt Hip ADduction MMT, Gross Movement  (4+/5) good plus  -CP    Rt Hip Internal (Medial) Rotation MMT, Gross Movement  (5/5) normal  -CP    Rt Hip External (Lateral) Rotation MMT, Gross Movement  (5/5) normal  -CP    Rt Knee Extension MMT, Gross Movement  (5/5) normal  -CP    Rt Knee Flexion MMT, Gross Movement  (5/5) normal  -CP    Rt Ankle Dorsiflexion MMT, Gross Movement  (5/5) normal  -CP       MMT Left Lower Ext    Lt Hip Flexion MMT, Gross Movement  (4-/5) good minus  -CP    Lt Hip Extension MMT, Gross Movement  (4+/5) good plus  -CP    Lt Hip ABduction MMT, Gross Movement  (4+/5) good plus  -CP    Lt Hip ADduction MMT, Gross Movement  (4+/5) good plus  -CP    Lt Hip Internal (Medial) Rotation MMT, Gross Movement  (5/5) normal  -CP    Lt Hip External (Lateral) Rotation MMT, Gross Movement  (5/5) normal  -CP    Lt Knee Extension MMT, Gross Movement  (5/5) normal  -CP    Lt Knee Flexion MMT, Gross Movement  (5/5) normal  -CP    Lt Ankle Dorsiflexion MMT, Gross Movement  (5/5) normal  -CP       Sensation    Sensation WNL?  WNL  -CP       Transfers    Sit-Stand [place] (Transfers)  modified independence Bilat UE use  -CP    Stand-Sit [place] (Transfers)  modified independence Bilat UE use  -CP       Gait/Stairs (Locomotion)    [place] Level (Gait)  independent  -CP    [place] Level (Stairs)  modified independence  -CP    Handrail Location (Stairs)  right side (ascending)  -CP    Ascending Technique (Stairs)  step-to-step  -CP    Descending Technique (Stairs)  step-to-step  -CP      User Key  (r) = Recorded By, (t) = Taken By, (c) =  "Cosigned By    Initials Name Provider Type    CP Marisela Macias, JAIDEN Physical Therapy Assistant                      PT Assessment/Plan     Row Name 01/25/21 1444          PT Assessment    Assessment Comments  Patient is progressing toward remaining goals.  She did have some increase pain after performing 553.  -CP        PT Plan    PT Frequency  1x/week  -CP     Predicted Duration of Therapy Intervention (PT)  4 weeks  -CP     PT Plan Comments  Cont wtih POC.  sit to stand from standard chairs in the gym.  -CP       User Key  (r) = Recorded By, (t) = Taken By, (c) = Cosigned By    Initials Name Provider Type    CP Marisela Macias, PTA Physical Therapy Assistant            OP Exercises     Row Name 01/25/21 1400             Subjective Comments    Subjective Comments  patient states that over the weekend, she was having some pain.  However today pain has decreased to 1/10.  She is 95% better.  -CP         Subjective Pain    Able to rate subjective pain?  yes  -CP      Pre-Treatment Pain Level  1  -CP      Post-Treatment Pain Level  3  -CP         Exercise 1    Exercise Name 1  Pro I level 4  -CP      Time 1  10  -CP         Exercise 2    Exercise Name 2  standing Hip fl stretch  -CP      Cueing 2  Verbal;Demo  -CP      Sets 2  3  -CP      Time 2  30  -CP         Exercise 3    Exercise Name 3  lat step up and overs 4\"  -CP      Cueing 3  Verbal;Demo  -CP      Sets 3  2  -CP      Reps 3  10  -CP         Exercise 4    Exercise Name 4  lat step downs 4\"  -CP      Cueing 4  Verbal;Demo  -CP      Sets 4  2  -CP      Reps 4  10  -CP         Exercise 5    Exercise Name 5  sit to stand with one pillow  -CP      Cueing 5  Verbal;Demo  -CP      Sets 5  1  -CP      Reps 5  5  -CP      Time 5  independent  -CP         Exercise 6    Exercise Name 6  553 on airex  -CP      Cueing 6  Verbal;Demo  -CP      Sets 6  1  -CP      Reps 6  5  -CP      Time 6  one handrail assit for balance  -CP         Exercise 7    Exercise Name 7  " cybex leg press  -CP      Cueing 7  Verbal;Tactile  -CP      Sets 7  3  -CP      Reps 7  10  -CP      Time 7  70 lb  -CP         Exercise 8    Exercise Name 8  cybex hip AB  -CP      Cueing 8  Verbal  -CP      Sets 8  3  -CP      Reps 8  10  -CP      Time 8  15 lb  -CP        User Key  (r) = Recorded By, (t) = Taken By, (c) = Cosigned By    Initials Name Provider Type    CP Marisela Macias, JAIDEN Physical Therapy Assistant                       PT OP Goals     Row Name 01/25/21 1400          PT Short Term Goals    STG Date to Achieve  -- All short term goals met  -CP     STG 1  Pt will demonstrate RLE AROM WFL  -CP     STG 1 Progress  Met  -CP     STG 2  Pt will report 50% improvement in functional ability  -CP     STG 2 Progress  Met  -CP     STG 3  Pt will be independent with self care and HEP.   -CP     STG 3 Progress  Met  -CP     STG 4  Pt will ambulate community distances with SPC independently c/o increased pain.   -CP     STG 4 Progress  Met  -CP        Long Term Goals    LTG Date to Achieve  12/29/20  -CP     LTG 1  Pt will demonstrate RLE MMT 4+/5 grossly  -CP     LTG 1 Progress  Met  -CP     LTG 2  Pt will demonstrate ability to ambulate community distances with stable, symmetrical gait pattern with no AD.  -CP     LTG 2 Progress  Met  -CP     LTG 3  Pt will ascend/descend 2 stairs independently without use of handrails.   -CP     LTG 3 Progress  Not Met  -CP     LTG 4  Pt will achieve an LEFS score of 40/80 indicating improved perceived functional ability.   -CP     LTG 4 Progress  Ongoing  -CP     LTG 5  Pt will be able to perform sit to stand from chair without UE use 5x.  -CP     LTG 5 Progress  Progressing  -CP        Time Calculation    PT Goal Re-Cert Due Date  02/01/21  -CP       User Key  (r) = Recorded By, (t) = Taken By, (c) = Cosigned By    Initials Name Provider Type    CP Marisela Macias, JAIDEN Physical Therapy Assistant                         Time Calculation:   Start Time: 1345  Stop  Time: 1430  Time Calculation (min): 45 min  Total Timed Code Minutes- PT: 45 minute(s)  Therapy Charges for Today     Code Description Service Date Service Provider Modifiers Qty    44457980208 HC PT THER PROC EA 15 MIN 1/25/2021 Marisela Macias, PTA GP 3                    Marisela Macias PTA  1/25/2021

## 2021-01-29 ENCOUNTER — OFFICE VISIT (OUTPATIENT)
Dept: ENDOCRINOLOGY | Facility: CLINIC | Age: 76
End: 2021-01-29

## 2021-01-29 VITALS
OXYGEN SATURATION: 98 % | HEART RATE: 89 BPM | DIASTOLIC BLOOD PRESSURE: 78 MMHG | HEIGHT: 68 IN | BODY MASS INDEX: 27.48 KG/M2 | WEIGHT: 181.3 LBS | SYSTOLIC BLOOD PRESSURE: 124 MMHG

## 2021-01-29 DIAGNOSIS — E03.8 HYPOTHYROIDISM DUE TO HASHIMOTO'S THYROIDITIS: ICD-10-CM

## 2021-01-29 DIAGNOSIS — E89.0 POSTABLATIVE HYPOTHYROIDISM: Primary | ICD-10-CM

## 2021-01-29 DIAGNOSIS — E78.5 DYSLIPIDEMIA: ICD-10-CM

## 2021-01-29 DIAGNOSIS — E53.8 B12 DEFICIENCY: ICD-10-CM

## 2021-01-29 DIAGNOSIS — E55.9 VITAMIN D DEFICIENCY: ICD-10-CM

## 2021-01-29 DIAGNOSIS — E06.3 HYPOTHYROIDISM DUE TO HASHIMOTO'S THYROIDITIS: ICD-10-CM

## 2021-01-29 PROCEDURE — 99214 OFFICE O/P EST MOD 30 MIN: CPT | Performed by: INTERNAL MEDICINE

## 2021-01-29 RX ORDER — ERGOCALCIFEROL 1.25 MG/1
50000 CAPSULE ORAL
Qty: 6 CAPSULE | Refills: 3 | Status: SHIPPED | OUTPATIENT
Start: 2021-01-29 | End: 2022-01-31 | Stop reason: SDUPTHER

## 2021-01-29 RX ORDER — LEVOTHYROXINE SODIUM 100 MCG
100 TABLET ORAL DAILY
Qty: 90 TABLET | Refills: 3 | Status: SHIPPED | OUTPATIENT
Start: 2021-01-29 | End: 2021-03-09

## 2021-01-29 NOTE — PROGRESS NOTES
Subjective    Carla Best is a 75 y.o. female. she is here today for follow-up.    Hypothyroidism  Associated symptoms include arthralgias, myalgias and weakness. Pertinent negatives include no abdominal pain, chest pain, chills, congestion, coughing, diaphoresis, fatigue, headaches, joint swelling, nausea, neck pain, numbness, rash, sore throat or vomiting.   Thyroid Problem  Symptoms include anxiety. Patient reports no cold intolerance, constipation, diaphoresis, diarrhea, fatigue, heat intolerance, palpitations or tremors.               75 y.o.      female comes for fu , very pleasant       history of subacute thyroiditis with subsequent permanent hypothyroidism      duration - 5+ years    timing - constant    quality - biochemically controlled but still symptomatic     severity - moderate     symptoms - multiple , detailed under ROS    Fatigue, hair loss, cold intolerance, weigh gain, myalgias, muscle weakness  alleviating factors - brand name synthroid and cytomel but lately not equally effective     she states that if no changes are needed then she will accept that it is not her thyroid, she only wants to be certain there isn't anything that has been missed.      she has fibromylgia and neck pain has worsened since last appt, most importantly for her , hair loss    Review of Systems  Review of Systems   Constitutional: Negative for activity change, appetite change, chills, diaphoresis and fatigue.         Daytime sleepiness       Nonrestorative sleep       Loud snoring       Witnessed apneas by roommate       Awakening with choking or gasping       Nocturnal restlessness       Insomnia with frequent awakenings       Lack of concentration       Cognitive deficits       Changes in mood       Morning headaches       Nocturia           HENT: Negative for congestion, dental problem, drooling, ear discharge, ear pain, facial swelling, sneezing, sore throat, tinnitus, trouble swallowing and voice change.   "  Eyes: Negative for photophobia, pain, discharge, redness, itching and visual disturbance.   Respiratory: Positive for apnea. Negative for cough, choking, chest tightness and shortness of breath.    Cardiovascular: Negative for chest pain, palpitations and leg swelling.   Gastrointestinal: Negative for abdominal distention, abdominal pain, constipation, diarrhea, nausea and vomiting.   Endocrine: Negative for cold intolerance, heat intolerance, polydipsia, polyphagia and polyuria.   Genitourinary: Negative for difficulty urinating, dysuria, frequency, hematuria and urgency.   Musculoskeletal: Positive for arthralgias and myalgias. Negative for back pain, gait problem, joint swelling, neck pain and neck stiffness.   Skin: Negative for color change, pallor, rash and wound.   Allergic/Immunologic: Negative for environmental allergies, food allergies and immunocompromised state.   Neurological: Positive for weakness. Negative for dizziness, tremors, facial asymmetry, light-headedness, numbness and headaches.   Hematological: Negative for adenopathy. Does not bruise/bleed easily.   Psychiatric/Behavioral: Positive for decreased concentration. Negative for agitation, behavioral problems, confusion and sleep disturbance. The patient is nervous/anxious.         Objective    /78 (BP Location: Left arm, Patient Position: Sitting, Cuff Size: Large Adult)   Pulse 89   Ht 172.7 cm (68\")   Wt 82.2 kg (181 lb 4.8 oz)   SpO2 98%   BMI 27.57 kg/m²   Physical Exam   Constitutional: She is oriented to person, place, and time. She appears well-developed. No distress.   HENT:   Head: Normocephalic and atraumatic.   Right Ear: External ear normal.   Left Ear: External ear normal.   Nose: Nose normal.   Eyes: Pupils are equal, round, and reactive to light. Conjunctivae are normal.   Neck: Normal range of motion. Neck supple. No tracheal deviation present. No thyromegaly present.   Cardiovascular: Normal rate, regular rhythm " and normal heart sounds.   No murmur heard.  Pulmonary/Chest: Effort normal and breath sounds normal. No respiratory distress. She has no wheezes.   Abdominal: Soft. Bowel sounds are normal. There is no abdominal tenderness. There is no rebound and no guarding.   Musculoskeletal: Normal range of motion. No tenderness or deformity.      Comments:     Neurological: She is alert and oriented to person, place, and time. No cranial nerve deficit.   Skin: Skin is warm and dry. No rash noted.   Psychiatric: Her behavior is normal. Judgment and thought content normal.       Lab Review  Glucose (mg/dL)   Date Value   01/20/2021 101 (H)   11/06/2020 92   08/21/2020 83     Sodium   Date Value   01/20/2021 138 mmol/L   11/06/2020 140 mmol/L   08/21/2020 142 mmol/L   10/22/2019 137 MMOL/L     Potassium   Date Value   01/20/2021 4.7 mmol/L   11/06/2020 4.6 mmol/L   08/21/2020 4.4 mmol/L   10/22/2019 3.7 MMOL/L     Chloride   Date Value   01/20/2021 103 mmol/L   11/06/2020 104 mmol/L   08/21/2020 105 mmol/L   10/22/2019 98 MMOL/L     CO2   Date Value   01/20/2021 25.9 mmol/L   11/06/2020 27.5 mmol/L   08/21/2020 23.6 mmol/L   10/22/2019 28 MMOL/L     BUN   Date Value   01/20/2021 17 mg/dL   11/06/2020 11 mg/dL   08/21/2020 14 mg/dL   10/22/2019 14 MG/DL     Creatinine   Date Value   01/20/2021 0.80 mg/dL   11/06/2020 0.61 mg/dL   08/21/2020 0.77 mg/dL   10/22/2019 0.7 MG/DL     Triglycerides (mg/dL)   Date Value   11/06/2020 199 (H)   08/21/2020 174 (H)   02/12/2020 127     LDL Cholesterol  (mg/dL)   Date Value   11/06/2020 138 (H)   08/21/2020 116 (H)   02/12/2020 99       Assessment/Plan      1. Postablative hypothyroidism    2. Vitamin D deficiency    3. B12 deficiency    4. Hypothyroidism due to Hashimoto's thyroiditis    5. Dyslipidemia    .       Orders placed during this encounter include:  No orders of the defined types were placed in this encounter.    Hypothyroidism    On 112 daily     Has taken multiple formulations and  doses     This time decrease to 100          Lab Results   Component Value Date    TSH 0.034 (L) 01/20/2021            neg celiac panel     she also has sjogren's    =====     Osteoporosis     Rheumatology Following    Last DXA in 2017     On Vit D     Lab Results   Component Value Date    UEFW49GS 47.5 01/20/2021    ALPI69IM 41.3 11/06/2020    XEAB22NG 40.5 08/21/2020              =====      Alopecia     Thyroid as above  Dr. Baig added aldactone, she can't tolerate  Added iron even though levels are normal       I added finasteride 1 mg daily - preferred not to     Lab Results   Component Value Date    GLUCOSE 101 (H) 01/20/2021    BUN 17 01/20/2021    CREATININE 0.80 01/20/2021    EGFRIFNONA 70 01/20/2021    BCR 21.3 01/20/2021    K 4.7 01/20/2021    CO2 25.9 01/20/2021    CALCIUM 10.0 01/20/2021    ALBUMIN 4.30 01/20/2021    LABIL2 1.6 10/22/2019    AST 19 01/20/2021    ALT 10 01/20/2021       Gained weight due to elavil , she had to stop due to side effects  Fasting glucose 101 and mild elevation in LFT     ===    Dyslipidemia    Lab Results   Component Value Date    CHOL 227 (H) 11/06/2020    CHLPL 249 (H) 12/07/2016    TRIG 199 (H) 11/06/2020    HDL 54 11/06/2020     (H) 11/06/2020     Lab Results   Component Value Date     (H) 11/06/2020     (H) 08/21/2020    LDL 99 02/12/2020         Side effects to statins in the past     Tolerating pravastatin     Strong fam hx of CAD    ==    Apnea    Refer for sleep study     ==    sjogren , OA    On chloroquine    Had dxa, osteopenia w high FRAX now on fosamax = osteoporosis    Sept 2019     Total Lumbar spine:   0.868     gm/cm2     T-Score -1.6         Femoral neck Left Hip:            0.654     gm/cm2     T-Score  -1.8         IMPRESSION:  CONCLUSION: This patient has bone density parameters in both  lumbar spine and left hip in the mildly abnormal, osteopenia  category.     FRAX (World Health Organization risk assessment tool.) 10  year  fracture risk.  Major osteoporotic fracture 21%.  Hip fracture 18%.       Intolerant to fosamax - GERD , nausea     Approve for prolia preferred not to take   4. Follow-up: No follow-ups on file.

## 2021-02-01 ENCOUNTER — HOSPITAL ENCOUNTER (OUTPATIENT)
Dept: PHYSICAL THERAPY | Facility: HOSPITAL | Age: 76
Setting detail: THERAPIES SERIES
Discharge: HOME | End: 2021-02-01

## 2021-02-01 DIAGNOSIS — Z96.641 HISTORY OF TOTAL HIP REPLACEMENT, RIGHT: ICD-10-CM

## 2021-02-01 DIAGNOSIS — M25.559 HIP PAIN: ICD-10-CM

## 2021-02-01 DIAGNOSIS — Z96.641 STATUS POST TOTAL REPLACEMENT OF RIGHT HIP: Primary | ICD-10-CM

## 2021-02-01 PROCEDURE — 97110 THERAPEUTIC EXERCISES: CPT

## 2021-02-01 PROCEDURE — 97530 THERAPEUTIC ACTIVITIES: CPT

## 2021-02-02 NOTE — THERAPY DISCHARGE NOTE
Outpatient Physical Therapy Ortho Progress Note/Discharge Summary  [facility]     Patient Name: Carla Best  : 1945  MRN: [id number]  Today's Date: 2021      Visit Date: 2021   Attendance:  (med nec approved)  Subjective Improvement: 90-95%  Next MD Visit: PRN  Recert Date: n/a     Therapy Diagnosis: R ROSALINE on  with FX afterward    Visit Dx:    ICD-10-CM ICD-9-CM   1. Status post total replacement of right hip  Z96.641 V43.64   2. History of total hip replacement, right  Z96.641 V43.64   3. Hip pain  M25.959 719.45       Patient Active Problem List   Diagnosis   • Osteopenia   • Hypothyroidism due to Hashimoto's thyroiditis   • Vitamin D deficiency   • Keratoconjunctivitis sicca (CMS/HCC)   • Long term use of drug   • Cataract   • Primary osteoarthritis of right knee   • B12 deficiency   • Right hip pain   • Encounter for screening for osteoporosis   • GERD (gastroesophageal reflux disease)   • Hip pain, acute, right   • Myofascial pain   • Neck pain   • Osteoarthritis   • Spondylosis of cervical joint   • Palpitations   • Acquired hypothyroidism   • Pre-operative clearance   • Vaginal discharge   • Vaginal irritation   • Urinary incontinence in female   • UTI (urinary tract infection)   • Female cystocele   • Acute cystitis without hematuria   • Overweight   • Diverticulosis of large intestine without perforation or abscess without bleeding   • Generalized abdominal pain   • Helicobacter pylori (H. 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   • Fibromyalgia   • Cystocele with prolapse   • Obstructive sleep apnea, adult   • Age-related osteoporosis without current pathological fracture        Past Medical  History:   Diagnosis Date   • Chest pain    • Fibrocystic disease of breast    • Hashimoto's thyroiditis    • History of screening mammography 08/27/2014    SCREENING MAMMOGRAPHY DIGITAL  (Medicare) (1)    • Hyperlipidemia    • Hypothyroidism due to Hashimoto's thyroiditis 12/3/2016   • Keratoconjunctivitis sicca (CMS/HCC)    • Nuclear senile cataract    • On long term drug therapy    • Osteoarthritis    • Osteopenia    • Osteopenia 12/3/2016   • Photopsia     Right   • Postmenopausal bleeding    • Sinus tachycardia    • Sjogren's syndrome (CMS/HCC)    • Vitamin D deficiency    • Vitamin D deficiency 12/3/2016   • Vitreous detachment of right eye         Past Surgical History:   Procedure Laterality Date   • CATARACT EXTRACTION, BILATERAL  2018   • CHOLECYSTECTOMY  06/09/1997    with operative cholangiogram. Chronic cholecystitis & cholelithiasis. HX of passed common duct stone   • CYSTOSCOPY  11/26/1962    urethral dilatation.Recurrent pyelonephritis. urethritis   • ENDOSCOPY N/A 2/17/2017    Procedure: ESOPHAGOGASTRODUODENOSCOPY;  Surgeon: Cisco Mason DO;  Location: [facility] ENDOSCOPY;  Service:    • ENDOSCOPY N/A 10/31/2018    Procedure: ESOPHAGOGASTRODUODENOSCOPY;  Surgeon: Cisco Mason DO;  Location: [facility] ENDOSCOPY;  Service: Gastroenterology   • ENDOSCOPY AND COLONOSCOPY  09/30/1985    Normal colon to left transverse colon   • GALLBLADDER SURGERY     • JOINT REPLACEMENT      R Ant ROSALINE 7/6/2020   • PAP SMEAR  2009   • TONSILLECTOMY  1950   • VAGINAL DELIVERY      x3       PT Ortho     Row Name 02/01/21 1400       Subjective Comments    Subjective Comments  Pt stated that today is not a good week. She stated that she is having more pain than usual today and that she is frustrated about not being able to perform sit<>stand without UEs or ascend/descend stairs step-over-step. 95% subjective improvement.   -       Precautions and Contraindications    Precautions  R ROSALINE 7-  -     Contraindications  R hip fx after surgery   -       Subjective Pain    Able to rate subjective pain?  yes  -    Pre-Treatment Pain Level  4  -    Post-Treatment Pain Level  4  -       Posture/Observations    Posture/Observations Comments  TTP along tensor fascia higinio and down ITB, quad muscle belly. No edema.   -       MMT (Manual Muscle Testing)    Rt Lower Ext  Rt Hip WFL;Rt Knee WFL  -MH    Lt Lower Ext  Lt Hip WFL;Lt Knee WFL  -       MMT Right Lower Ext    Rt Hip Flexion MMT, Gross Movement  (4+/5) good plus  -    Rt Hip Extension MMT, Gross Movement  (4+/5) good plus  -    Rt Hip ABduction MMT, Gross Movement  (5/5) normal  -    Rt Hip ADduction MMT, Gross Movement  (5/5) normal  -    Rt Hip Internal (Medial) Rotation MMT, Gross Movement  (5/5) normal  -    Rt Hip External (Lateral) Rotation MMT, Gross Movement  (5/5) normal  -    Rt Knee Extension MMT, Gross Movement  (5/5) normal  -    Rt Knee Flexion MMT, Gross Movement  (5/5) normal  -    Rt Ankle Dorsiflexion MMT, Gross Movement  (5/5) normal  -       MMT Left Lower Ext    Lt Hip Flexion MMT, Gross Movement  (4+/5) good plus  -    Lt Hip Extension MMT, Gross Movement  (4+/5) good plus  -    Lt Hip ABduction MMT, Gross Movement  (5/5) normal  -    Lt Hip ADduction MMT, Gross Movement  (5/5) normal  -    Lt Hip Internal (Medial) Rotation MMT, Gross Movement  (5/5) normal  -    Lt Hip External (Lateral) Rotation MMT, Gross Movement  (5/5) normal  -    Lt Knee Extension MMT, Gross Movement  (5/5) normal  -    Lt Knee Flexion MMT, Gross Movement  (5/5) normal  -    Lt Ankle Dorsiflexion MMT, Gross Movement  (5/5) normal  -       Sensation    Sensation WNL?  WNL  -    Light Touch  No apparent deficits  -       Transfers    Sit-Stand [place] (Transfers)  independent  -    Stand-Sit [place] (Transfers)  independent  -       Gait/Stairs (Locomotion)    [place] Level (Gait)  independent  -     [place] Level (Stairs)  modified independence  -    Handrail Location (Stairs)  right side (ascending)  -    Ascending Technique (Stairs)  step-over-step  -    Descending Technique (Stairs)  step-over-step  -    Comment (Gait/Stairs)  No gait deviations at beginning of session. Mild antalgic gait post session.   -      User Key  (r) = Recorded By, (t) = Taken By, (c) = Cosigned By    Initials Name Provider Type     Elisabeth Jonas, PT Physical Therapist                      PT Assessment/Plan     Row Name 02/01/21 1400          PT Assessment    Functional Limitations  Impaired gait;Limitation in home management;Performance in leisure activities;Limitations in community activities;Impaired locomotion;Limitations in functional capacity and performance;Performance in self-care ADL  -     Impairments  Balance;Gait;Impaired muscle power;Impaired muscle endurance;Joint mobility;Locomotion;Muscle strength;Pain;Sensation;Range of motion;Posture  -     Assessment Comments  Recheck completed this visit. 8/9 goals met to date. At this time, pt is d/c from skilled physical therapy. She has met all but one goal, has demonstrated adequate strength, is able to ambulate community distance without an AD, and able to negotiate stairs independently. She has had a lack in progress over the last few weeks which I believe to be due to fear of falling than physical deficits. HEP was updated and reviewed with the pt. I also educated her on the importance of using both LE at home for functional tasks. Pt stated that she understood and would try to use her right LE more. MD progress note written and provided to pt for MD follow up appt.   -     Please refer to paper survey for additional self-reported information  Yes  -MH     Rehab Potential  Good  -MH     Patient/caregiver participated in establishment of treatment plan and goals  Yes  -MH        PT Plan    PT Frequency  -- d/c  -MH     Predicted Duration of Therapy  "Intervention (PT)  d/c  -     PT Plan Comments  d/c home to HEP  -       User Key  (r) = Recorded By, (t) = Taken By, (c) = Cosigned By    Initials Name Provider Type    Elisabeth Womack, OJ Physical Therapist              OP Exercises     Row Name 02/01/21 1400             Subjective Comments    Subjective Comments  Pt stated that today is not a good week. She stated that she is having more pain than usual today and that she is frustrated about not being able to perform sit<>stand without UEs or ascend/descend stairs step-over-step. 95% subjective improvement.   -         Subjective Pain    Able to rate subjective pain?  yes  -      Pre-Treatment Pain Level  4  -      Post-Treatment Pain Level  4  -         Exercise 1    Exercise Name 1  Pro II LE; strength  -      Time 1  10  -MH      Additional Comments  lvl 4; seat 9  -         Exercise 2    Exercise Name 2  Standing hip flex stretch  -      Cueing 2  Verbal  -      Sets 2  3  -      Time 2  30 sec  -         Exercise 3    Exercise Name 3  Stair negotiation: step over step  -      Cueing 3  Verbal  -      Sets 3  5 laps  -      Reps 3  3 steps  -      Additional Comments  4\", no UE  -         Exercise 4    Exercise Name 4  Recheck  -         Exercise 5    Exercise Name 5  5x sit to stand  -      Cueing 5  Verbal  -      Sets 5  2  -MH      Reps 5  5  -MH      Additional Comments  no UE use  -         Exercise 6    Exercise Name 6  Stair negotiation gillespie: step-over-step  -      Cueing 6  Verbal  -      Sets 6  5  -MH      Reps 6  5 steps  -MH      Additional Comments  6\" step; one UE use  -        User Key  (r) = Recorded By, (t) = Taken By, (c) = Cosigned By    Initials Name Provider Type    Elisabeth Womack, PT Physical Therapist                         PT OP Goals     Row Name 02/01/21 1400          PT Short Term Goals    STG Date to Achieve  -- All short term goals met  -     STG 1  Pt will demonstrate RLE " AROM WFL  -     STG 1 Progress  Met  -     STG 2  Pt will report 50% improvement in functional ability  -     STG 2 Progress  Met  -     STG 3  Pt will be independent with self care and HEP.   -     STG 3 Progress  Met  -     STG 4  Pt will ambulate community distances with SPC independently c/o increased pain.   -     STG 4 Progress  Met  [facility]        Long Term Goals    LTG Date to Achieve  --  -     LTG 1  Pt will demonstrate RLE MMT 4+/5 grossly  -     LTG 1 Progress  Met  -     LTG 2  Pt will demonstrate ability to ambulate community distances with stable, symmetrical gait pattern with no AD.  -     LTG 2 Progress  Met  -     LTG 3  Pt will ascend/descend 2 stairs independently without use of handrails.   -     LTG 3 Progress  Met  -     LTG 4  Pt will achieve an LEFS score of 40/80 indicating improved perceived functional ability.   -     LTG 4 Progress  Not Met  -     LTG 5  Pt will be able to perform sit to stand from chair without UE use 5x.  -     LTG 5 Progress  Met  [facility]        Time Calculation    PT Goal Re-Cert Due Date  -- d/c  -       User Key  (r) = Recorded By, (t) = Taken By, (c) = Cosigned By    Initials Name Provider Type    Elisabeth Womack, PT Physical Therapist          Therapy Education  Education Details: HEP: stair negotiation, sit to stands, walking program, step downs.  Given: HEP  Program: Progressed  How Provided: Verbal, Demonstration  Provided to: Patient  Level of Understanding: Verbalized, Demonstrated    Outcome Measure Options: Lower Extremity Functional Scale (LEFS)  5 Times Sit to Stand  5 Times Sit to Stand (seconds): 24.14 seconds  5 Times Sit to Stand Comments: no UE use  Lower Extremity Functional Index  Any of your usual work, housework or school activities: Moderate difficulty  Your usual hobbies, recreational or sporting activities: Extreme difficulty or unable to perform activity  Getting into or out of the bath: A little bit of  difficulty  Walking between rooms: A little bit of difficulty  Putting on your shoes or socks: Quite a bit of difficulty  Squatting: Quite a bit of difficulty  Lifting an object, like a bag of groceries from the floor: Moderate difficulty  Performing light activities around your home: Moderate difficulty  Performing heavy activities around your home: Quite a bit of difficulty  Getting into or out of a car: Moderate difficulty  Walking 2 blocks: Extreme difficulty or unable to perform activity  Walking a mile: Extreme difficulty or unable to perform activity  Going up or down 10 stairs (about 1 flight of stairs): Moderate difficulty  Standing for 1 hour: Extreme difficulty or unable to perform activity  Sitting for 1 hour: A little bit of difficulty  Running on even ground: Extreme difficulty or unable to perform activity  Running on uneven ground: Extreme difficulty or unable to perform activity  Making sharp turns while running fast: Extreme difficulty or unable to perform activity  Hopping: Extreme difficulty or unable to perform activity  Rolling over in bed: Moderate difficulty  Total: 24      Time Calculation:   Start Time: 1430  Stop Time: 1515  Time Calculation (min): 45 min  Therapy Charges for Today     Code Description Service Date Service Provider Modifiers Qty    80092073151  PT THER PROC EA 15 MIN 2/1/2021 Elisabeth Jonas, PT GP 1    [id number] HC PT THERAPEUTIC ACT EA 15 MIN 2/1/2021 Elisabeth Jonas, PT GP 2          PT G-Codes  Outcome Measure Options: Lower Extremity Functional Scale (LEFS)  Total: 24            Elisabeth Jonas PT  2/1/2021

## 2021-02-05 ENCOUNTER — IMMUNIZATION (OUTPATIENT)
Dept: VACCINE CLINIC | Facility: HOSPITAL | Age: 76
End: 2021-02-05

## 2021-02-05 PROCEDURE — 0002A: CPT | Performed by: THORACIC SURGERY (CARDIOTHORACIC VASCULAR SURGERY)

## 2021-02-05 PROCEDURE — 91300 HC SARSCOV02 VAC 30MCG/0.3ML IM: CPT | Performed by: THORACIC SURGERY (CARDIOTHORACIC VASCULAR SURGERY)

## 2021-02-08 ENCOUNTER — APPOINTMENT (OUTPATIENT)
Dept: PHYSICAL THERAPY | Facility: HOSPITAL | Age: 76
End: 2021-02-08

## 2021-02-16 ENCOUNTER — OFFICE VISIT (OUTPATIENT)
Dept: SLEEP MEDICINE | Facility: HOSPITAL | Age: 76
End: 2021-02-16

## 2021-02-16 DIAGNOSIS — G47.33 OSA (OBSTRUCTIVE SLEEP APNEA): Primary | ICD-10-CM

## 2021-02-16 PROCEDURE — 99442 PR PHYS/QHP TELEPHONE EVALUATION 11-20 MIN: CPT | Performed by: NURSE PRACTITIONER

## 2021-02-16 NOTE — PROGRESS NOTES
Sleep Clinic Follow Up - Telephone Visit      You have chosen to receive care through a telephone visit. Do you consent to use a telephone visit for your medical care today? YES     Date: 2021  Primary Care Provider: System, Provider Not In    Last office visit: 2020(I reviewed this note)    CC: Follow up: KIMMIE on CPAP, 2 month compliance check       Interim History:  Since the last visit:    1) Severe KIMMIE -  Carla Best has remained compliant with CPAP. She denies dry mouth and headaches. She denies abnormal dreams, sleep paralysis, nasal congestion, URI sx.    Since her last office visit, she met with the sleep educator and is using the mask that was recommended that has less straps. She does like the mask however she states that the straps have already stretched out within one week. She is continuing to try to use the CPAP as much as she can and states her hours used compliance has increased. She states she thinks the pressure may be too much at times.       Sleep Testin. Split night PSG on 2019 , AHI of 44   2. CPAP titration recommended 8-11 cm H2O   3. Currently on 9-20 cm H2O    PAP Data:  No data available will obtain from [facility] [place]       Bed time: 0695-3206  Sleep latency: 0-30 minutes  Number of times awakens during the night: 2  Wake time: 3766-1286  Estimated total sleep time at night: 6-7 hours  Caffeine intake: 1 cups of coffee, 1cups of tea, and 1 sodas per day  Alcohol intake: 0 drinks per week  Nap time: denies  Sleepiness with Driving: denies       2) Patient denies RLS symptoms.     PMHx, FH, SH reviewed and pertinent changes are: still having trouble with her right hip from her surgery she had several months ago, otherwise unchanged from last office visit on 2020      REVIEW OF SYSTEMS:   Positive: right hip pain   Negative for chest pain, SOA, fever, chills, cough, N/V/D, abdominal pain.    Smoking:None            Exam:  Unable to perform physical exam due to  conducting telephone visit    Past Medical History:   Diagnosis Date   • Chest pain    • Fibrocystic disease of breast    • Hashimoto's thyroiditis    • History of screening mammography 08/27/2014    SCREENING MAMMOGRAPHY DIGITAL  (Medicare) (1)    • Hyperlipidemia    • Hypothyroidism due to Hashimoto's thyroiditis 12/3/2016   • Keratoconjunctivitis sicca (CMS/[facility])    • Nuclear senile cataract    • On long term drug therapy    • Osteoarthritis    • Osteopenia    • Osteopenia 12/3/2016   • Photopsia     Right   • Postmenopausal bleeding    • Sinus tachycardia    • Sjogren's syndrome (CMS/[facility])    • Vitamin D deficiency    • Vitamin D deficiency 12/3/2016   • Vitreous detachment of right eye        Current Outpatient Medications:   •  aspirin 81 MG EC tablet, Take 81 mg by mouth daily., Disp: , Rfl:   •  Biotin (BIOTIN MAXIMUM STRENGTH) 5 MG capsule, Take 1 tablet by mouth Daily., Disp: , Rfl:   •  Calcium Carbonate-Vitamin D (CALCIUM 600+D) 600-200 MG-UNIT tablet, Take 1 tablet by mouth Daily., Disp: , Rfl:   •  clobetasol (TEMOVATE) 0.05 % ointment, Apply  topically to the appropriate area as directed 2 (Two) Times a Day., Disp: 60 g, Rfl: 20  •  diphenoxylate-atropine (LOMOTIL) 2.5-0.025 MG per tablet, TK 1 T PO SIX TIMES PER DAY PRN, Disp: , Rfl: 5  •  losartan (COZAAR) 25 MG tablet, TAKE ONE TABLET BY MOUTH DAILY, Disp: , Rfl:   •  meclizine (ANTIVERT) 12.5 MG tablet, Take 12.5 mg by mouth 3 (Three) Times a Day As Needed for dizziness., Disp: , Rfl:   •  metoprolol succinate XL (TOPROL-XL) 50 MG 24 hr tablet, Take 1 tablet by mouth Daily With Dinner., Disp: 90 tablet, Rfl: 6  •  Multiple Vitamins-Minerals (MULTIVITAMIN ADULT PO), Take 1 tablet by mouth Daily., Disp: , Rfl:   •  pravastatin (PRAVACHOL) 20 MG tablet, Take 1 tablet by mouth Daily., Disp: 90 tablet, Rfl: 3  •  Synthroid 100 MCG tablet, Take 1 tablet by mouth Daily., Disp: 90 tablet, Rfl: 3  •  triamcinolone (KENALOG) 0.1 % cream, Apply 1  application topically to the appropriate area as directed 2 (Two) Times a Day., Disp: , Rfl:   •  vitamin B-12 (CYANOCOBALAMIN) 1000 MCG tablet, Take 1,000 mcg by mouth Every Other Day., Disp: , Rfl:   •  vitamin D (ERGOCALCIFEROL) 1.25 MG (57990 UT) capsule capsule, Take 1 capsule by mouth Every 14 (Fourteen) Days., Disp: 6 capsule, Rfl: 3    Current Facility-Administered Medications:   •  triamcinolone acetonide (KENALOG-40) injection 80 mg, 80 mg, Intramuscular, Once, Aditya Falk APRN      Assessment and Plan:    1. Obstructive sleep apnea - Established, stable   1. Compliant with PAP therapy- obtain data from DME , continued compliance encouraged   2. Continue PAP as prescribed, pressure change to [id number] for comfort   3. Script for PAP supplies  4. Return to clinic in 6 months with compliance report unless change in symptoms in interim period      This visit has been rescheduled as a phone visit to comply with patient safety concerns in accordance with CDC recommendations. Total time of discussion was 11 minutes.    6of 11 minutes spent telephone counseling patient extensively regarding:   PAP management, maintenance, compliance, pressure change       Return to clinic in 6 months. Patient agrees to return sooner if changes in symptoms.            This document has been electronically signed by ZACHARY Higuera on February 16, 2021 09:41 CST              CC: System, Provider Not In          No ref. provider found

## 2021-03-04 ENCOUNTER — LAB (OUTPATIENT)
Dept: LAB | Facility: HOSPITAL | Age: 76
End: 2021-03-04

## 2021-03-04 LAB
T4 FREE SERPL-MCNC: 1.6 NG/DL (ref 0.93–1.7)
TSH SERPL DL<=0.05 MIU/L-ACNC: 0.06 UIU/ML (ref 0.27–4.2)

## 2021-03-04 PROCEDURE — 84439 ASSAY OF FREE THYROXINE: CPT | Performed by: INTERNAL MEDICINE

## 2021-03-04 PROCEDURE — 36415 COLL VENOUS BLD VENIPUNCTURE: CPT | Performed by: INTERNAL MEDICINE

## 2021-03-04 PROCEDURE — 84443 ASSAY THYROID STIM HORMONE: CPT | Performed by: INTERNAL MEDICINE

## 2021-03-09 DIAGNOSIS — E53.8 B12 DEFICIENCY: ICD-10-CM

## 2021-03-09 DIAGNOSIS — E89.0 POSTABLATIVE HYPOTHYROIDISM: Primary | ICD-10-CM

## 2021-03-09 DIAGNOSIS — E55.9 VITAMIN D DEFICIENCY: ICD-10-CM

## 2021-03-09 RX ORDER — LEVOTHYROXINE SODIUM 88 UG/1
88 TABLET ORAL DAILY
Qty: 30 TABLET | Refills: 11 | Status: SHIPPED | OUTPATIENT
Start: 2021-03-09 | End: 2021-03-14 | Stop reason: SDUPTHER

## 2021-03-14 RX ORDER — LEVOTHYROXINE SODIUM 88 UG/1
88 TABLET ORAL DAILY
Qty: 30 TABLET | Refills: 11 | Status: SHIPPED | OUTPATIENT
Start: 2021-03-14 | End: 2021-09-23

## 2021-04-08 ENCOUNTER — OFFICE VISIT (OUTPATIENT)
Dept: OBSTETRICS AND GYNECOLOGY | Facility: CLINIC | Age: 76
End: 2021-04-08

## 2021-04-08 ENCOUNTER — LAB (OUTPATIENT)
Dept: LAB | Facility: HOSPITAL | Age: 76
End: 2021-04-08

## 2021-04-08 VITALS
SYSTOLIC BLOOD PRESSURE: 128 MMHG | HEIGHT: 68 IN | WEIGHT: 190 LBS | BODY MASS INDEX: 28.79 KG/M2 | DIASTOLIC BLOOD PRESSURE: 88 MMHG

## 2021-04-08 DIAGNOSIS — Z12.31 ENCOUNTER FOR SCREENING MAMMOGRAM FOR MALIGNANT NEOPLASM OF BREAST: Primary | ICD-10-CM

## 2021-04-08 DIAGNOSIS — R30.0 DYSURIA: Primary | ICD-10-CM

## 2021-04-08 DIAGNOSIS — R30.0 DYSURIA: ICD-10-CM

## 2021-04-08 LAB
BACTERIA UR QL AUTO: ABNORMAL /HPF
BILIRUB UR QL STRIP: NEGATIVE
CLARITY UR: ABNORMAL
COLOR UR: YELLOW
GLUCOSE UR STRIP-MCNC: NEGATIVE MG/DL
HGB UR QL STRIP.AUTO: ABNORMAL
HYALINE CASTS UR QL AUTO: ABNORMAL /LPF
KETONES UR QL STRIP: NEGATIVE
LEUKOCYTE ESTERASE UR QL STRIP.AUTO: ABNORMAL
NITRITE UR QL STRIP: NEGATIVE
PH UR STRIP.AUTO: 6 [PH] (ref 5–8)
PROT UR QL STRIP: ABNORMAL
RBC # UR: ABNORMAL /HPF
REF LAB TEST METHOD: ABNORMAL
SP GR UR STRIP: 1.02 (ref 1–1.03)
SQUAMOUS #/AREA URNS HPF: ABNORMAL /HPF
UROBILINOGEN UR QL STRIP: ABNORMAL
WBC UR QL AUTO: ABNORMAL /HPF

## 2021-04-08 PROCEDURE — 81001 URINALYSIS AUTO W/SCOPE: CPT

## 2021-04-08 PROCEDURE — 87086 URINE CULTURE/COLONY COUNT: CPT

## 2021-04-08 PROCEDURE — 99212 OFFICE O/P EST SF 10 MIN: CPT | Performed by: OBSTETRICS & GYNECOLOGY

## 2021-04-09 LAB — BACTERIA SPEC AEROBE CULT: NORMAL

## 2021-04-09 NOTE — PROGRESS NOTES
Carla Best is a 75 y.o. y/o female.     Chief Complaint: Dysuria    HPI:   75 y.o. .  No LMP recorded. Patient is postmenopausal..  Patient presents with complaints of dysuria some frequency and urgency          Review of Systems     Constitutional: Denies night sweats    HENT: No hearing changes, denies ear pain    Eye: No eye pain; no foreign body in eye    Pulmonary: No hemoptysis    Cardiovascular: No claudication    GI: No hematemesis    Musculoskeletal: No arthralgias, no joint swelling    Endocrine: No polydipsia or polyuria    Hematologic: Denies any free bleeding    Psychiatric: Denies any delusions    The following portions of the patient's history were reviewed and updated as appropriate: allergies, current medications, past family history, past medical history, past social history, past surgical history and problem list.    Allergies   Allergen Reactions   • Ciprofloxacin Hives   • Codeine Other (See Comments)     headache   • Demerol [Meperidine] Other (See Comments)     Drop in blood pressure   • Dexlansoprazole Nausea Only   • Lipitor [Atorvastatin Calcium] Myalgia   • Tape Other (See Comments)     Burns skin      • Phenazopyridine Rash        Outpatient Medications Prior to Visit   Medication Sig Dispense Refill   • aspirin 81 MG EC tablet Take 81 mg by mouth daily.     • Biotin (BIOTIN MAXIMUM STRENGTH) 5 MG capsule Take 1 tablet by mouth Daily.     • Calcium Carbonate-Vitamin D (CALCIUM 600+D) 600-200 MG-UNIT tablet Take 1 tablet by mouth Daily.     • clobetasol (TEMOVATE) 0.05 % ointment Apply  topically to the appropriate area as directed 2 (Two) Times a Day. 60 g 20   • diphenoxylate-atropine (LOMOTIL) 2.5-0.025 MG per tablet TK 1 T PO SIX TIMES PER DAY PRN  5   • levothyroxine (Synthroid) 88 MCG tablet Take 1 tablet by mouth Daily. 30 tablet 11   • losartan (COZAAR) 25 MG tablet TAKE ONE TABLET BY MOUTH DAILY     • meclizine (ANTIVERT) 12.5 MG tablet Take 12.5 mg by mouth 3 (Three)  Times a Day As Needed for dizziness.     • metoprolol succinate XL (TOPROL-XL) 50 MG 24 hr tablet Take 1 tablet by mouth Daily With Dinner. 90 tablet 6   • Multiple Vitamins-Minerals (MULTIVITAMIN ADULT PO) Take 1 tablet by mouth Daily.     • pravastatin (PRAVACHOL) 20 MG tablet Take 1 tablet by mouth Daily. 90 tablet 3   • triamcinolone (KENALOG) 0.1 % cream Apply 1 application topically to the appropriate area as directed 2 (Two) Times a Day.     • vitamin B-12 (CYANOCOBALAMIN) 1000 MCG tablet Take 1,000 mcg by mouth Every Other Day.     • vitamin D (ERGOCALCIFEROL) 1.25 MG (87372 UT) capsule capsule Take 1 capsule by mouth Every 14 (Fourteen) Days. 6 capsule 3   • triamcinolone acetonide (KENALOG-40) injection 80 mg        No facility-administered medications prior to visit.        The patient has a family history of   Family History   Problem Relation Age of Onset   • Heart disease Mother    • Arthritis Mother    • Osteoporosis Mother    • Cataracts Mother    • Heart disease Father    • Hypertension Sister    • Arthritis Sister    • Diabetes Sister    • Fuchs' dystrophy Sister    • Breast cancer Paternal Grandmother    • Diabetes Other    • Heart disease Other    • Hypertension Other    • Stroke Other    • Thyroid disease Other    • Lung disease Other    • Other Other         Gallstones, Bleeding Tendency, Colon Problems.   • Fuchs' dystrophy Maternal Grandfather         Past Medical History:   Diagnosis Date   • Chest pain    • Fibrocystic disease of breast    • Hashimoto's thyroiditis    • History of screening mammography 08/27/2014    SCREENING MAMMOGRAPHY DIGITAL  (Medicare) (1)    • Hyperlipidemia    • Hypothyroidism due to Hashimoto's thyroiditis 12/3/2016   • Keratoconjunctivitis sicca (CMS/HCC)    • Nuclear senile cataract    • On long term drug therapy    • Osteoarthritis    • Osteopenia    • Osteopenia 12/3/2016   • Photopsia     Right   • Postmenopausal bleeding    • Sinus tachycardia    •  Sjogren's syndrome (CMS/HCC)    • Vitamin D deficiency    • Vitamin D deficiency 12/3/2016   • Vitreous detachment of right eye         OB History        3    Para   3    Term   3            AB        Living           SAB        TAB        Ectopic        Molar        Multiple        Live Births                     Social History     Socioeconomic History   • Marital status:      Spouse name: Not on file   • Number of children: Not on file   • Years of education: Not on file   • Highest education level: Not on file   Tobacco Use   • Smoking status: Never Smoker   • Smokeless tobacco: Never Used   Substance and Sexual Activity   • Alcohol use: No   • Drug use: No   • Sexual activity: Defer        Past Surgical History:   Procedure Laterality Date   • CATARACT EXTRACTION, BILATERAL     • CHOLECYSTECTOMY  1997    with operative cholangiogram. Chronic cholecystitis & cholelithiasis. HX of passed common duct stone   • CYSTOSCOPY  1962    urethral dilatation.Recurrent pyelonephritis. urethritis   • ENDOSCOPY N/A 2017    Procedure: ESOPHAGOGASTRODUODENOSCOPY;  Surgeon: Cisco Mason DO;  Location: [facility] ENDOSCOPY;  Service:    • ENDOSCOPY N/A 10/31/2018    Procedure: ESOPHAGOGASTRODUODENOSCOPY;  Surgeon: Cisco Mason DO;  Location: [facility] ENDOSCOPY;  Service: Gastroenterology   • ENDOSCOPY AND COLONOSCOPY  1985    Normal colon to left transverse colon   • GALLBLADDER SURGERY     • JOINT REPLACEMENT      R Ant ROSALINE 2020   • PAP SMEAR     • TONSILLECTOMY  1950   • VAGINAL DELIVERY      x3        Patient Active Problem List   Diagnosis   • Osteopenia   • Hypothyroidism due to Hashimoto's thyroiditis   • Vitamin D deficiency   • Keratoconjunctivitis sicca (CMS/HCC)   • Long term use of drug   • Cataract   • Primary osteoarthritis of right knee   • B12 deficiency   • Right hip pain   • Encounter for screening for osteoporosis   • GERD (gastroesophageal reflux  "disease)   • Hip pain, acute, right   • Myofascial pain   • Neck pain   • Osteoarthritis   • Spondylosis of cervical joint   • Palpitations   • Acquired hypothyroidism   • Pre-operative clearance   • Vaginal discharge   • Vaginal irritation   • Urinary incontinence in female   • UTI (urinary tract infection)   • Female cystocele   • Acute cystitis without hematuria   • Overweight   • Diverticulosis of large intestine without perforation or abscess without bleeding   • Generalized abdominal pain   • Helicobacter pylori (H. 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   • Fibromyalgia   • Cystocele with prolapse   • Obstructive sleep apnea, adult   • Age-related osteoporosis without current pathological fracture        Documented Vitals    04/08/21 0913   BP: 128/88   Weight: 86.2 kg (190 lb)   Height: 172.7 cm (68\")   PainSc:   4        Body mass index is 28.89 kg/m².    Physical Exam  Constitutional: Appears to be in no acute distress; Eyes: Sclerae normal; Endocrine system: Thyroid palpation is normal; Pulmonary system: Lungs clear; Cardiovascular system: Heart regular rate and rhythm; Gastrointestinal system: Abdomen soft and nontender, active bowel sounds; Urologic system: CVA negative; Psychiatric: Appropriate insight; Neurologic: Gait within normal limits there is no suprapubic tenderness no CVA tenderness    Laboratory Data:   Lab Results - Last 18 Months   Lab Units 01/20/21  0754 11/06/20  0751 08/21/20  0803 02/12/20  0746 10/22/19  1431   GLUCOSE mg/dL 101* 92 83 85  --    BUN mg/dL 17 11 14 10 14   CREATININE mg/dL 0.80 0.61 0.77 0.78 0.7   SODIUM mmol/L 138 140 142 136 137   POTASSIUM mmol/L 4.7 4.6 4.4 4.3 3.7   CHLORIDE mmol/L 103 104 105 98 " 98   TOTAL CO2 MMOL/L  --   --   --   --  28   CO2 mmol/L 25.9 27.5 23.6 25.8  --    CALCIUM mg/dL 10.0 10.0 9.6 10.3 9.5   TOTAL PROTEIN g/dL 7.2 6.8 6.5 6.9 7.1   ALBUMIN g/dL 4.30 4.30 4.40 4.50 4.4   ALT (SGPT) U/L 10 12 12 12 19   AST (SGOT) U/L 19 20 15 20 26   ALK PHOS U/L 118* 98 95 58 76   BILIRUBIN mg/dL 0.5 0.6 0.4 0.6 0.5   EGFR IF NONAFRICN AM mL/min/1.73 70 95 73 72  --    GLOBULIN gm/dL 2.9 2.5 2.1 2.4  --    A/G RATIO g/dL 1.5 1.7 2.1 1.9  --    BUN / CREAT RATIO  21.3 18.0 18.2 12.8  --    ANION GAP mmol/L 9.1 8.5 13.4 12.2 11     Lab Results - Last 18 Months   Lab Units 01/20/21  0754 11/06/20  0751 08/21/20  0803 02/12/20  0746 10/18/19  1121   WBC 10*3/mm3 9.47 6.34 5.57 4.39 6.1   RBC 10*6/mm3 4.71 4.80 4.07 4.41 4.42   HEMOGLOBIN g/dL 14.0 13.7 12.0 13.1 13.7   HEMATOCRIT % 41.8 40.6 36.6 39.4 40.5   MCV fL 88.7 84.6 89.9 89.3 91.7   MCH pg 29.7 28.5 29.5 29.7 30.9   MCHC g/dL 33.5 33.7 32.8 33.2 33.8   RDW % 12.9 12.9 13.5 12.7 13.6   RDW-SD fl 42.0 39.1 44.1 41.5  --    MPV fL 10.0 10.3 10.1 10.5 8.4   PLATELETS 10*3/mm3 309 296 306 300 274     No results for input(s): HCGQUAL in the last 58355 hours.    Assessment        Diagnosis Plan   1. Dysuria  Urinalysis With Culture If Indicated -         Plan       No orders of the defined types were placed in this encounter.            This document has been electronically signed by Nav Barber MD on April 9, 2021 14:54 CDT    Please note that portions of this note were completed with a voice recognition program.

## 2021-04-12 ENCOUNTER — TELEPHONE (OUTPATIENT)
Dept: OBSTETRICS AND GYNECOLOGY | Facility: CLINIC | Age: 76
End: 2021-04-12

## 2021-04-12 RX ORDER — FLUCONAZOLE 200 MG/1
200 TABLET ORAL DAILY
Qty: 3 TABLET | Refills: 1 | Status: SHIPPED | OUTPATIENT
Start: 2021-04-12 | End: 2021-05-23

## 2021-04-12 RX ORDER — CEPHALEXIN 500 MG/1
500 CAPSULE ORAL 4 TIMES DAILY
Qty: 40 CAPSULE | Refills: 0 | Status: SHIPPED | OUTPATIENT
Start: 2021-04-12 | End: 2021-04-22

## 2021-04-12 RX ORDER — TRIAMCINOLONE ACETONIDE 1 MG/G
1 CREAM TOPICAL 2 TIMES DAILY
Qty: 45 G | Refills: 9 | Status: SHIPPED | OUTPATIENT
Start: 2021-04-12

## 2021-04-12 RX ORDER — CLOBETASOL PROPIONATE 0.5 MG/G
OINTMENT TOPICAL 2 TIMES DAILY
Qty: 60 G | Refills: 20 | Status: SHIPPED | OUTPATIENT
Start: 2021-04-12

## 2021-04-12 NOTE — TELEPHONE ENCOUNTER
Patient UA suggestive of UTI.  Culture polymicrobial 1 we will go ahead and treat also requesting renewal of medication for lichen sclerosus

## 2021-05-20 ENCOUNTER — OFFICE VISIT (OUTPATIENT)
Dept: OTOLARYNGOLOGY | Facility: CLINIC | Age: 76
End: 2021-05-20

## 2021-05-20 VITALS — BODY MASS INDEX: 28.79 KG/M2 | HEIGHT: 68 IN | OXYGEN SATURATION: 98 % | WEIGHT: 190 LBS

## 2021-05-20 DIAGNOSIS — H81.12 BPPV (BENIGN PAROXYSMAL POSITIONAL VERTIGO), LEFT: Primary | ICD-10-CM

## 2021-05-20 DIAGNOSIS — H92.01 RIGHT EAR PAIN: ICD-10-CM

## 2021-05-20 PROCEDURE — 99213 OFFICE O/P EST LOW 20 MIN: CPT | Performed by: OTOLARYNGOLOGY

## 2021-05-24 NOTE — PROGRESS NOTES
Subjective   Carla Best is a 75 y.o. female.       History of Present Illness     Patient will follow with chronic noninfective otitis externa and has been having some right-sided ear pain for the last couple of weeks.  This seems to be worse when she lies on her right side.  Nothing in particular brought this on.  No otorrhea.  She has also been having some intermittent spinning dizziness especially when she turns to the left.  No change in hearing.    The following portions of the patient's history were reviewed and updated as appropriate: allergies, current medications, past family history, past medical history, past social history, past surgical history and problem list.     reports that she has never smoked. She has never used smokeless tobacco. She reports that she does not drink alcohol and does not use drugs.   Patient is not a tobacco user and has not been counseled for use of tobacco products      Review of Systems        Objective   Physical Exam  Ears: External ears no deformity.  Canals show only minimal squamous debris.  Beyond this tympanic membranes are intact clear mobile.  Nares: No discharge.  Oral cavity: No lesions.  Pharynx: No erythema or exudate  Neck: No lymphadenopathy.  No thyromegaly.  Trachea and larynx midline.  No masses in the parotid or submandibular glands.  TMJs are not particularly tender to palpation.  [place]-Hallpike maneuvers produce a classic rotatory nystagmus and subjective spinning vertigo with the head to the left.        Assessment/Plan   Diagnoses and all orders for this visit:    1. BPPV (benign paroxysmal positional vertigo), left (Primary)    2. Right ear pain          Plan: Reassured the patient that her right-sided otalgia was musculoskeletal in nature and likely related to the way she positioned herself during sleep.  With regards to her BPPV I have explained the nature of this to her and the [place] exercises.  Told her as long as the dizziness improved she  could stop the exercises when she could no longer induce vertigo.  If she improves just keep her scheduled follow-up visit later in the year.

## 2021-07-19 ENCOUNTER — LAB (OUTPATIENT)
Dept: LAB | Facility: HOSPITAL | Age: 76
End: 2021-07-19

## 2021-07-19 LAB
25(OH)D3 SERPL-MCNC: 39.1 NG/ML
ALBUMIN SERPL-MCNC: 4.4 G/DL (ref 3.5–5.2)
ALBUMIN/GLOB SERPL: 1.7 G/DL
ALP SERPL-CCNC: 99 U/L (ref 39–117)
ALT SERPL W P-5'-P-CCNC: 10 U/L (ref 1–33)
ANION GAP SERPL CALCULATED.3IONS-SCNC: 10.8 MMOL/L (ref 5–15)
AST SERPL-CCNC: 17 U/L (ref 1–32)
BASOPHILS # BLD AUTO: 0.07 10*3/MM3 (ref 0–0.2)
BASOPHILS NFR BLD AUTO: 1 % (ref 0–1.5)
BILIRUB SERPL-MCNC: 0.6 MG/DL (ref 0–1.2)
BUN SERPL-MCNC: 14 MG/DL (ref 8–23)
BUN/CREAT SERPL: 18.4 (ref 7–25)
CALCIUM SPEC-SCNC: 10.4 MG/DL (ref 8.6–10.5)
CHLORIDE SERPL-SCNC: 102 MMOL/L (ref 98–107)
CO2 SERPL-SCNC: 26.2 MMOL/L (ref 22–29)
CREAT SERPL-MCNC: 0.76 MG/DL (ref 0.57–1)
DEPRECATED RDW RBC AUTO: 44.9 FL (ref 37–54)
EOSINOPHIL # BLD AUTO: 0.29 10*3/MM3 (ref 0–0.4)
EOSINOPHIL NFR BLD AUTO: 4.2 % (ref 0.3–6.2)
ERYTHROCYTE [DISTWIDTH] IN BLOOD BY AUTOMATED COUNT: 13.2 % (ref 12.3–15.4)
GFR SERPL CREATININE-BSD FRML MDRD: 74 ML/MIN/1.73
GLOBULIN UR ELPH-MCNC: 2.6 GM/DL
GLUCOSE SERPL-MCNC: 89 MG/DL (ref 65–99)
HCT VFR BLD AUTO: 44.2 % (ref 34–46.6)
HGB BLD-MCNC: 14.3 G/DL (ref 12–15.9)
IMM GRANULOCYTES # BLD AUTO: 0.02 10*3/MM3 (ref 0–0.05)
IMM GRANULOCYTES NFR BLD AUTO: 0.3 % (ref 0–0.5)
LYMPHOCYTES # BLD AUTO: 1.61 10*3/MM3 (ref 0.7–3.1)
LYMPHOCYTES NFR BLD AUTO: 23.1 % (ref 19.6–45.3)
MCH RBC QN AUTO: 29.5 PG (ref 26.6–33)
MCHC RBC AUTO-ENTMCNC: 32.4 G/DL (ref 31.5–35.7)
MCV RBC AUTO: 91.3 FL (ref 79–97)
MONOCYTES # BLD AUTO: 0.56 10*3/MM3 (ref 0.1–0.9)
MONOCYTES NFR BLD AUTO: 8 % (ref 5–12)
NEUTROPHILS NFR BLD AUTO: 4.43 10*3/MM3 (ref 1.7–7)
NEUTROPHILS NFR BLD AUTO: 63.4 % (ref 42.7–76)
NRBC BLD AUTO-RTO: 0 /100 WBC (ref 0–0.2)
PLATELET # BLD AUTO: 282 10*3/MM3 (ref 140–450)
PMV BLD AUTO: 10.4 FL (ref 6–12)
POTASSIUM SERPL-SCNC: 4.8 MMOL/L (ref 3.5–5.2)
PROT SERPL-MCNC: 7 G/DL (ref 6–8.5)
RBC # BLD AUTO: 4.84 10*6/MM3 (ref 3.77–5.28)
SODIUM SERPL-SCNC: 139 MMOL/L (ref 136–145)
TSH SERPL DL<=0.05 MIU/L-ACNC: 0.76 UIU/ML (ref 0.27–4.2)
VIT B12 BLD-MCNC: 937 PG/ML (ref 211–946)
WBC # BLD AUTO: 6.98 10*3/MM3 (ref 3.4–10.8)

## 2021-07-19 PROCEDURE — 84443 ASSAY THYROID STIM HORMONE: CPT | Performed by: INTERNAL MEDICINE

## 2021-07-19 PROCEDURE — 82306 VITAMIN D 25 HYDROXY: CPT | Performed by: INTERNAL MEDICINE

## 2021-07-19 PROCEDURE — 36415 COLL VENOUS BLD VENIPUNCTURE: CPT | Performed by: INTERNAL MEDICINE

## 2021-07-19 PROCEDURE — 85025 COMPLETE CBC W/AUTO DIFF WBC: CPT | Performed by: INTERNAL MEDICINE

## 2021-07-19 PROCEDURE — 80053 COMPREHEN METABOLIC PANEL: CPT | Performed by: INTERNAL MEDICINE

## 2021-07-19 PROCEDURE — 82607 VITAMIN B-12: CPT | Performed by: INTERNAL MEDICINE

## 2021-07-29 ENCOUNTER — OFFICE VISIT (OUTPATIENT)
Dept: ENDOCRINOLOGY | Facility: CLINIC | Age: 76
End: 2021-07-29

## 2021-07-29 VITALS
OXYGEN SATURATION: 97 % | WEIGHT: 193 LBS | HEART RATE: 78 BPM | DIASTOLIC BLOOD PRESSURE: 80 MMHG | HEIGHT: 68 IN | SYSTOLIC BLOOD PRESSURE: 130 MMHG | BODY MASS INDEX: 29.25 KG/M2

## 2021-07-29 DIAGNOSIS — E55.9 VITAMIN D DEFICIENCY: ICD-10-CM

## 2021-07-29 DIAGNOSIS — E78.5 DYSLIPIDEMIA: ICD-10-CM

## 2021-07-29 DIAGNOSIS — M81.0 AGE-RELATED OSTEOPOROSIS WITHOUT CURRENT PATHOLOGICAL FRACTURE: ICD-10-CM

## 2021-07-29 DIAGNOSIS — E89.0 POSTABLATIVE HYPOTHYROIDISM: Primary | ICD-10-CM

## 2021-07-29 DIAGNOSIS — E53.8 B12 DEFICIENCY: ICD-10-CM

## 2021-07-29 PROCEDURE — 99214 OFFICE O/P EST MOD 30 MIN: CPT | Performed by: INTERNAL MEDICINE

## 2021-07-29 RX ORDER — LOSARTAN POTASSIUM 25 MG/1
25 TABLET ORAL DAILY
Qty: 90 TABLET | Refills: 3 | Status: SHIPPED | OUTPATIENT
Start: 2021-07-29 | End: 2022-09-29

## 2021-07-29 RX ORDER — PRAVASTATIN SODIUM 20 MG
20 TABLET ORAL DAILY
Qty: 90 TABLET | Refills: 3 | Status: SHIPPED | OUTPATIENT
Start: 2021-07-29 | End: 2022-01-31

## 2021-07-29 RX ORDER — METOPROLOL SUCCINATE 50 MG/1
50 TABLET, EXTENDED RELEASE ORAL
Qty: 90 TABLET | Refills: 3 | Status: SHIPPED | OUTPATIENT
Start: 2021-07-29 | End: 2022-09-29

## 2021-07-29 NOTE — PROGRESS NOTES
Subjective    Carla Best is a 76 y.o. female. she is here today for follow-up.    Hypothyroidism  Associated symptoms include arthralgias, myalgias and weakness. Pertinent negatives include no abdominal pain, chest pain, chills, congestion, coughing, diaphoresis, fatigue, headaches, joint swelling, nausea, neck pain, numbness, rash, sore throat or vomiting.   Thyroid Problem  Symptoms include anxiety. Patient reports no cold intolerance, constipation, diaphoresis, diarrhea, fatigue, heat intolerance, palpitations or tremors.               76 y.o.      female comes for fu , very pleasant       history of subacute thyroiditis with subsequent permanent hypothyroidism      duration - 5+ years    timing - constant    quality - biochemically controlled but still symptomatic     severity - moderate     symptoms - multiple , detailed under ROS    Fatigue, hair loss, cold intolerance, weigh gain, myalgias, muscle weakness  alleviating factors - brand name synthroid and cytomel but lately not equally effective     she states that if no changes are needed then she will accept that it is not her thyroid, she only wants to be certain there isn't anything that has been missed.      she has fibromylgia and neck pain has worsened since last appt, most importantly for her , hair loss    Review of Systems  Review of Systems   Constitutional: Negative for activity change, appetite change, chills, diaphoresis and fatigue.         Daytime sleepiness       Nonrestorative sleep       Loud snoring       Witnessed apneas by roommate       Awakening with choking or gasping       Nocturnal restlessness       Insomnia with frequent awakenings       Lack of concentration       Cognitive deficits       Changes in mood       Morning headaches       Nocturia           HENT: Negative for congestion, dental problem, drooling, ear discharge, ear pain, facial swelling, sneezing, sore throat, tinnitus, trouble swallowing and voice change.   "  Eyes: Negative for photophobia, pain, discharge, redness, itching and visual disturbance.   Respiratory: Positive for apnea. Negative for cough, choking, chest tightness and shortness of breath.    Cardiovascular: Negative for chest pain, palpitations and leg swelling.   Gastrointestinal: Negative for abdominal distention, abdominal pain, constipation, diarrhea, nausea and vomiting.   Endocrine: Negative for cold intolerance, heat intolerance, polydipsia, polyphagia and polyuria.   Genitourinary: Negative for difficulty urinating, dysuria, frequency, hematuria and urgency.   Musculoskeletal: Positive for arthralgias and myalgias. Negative for back pain, gait problem, joint swelling, neck pain and neck stiffness.   Skin: Negative for color change, pallor, rash and wound.   Allergic/Immunologic: Negative for environmental allergies, food allergies and immunocompromised state.   Neurological: Positive for weakness. Negative for dizziness, tremors, facial asymmetry, light-headedness, numbness and headaches.   Hematological: Negative for adenopathy. Does not bruise/bleed easily.   Psychiatric/Behavioral: Positive for decreased concentration. Negative for agitation, behavioral problems, confusion and sleep disturbance. The patient is nervous/anxious.         Objective    /80   Pulse 78   Ht 172.7 cm (68\")   Wt 87.5 kg (193 lb)   SpO2 97%   BMI 29.35 kg/m²   Physical Exam   Constitutional: She is oriented to person, place, and time. She appears well-developed. No distress.   HENT:   Head: Normocephalic and atraumatic.   Right Ear: External ear normal.   Left Ear: External ear normal.   Nose: Nose normal.   Eyes: Pupils are equal, round, and reactive to light. Conjunctivae are normal.   Neck: No tracheal deviation present. No thyromegaly present.   Cardiovascular: Normal rate, regular rhythm and normal heart sounds.   No murmur heard.  Pulmonary/Chest: Effort normal and breath sounds normal. No respiratory " distress. She has no wheezes.   Abdominal: Soft. Bowel sounds are normal. There is no abdominal tenderness. There is no rebound and no guarding.   Musculoskeletal: Normal range of motion. No tenderness or deformity.      Comments:     Neurological: She is alert and oriented to person, place, and time. No cranial nerve deficit.   Skin: Skin is warm and dry. No rash noted.   Psychiatric: Her behavior is normal. Judgment and thought content normal.       Lab Review  Glucose (mg/dL)   Date Value   07/19/2021 89   01/20/2021 101 (H)   11/06/2020 92     Sodium   Date Value   07/19/2021 139 mmol/L   01/20/2021 138 mmol/L   11/06/2020 140 mmol/L   10/22/2019 137 MMOL/L     Potassium   Date Value   07/19/2021 4.8 mmol/L   01/20/2021 4.7 mmol/L   11/06/2020 4.6 mmol/L   10/22/2019 3.7 MMOL/L     Chloride   Date Value   07/19/2021 102 mmol/L   01/20/2021 103 mmol/L   11/06/2020 104 mmol/L   10/22/2019 98 MMOL/L     CO2   Date Value   07/19/2021 26.2 mmol/L   01/20/2021 25.9 mmol/L   11/06/2020 27.5 mmol/L   10/22/2019 28 MMOL/L     BUN   Date Value   07/19/2021 14 mg/dL   01/20/2021 17 mg/dL   11/06/2020 11 mg/dL   10/22/2019 14 MG/DL     Creatinine   Date Value   07/19/2021 0.76 mg/dL   01/20/2021 0.80 mg/dL   11/06/2020 0.61 mg/dL   10/22/2019 0.7 MG/DL     Triglycerides (mg/dL)   Date Value   11/06/2020 199 (H)   08/21/2020 174 (H)   02/12/2020 127     LDL Cholesterol  (mg/dL)   Date Value   11/06/2020 138 (H)   08/21/2020 116 (H)   02/12/2020 99       Assessment/Plan      1. Postablative hypothyroidism    2. Vitamin D deficiency    3. B12 deficiency    4. Dyslipidemia    5. Age-related osteoporosis without current pathological fracture     .       Orders placed during this encounter include:  No orders of the defined types were placed in this encounter.    Hypothyroidism      Has taken multiple formulations and doses     Now on synthroid 112-100-88         Lab Results   Component Value Date    TSH 0.756 07/19/2021             neg celiac panel     she also has sjogren's    =====     Osteoporosis     Rheumatology Following    Last DXA in 2017     On Vit D     Lab Results   Component Value Date    ACTM01CU 39.1 07/19/2021    QSTX91CJ 47.5 01/20/2021    JOVL38TO 41.3 11/06/2020              =====      Alopecia     Thyroid as above  Dr. Baig added aldactone, she can't tolerate  Added iron even though levels are normal       I added finasteride 1 mg daily - preferred not to     Lab Results   Component Value Date    GLUCOSE 89 07/19/2021    BUN 14 07/19/2021    CREATININE 0.76 07/19/2021    EGFRIFNONA 74 07/19/2021    BCR 18.4 07/19/2021    K 4.8 07/19/2021    CO2 26.2 07/19/2021    CALCIUM 10.4 07/19/2021    ALBUMIN 4.40 07/19/2021    LABIL2 1.6 10/22/2019    AST 17 07/19/2021    ALT 10 07/19/2021       Gained weight due to elavil , she had to stop due to side effects  Fasting glucose 101 and mild elevation in LFT     ===    Dyslipidemia    Lab Results   Component Value Date    CHOL 227 (H) 11/06/2020    CHLPL 249 (H) 12/07/2016    TRIG 199 (H) 11/06/2020    HDL 54 11/06/2020     (H) 11/06/2020     Lab Results   Component Value Date     (H) 11/06/2020     (H) 08/21/2020    LDL 99 02/12/2020         Side effects to statins in the past     Tolerating pravastatin     Strong fam hx of CAD    ==    Apnea    Refer for sleep study     ==    sjogren , OA    On chloroquine    Had dxa, osteopenia w high FRAX now on fosamax = osteoporosis    Sept 2019     Total Lumbar spine:   0.868     gm/cm2     T-Score -1.6         Femoral neck Left Hip:            0.654     gm/cm2     T-Score  -1.8         IMPRESSION:  CONCLUSION: This patient has bone density parameters in both  lumbar spine and left hip in the mildly abnormal, osteopenia  category.     FRAX (World Health Organization risk assessment tool.) 10 year  fracture risk.  Major osteoporotic fracture 21%.  Hip fracture 18%.       Intolerant to fosamax - GERD , nausea     Approve for  benjamin preferred not to take   4. Follow-up: No follow-ups on file.

## 2021-08-18 ENCOUNTER — OFFICE VISIT (OUTPATIENT)
Dept: SLEEP MEDICINE | Facility: HOSPITAL | Age: 76
End: 2021-08-18

## 2021-08-18 VITALS
BODY MASS INDEX: 29.39 KG/M2 | OXYGEN SATURATION: 95 % | DIASTOLIC BLOOD PRESSURE: 75 MMHG | SYSTOLIC BLOOD PRESSURE: 151 MMHG | HEIGHT: 68 IN | WEIGHT: 193.9 LBS | HEART RATE: 83 BPM

## 2021-08-18 DIAGNOSIS — G47.33 OBSTRUCTIVE SLEEP APNEA, ADULT: Primary | ICD-10-CM

## 2021-08-18 PROCEDURE — 99213 OFFICE O/P EST LOW 20 MIN: CPT | Performed by: NURSE PRACTITIONER

## 2021-08-18 NOTE — PROGRESS NOTES
Sleep Clinic Follow Up    Date: 2021  Primary Care Provider: System, Provider Not In    Last office visit: 2021 (I reviewed this note)    CC: Follow up: KIMMIE on CPAP, 6 month follow-up       Interim History:  Since the last visit:    1) severe KIMMIE -  Carla Best has not remained compliant with CPAP. She reports morning headaches, worsened reflux and nocturnal awakenings since stopping CPAP use. She was using a loaner machine because her Dreamstation stopped working in early 2020. The loaner machine is also recalled so she has not been on therapy since the end of . nShe denies abnormal dreams, sleep paralysis, nasal congestion, URI sx.      2) Patient denies RLS symptoms.       Sleep Testin. Split night on 2019, AHI of 44   2. CPAP titration , recommended 8-11 cm H2O   3. Currently on 9-15 cm H2O    PAP Data:  No data available - not worn CPAP since [employer]'s recall   Mask type: Full face mask  DME: [facility]    Bed time: 7032-9829  Sleep latency: 0-30 minutes  Number of times awakens during the night: 3 or more   Wake time: 2512-3799  Estimated total sleep time at night: 6-7 hours  Caffeine intake: 1 cups of coffee, 3+ cups of tea, and 1 sodas per day  Alcohol intake: 0 drinks per week  Nap time: most days    Sleepiness with Driving: denies      [employer] - 15        PMHx, FH, SH reviewed and pertinent changes are:  unchanged from last office visit on 2021      REVIEW OF SYSTEMS:   Negative for chest pain, SOA, fever, chills, cough, N/V/D, abdominal pain.    Smoking:none        Exam:  Vitals:    21 1005   BP: 151/75   Pulse: 83   SpO2: 95%           21  1005   Weight: 88 kg (193 lb 14.4 oz)     Body mass index is 29.49 kg/m². Patient's Body mass index is 29.49 kg/m². indicating that she is overweight (BMI 25-29.9). Obesity-related health conditions include the following: obstructive sleep apnea. Obesity is unchanged. BMI is is above average; BMI management plan is  completed. We discussed portion control and increasing exercise..      Gen:                No distress, conversant, pleasant, appears stated age, alert, oriented  Eyes:               Anicteric sclera, moist conjunctiva, no lid lag                           EOMI   Lungs:             normal effort, non-labored breathing                          Clear to auscultation bilaterally          CV:                  Normal S1/S2, no murmur                          no lower extremity edema               Psych:             Appropriate affect  Neuro:             CN 2-12 appear intact    Past Medical History:   Diagnosis Date   • Chest pain    • Fibrocystic disease of breast    • Hashimoto's thyroiditis    • History of screening mammography 08/27/2014    SCREENING MAMMOGRAPHY DIGITAL  (Medicare) (1)    • Hyperlipidemia    • Hypothyroidism due to Hashimoto's thyroiditis 12/3/2016   • Keratoconjunctivitis sicca (CMS/HCC)    • Nuclear senile cataract    • On long term drug therapy    • Osteoarthritis    • Osteopenia    • Osteopenia 12/3/2016   • Photopsia     Right   • Postmenopausal bleeding    • Sinus tachycardia    • Sjogren's syndrome (CMS/[facility])    • Vitamin D deficiency    • Vitamin D deficiency 12/3/2016   • Vitreous detachment of right eye        Current Outpatient Medications:   •  aspirin 81 MG EC tablet, Take 81 mg by mouth daily., Disp: , Rfl:   •  Biotin (BIOTIN MAXIMUM STRENGTH) 5 MG capsule, Take 1 tablet by mouth Daily., Disp: , Rfl:   •  Calcium Carbonate-Vitamin D (CALCIUM 600+D) 600-200 MG-UNIT tablet, Take 1 tablet by mouth Daily., Disp: , Rfl:   •  clobetasol (TEMOVATE) 0.05 % ointment, Apply  topically to the appropriate area as directed 2 (Two) Times a Day., Disp: 60 g, Rfl: 20  •  diphenoxylate-atropine (LOMOTIL) 2.5-0.025 MG per tablet, TK 1 T PO SIX TIMES PER DAY PRN, Disp: , Rfl: 5  •  levothyroxine (Synthroid) 88 MCG tablet, Take 1 tablet by mouth Daily., Disp: 30 tablet, Rfl: 11  •  losartan (COZAAR)  25 MG tablet, Take 1 tablet by mouth Daily., Disp: 90 tablet, Rfl: 3  •  meclizine (ANTIVERT) 12.5 MG tablet, Take 12.5 mg by mouth 3 (Three) Times a Day As Needed for dizziness., Disp: , Rfl:   •  metoprolol succinate XL (TOPROL-XL) 50 MG 24 hr tablet, Take 1 tablet by mouth Daily With Dinner., Disp: 90 tablet, Rfl: 3  •  Multiple Vitamins-Minerals (MULTIVITAMIN ADULT PO), Take 1 tablet by mouth Daily., Disp: , Rfl:   •  pravastatin (Pravachol) 20 MG tablet, Take 1 tablet by mouth Daily., Disp: 90 tablet, Rfl: 3  •  triamcinolone (KENALOG) 0.1 % cream, Apply 1 application topically to the appropriate area as directed 2 (Two) Times a Day., Disp: 45 g, Rfl: 9  •  vitamin B-12 (CYANOCOBALAMIN) 1000 MCG tablet, Take 1,000 mcg by mouth Every Other Day., Disp: , Rfl:   •  vitamin D (ERGOCALCIFEROL) 1.25 MG (52279 UT) capsule capsule, Take 1 capsule by mouth Every 14 (Fourteen) Days., Disp: 6 capsule, Rfl: 3      Assessment and Plan:    1. Obstructive sleep apnea  -Established, stable (1)  1. Not compliant with PAP therapy due to recall   2. Discussed new safety recall of [employer] CPAP/BiPAP/AVAPS machines. We discussed the risk versus benefit of continuing usage of PAP therapy. The company has recommended that patients stop usage of their current machines. Instructed patient to call [employer] ([phone]) to check if the machine is under warranty for repair or replacement. Ruby machine with serial number on website: www.[employer]/src-updates. Follow up with DME company or sleep clinic regarding any further questions, or for consideration for new machine once eligible. Advised patient to avoid unapproved ozone-type CPAP . May reach out to [facility] regarding bacterial filter if interested. Advised to call us if any further questions or problems.  3. Script for PAP supplies  4. Drowsy driving tips- do not drive if feeling sleepy   5. Return to clinic in 6 months with compliance report unless  change in symptoms in interim period          I spent 22 minutes caring for Carla on this date of service. This time includes time spent by me in the following activities: preparing for the visit, obtaining and/or reviewing a separately obtained history, performing a medically appropriate examination and/or evaluation, counseling and educating the patient/family/caregiver, ordering medications, tests, or procedures and documenting information in the medical record.           This document has been electronically signed by ZACHARY Higuera on August 18, 2021 10:08 CDT            CC: System, Provider Not In          No ref. provider found

## 2021-09-23 RX ORDER — LEVOTHYROXINE SODIUM 88 MCG
TABLET ORAL
Qty: 96 TABLET | Refills: 3 | Status: SHIPPED | OUTPATIENT
Start: 2021-09-23 | End: 2022-07-29

## 2021-10-05 DIAGNOSIS — R92.8 ABNORMAL MAMMOGRAM: Primary | ICD-10-CM

## 2021-10-11 ENCOUNTER — LAB (OUTPATIENT)
Dept: LAB | Facility: HOSPITAL | Age: 76
End: 2021-10-11

## 2021-10-11 DIAGNOSIS — Z01.818 PREOP TESTING: Primary | ICD-10-CM

## 2021-10-11 LAB — SARS-COV-2 N GENE RESP QL NAA+PROBE: NOT DETECTED

## 2021-10-11 PROCEDURE — C9803 HOPD COVID-19 SPEC COLLECT: HCPCS

## 2021-10-11 PROCEDURE — 87635 SARS-COV-2 COVID-19 AMP PRB: CPT

## 2021-10-12 ENCOUNTER — HOSPITAL ENCOUNTER (OUTPATIENT)
Facility: HOSPITAL | Age: 76
Setting detail: HOSPITAL OUTPATIENT SURGERY
Discharge: HOME | End: 2021-10-12
Attending: INTERNAL MEDICINE | Admitting: INTERNAL MEDICINE

## 2021-10-12 ENCOUNTER — ANESTHESIA (OUTPATIENT)
Dept: GASTROENTEROLOGY | Facility: HOSPITAL | Age: 76
End: 2021-10-12

## 2021-10-12 ENCOUNTER — ANESTHESIA EVENT (OUTPATIENT)
Dept: GASTROENTEROLOGY | Facility: HOSPITAL | Age: 76
End: 2021-10-12

## 2021-10-12 VITALS
RESPIRATION RATE: 18 BRPM | DIASTOLIC BLOOD PRESSURE: 73 MMHG | BODY MASS INDEX: 29.34 KG/M2 | WEIGHT: 193.6 LBS | OXYGEN SATURATION: 93 % | HEIGHT: 68 IN | TEMPERATURE: 97.6 F | HEART RATE: 83 BPM | SYSTOLIC BLOOD PRESSURE: 137 MMHG

## 2021-10-12 DIAGNOSIS — K22.2 STRICTURE OF ESOPHAGUS: ICD-10-CM

## 2021-10-12 PROCEDURE — 25010000002 PROPOFOL 10 MG/ML EMULSION: Performed by: NURSE ANESTHETIST, CERTIFIED REGISTERED

## 2021-10-12 PROCEDURE — 88305 TISSUE EXAM BY PATHOLOGIST: CPT

## 2021-10-12 PROCEDURE — C1769 GUIDE WIRE: HCPCS | Performed by: INTERNAL MEDICINE

## 2021-10-12 RX ORDER — PROPOFOL 10 MG/ML
VIAL (ML) INTRAVENOUS AS NEEDED
Status: DISCONTINUED | OUTPATIENT
Start: 2021-10-12 | End: 2021-10-12 | Stop reason: SURG

## 2021-10-12 RX ORDER — DEXTROSE AND SODIUM CHLORIDE 5; .45 G/100ML; G/100ML
30 INJECTION, SOLUTION INTRAVENOUS CONTINUOUS
Status: DISCONTINUED | OUTPATIENT
Start: 2021-10-12 | End: 2021-10-12 | Stop reason: HOSPADM

## 2021-10-12 RX ORDER — ONDANSETRON 2 MG/ML
4 INJECTION INTRAMUSCULAR; INTRAVENOUS ONCE AS NEEDED
Status: DISCONTINUED | OUTPATIENT
Start: 2021-10-12 | End: 2021-10-12 | Stop reason: HOSPADM

## 2021-10-12 RX ORDER — MEPERIDINE HYDROCHLORIDE 25 MG/ML
12.5 INJECTION INTRAMUSCULAR; INTRAVENOUS; SUBCUTANEOUS
Status: DISCONTINUED | OUTPATIENT
Start: 2021-10-12 | End: 2021-10-12 | Stop reason: HOSPADM

## 2021-10-12 RX ORDER — PROMETHAZINE HYDROCHLORIDE 25 MG/1
25 TABLET ORAL ONCE AS NEEDED
Status: DISCONTINUED | OUTPATIENT
Start: 2021-10-12 | End: 2021-10-12 | Stop reason: HOSPADM

## 2021-10-12 RX ORDER — LIDOCAINE HYDROCHLORIDE 20 MG/ML
INJECTION, SOLUTION INTRAVENOUS AS NEEDED
Status: DISCONTINUED | OUTPATIENT
Start: 2021-10-12 | End: 2021-10-12 | Stop reason: SURG

## 2021-10-12 RX ORDER — AMOXICILLIN 500 MG/1
500 CAPSULE ORAL 4 TIMES DAILY
COMMUNITY
End: 2022-01-10

## 2021-10-12 RX ORDER — PROMETHAZINE HYDROCHLORIDE 25 MG/1
25 SUPPOSITORY RECTAL ONCE AS NEEDED
Status: DISCONTINUED | OUTPATIENT
Start: 2021-10-12 | End: 2021-10-12 | Stop reason: HOSPADM

## 2021-10-12 RX ADMIN — PROPOFOL 20 MG: 10 INJECTION, EMULSION INTRAVENOUS at 11:22

## 2021-10-12 RX ADMIN — PROPOFOL 50 MG: 10 INJECTION, EMULSION INTRAVENOUS at 11:16

## 2021-10-12 RX ADMIN — PROPOFOL 20 MG: 10 INJECTION, EMULSION INTRAVENOUS at 11:24

## 2021-10-12 RX ADMIN — PROPOFOL 30 MG: 10 INJECTION, EMULSION INTRAVENOUS at 11:19

## 2021-10-12 RX ADMIN — LIDOCAINE HYDROCHLORIDE 100 MG: 20 INJECTION, SOLUTION INTRAVENOUS at 11:16

## 2021-10-12 RX ADMIN — PROPOFOL 30 MG: 10 INJECTION, EMULSION INTRAVENOUS at 11:23

## 2021-10-12 RX ADMIN — DEXTROSE AND SODIUM CHLORIDE 30 ML/HR: 5; 450 INJECTION, SOLUTION INTRAVENOUS at 10:53

## 2021-10-12 NOTE — H&P
Sydnee Schwartz DO,[facility]  Gastroenterology  Hepatology  Endoscopy  Board Certified in Internal Medicine and gastroenterology  [address], [address]  [place]. [address]  - [phone]   F - [phone]     GASTROENTEROLOGY HISTORY AND PHYSICAL  NOTE   SYDNEE SCHWARTZ DO.         SUBJECTIVE:   10/12/2021    Name: Carla Best  DOD: 1945        Chief Complaint:       Subjective : Dysphagia.  Known history of esophageal stricture and acid reflux    Patient is 76 y.o. female presents with desire for elective EGD with biopsy and dilation as required.      ROS/HISTORY/ CURRENT MEDICATIONS/OBJECTIVE/VS/PE:   Review of Systems:  All systems unremarkable unless specified below.  Constitutional   HENT  Eyes   Respiratory    Cardiovascular  Gastrointestinal   Endocrine  Genitourinary    Musculoskeletal   Skin  Allergic/Immunologic    Neurological    Hematological  Psychiatric/Behavioral    History:     Past Medical History:   Diagnosis Date   • Cancer (HCC)     ear sp carcinoma   • Chest pain    • Elevated cholesterol    • Fibrocystic disease of breast    • GERD (gastroesophageal reflux disease)    • Hashimoto's thyroiditis    • Hearing aid worn    • History of screening mammography 08/27/2014    SCREENING MAMMOGRAPHY DIGITAL  (Medicare) (1)    • History of transfusion    • Hyperlipidemia    • Hypertension    • Hypothyroidism due to Hashimoto's thyroiditis 12/3/2016   • Keratoconjunctivitis sicca (HCC)    • Migraines    • Nausea    • Nuclear senile cataract    • On long term drug therapy    • Osteoarthritis    • Osteopenia    • Osteopenia 12/3/2016   • Photopsia     Right   • Postmenopausal bleeding    • Psoriasis    • Sinus tachycardia    • Sjogren's syndrome (HCC)    • Sleep apnea     when available on recall   • Vitamin D deficiency    • Vitamin D deficiency 12/3/2016   • Vitreous detachment of right eye    • Wears glasses      Past Surgical History:   Procedure Laterality Date   •  CATARACT EXTRACTION, BILATERAL  2018   • CHOLECYSTECTOMY  06/09/1997    with operative cholangiogram. Chronic cholecystitis & cholelithiasis. HX of passed common duct stone   • CYSTOSCOPY  11/26/1962    urethral dilatation.Recurrent pyelonephritis. urethritis   • ENDOSCOPY N/A 2/17/2017    Procedure: ESOPHAGOGASTRODUODENOSCOPY;  Surgeon: Cisco Mason DO;  Location: [facility] ENDOSCOPY;  Service:    • ENDOSCOPY N/A 10/31/2018    Procedure: ESOPHAGOGASTRODUODENOSCOPY;  Surgeon: Cisco Mason DO;  Location: [facility] ENDOSCOPY;  Service: Gastroenterology   • ENDOSCOPY AND COLONOSCOPY  09/30/1985    Normal colon to left transverse colon   • GALLBLADDER SURGERY     • JOINT REPLACEMENT      R Ant ROSALINE 7/6/2020   • PAP SMEAR  2009   • TONSILLECTOMY  1950   • VAGINAL DELIVERY      x3     Family History   Problem Relation Age of Onset   • Heart disease Mother    • Arthritis Mother    • Osteoporosis Mother    • Cataracts Mother    • Heart disease Father    • Hypertension Sister    • Arthritis Sister    • Diabetes Sister    • Fuchs' dystrophy Sister    • Breast cancer Paternal Grandmother    • Diabetes Other    • Heart disease Other    • Hypertension Other    • Stroke Other    • Thyroid disease Other    • Lung disease Other    • Other Other         Gallstones, Bleeding Tendency, Colon Problems.   • Fuchs' dystrophy Maternal Grandfather      Social History     Tobacco Use   • Smoking status: Never Smoker   • Smokeless tobacco: Never Used   Vaping Use   • Vaping Use: Never used   Substance Use Topics   • Alcohol use: No   • Drug use: No     Prior to Admission medications    Medication Sig Start Date End Date Taking? Authorizing Provider   Biotin (BIOTIN MAXIMUM STRENGTH) 5 MG capsule Take 1 tablet by mouth Daily.   Yes ProviderLauri MD   Calcium Carbonate-Vitamin D (CALCIUM 600+D) 600-200 MG-UNIT tablet Take 1 tablet by mouth Daily.   Yes Lauri Lagunas MD   clobetasol (TEMOVATE) 0.05 % ointment Apply   topically to the appropriate area as directed 2 (Two) Times a Day. 4/12/21  Yes Nav Barber MD   losartan (COZAAR) 25 MG tablet Take 1 tablet by mouth Daily. 7/29/21  Yes Andre Jacques MD   metoprolol succinate XL (TOPROL-XL) 50 MG 24 hr tablet Take 1 tablet by mouth Daily With Dinner. 7/29/21  Yes Andre Jacques MD   Multiple Vitamins-Minerals (MULTIVITAMIN ADULT PO) Take 1 tablet by mouth Daily.   Yes Lauri Lagunas MD   pravastatin (Pravachol) 20 MG tablet Take 1 tablet by mouth Daily. 7/29/21  Yes Andre Jacques MD   Synthroid 88 MCG tablet TAKE ONE TABLET BY MOUTH DAILY ON MONDAY TO SATURDAY AND TWO TABLETS ON SUNDAY  Patient taking differently: Take 88 mcg by mouth Daily. 9/23/21  Yes Andre Jacques MD   triamcinolone (KENALOG) 0.1 % cream Apply 1 application topically to the appropriate area as directed 2 (Two) Times a Day. 4/12/21  Yes Nav Barber MD   aspirin 81 MG EC tablet Take 81 mg by mouth daily.    Lauri Lagunas MD   diphenoxylate-atropine (LOMOTIL) 2.5-0.025 MG per tablet TK 1 T PO SIX TIMES PER DAY PRN 9/25/17   Lauri Lagunas MD   meclizine (ANTIVERT) 12.5 MG tablet Take 12.5 mg by mouth 3 (Three) Times a Day As Needed for dizziness.    Lauri Lagunas MD   vitamin B-12 (CYANOCOBALAMIN) 1000 MCG tablet Take 1,000 mcg by mouth Every Other Day.    Lauri Lagunas MD   vitamin D (ERGOCALCIFEROL) 1.25 MG (25079 UT) capsule capsule Take 1 capsule by mouth Every 14 (Fourteen) Days. 1/29/21   Andre Jacques MD     Allergies:  Ciprofloxacin, Codeine, Demerol [meperidine], Dexlansoprazole, Lipitor [atorvastatin calcium], Tape, and Phenazopyridine    I have reviewed the patients medical history, surgical history and family history in the available medical record system.     Current Medications:     No current facility-administered medications for this encounter.       Objective     Physical Exam:   Temp:   [97.1 °F (36.2 °C)] 97.1 °F (36.2 °C)  Heart Rate:  [85] 85  Resp:  [18] 18  BP: (146)/(90) 146/90    Physical Exam:  General Appearance:    Alert, cooperative, in no acute distress   Head:    Normocephalic, without obvious abnormality, atraumatic   Eyes:            Lids and lashes normal, conjunctivae and sclerae normal, no icterus, no pallor, corneas clear, PERRLA   Ears:    Ears appear intact with no abnormalities noted   Throat:   No oral lesions, no thrush, oral mucosa moist   Neck:   No adenopathy, supple, trachea midline, no thyromegaly, no  carotid bruit, no JVD   Back:     No kyphosis present, no scoliosis present, no skin lesions,   erythema or scars, no tenderness to percussion or                 palpation,  range of motion normal   Lungs:     Clear to auscultation,respirations regular, even and         unlabored    Heart:    Regular rhythm and normal rate, normal S1 and S2, no  murmur, no gallop, no rub, no click   Breast Exam:    Deferred   Abdomen:     Normal bowel sounds, no masses, no organomegaly, soft  nontender, nondistended, no guarding, no rebound                 tenderness   Genitalia:    Deferred   Extremities:   Moves all extremities well, no edema, no cyanosis, no          redness   Pulses:   Pulses palpable and equal bilaterally   Skin:   No bleeding, bruising or rash   Lymph nodes:   No palpable adenopathy   Neurologic:   Cranial nerves 2 - 12 grossly intact, sensation intact, DTR     present and equal bilaterally      Results Review:     Lab Results   Component Value Date    WBC 6.98 07/19/2021    WBC 9.47 01/20/2021    WBC 6.34 11/06/2020    HGB 14.3 07/19/2021    HGB 14.0 01/20/2021    HGB 13.7 11/06/2020    HCT 44.2 07/19/2021    HCT 41.8 01/20/2021    HCT 40.6 11/06/2020     07/19/2021     01/20/2021     11/06/2020             No results found for: LIPASE  No results found for: INR  No results found for: THROATCX    Radiology Review:  Imaging Results (Last 72  Hours)     ** No results found for the last 72 hours. **           I reviewed the patient's new clinical results.  I reviewed the patient's new imaging results and agree with the interpretation.     ASSESSMENT/PLAN:   ASSESSMENT:  1.  Esophageal stricture    PLAN:  1.  Esophagogastroduodenoscopy with biopsy and dilation as required    Risk and benefits associated with the procedure are reviewed with the patient.  The patient wished to proceed     Cisco Mason DO  10/12/21  10:41 CDT

## 2021-10-12 NOTE — ANESTHESIA PREPROCEDURE EVALUATION
Anesthesia Evaluation     no history of anesthetic complications:  NPO Solid Status: > 8 hours  NPO Liquid Status: > 2 hours           Airway   Mallampati: II  TM distance: >3 FB  Neck ROM: full  No difficulty expected  Dental - normal exam     Pulmonary - negative pulmonary ROS and normal exam   Cardiovascular - normal exam    (+) hypertension less than 2 medications, hyperlipidemia,       Neuro/Psych- negative ROS  GI/Hepatic/Renal/Endo    (+)  GERD,      Musculoskeletal     (+) neck pain,   Abdominal    Substance History      OB/GYN          Other   arthritis,                        Anesthesia Plan    ASA 2     MAC     intravenous induction     Anesthetic plan, all risks, benefits, and alternatives have been provided, discussed and informed consent has been obtained with: patient.

## 2021-10-12 NOTE — ANESTHESIA POSTPROCEDURE EVALUATION
Patient: Carla Best    Procedure Summary     Date: 10/12/21 Room / Location: [facility] ENDOSCOPY 2 / [facility] ENDOSCOPY    Anesthesia Start: 1113 Anesthesia Stop: 1125    Procedure: ESOPHAGOGASTRODUODENOSCOPY (N/A ) Diagnosis:       Stricture of esophagus      (Stricture of esophagus [K22.2])    Surgeons: Cisco Mason DO Provider: Jaya Epperson CRNA    Anesthesia Type: MAC ASA Status: 2          Anesthesia Type: MAC    Vitals  No vitals data found for the desired time range.          Post Anesthesia Care and Evaluation    Patient location during evaluation: bedside  Patient participation: complete - patient cannot participate  Level of consciousness: awake  Pain score: 0  Pain management: adequate  Airway patency: patent  Anesthetic complications: No anesthetic complications  PONV Status: none  Cardiovascular status: acceptable  Respiratory status: acceptable  Hydration status: acceptable

## 2021-10-14 LAB
LAB AP CASE REPORT: NORMAL
PATH REPORT.FINAL DX SPEC: NORMAL

## 2022-01-10 ENCOUNTER — OFFICE VISIT (OUTPATIENT)
Dept: OTOLARYNGOLOGY | Facility: CLINIC | Age: 77
End: 2022-01-10

## 2022-01-10 VITALS — WEIGHT: 193.2 LBS | HEIGHT: 68 IN | BODY MASS INDEX: 29.28 KG/M2 | OXYGEN SATURATION: 98 %

## 2022-01-10 DIAGNOSIS — G50.1 ATYPICAL FACIAL PAIN: ICD-10-CM

## 2022-01-10 DIAGNOSIS — H60.63 CHRONIC NON-INFECTIVE OTITIS EXTERNA OF BOTH EARS, UNSPECIFIED TYPE: Primary | ICD-10-CM

## 2022-01-10 PROCEDURE — 99213 OFFICE O/P EST LOW 20 MIN: CPT | Performed by: OTOLARYNGOLOGY

## 2022-01-10 NOTE — PROGRESS NOTES
Subjective   Carla Best is a 76 y.o. female.       History of Present Illness   Patient that I followed in the past with cerumen impactions and chronic noninfective otitis externa who at most recent visit was also noted to have left-sided BPPV returns for reevaluation.  She states her dizziness has resolved.  She states her ears occasionally feel full but she is not had any otorrhea.  She states she has a new problem which is pain that radiates up from her left eyebrow and down from her left cheek into her lip and nose.  It is intermittently a burning sensation.  Nothing in particular brought this on.  Its not bothering her today.      The following portions of the patient's history were reviewed and updated as appropriate: allergies, current medications, past family history, past medical history, past social history, past surgical history and problem list.     reports that she has never smoked. She has never used smokeless tobacco. She reports that she does not drink alcohol and does not use drugs.   Patient is not a tobacco user and has not been counseled for use of tobacco products      Review of Systems        Objective   Physical Exam  Ears: External ears no deformity.  Right ear canal today shows no significant discharge.  Left ear canal shows modest squamous debris that is cleaned under the microscope using instrumentation.  Both tympanic membranes are intact clear mobile  Nares: No discharge or purulence  Oral cavity: No masses or lesions  Pharynx: No erythema or exudate  Neck no adenopathy or mass    Assessment/Plan   Diagnoses and all orders for this visit:    1. Chronic non-infective otitis externa of both ears, unspecified type (Primary)    2. Atypical facial pain        Plan: Ear cleaned as described above.  Explained to the patient that the distribution of her pain and the occasional burning sensation or highly suggestive of trigeminal neuralgia.  Suggested she discuss this further with her  primary care provider.  She says at this point she needs a new family physician and I gave her Dr. Vazquez's name.  Follow-up with me in 1 year .  Call sooner for problems.

## 2022-01-21 ENCOUNTER — LAB (OUTPATIENT)
Dept: LAB | Facility: HOSPITAL | Age: 77
End: 2022-01-21

## 2022-01-21 LAB
25(OH)D3 SERPL-MCNC: 40.4 NG/ML
ALBUMIN SERPL-MCNC: 4.1 G/DL (ref 3.5–5.2)
ALBUMIN/GLOB SERPL: 1.6 G/DL
ALP SERPL-CCNC: 99 U/L (ref 39–117)
ALT SERPL W P-5'-P-CCNC: 28 U/L (ref 1–33)
ANION GAP SERPL CALCULATED.3IONS-SCNC: 9.5 MMOL/L (ref 5–15)
AST SERPL-CCNC: 21 U/L (ref 1–32)
BILIRUB SERPL-MCNC: 0.5 MG/DL (ref 0–1.2)
BUN SERPL-MCNC: 14 MG/DL (ref 8–23)
BUN/CREAT SERPL: 19.7 (ref 7–25)
CALCIUM SPEC-SCNC: 9.5 MG/DL (ref 8.6–10.5)
CHLORIDE SERPL-SCNC: 106 MMOL/L (ref 98–107)
CHOLEST SERPL-MCNC: 222 MG/DL (ref 0–200)
CO2 SERPL-SCNC: 25.5 MMOL/L (ref 22–29)
CREAT SERPL-MCNC: 0.71 MG/DL (ref 0.57–1)
GFR SERPL CREATININE-BSD FRML MDRD: 80 ML/MIN/1.73
GLOBULIN UR ELPH-MCNC: 2.5 GM/DL
GLUCOSE SERPL-MCNC: 96 MG/DL (ref 65–99)
HDLC SERPL-MCNC: 45 MG/DL (ref 40–60)
LDLC SERPL CALC-MCNC: 147 MG/DL (ref 0–100)
LDLC/HDLC SERPL: 3.2 {RATIO}
POTASSIUM SERPL-SCNC: 4.3 MMOL/L (ref 3.5–5.2)
PROT SERPL-MCNC: 6.6 G/DL (ref 6–8.5)
SODIUM SERPL-SCNC: 141 MMOL/L (ref 136–145)
TRIGL SERPL-MCNC: 165 MG/DL (ref 0–150)
TSH SERPL DL<=0.05 MIU/L-ACNC: 0.07 UIU/ML (ref 0.27–4.2)
VIT B12 BLD-MCNC: 581 PG/ML (ref 211–946)
VLDLC SERPL-MCNC: 30 MG/DL (ref 5–40)

## 2022-01-21 PROCEDURE — 84443 ASSAY THYROID STIM HORMONE: CPT | Performed by: INTERNAL MEDICINE

## 2022-01-21 PROCEDURE — 82306 VITAMIN D 25 HYDROXY: CPT | Performed by: INTERNAL MEDICINE

## 2022-01-21 PROCEDURE — 85025 COMPLETE CBC W/AUTO DIFF WBC: CPT | Performed by: INTERNAL MEDICINE

## 2022-01-21 PROCEDURE — 36415 COLL VENOUS BLD VENIPUNCTURE: CPT | Performed by: INTERNAL MEDICINE

## 2022-01-21 PROCEDURE — 82607 VITAMIN B-12: CPT | Performed by: INTERNAL MEDICINE

## 2022-01-21 PROCEDURE — 80061 LIPID PANEL: CPT | Performed by: INTERNAL MEDICINE

## 2022-01-21 PROCEDURE — 80053 COMPREHEN METABOLIC PANEL: CPT | Performed by: INTERNAL MEDICINE

## 2022-01-22 LAB
BASOPHILS # BLD AUTO: 0.04 10*3/MM3 (ref 0–0.2)
BASOPHILS NFR BLD AUTO: 0.8 % (ref 0–1.5)
DEPRECATED RDW RBC AUTO: 44.9 FL (ref 37–54)
EOSINOPHIL # BLD AUTO: 0.3 10*3/MM3 (ref 0–0.4)
EOSINOPHIL NFR BLD AUTO: 6.3 % (ref 0.3–6.2)
ERYTHROCYTE [DISTWIDTH] IN BLOOD BY AUTOMATED COUNT: 13.9 % (ref 12.3–15.4)
HCT VFR BLD AUTO: 41.1 % (ref 34–46.6)
HGB BLD-MCNC: 13.2 G/DL (ref 12–15.9)
IMM GRANULOCYTES # BLD AUTO: 0.01 10*3/MM3 (ref 0–0.05)
IMM GRANULOCYTES NFR BLD AUTO: 0.2 % (ref 0–0.5)
LYMPHOCYTES # BLD AUTO: 1.14 10*3/MM3 (ref 0.7–3.1)
LYMPHOCYTES NFR BLD AUTO: 24 % (ref 19.6–45.3)
MCH RBC QN AUTO: 28.6 PG (ref 26.6–33)
MCHC RBC AUTO-ENTMCNC: 32.1 G/DL (ref 31.5–35.7)
MCV RBC AUTO: 89.2 FL (ref 79–97)
MONOCYTES # BLD AUTO: 0.5 10*3/MM3 (ref 0.1–0.9)
MONOCYTES NFR BLD AUTO: 10.5 % (ref 5–12)
NEUTROPHILS NFR BLD AUTO: 2.76 10*3/MM3 (ref 1.7–7)
NEUTROPHILS NFR BLD AUTO: 58.2 % (ref 42.7–76)
NRBC BLD AUTO-RTO: 0 /100 WBC (ref 0–0.2)
PLATELET # BLD AUTO: 283 10*3/MM3 (ref 140–450)
PMV BLD AUTO: 10.6 FL (ref 6–12)
RBC # BLD AUTO: 4.61 10*6/MM3 (ref 3.77–5.28)
WBC NRBC COR # BLD: 4.75 10*3/MM3 (ref 3.4–10.8)

## 2022-01-26 NOTE — PROGRESS NOTES
Carla Best is a 71 y.o. female who presents for  evaluation of   Chief Complaint   Patient presents with   • Thyroid Problem         Primary Care / Referring Provider  Debbi Mckeon, APRN      71 y.o.   female comes for fu , very pleasant    PCP - Dr. Lindy Landeros    Note- from June 2011 reviewed      very pleasant    history of subacute thyroiditis with subsequent permanent hypothyroidism     duration - 5+ years  timing - constant  quality - biochemically controlled but still symptomatic  Lab Results   Component Value Date    TSH 2.760 03/28/2017         severity - moderate   symptoms - multiple , detailed under ROS  Fatigue, hair loss, cold intolerance, weigh gain, myalgias, muscle weakness  alleviating factors - brand name synthroid and cytomel     she states that if no changes are needed then she will accept that it is not her thyroid, she only wants to be certain there isn't anything that has been missed.     she has fibromylgia and neck pain has worsened since last appt    Past Medical History:   Diagnosis Date   • Chest pain    • Fibrocystic disease of breast    • Hashimoto's thyroiditis    • History of screening mammography 08/27/2014    SCREENING MAMMOGRAPHY DIGITAL  (Medicare) (1)    • Hyperlipidemia    • Hypothyroidism due to Hashimoto's thyroiditis 12/3/2016   • Keratoconjunctivitis sicca    • Nuclear senile cataract    • On long term drug therapy    • Osteoarthritis    • Osteopenia    • Osteopenia 12/3/2016   • Photopsia     Right   • Postmenopausal bleeding    • Sinus tachycardia    • Sjogren's syndrome    • Vitamin D deficiency    • Vitamin D deficiency 12/3/2016   • Vitreous detachment of right eye      Family History   Problem Relation Age of Onset   • Heart disease Mother    • Arthritis Mother    • Osteoporosis Mother    • Heart disease Father    • Hypertension Sister    • Arthritis Sister    • Diabetes Sister    • Breast cancer Paternal Grandmother    • Diabetes Other    •  [facility] [place] [facility], [address]  [address] [facility] [facility] [address]  [phone]      Hiral Wild :2001 MRN:[id number]      2022 Time Session Began: 10:25 am  Time Session Ended: 11:08 am    PLEASE  NOTE - This session is an in-person/office visit!    Session Type:Therapy 38-52 minutes (23855)     Others Present: Best friend, Radha    Intervention: Behavioral, Cognitive    Suicide/Homicide/Violence Ideation: No  If Yes, explain:     Current Outpatient Medications   Medication Sig   • levETIRAcetam (KepPRA) 500 MG tablet Take 500 mg by mouth 2 (two) times a day.   • bisacodyl (Dulcolax) 5 MG EC tablet Use as directed on colonoscopy instructions.   • polyethylene glycol (MiraLax) 17 GM/SCOOP powder Use as directed on colonoscopy instructions.   • omeprazole (PriLOSEC) 40 MG capsule Take 1 capsule by mouth 2 times daily. USE IN AM BEFORE BREAKFAST   • hydroCORTisone (Anusol-HC) 2.5 % rectal cream Apply twice daily for 2 weeks on the anal area and in the anal verge.   • escitalopram (LEXAPRO) 20 MG tablet Take 1 tablet by mouth daily.   • albuterol 108 (90 Base) MCG/ACT inhaler Inhale 2 puffs into the lungs every 4 hours as needed for Shortness of Breath or Wheezing.   • rizatriptan (MAXALT) 5 MG tablet Take 1 tablet by mouth as needed for Migraine. May repeat in 2 hours if needed. Max dose of 2 tablets daily.   • ondansetron (Zofran ODT) 4 MG disintegrating tablet Place 1 tablet onto the tongue every 8 hours as needed for Nausea.   • cetirizine (ZyrTEC Allergy) 10 MG tablet Take 1 tablet by mouth daily.   • Multiple Vitamins-Minerals (vitamin - therapeutic multivitamins w/minerals) tablet Take 1 tablet by mouth daily.   • traZODone (DESYREL) 100 MG tablet Take 1 tablet by mouth nightly.   • losartan-hydrochlorothiazide (HYZAAR) 100-25 MG per tablet Take 1 tablet by mouth daily.   • busPIRone (BUSPAR) 10 MG tablet Take 1 tablet by mouth 2 times  Heart disease Other    • Hypertension Other    • Stroke Other    • Thyroid disease Other    • Lung disease Other    • Other Other      Gallstones, Bleeding Tendency, Colon Problems.     Social History   Substance Use Topics   • Smoking status: Never Smoker   • Smokeless tobacco: Not on file   • Alcohol use Yes      Comment: less than 12 drinks a year         Current Outpatient Prescriptions:   •  aspirin 81 MG EC tablet, Take 81 mg by mouth daily., Disp: , Rfl:   •  Biotin (BIOTIN MAXIMUM STRENGTH) 5 MG capsule, Take  by mouth., Disp: , Rfl:   •  CALCIUM CITRATE-VITAMIN D3 PO, Take  by mouth., Disp: , Rfl:   •  dicyclomine (BENTYL) 10 MG capsule, Take 10 mg by mouth 2 (Two) Times a Day As Needed., Disp: , Rfl:   •  hydroxychloroquine (PLAQUENIL) 200 MG tablet, Take  by mouth 2 (Two) Times a Day., Disp: , Rfl:   •  Iron-Vitamins (GERITOL PO), Take 5 mL by mouth 2 (Two) Times a Day., Disp: , Rfl:   •  meclizine (ANTIVERT) 12.5 MG tablet, Take 12.5 mg by mouth 3 (Three) Times a Day As Needed for dizziness., Disp: , Rfl:   •  metoprolol tartrate (LOPRESSOR) 25 MG tablet, Take 1 tablet by mouth 2 (Two) Times a Day., Disp: 180 tablet, Rfl: 3  •  Multiple Vitamins-Minerals (MULTIVITAMIN ADULT PO), Take  by mouth., Disp: , Rfl:   •  Omega-3 Fatty Acids (FISH OIL) 1000 MG capsule capsule, Take 2,000 mg by mouth Daily With Breakfast., Disp: , Rfl:   •  Probiotic Product (PROBIOTIC DAILY PO), Take  by mouth., Disp: , Rfl:   •  Thyroid 30 MG PO tablet, 2 in a.m. And 2 in p.m., Disp: 120 tablet, Rfl: 11  •  triamcinolone (KENALOG) 0.1 % cream, Apply  topically 2 (two) times a day., Disp: , Rfl:   •  vitamin D (ERGOCALCIFEROL) 66856 UNITS capsule capsule, TAKE 1 CAPSULE BY MOUTH EVERY 2 WEEKS, Disp: 6 capsule, Rfl: 0    Review of Systems    Review of Systems   Constitutional: Positive for fatigue. Negative for activity change, appetite change, chills, diaphoresis, fever and unexpected weight change.   HENT: Negative for  daily.   • cloNIDine (CATAPRES) 0.2 MG tablet Take 1 tablet by mouth 2 times daily.   • celecoxib (CeleBREX) 200 MG capsule TAKE 1 CAPSULE BY MOUTH TWICE DAILY   • hydrOXYzine (ATARAX) 25 MG tablet Take 1 tablet by mouth 3 times daily as needed for Anxiety.     No current facility-administered medications for this visit.     Change in Medication(s) Reported: Yes  If Yes, explain: Patient recently started taking anti-depressant. She has been taking Atarax as needed for anxiety.    Chief Complaint (in patient's own words): Patient reports \"I've relapsed. I started cutting again.\"    Progress Note (containing chief complaint & symptoms/problems related:  D: Met with patient and best friend to address issues with mood and relationships. Patient shares update above. She discusses how she just recently began taking medication again for depression. She reports, \"this has helped and my mood is a little better.\"  She shares it has been one week since she last cut.  Writer praises her for this.  Writer queries as to what has been going on to lead to relapse and stress. She shares how she is officially out of father's home, and living with best friend and her family. She also changed jobs. She is also in a new relationship for the past two years. She does care for significant other, but \"we have long discussions. She thinks I put myself above her, when actually I usually put others in front of me.\"  Writer concurs.  Significant other is studying psychology at present.  Patient shares various tools she is utilizing, which is mainly thinking things through or over thinking, which is adding to her anxiety. Writer discusses with patient various relaxation tools to utilize, especially requesting others to entertain or distract her. Writer also reviews with patient various anger management tools as she continues to struggle with this and is not actually venting them actively or physically. Writer also discusses with patient various  "congestion, drooling, ear discharge, ear pain, facial swelling, mouth sores, nosebleeds, postnasal drip, sinus pressure, sneezing, sore throat, tinnitus, trouble swallowing and voice change.    Eyes: Negative.  Negative for photophobia, pain, discharge, redness and itching.   Respiratory: Negative.  Negative for apnea, cough, choking, chest tightness, shortness of breath, wheezing and stridor.    Cardiovascular: Negative.  Negative for chest pain, palpitations and leg swelling.   Gastrointestinal: Negative.  Negative for abdominal distention, abdominal pain, constipation, diarrhea, nausea and vomiting.   Endocrine: Negative.  Negative for cold intolerance, heat intolerance, polydipsia, polyphagia and polyuria.   Genitourinary: Negative for difficulty urinating, dysuria, flank pain and frequency.   Musculoskeletal: Negative for arthralgias, back pain, gait problem, joint swelling, myalgias, neck pain and neck stiffness.   Skin: Negative for color change, pallor, rash and wound.        Hair loss   Allergic/Immunologic: Negative for environmental allergies, food allergies and immunocompromised state.   Neurological: Negative for dizziness, tremors, seizures, syncope, facial asymmetry, speech difficulty, weakness, light-headedness, numbness and headaches.   Hematological: Negative for adenopathy. Does not bruise/bleed easily.   Psychiatric/Behavioral: Negative for agitation, behavioral problems, confusion, decreased concentration, dysphoric mood, hallucinations, self-injury, sleep disturbance and suicidal ideas. The patient is not nervous/anxious and is not hyperactive.         Objective:     /84 (BP Location: Left arm, Patient Position: Sitting, Cuff Size: Adult)  Pulse 92  Ht 67\" (170.2 cm)  Wt 193 lb 8 oz (87.8 kg)  BMI 30.31 kg/m2    Physical Exam   Constitutional: She is oriented to person, place, and time. She appears well-developed.   HENT:   Head: Normocephalic.   Right Ear: External ear normal.   Left " means to increase cytosin without harming self. Patient is agreeable to trying these things. Writer also encourages patient to make appointment with Dr. WANG to review medication. She is agreeable. Writer did share with patient how she is leaving [place] and various options she has for therapy. Patient will look into these.  A: Patient continues to struggle with depression and anxiety. It appears she continues to overthink and then act impulsively with cutting self. She needs to work on utilizing tools writer showed/suggested today. Writer also strongly encourages patient to allow others to assist her and to place herself in the front seat, even if not driving car.  R: Patient is agreeable to continue to work on treatment plan/goals.  P: Patient is to continue to take medication as prescribed. She is to make appointment with Dr. WANG for medication review. Patient is to continue to practice/utilize tools learned. Return for therapy at next available, minimum 2 weeks via in person with this being last session. She will look into options for further therapy and get back to writer at next session.    Patient/Family Education Provided: Yes  Patient/Family Displays Understanding: Yes  If No, explain:     Need for Community Resources Assessed: Yes  Resources Needed: No  If Yes, what resources:     Primary Diagnosis: MDD (major depressive disorder), recurrent episode, moderate (CMS/HCC)  (primary encounter diagnosis)  Generalized anxiety disorder  Panic disorder  ADHD, predominantly inattentive type    Treatment Plan: Unchanged    Discharge Plan: Strategies Discussed to Maintain Gains, Continue with M.D., Titrate Sessions    Cara Hernandez, [facility]   Ear: External ear normal.   Nose: Nose normal.   Eyes: Conjunctivae and EOM are normal. No scleral icterus.   Neck: Normal range of motion. Neck supple. No tracheal deviation present. No thyromegaly present.   Cardiovascular: Normal rate, regular rhythm, normal heart sounds and intact distal pulses.  Exam reveals no gallop and no friction rub.    No murmur heard.  Pulmonary/Chest: Effort normal and breath sounds normal. No stridor. No respiratory distress. She has no wheezes. She has no rales. She exhibits no tenderness.   Abdominal: Soft. Bowel sounds are normal. She exhibits no distension and no mass. There is no tenderness. There is no rebound and no guarding.   Musculoskeletal: Normal range of motion. She exhibits no tenderness or deformity.   Lymphadenopathy:     She has no cervical adenopathy.   Neurological: She is alert and oriented to person, place, and time. She displays normal reflexes. She exhibits normal muscle tone. Coordination normal.   Skin: No rash noted. No erythema. No pallor.   Psychiatric: She has a normal mood and affect. Her behavior is normal. Judgment and thought content normal.       Lab Review    Lab Results   Component Value Date    GLUCOSE 95 03/28/2017    BUN 14 03/28/2017    CREATININE 0.80 03/28/2017    EGFRIFNONA 71 03/28/2017    BCR 17.5 03/28/2017    K 4.4 03/28/2017    CO2 26.0 03/28/2017    CALCIUM 9.2 03/28/2017    ALBUMIN 4.10 03/28/2017    LABIL2 1.6 03/28/2017    AST 31 03/28/2017    ALT 36 03/28/2017     Lab Results   Component Value Date    WBC 4.06 03/28/2017    HGB 13.2 03/28/2017    HCT 39.0 03/28/2017    MCV 88.8 03/28/2017     03/28/2017           Assessment/Plan       ICD-10-CM ICD-9-CM   1. Osteopenia M85.80 733.90   2. Hypothyroidism due to Hashimoto's thyroiditis E03.8 244.8    E06.3 245.2   3. Vitamin D deficiency E55.9 268.9   4. B12 deficiency E53.8 266.2           Hypothyroidism  developed after episode of subacute thyroiditis    Was on synthroid 88  mcgs x7.5 tabs per week     Lab Results   Component Value Date    TSH 2.760 03/28/2017       Hana not covered but willing to pay out of pocket  changed  to synthroid and cytomel didn't feel better     armour 30 mg tabs, presently doing 60       Add synthroid 88 mcgs two tabs per week       neg celiac panel and nl  b12  she also has sjogren's     Labs in 1 month and before next appt     =====    osteopenia  dxa from Nov 2015 personally reviewed    L1-L4 bmd 1.056 , T score -1  LFN bmd 0.847 , T score -1.4  RFN , bmd 0.885 , T score -1.1     vit D low in Nov 2013 - start vit D 50,000 q 2weeks and keep calcium + D daily     continue vit D 50,000 units every 2 weeks     Nl Vit D    Lab Results   Component Value Date    TGBV10MJ 31.9 03/28/2017    JZZR10QJ 31.9 02/07/2017    EUQZ08UO 31.7 11/23/2016           =====    follwoing w  pain management   never got botox.   may try supplements     call if needed    ======    Alopecia    Thyroid as above  Dr. Baig added aldactone, she can't tolerate  Added iron even though levels are normal         I reviewed and summarized records from [facility] from 2016 and I reviewed / ordered labs.     No orders of the defined types were placed in this encounter.        A copy of my note was sent to [facility]    Please see my above opinion and suggestions.

## 2022-01-30 NOTE — PROGRESS NOTES
CC Hypothyroidism    HPI     76 y.o.      female comes for fu , very pleasant       history of subacute thyroiditis with subsequent permanent hypothyroidism      duration - 5+ years    timing - constant    quality - biochemically controlled      severity - moderate     symptoms - mainly fatigue  Restless sleep since right hip pain after surgery           PE    There were no vitals taken for this visit.  AOx3  No visible goiter  Normal Respiratory Effort , Lung CTA  RRR  No Edema    Labs    Lab Results   Component Value Date    WBC 4.75 01/21/2022    HGB 13.2 01/21/2022    HCT 41.1 01/21/2022    MCV 89.2 01/21/2022     01/21/2022     Lab Results   Component Value Date    GLUCOSE 96 01/21/2022    BUN 14 01/21/2022    CREATININE 0.71 01/21/2022    EGFRIFNONA 80 01/21/2022    EGFRIFAFRI 85 06/29/2020    BCR 19.7 01/21/2022     01/21/2022    K 4.3 01/21/2022    CO2 25.5 01/21/2022    CALCIUM 9.5 01/21/2022    ALBUMIN 4.10 01/21/2022    LABIL2 1.6 10/22/2019    AST 21 01/21/2022    ALT 28 01/21/2022         Assessment/Plan      1. Postablative hypothyroidism    2. Vitamin D deficiency    3. B12 deficiency    4. Dyslipidemia    5. Age-related osteoporosis without current pathological fracture     .       Hypothyroidism      Has taken multiple formulations and doses     Is on biotin but stops 1 week before     Now on synthroid 112-100-88  Doing 88 at  8 tabs per week?  Change to daily     Thyroid Workup    Lab Results   Component Value Date    TSH 0.073 (L) 01/21/2022    TSH 0.756 07/19/2021    TSH 0.058 (L) 03/04/2021       Lab Results   Component Value Date    FREET4 1.60 03/04/2021    FREET4 1.65 11/06/2020    FREET4 1.36 08/21/2020       Lab Results   Component Value Date    T3FREE 3.36 11/06/2020    T3FREE 2.70 08/21/2020    T3FREE 3.42 02/12/2020       TPO antibodies    No results found for: THYROIDAB    TSI antibodies    No results found for: THYRSTIMIMMU          neg celiac panel     she also has  sjogren's    =====     Osteoporosis     Rheumatology Following    Last DXA in 2019, osteopenia High FRAX   Need to repeat    Fosamax gave GERD, she preferred no prolia     On Vit D and calcium     Lab Results   Component Value Date    OIHJ97FV 40.4 01/21/2022    AOUR90IP 39.1 07/19/2021    OEUS23JD 47.5 01/20/2021                =====      Alopecia     Thyroid as above  Dr. Baig added aldactone, she can't tolerate  Added iron even though levels are normal       I added finasteride 1 mg daily - preferred not to     Weight    Gained weight due to elavil , she had to stop due to side effects  Fasting glucose 101 and mild elevation in LFT     ===    Dyslipidemia    Lab Results   Component Value Date    CHOL 222 (H) 01/21/2022    CHLPL 249 (H) 12/07/2016    TRIG 165 (H) 01/21/2022    HDL 45 01/21/2022     (H) 01/21/2022     Lab Results   Component Value Date     (H) 01/21/2022     (H) 11/06/2020     (H) 08/21/2020     The 10-year ASCVD risk score (Melitaca CLEMENT Jr., et al., 2013) is: 21.8%    Values used to calculate the score:      Age: 76 years      Sex: Female      Is Non- : No      Diabetic: No      Tobacco smoker: No      Systolic Blood Pressure: 137 mmHg      Is BP treated: No      HDL Cholesterol: 45 mg/dL      Total Cholesterol: 222 mg/dL      Side effects to statins in the past     Tolerating pravastatin 20 ? Compliance, change to crestor 5mg     Strong fam hx of CAD    ==    KIMMIE    Hasn't been able to get     ==    sjogren , OA    On chloroquine    Labs in 1 month and I will call

## 2022-01-31 ENCOUNTER — OFFICE VISIT (OUTPATIENT)
Dept: ENDOCRINOLOGY | Facility: CLINIC | Age: 77
End: 2022-01-31

## 2022-01-31 VITALS
DIASTOLIC BLOOD PRESSURE: 70 MMHG | HEART RATE: 81 BPM | WEIGHT: 193 LBS | OXYGEN SATURATION: 97 % | HEIGHT: 68 IN | SYSTOLIC BLOOD PRESSURE: 130 MMHG | BODY MASS INDEX: 29.25 KG/M2

## 2022-01-31 DIAGNOSIS — E78.5 DYSLIPIDEMIA: ICD-10-CM

## 2022-01-31 DIAGNOSIS — E89.0 POSTABLATIVE HYPOTHYROIDISM: Primary | ICD-10-CM

## 2022-01-31 DIAGNOSIS — E53.8 B12 DEFICIENCY: ICD-10-CM

## 2022-01-31 DIAGNOSIS — E55.9 VITAMIN D DEFICIENCY: ICD-10-CM

## 2022-01-31 DIAGNOSIS — M81.0 AGE-RELATED OSTEOPOROSIS WITHOUT CURRENT PATHOLOGICAL FRACTURE: ICD-10-CM

## 2022-01-31 PROCEDURE — 99214 OFFICE O/P EST MOD 30 MIN: CPT | Performed by: INTERNAL MEDICINE

## 2022-01-31 RX ORDER — ERGOCALCIFEROL 1.25 MG/1
50000 CAPSULE ORAL
Qty: 6 CAPSULE | Refills: 3 | Status: SHIPPED | OUTPATIENT
Start: 2022-01-31 | End: 2022-03-14 | Stop reason: SDUPTHER

## 2022-01-31 RX ORDER — ROSUVASTATIN CALCIUM 5 MG/1
5 TABLET, COATED ORAL NIGHTLY
Qty: 30 TABLET | Refills: 11 | Status: SHIPPED | OUTPATIENT
Start: 2022-01-31 | End: 2022-03-31

## 2022-03-01 ENCOUNTER — LAB (OUTPATIENT)
Dept: LAB | Facility: HOSPITAL | Age: 77
End: 2022-03-01

## 2022-03-01 LAB
CHOLEST SERPL-MCNC: 228 MG/DL (ref 0–200)
HDLC SERPL-MCNC: 61 MG/DL (ref 40–60)
LDLC SERPL CALC-MCNC: 138 MG/DL (ref 0–100)
LDLC/HDLC SERPL: 2.2 {RATIO}
TRIGL SERPL-MCNC: 164 MG/DL (ref 0–150)
TSH SERPL DL<=0.05 MIU/L-ACNC: 0.13 UIU/ML (ref 0.27–4.2)
VLDLC SERPL-MCNC: 29 MG/DL (ref 5–40)

## 2022-03-01 PROCEDURE — 36415 COLL VENOUS BLD VENIPUNCTURE: CPT | Performed by: INTERNAL MEDICINE

## 2022-03-01 PROCEDURE — 84443 ASSAY THYROID STIM HORMONE: CPT | Performed by: INTERNAL MEDICINE

## 2022-03-01 PROCEDURE — 80061 LIPID PANEL: CPT | Performed by: INTERNAL MEDICINE

## 2022-03-03 DIAGNOSIS — E78.5 DYSLIPIDEMIA: ICD-10-CM

## 2022-03-03 DIAGNOSIS — E55.9 VITAMIN D DEFICIENCY: ICD-10-CM

## 2022-03-03 DIAGNOSIS — E89.0 POSTABLATIVE HYPOTHYROIDISM: Primary | ICD-10-CM

## 2022-03-03 DIAGNOSIS — E53.8 B12 DEFICIENCY: ICD-10-CM

## 2022-03-14 RX ORDER — ERGOCALCIFEROL 1.25 MG/1
50000 CAPSULE ORAL
Qty: 6 CAPSULE | Refills: 3 | Status: SHIPPED | OUTPATIENT
Start: 2022-03-14 | End: 2023-01-20 | Stop reason: SDUPTHER

## 2022-03-31 ENCOUNTER — OFFICE VISIT (OUTPATIENT)
Dept: SLEEP MEDICINE | Facility: HOSPITAL | Age: 77
End: 2022-03-31

## 2022-03-31 VITALS
OXYGEN SATURATION: 95 % | SYSTOLIC BLOOD PRESSURE: 152 MMHG | HEIGHT: 68 IN | HEART RATE: 69 BPM | DIASTOLIC BLOOD PRESSURE: 82 MMHG | BODY MASS INDEX: 29.9 KG/M2 | WEIGHT: 197.3 LBS

## 2022-03-31 DIAGNOSIS — G47.33 OBSTRUCTIVE SLEEP APNEA, ADULT: Primary | ICD-10-CM

## 2022-03-31 PROCEDURE — 99213 OFFICE O/P EST LOW 20 MIN: CPT | Performed by: NURSE PRACTITIONER

## 2022-03-31 RX ORDER — SOLIFENACIN SUCCINATE 10 MG/1
10 TABLET, FILM COATED ORAL DAILY
COMMUNITY
Start: 2022-03-30 | End: 2022-07-29

## 2022-03-31 RX ORDER — PRAVASTATIN SODIUM 20 MG
20 TABLET ORAL
COMMUNITY
End: 2022-05-04 | Stop reason: SDUPTHER

## 2022-03-31 NOTE — PROGRESS NOTES
Sleep Clinic Follow Up    Date: 3/31/2022  Primary Care Provider: Anastacia Coppola APRN    Last office visit: 2021 (I reviewed this note)    CC: Follow up: KIMMIE on CPAP      Interim History:  Since the last visit:    1) severe KIMMIE -  Carla Best has remained compliant with CPAP. She denies  machine issues, dry mouth, headaches, or pressures intolerance. She denies abnormal dreams, sleep paralysis, nasal congestion, URI sx.    Since last OV she received replacement CPAP. She has DreamStation2. Her old machine was part of [employer] safety recall. She likes her new machine.     Since starting on CPAP again she is not longer waking up gasping and no longer has morning headaches.     She is having issue with mask and taking off at night without realizing it. Currently wearing dreamwear nasal cushion. Head gear causes head to be sore.     2) Patient denies RLS symptoms.     Sleep Testin. Split night PSG on 2019, AHI of 44   2. CPAP titration , recommended 8-11 cm H2O   3. Currently on 9-15 cm H2O    PAP Data:    Time frame: 02/10/2022-2022   Compliance: 75.5 %  Average use on days used: 4hrs 40 min  Percent of days with usage greater than or equal to 4 hours: 61.2%  PAP range: 9-15 cm H2O  Average 90% pressure: 11 cmH2O  Leak: 2 minutes  Average AHI: 0.7 events/hr  Mask type: nasal mask  DME: [facility]    Bed time: 4945-5096  Sleep latency: 15 minutes  Number of times awakens during the night: 4-6  Wake time: 2976-9853  Estimated total sleep time at night: 5-6 hours  Caffeine intake: 1 cups of coffee, 2 cups of tea, and 1 sodas per day  Alcohol intake: 0 drinks per week  Nap time: most days    Sleepiness with Driving: denies      [place] - 11        PMHx, FH, SH reviewed and pertinent changes are:  unchanged from last office visit on 2021      REVIEW OF SYSTEMS:   Negative for chest pain, SOA, fever, chills, cough, N/V/D, abdominal pain.    Smoking:none           Exam:  Vitals:    22  1418   BP: 152/82   Pulse: 69   SpO2: 95%           03/31/22  1418   Weight: 89.5 kg (197 lb 4.8 oz)     Body mass index is 30.01 kg/m². Patient's Body mass index is 30.01 kg/m². indicating that she is obese (BMI >30). Obesity-related health conditions include the following: obstructive sleep apnea. Obesity is unchanged. BMI is is above average; BMI management plan is completed. We discussed portion control and increasing exercise..      Gen:                No distress, conversant, pleasant, appears stated age, alert, oriented  Eyes:               Anicteric sclera, moist conjunctiva, no lid lag                           EOMI   Lungs:             normal effort, non-labored breathing                          Clear to auscultation bilaterally          CV:                  Normal S1/S2, no murmur                          no lower extremity edema                 Psych:             Appropriate affect  Neuro:             CN 2-12 appear intact    Past Medical History:   Diagnosis Date   • Allergic rhinitis    • Cancer (HCC)     ear sp carcinoma   • Chest pain    • Dental disease    • Dizziness    • Elevated cholesterol    • Fibrocystic disease of breast    • GERD (gastroesophageal reflux disease)    • Hashimoto's thyroiditis    • Hearing aid worn    • History of screening mammography 08/27/2014    SCREENING MAMMOGRAPHY DIGITAL  (Medicare) (1)    • History of transfusion    • HL (hearing loss)    • Hyperlipidemia    • Hypertension    • Hypothyroidism due to Hashimoto's thyroiditis 12/3/2016   • Keratoconjunctivitis sicca (HCC)    • Migraines    • Nausea    • Nuclear senile cataract    • On long term drug therapy    • Osteoarthritis    • Osteopenia    • Osteopenia 12/3/2016   • Photopsia     Right   • Postmenopausal bleeding    • Psoriasis    • Sinus tachycardia    • Sjogren's syndrome (HCC)    • Sleep apnea     when available on recall   • Tinnitus    • Vitamin D deficiency    • Vitamin D deficiency 12/3/2016   •  Vitreous detachment of right eye    • Wears glasses        Current Outpatient Medications:   •  aspirin 81 MG EC tablet, Take 81 mg by mouth daily., Disp: , Rfl:   •  Biotin 5 MG capsule, Take 1 tablet by mouth Daily., Disp: , Rfl:   •  Calcium Carbonate-Vitamin D 600-200 MG-UNIT tablet, Take 1 tablet by mouth Daily., Disp: , Rfl:   •  clobetasol (TEMOVATE) 0.05 % ointment, Apply  topically to the appropriate area as directed 2 (Two) Times a Day., Disp: 60 g, Rfl: 20  •  losartan (COZAAR) 25 MG tablet, Take 1 tablet by mouth Daily., Disp: 90 tablet, Rfl: 3  •  metoprolol succinate XL (TOPROL-XL) 50 MG 24 hr tablet, Take 1 tablet by mouth Daily With Dinner., Disp: 90 tablet, Rfl: 3  •  Multiple Vitamins-Minerals (MULTIVITAMIN ADULT PO), Take 1 tablet by mouth Daily., Disp: , Rfl:   •  Synthroid 88 MCG tablet, TAKE ONE TABLET BY MOUTH DAILY ON MONDAY TO SATURDAY AND TWO TABLETS ON SUNDAY (Patient taking differently: Take 88 mcg by mouth Daily. 2 on Sunday), Disp: 96 tablet, Rfl: 3  •  triamcinolone (KENALOG) 0.1 % cream, Apply 1 application topically to the appropriate area as directed 2 (Two) Times a Day., Disp: 45 g, Rfl: 9  •  vitamin B-12 (CYANOCOBALAMIN) 1000 MCG tablet, Take 1,000 mcg by mouth Every Other Day., Disp: , Rfl:   •  vitamin D (ERGOCALCIFEROL) 1.25 MG (37374 UT) capsule capsule, Take 1 capsule by mouth Every 14 (Fourteen) Days., Disp: 6 capsule, Rfl: 3  •  rosuvastatin (Crestor) 5 MG tablet, Take 1 tablet by mouth Every Night., Disp: 30 tablet, Rfl: 11      Assessment and Plan:    1. Obstructive sleep apnea  -Established, stable (1)  1. Compliant with PAP therapy- encouraged increasing hours used per night. Discussed health impact of sleep apnea.   2. Continue PAP as prescribed- discussed alternative therapies. Will continue with CPAP at this time   3. Sleep educator in room to discuss options. Recommended wrapping head gear in cloth material. Mask fitting per DME  4. Script for PAP  supplies  5. Drowsy driving tips- do not drive if feeling sleepy   6. Return to clinic in 5 months with compliance report unless change in symptoms in interim period          I spent 25 minutes caring for Carla on this date of service. This time includes time spent by me in the following activities: preparing for the visit, obtaining and/or reviewing a separately obtained history, performing a medically appropriate examination and/or evaluation, counseling and educating the patient/family/caregiver, ordering medications, tests, or procedures and documenting information in the medical record.           This document has been electronically signed by ZACHARY Higuera on March 31, 2022 14:20 CDT            CC: Anastacia Coppola APRN          No ref. provider found

## 2022-04-15 DIAGNOSIS — Z12.31 ENCOUNTER FOR SCREENING MAMMOGRAM FOR MALIGNANT NEOPLASM OF BREAST: Primary | ICD-10-CM

## 2022-04-28 DIAGNOSIS — R92.8 ABNORMAL MAMMOGRAM: Primary | ICD-10-CM

## 2022-05-04 ENCOUNTER — TELEPHONE (OUTPATIENT)
Dept: ENDOCRINOLOGY | Facility: CLINIC | Age: 77
End: 2022-05-04

## 2022-05-04 RX ORDER — PRAVASTATIN SODIUM 20 MG
20 TABLET ORAL NIGHTLY
Qty: 30 TABLET | Refills: 3 | Status: SHIPPED | OUTPATIENT
Start: 2022-05-04 | End: 2022-05-31 | Stop reason: SDUPTHER

## 2022-05-31 RX ORDER — PRAVASTATIN SODIUM 20 MG
20 TABLET ORAL NIGHTLY
Qty: 90 TABLET | Refills: 3 | Status: SHIPPED | OUTPATIENT
Start: 2022-05-31 | End: 2023-01-20 | Stop reason: SDUPTHER

## 2022-07-18 ENCOUNTER — LAB (OUTPATIENT)
Dept: LAB | Facility: HOSPITAL | Age: 77
End: 2022-07-18

## 2022-07-18 LAB — TSH SERPL DL<=0.05 MIU/L-ACNC: 3.54 UIU/ML (ref 0.27–4.2)

## 2022-07-18 PROCEDURE — 36415 COLL VENOUS BLD VENIPUNCTURE: CPT | Performed by: INTERNAL MEDICINE

## 2022-07-18 PROCEDURE — 84443 ASSAY THYROID STIM HORMONE: CPT | Performed by: INTERNAL MEDICINE

## 2022-07-29 ENCOUNTER — OFFICE VISIT (OUTPATIENT)
Dept: ENDOCRINOLOGY | Facility: CLINIC | Age: 77
End: 2022-07-29

## 2022-07-29 VITALS
WEIGHT: 194 LBS | OXYGEN SATURATION: 95 % | HEART RATE: 74 BPM | SYSTOLIC BLOOD PRESSURE: 130 MMHG | HEIGHT: 68 IN | BODY MASS INDEX: 29.4 KG/M2 | DIASTOLIC BLOOD PRESSURE: 76 MMHG

## 2022-07-29 DIAGNOSIS — E55.9 VITAMIN D DEFICIENCY: ICD-10-CM

## 2022-07-29 DIAGNOSIS — E53.8 B12 DEFICIENCY: ICD-10-CM

## 2022-07-29 DIAGNOSIS — R73.09 ELEVATED GLUCOSE: ICD-10-CM

## 2022-07-29 DIAGNOSIS — R73.01 IMPAIRED FASTING GLUCOSE: Primary | ICD-10-CM

## 2022-07-29 DIAGNOSIS — M81.0 AGE-RELATED OSTEOPOROSIS WITHOUT CURRENT PATHOLOGICAL FRACTURE: ICD-10-CM

## 2022-07-29 DIAGNOSIS — E89.0 POSTABLATIVE HYPOTHYROIDISM: Primary | ICD-10-CM

## 2022-07-29 DIAGNOSIS — E78.5 DYSLIPIDEMIA: ICD-10-CM

## 2022-07-29 PROCEDURE — 99214 OFFICE O/P EST MOD 30 MIN: CPT | Performed by: INTERNAL MEDICINE

## 2022-07-29 RX ORDER — LEVOTHYROXINE SODIUM 88 UG/1
88 TABLET ORAL DAILY
COMMUNITY
End: 2022-09-19

## 2022-07-29 NOTE — PROGRESS NOTES
"  CC Hypothyroidism    HPI     77 y.o.      female comes for fu , very pleasant       history of subacute thyroiditis with subsequent permanent hypothyroidism      duration - 5+ years    timing - constant    quality - biochemically controlled      severity - moderate     symptoms - mainly fatigue  Restless sleep since right hip pain after surgery           PE    /76   Pulse 74   Ht 172.7 cm (68\")   Wt 88 kg (194 lb)   SpO2 95%   BMI 29.50 kg/m²   AOx3  No visible goiter  Normal Respiratory Effort , Lung CTA  RRR  No Edema    Labs    Lab Results   Component Value Date    WBC 4.75 01/21/2022    HGB 13.2 01/21/2022    HCT 41.1 01/21/2022    MCV 89.2 01/21/2022     01/21/2022     Lab Results   Component Value Date    GLUCOSE 96 01/21/2022    BUN 13 04/07/2022    CREATININE 0.9 04/07/2022    EGFRIFNONA 80 01/21/2022    EGFRIFAFRI 85 06/29/2020    BCR 19.7 01/21/2022     04/07/2022    K 4.6 04/07/2022    CO2 27 04/07/2022    CALCIUM 9.0 04/07/2022    ALBUMIN 4.0 04/07/2022    LABIL2 1.6 10/22/2019    AST 21 04/07/2022    ALT 15 04/07/2022         Assessment & Plan      1. Postablative hypothyroidism    2. Vitamin D deficiency    3. B12 deficiency    4. Age-related osteoporosis without current pathological fracture     5. Dyslipidemia    .       Hypothyroidism      Has taken multiple formulations and doses     Is on biotin but stops 1 week before     Now on synthroid 112-100-88    Has been on 88 mcgs ; 8 tabs, 7 tabs but now on 6 tabs weekly and working    Thyroid Workup    Lab Results   Component Value Date    TSH 3.540 07/18/2022    TSH 0.126 (L) 03/01/2022    TSH 0.073 (L) 01/21/2022       Lab Results   Component Value Date    FREET4 1.60 03/04/2021    FREET4 1.65 11/06/2020    FREET4 1.36 08/21/2020       Lab Results   Component Value Date    T3FREE 3.36 11/06/2020    T3FREE 2.70 08/21/2020    T3FREE 3.42 02/12/2020       TPO antibodies    No results found for: THYROIDAB    TSI antibodies    No " results found for: THYRSTIMIMMU          neg celiac panel     she also has sjogren's    =====     Osteoporosis - Osteopenia      comparing 2019 to 2022 , improvement    She went from numerical osteopenia w high FRAX to    FRAX   Hip 2.8  Major 12    Fosamax gave GERD, she preferred no prolia and doesn't need     On Vit D and calcium           Lab Results   Component Value Date    ENGT21PG 40.4 01/21/2022    UHQR12CA 39.1 07/19/2021    MOAH58EA 47.5 01/20/2021                =====      Alopecia     Thyroid as above  Dr. Baig added aldactone, she can't tolerate  Added iron even though levels are normal       I added finasteride 1 mg daily - preferred not to     Weight    Gained weight due to elavil , she had to stop due to side effects  Fasting glucose 101 and mild elevation in LFT  ,now nl       ===    Dyslipidemia    Lab Results   Component Value Date    CHOL 228 (H) 03/01/2022    CHLPL 249 (H) 12/07/2016    TRIG 164 (H) 03/01/2022    HDL 61 (H) 03/01/2022     (H) 03/01/2022     Lab Results   Component Value Date     (H) 03/01/2022     (H) 01/21/2022     (H) 11/06/2020     The 10-year ASCVD risk score (Cortlandca CLEMENT Jr., et al., 2013) is: 22.6%    Values used to calculate the score:      Age: 77 years      Sex: Female      Is Non- : No      Diabetic: No      Tobacco smoker: No      Systolic Blood Pressure: 130 mmHg      Is BP treated: No      HDL Cholesterol: 61 mg/dL      Total Cholesterol: 228 mg/dL      Side effects to statins in the past     Tolerating pravastatin 20   Strong fam hx of CAD    ==    KIMMIE    Hasn't been able to get     ==    sjogren , OA    On chloroquine

## 2022-08-12 ENCOUNTER — OFFICE VISIT (OUTPATIENT)
Dept: ORTHOPEDIC SURGERY | Facility: CLINIC | Age: 77
End: 2022-08-12

## 2022-08-12 ENCOUNTER — TELEPHONE (OUTPATIENT)
Dept: ORTHOPEDIC SURGERY | Facility: CLINIC | Age: 77
End: 2022-08-12

## 2022-08-12 VITALS — BODY MASS INDEX: 29.4 KG/M2 | WEIGHT: 194 LBS | HEIGHT: 68 IN

## 2022-08-12 DIAGNOSIS — M25.511 RIGHT SHOULDER PAIN, UNSPECIFIED CHRONICITY: Primary | ICD-10-CM

## 2022-08-12 PROCEDURE — 99204 OFFICE O/P NEW MOD 45 MIN: CPT | Performed by: SPECIALIST/TECHNOLOGIST, OTHER

## 2022-08-12 NOTE — PROGRESS NOTES
Carla Best is a 77 y.o. 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  Her right shoulder pain started 3 weeks ago on a scale of 5 out of 10 and lasted for 3 days relieved with rest and local measures.  The symptoms reappeared in her right shoulder a week ago with persistent pain throughout the day with stiffness in the right shoulder with inability to do ADLs and overhead activities.  Her right shoulder pain at present is 6 x 10 in severity, aggravated by any movement involving the right shoulder that is at the shoulder level are about overhead activities and is partly relieved by resting and sleeping on her back and avoiding any movement along with over-the-counter medication.  Her right shoulder symptoms are not preceded by any injury and they are spontaneous in onset.  She had similar symptoms 5 years ago when she had x-rays done to her shoulder and she was suggested that she was having symptoms or signs of shoulder impingement on the right side.      She does not have any symptoms on the left side at present and she is right-hand dominant.    She denies smoking or consuming alcohol and denies any drugs.  Her past medical history significant for hypothyroidism, vitamin deficiency, osteoporosis that is age-related, B12 deficiency, dyslipidemias and Sjogren syndrome.       CONCURRENT MEDICAL HISTORY:  Past medical issues:  1. Postablative hypothyroidism    2. Vitamin D deficiency    3. B12 deficiency    4. Age-related osteoporosis without current pathological fracture     5. Dyslipidemia        Past Medical History:   Diagnosis Date   • Allergic rhinitis    •  Cancer (HCC)     ear sp carcinoma   • Chest pain    • Dental disease    • Dizziness    • Elevated cholesterol    • Fibrocystic disease of breast    • GERD (gastroesophageal reflux disease)    • Hashimoto's thyroiditis    • Hearing aid worn    • History of screening mammography 08/27/2014    SCREENING MAMMOGRAPHY DIGITAL  (Medicare) (1)    • History of transfusion    • HL (hearing loss)    • Hyperlipidemia    • Hypertension    • Hypothyroidism due to Hashimoto's thyroiditis 12/3/2016   • Keratoconjunctivitis sicca (HCC)    • Migraines    • Nausea    • Nuclear senile cataract    • On long term drug therapy    • Osteoarthritis    • Osteopenia    • Osteopenia 12/3/2016   • Photopsia     Right   • Postmenopausal bleeding    • Psoriasis    • Sinus tachycardia    • Sjogren's syndrome (HCC)    • Sleep apnea     when available on recall   • Tinnitus    • Vitamin D deficiency    • Vitamin D deficiency 12/3/2016   • Vitreous detachment of right eye    • Wears glasses        Allergies   Allergen Reactions   • Ciprofloxacin Hives   • Codeine Other (See Comments)     headache   • Demerol [Meperidine] Other (See Comments)     Drop in blood pressure   • Dexlansoprazole Nausea Only   • Lipitor [Atorvastatin Calcium] Myalgia   • Tape Other (See Comments)     Burns skin      • Phenazopyridine Rash         Current Outpatient Medications:   •  aspirin 81 MG EC tablet, Take 81 mg by mouth daily., Disp: , Rfl:   •  Biotin 5 MG capsule, Take 1 tablet by mouth Daily., Disp: , Rfl:   •  Calcium Carbonate-Vitamin D 600-200 MG-UNIT tablet, Take 1 tablet by mouth Daily., Disp: , Rfl:   •  clobetasol (TEMOVATE) 0.05 % ointment, Apply  topically to the appropriate area as directed 2 (Two) Times a Day., Disp: 60 g, Rfl: 20  •  levothyroxine (SYNTHROID, LEVOTHROID) 88 MCG tablet, Take 88 mcg by mouth Daily. One tablet  Monday through Saturday and none on Sunday, Disp: , Rfl:   •  losartan (COZAAR) 25 MG tablet, Take 1 tablet by mouth Daily.,  Disp: 90 tablet, Rfl: 3  •  metoprolol succinate XL (TOPROL-XL) 50 MG 24 hr tablet, Take 1 tablet by mouth Daily With Dinner., Disp: 90 tablet, Rfl: 3  •  Multiple Vitamins-Minerals (MULTIVITAMIN ADULT PO), Take 1 tablet by mouth Daily., Disp: , Rfl:   •  pravastatin (PRAVACHOL) 20 MG tablet, Take 1 tablet by mouth Every Night., Disp: 90 tablet, Rfl: 3  •  triamcinolone (KENALOG) 0.1 % cream, Apply 1 application topically to the appropriate area as directed 2 (Two) Times a Day., Disp: 45 g, Rfl: 9  •  vitamin B-12 (CYANOCOBALAMIN) 1000 MCG tablet, Take 1,000 mcg by mouth Every Other Day., Disp: , Rfl:   •  vitamin D (ERGOCALCIFEROL) 1.25 MG (97993 UT) capsule capsule, Take 1 capsule by mouth Every 14 (Fourteen) Days., Disp: 6 capsule, Rfl: 3    Past Surgical History:   Procedure Laterality Date   • CATARACT EXTRACTION, BILATERAL  2018   • CHOLECYSTECTOMY  06/09/1997    with operative cholangiogram. Chronic cholecystitis & cholelithiasis. HX of passed common duct stone   • CYSTOSCOPY  11/26/1962    urethral dilatation.Recurrent pyelonephritis. urethritis   • ENDOSCOPY N/A 2/17/2017    Procedure: ESOPHAGOGASTRODUODENOSCOPY;  Surgeon: Cisco Mason DO;  Location: [facility] ENDOSCOPY;  Service:    • ENDOSCOPY N/A 10/31/2018    Procedure: ESOPHAGOGASTRODUODENOSCOPY;  Surgeon: Cisco Mason DO;  Location: [facility] ENDOSCOPY;  Service: Gastroenterology   • ENDOSCOPY N/A 10/12/2021    Procedure: ESOPHAGOGASTRODUODENOSCOPY;  Surgeon: Cisco Mason DO;  Location: [facility] ENDOSCOPY;  Service: Gastroenterology;  Laterality: N/A;   • ENDOSCOPY AND COLONOSCOPY  09/30/1985    Normal colon to left transverse colon   • EYE SURGERY      Cataract   • GALLBLADDER SURGERY     • JOINT REPLACEMENT      R Ant ROSALINE 7/6/2020   • PAP SMEAR  2009   • TONSILLECTOMY  1950   • VAGINAL DELIVERY      x3       Family History   Problem Relation Age of Onset   • Heart disease Mother    • Arthritis Mother    • Osteoporosis Mother    • Cataracts  "Mother    • Heart disease Father    • Heart failure Father    • Rashes / Skin problems Father    • Hypertension Sister    • Arthritis Sister    • Diabetes Sister    • Fuchs' dystrophy Sister    • Breast cancer Paternal Grandmother    • Heart failure Paternal Grandmother    • Diabetes Other    • Heart disease Other    • Hypertension Other    • Stroke Other    • Thyroid disease Other    • Lung disease Other    • Other Other         Gallstones, Bleeding Tendency, Colon Problems.   • Fuchs' dystrophy Maternal Grandfather    • Heart failure Maternal Grandfather    • Heart failure Maternal Grandmother    • Heart failure Paternal Grandfather         Social History     Socioeconomic History   • Marital status:    Tobacco Use   • Smoking status: Never Smoker   • Smokeless tobacco: Never Used   Vaping Use   • Vaping Use: Never used   Substance and Sexual Activity   • Alcohol use: Never   • Drug use: Never   • Sexual activity: Defer        Review of Systems   Constitutional: Negative.    HENT: Positive for tinnitus.    Eyes: Negative.         Dry eyes   Respiratory: Negative.    Cardiovascular: Negative.    Musculoskeletal: Positive for joint swelling.        Muscle pain   Skin: Negative.    Neurological: Positive for headaches.   Psychiatric/Behavioral: Positive for sleep disturbance.       PHYSICAL EXAMINATION:       Ht 172.7 cm (68\")   Wt 88 kg (194 lb)   BMI 29.50 kg/m²     Physical Exam  Vitals and nursing note reviewed. Exam conducted with a chaperone present.   Constitutional:       Appearance: Normal appearance.   HENT:      Head: Normocephalic.   Cardiovascular:      Rate and Rhythm: Normal rate.      Pulses: Normal pulses.   Pulmonary:      Effort: Pulmonary effort is normal.   Musculoskeletal:         General: Tenderness present.      Cervical back: Normal range of motion.   Skin:     General: Skin is warm and dry.      Capillary Refill: Capillary refill takes less than 2 seconds.   Neurological:      " General: No focal deficit present.      Mental Status: She is alert.   Psychiatric:         Mood and Affect: Mood normal.         Behavior: Behavior normal.         Thought Content: Thought content normal.         Judgment: Judgment normal.         GAIT:     [x]  Normal  []  Antalgic    Assistive device: [x]  None  []  Walker     []  Crutches  []  Cane     []  Wheelchair  []  Stretcher    Right Shoulder Exam     Tenderness   The patient is experiencing tenderness in the acromioclavicular joint and biceps tendon (Tenderness subacromial region as well as the AC joint with impingement in the forwared elevation position and abduction overhead).    Range of Motion   Active abduction:  100 abnormal   Passive abduction:  110 abnormal   Extension:  10 abnormal   External rotation:  30 abnormal   Forward flexion:  110 abnormal   Internal rotation 0 degrees:  T12 abnormal   Internal rotation 90 degrees:  50 abnormal     Muscle Strength   Abduction: 4/5   Internal rotation: 5/5   External rotation: 4/5   Supraspinatus: 4/5   Subscapularis: 5/5   Biceps: 4/5     Tests   Apprehension: negative  Gaona test: positive  Cross arm: positive  Impingement: positive  Drop arm: negative  Sulcus: absent    Other   Erythema: absent  Scars: absent  Sensation: normal  Pulse: present    Comments:  Less in the subacromial region as well as in the AC joint region from degenerative changes with impingement on the right shoulder.      Left Shoulder Exam   Left shoulder exam is normal.    Range of Motion   The patient has normal left shoulder ROM.  Active abduction: normal   Passive abduction: normal   Extension: normal   External rotation: normal   Forward flexion: normal   Internal rotation 0 degrees:  T6 normal   Internal rotation 90 degrees:  60 normal     Muscle Strength   Abduction: 4/5   Internal rotation: 4/5   External rotation: 4/5   Supraspinatus: 4/5     Tests   Apprehension: negative  Gaona test: positive  Cross arm:  negative  Impingement: positive  Drop arm: negative  Sulcus: absent    Other   Erythema: absent  Sensation: normal  Pulse: present     Comments:  Mild impingement of the left shoulder with partial cuff weakness.              No results found.        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    Her right shoulder symptoms are recurrent in nature intermittent initially now became persistent over the past 1 week she had similar symptoms 5 years ago in the right shoulder that indicated some signs of impingement.    Her right shoulder preoperative Constant Murley score is 57/100  Her right shoulder preoperative ASES score is 58    Diagnoses and all orders for this visit:    Right shoulder pain, unspecified chronicity  -     XR Shoulder 2+ View Right    Imaging of the right shoulder 3 views:    No acute fractures or dislocations in the right shoulder joint noted.  Signs of subacromial impingement with decreased acromiohumeral interval with possible calcific tendinitis close to the rotator cuff footprint with lytic sclerotic changes in the rotator cuff footprint area indicating possible underlying cuff tear with age-related signs of osteopenia in the right shoulder with early signs of arthritis in the glenohumeral joint noted.    Degenerative changes in the right acromioclavicular joint noted with osteophytes and a decreased joint space and sclerosis.    Past medical issues:  1. Postablative hypothyroidism    2. Vitamin D deficiency    3. B12 deficiency    4. Age-related osteoporosis without current pathological fracture     5. Dyslipidemia            PLAN  Discussed different treatment options for her right shoulder condition that included conservative as well as possible operative intervention in future provided further imaging revealed relevant  pathology.  Continue local measures such as rest ice application local pain gel application.  Brace provided in clinic today for resting her right shoulder.    Advised to undergo right shoulder MRI without contrast in order to delineate the rotator cuff pathology and also to elucidate the glenohumeral articular cartilage changes.  Request for MRI for her right shoulder placed in clinic today.    Over-the-counter pain medication to continue for symptomatic relief till her further review in the clinic following her MRI of her right shoulder.    Briefly discussed the possible treatment plan of considering arthroscopic debridement and possible rotator cuff repair if the MRI findings correlate with the clinical impression.  In the long-term possibility of a shoulder joint replacement anatomic versus reverse has already also been discussed with her in case if the minimally invasive arthroscopic intervention fails to relieve her symptoms completely and patient understands the treatment plan and the investigations that were advised and all her questions were answered.

## 2022-08-12 NOTE — TELEPHONE ENCOUNTER
SHERWIN,     Patient called requesting a presription for her MRI. She stated that she is claustrophobic.

## 2022-08-15 RX ORDER — DIAZEPAM 5 MG/1
TABLET ORAL
Qty: 2 TABLET | Refills: 0 | Status: SHIPPED | OUTPATIENT
Start: 2022-08-15 | End: 2023-01-12

## 2022-08-16 ENCOUNTER — HOSPITAL ENCOUNTER (OUTPATIENT)
Dept: MRI IMAGING | Facility: HOSPITAL | Age: 77
Discharge: HOME | End: 2022-08-16
Admitting: SPECIALIST/TECHNOLOGIST, OTHER

## 2022-08-16 DIAGNOSIS — M25.511 RIGHT SHOULDER PAIN, UNSPECIFIED CHRONICITY: ICD-10-CM

## 2022-08-16 PROCEDURE — 73221 MRI JOINT UPR EXTREM W/O DYE: CPT

## 2022-08-24 ENCOUNTER — OFFICE VISIT (OUTPATIENT)
Dept: ORTHOPEDIC SURGERY | Facility: CLINIC | Age: 77
End: 2022-08-24

## 2022-08-24 ENCOUNTER — PREP FOR SURGERY (OUTPATIENT)
Dept: OTHER | Facility: HOSPITAL | Age: 77
End: 2022-08-24

## 2022-08-24 VITALS — HEIGHT: 68 IN | BODY MASS INDEX: 29.4 KG/M2 | WEIGHT: 194 LBS

## 2022-08-24 DIAGNOSIS — M25.511 RIGHT SHOULDER PAIN, UNSPECIFIED CHRONICITY: Primary | ICD-10-CM

## 2022-08-24 DIAGNOSIS — M75.31 CALCIFIC TENDINITIS OF RIGHT SHOULDER: ICD-10-CM

## 2022-08-24 DIAGNOSIS — M75.41 SUBACROMIAL IMPINGEMENT OF RIGHT SHOULDER: ICD-10-CM

## 2022-08-24 DIAGNOSIS — M75.101 NONTRAUMATIC TEAR OF RIGHT ROTATOR CUFF, UNSPECIFIED TEAR EXTENT: Primary | ICD-10-CM

## 2022-08-24 DIAGNOSIS — M75.101 NONTRAUMATIC TEAR OF RIGHT ROTATOR CUFF, UNSPECIFIED TEAR EXTENT: ICD-10-CM

## 2022-08-24 DIAGNOSIS — M19.011 ARTHRITIS OF RIGHT ACROMIOCLAVICULAR JOINT: ICD-10-CM

## 2022-08-24 PROCEDURE — 99214 OFFICE O/P EST MOD 30 MIN: CPT | Performed by: SPECIALIST/TECHNOLOGIST, OTHER

## 2022-08-24 RX ORDER — CYCLOBENZAPRINE HCL 10 MG
10 TABLET ORAL 3 TIMES DAILY PRN
Qty: 30 TABLET | Refills: 0 | Status: SHIPPED | OUTPATIENT
Start: 2022-08-24 | End: 2022-09-03

## 2022-08-24 RX ORDER — BUPIVACAINE HCL/0.9 % NACL/PF 0.1 %
2 PLASTIC BAG, INJECTION (ML) EPIDURAL ONCE
Status: CANCELLED | OUTPATIENT
Start: 2022-09-27 | End: 2022-08-24

## 2022-08-24 NOTE — PROGRESS NOTES
"Carla Best is a 77 y.o. female returns for     Chief Complaint   Patient presents with   • Right Shoulder - Follow-up       HISTORY OF PRESENT ILLNESS:  This pleasant lady returns for follow-up after her right shoulder MRI.  She is having continued pain with stiffness that radiates from the right shoulder to the right mid arm.  Her resting of the right shoulder in an arm sling with analgesics and activity modification has not fully helped her.    She has failed the conservative line of treatment and she is keen on exploring other possible treatment options to help her with the right shoulder function to get back to her active lifestyle.  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     CONCURRENT MEDICAL HISTORY:    The following portions of the patient's history were reviewed and updated as appropriate: allergies, current medications, past family history, past medical history, past social history, past surgical history and problem list.     ROS  No fevers or chills.  No chest pain or shortness of air.  No GI or  disturbances.    PHYSICAL EXAMINATION:       Ht 172.7 cm (68\")   Wt 88 kg (194 lb)   BMI 29.50 kg/m²     Physical Exam    GAIT:     [x]  Normal  []  Antalgic    Assistive device: [x]  None  []  Walker     []  Crutches  []  Cane     []  Wheelchair  []  Stretcher    Ortho Exam      XR Shoulder 2+ View Right    Result Date: 8/14/2022  Narrative: Three view right shoulder HISTORY: Right shoulder pain AP films with the humerus in internal and external rotation and scapular Y view were obtained. COMPARISON: August 20, 2017 FINDINGS: No fracture or dislocation. Hypertrophic change acromioclavicular joint. Calcific tendinitis or bursitis. Facet arthropathy cervical spine. No other osseous or articular abnormality.     Impression: CONCLUSION: Hypertrophic change acromioclavicular " joint. Calcific tendinitis or bursitis. Facet arthropathy cervical spine. [id number] Electronically signed by:  Torin Lopez MD  8/14/2022 11:19 AM CDT Workstation: [id number]    MRI Shoulder Right Without Contrast    Result Date: 8/17/2022  Narrative: Comparison: 8/12/2022 Indication:  Pain in right shoulder Technique:  Magnetic resonance imaging of the right shoulder was performed without contrast utilizing routine sequences. Findings: Rotator cuff: There is tendinopathy of the supraspinatus tendon with partial-thickness articular sided tear of the mid and anterior tendon. There is a partial-thickness insertional tear of the anteriormost tendon. Findings may obliquely reach full thickness. Mild tendinopathy of infraspinatus tendon. The subscapularis and teres minor tendons and muscles are intact.  Evaluation of the subscapularis tendon is degraded by internal rotation of the shoulder time of imaging. The long head biceps tendon is intact. Glenohumeral joint:  There is normal alignment. There is mild cartilage loss. The labrum appears grossly intact.  There is small joint effusion. Acromioclavicular joint:  The acromioclavicular joint is normally aligned. The joint measures 6 mm width. There are moderate degenerative changes of the acromioclavicular joint. Bone:  There is normal bone marrow signal.  Negative for fracture, osteomyelitis, osteonecrosis, or marrow replacing process. Bursa and soft tissues: Small fluid within the subacromial subdeltoid bursa.     Impression: Impression:  1.  Tendinopathy of the supraspinatus tendon. There is a partial-thickness articular sided tear of the mid to anterior tendon, approximating 50% or greater thickness. There is a partial-thickness insertional tear of the anteriormost tendon. This could obliquely reach full thickness. There appears to be calcific tendinosis of the supraspinatus and/or infraspinatus tendons on recent radiograph although this is less well appreciated on MRI 2.  Tendinopathy of the infraspinatus and subscapularis tendons. 3. Mild glenohumeral osteoarthritis. Small joint effusion. 4. Moderate acromioclavicular osteoarthritis Electronically signed by:  Donal Lawler MD  8/17/2022 5:03 PM CDT Workstation: [id number]            ASSESSMENT:  77-year-old female patient with right shoulder rotator cuff tear involving the supraspinatus with subacromial impingement with calcific tendinitis.  There is evidence of mild osteoarthritis in the glenohumeral joint and moderate to severe osteoarthritis in the acromioclavicular joint in the right shoulder as per the MRI findings.    I discussed in detail the radiographic and the MRI findings of the right shoulder with the patient today exploring the possible treatment plan.  Options are to continue the conservative line of management with rest to the arm in a sling with analgesics and activity modification followed by therapy with or without local corticosteroid / biologic injections or viscosupplementation to the right shoulder.  Patient was clearly not inclined towards the conservative line of management since she has been pursuing these modalities for the past few months with no improvement.  Considering the structural damage and the impingement that will not be relieved by the local injections to the right shoulder; she understands some form of surgical intervention is mandatory.  I discussed different surgical  Treatment options with her today including arthroscopic debridement of the subacromial joint and the rotator cuff followed by the rotator cuff repair and arthroscopic acromioclavicular decompression with debridement of calcific tendinitis in the right shoulder.  I also indicated to her that the findings in the arthroscopy will dictate how the rehab will be postoperatively and how she might progress and the expectations to be made from the right shoulder condition.    I also indicated clearly that there is a possibility of total joint  replacement (anatomical versus reverse) to her right shoulder in case if the symptoms continue to deteriorate after right shoulder arthroscopy OR if the planned intervention failed to improve her condition in the right shoulder in future.    Diagnoses and all orders for this visit:    Right shoulder pain, unspecified chronicity  -     Ambulatory Referral to Physical Therapy Evaluate and treat    Other orders  -     cyclobenzaprine (FLEXERIL) 10 MG tablet; Take 1 tablet by mouth 3 (Three) Times a Day As Needed for Muscle Spasms for up to 10 days.          PLAN  She is scheduled for right shoulder diagnostic arthroscopy with arthroscopic subacromial decompression with rotator cuff debridement and that of calcific tendinitis and rotator cuff repair followed by arthroscopic acromioclavicular joint decompression/debridement.  Since she had a planned social event earlier next month she preferred to have her right shoulder arthroscopy scheduled in the later half of next month.  Review made arrangements for the same.  Time she was advised to continue resting her right shoulder in an arm sling with analgesics over-the-counter along with local measures such as ice, pain gel application.    She was provided a prescription for muscle relaxant Flexeril 3 times a day for 10 days.  Also an outpatient prescription was provided for the physical therapy with referral that she understands the rehab principles and the expectations can be outlined for her to follow after the right shoulder arthroscopy.  Referral was provided for outpatient physical therapy and rehabilitation of right shoulder at our [facility]    - ROM   - strengthening   - modialities( as needed)    - HEP    The patient voiced understanding of the risks, benefits, and alternative forms of treatment that were discussed and the patient consents to proceed with surgery.  All risks, benefits and alternatives were discussed.  Risks include, but not exclusive to anesthetic  complications, including death, MI, CVA, infection, bleeding DVT, fracture, residual pain and need for future surgery.    This discussion was held with the patient by  Sidney Anders MD and all questions were answered.  EMR Dragon/Transciption Disclaimer: Some of this note may be an electronic transcription/translation of spoken language to printed text using the Dragon Dictation System.  Time spent of a minimum of 30 minutes including the face to face evaluation, reviewing of medical history and prior medial records, reviewing of diagnostic studies, documentation, patient education and coordination of care and surgical treatment decision.       No follow-ups on file.    Sidney Anders MD

## 2022-08-31 ENCOUNTER — HOSPITAL ENCOUNTER (OUTPATIENT)
Dept: PHYSICAL THERAPY | Facility: HOSPITAL | Age: 77
Setting detail: THERAPIES SERIES
Discharge: HOME | End: 2022-08-31

## 2022-08-31 DIAGNOSIS — M25.511 RIGHT SHOULDER PAIN, UNSPECIFIED CHRONICITY: Primary | ICD-10-CM

## 2022-08-31 PROCEDURE — 97110 THERAPEUTIC EXERCISES: CPT | Performed by: PHYSICAL THERAPIST

## 2022-08-31 PROCEDURE — 97162 PT EVAL MOD COMPLEX 30 MIN: CPT | Performed by: PHYSICAL THERAPIST

## 2022-09-01 ENCOUNTER — OFFICE VISIT (OUTPATIENT)
Dept: SLEEP MEDICINE | Facility: HOSPITAL | Age: 77
End: 2022-09-01

## 2022-09-01 VITALS
BODY MASS INDEX: 29.4 KG/M2 | WEIGHT: 194 LBS | DIASTOLIC BLOOD PRESSURE: 83 MMHG | SYSTOLIC BLOOD PRESSURE: 122 MMHG | HEIGHT: 68 IN | HEART RATE: 81 BPM | OXYGEN SATURATION: 94 %

## 2022-09-01 DIAGNOSIS — G47.33 OBSTRUCTIVE SLEEP APNEA, ADULT: Primary | ICD-10-CM

## 2022-09-01 DIAGNOSIS — Z78.9 DIFFICULTY WITH CPAP USE: ICD-10-CM

## 2022-09-01 PROCEDURE — 99213 OFFICE O/P EST LOW 20 MIN: CPT | Performed by: NURSE PRACTITIONER

## 2022-09-01 NOTE — PROGRESS NOTES
Sleep Clinic Follow Up    Date: 2022  Primary Care Provider: Anastacia Coppola APRN    Last office visit: 2022 (I reviewed this note)    CC: Follow up: KIMMIE on CPAP, 6 month      Interim History:  Since the last visit:    1) severe KIMMIE -  Carla Best has not remained compliant with CPAP. She denies  machine issues, dry mouth, headaches, or pressures intolerance. She denies abnormal dreams, sleep paralysis, nasal congestion, URI sx.  States continues to have mask issue. Wearing nasal cradle Dreamwear. Mask too heavy on head. Hates using CPAP. Interested in alternative therapies.   Up often during the night. Takes mask off in the middle of the night, sometimes does not realize she is doing it. Not feeling rested in AM.     2) Patient denies RLS symptoms.     Sleep Testin. Split PSG on 2019, AHI of 44   2. CPAP titration , recommended 8-11 cm H2O   3. Currently on 9-15 cm H2O    PAP Data:    Time frame: 2022-2022   Compliance: 95 %  Average use on days used: 3hrs 59 min  Percent of days with usage greater than or equal to 4 hours: 48%  PAP range: 9-15 cm H2O  Average 90% pressure: 10.5 cmH2O  Leak: 15 minutes  Average AHI: 1.0 events/hr  Mask type: Nasal mask  DME: [facility]     Bed time: 2230  Sleep latency: 30 minutes  Number of times awakens during the night: 8+  Wake time: 0600  Estimated total sleep time at night: 3-5 hours  Caffeine intake: 1 cups of coffee, 2 cups of tea, and 0 sodas per day  Alcohol intake: 0 drinks per week  Nap time: rare   Sleepiness with Driving: denies      [place] - 13        PMHx, FH, SH reviewed and pertinent changes are: upcoming left shoulder surgery        REVIEW OF SYSTEMS:   Negative for chest pain, SOA, fever, chills, cough, N/V/D, abdominal pain.    Smoking:none           Exam:  Vitals:    22 1032   BP: 122/83   Pulse: 81   SpO2: 94%           22  1032   Weight: 88 kg (194 lb)     Body mass index is 29.5 kg/m². BMI is >= 25 and  <30. (Overweight) The following options were offered after discussion;: nutrition counseling/recommendations      Gen:                No distress, conversant, pleasant, appears stated age, alert, oriented  Eyes:               Anicteric sclera, moist conjunctiva, no lid lag                           EOMI   Lungs:             normal effort, non-labored breathing                          Clear to auscultation bilaterally          CV:                  Normal S1/S2, no murmur                          no lower extremity edema                 Psych:             Appropriate affect  Neuro:             CN 2-12 appear intact    Past Medical History:   Diagnosis Date   • Allergic rhinitis    • Cancer (HCC)     ear sp carcinoma   • Chest pain    • Dental disease    • Dizziness    • Elevated cholesterol    • Fibrocystic disease of breast    • GERD (gastroesophageal reflux disease)    • Hashimoto's thyroiditis    • Hearing aid worn    • History of screening mammography 08/27/2014    SCREENING MAMMOGRAPHY DIGITAL  (Medicare) (1)    • History of transfusion    • HL (hearing loss)    • Hyperlipidemia    • Hypertension    • Hypothyroidism due to Hashimoto's thyroiditis 12/3/2016   • Keratoconjunctivitis sicca (HCC)    • Migraines    • Nausea    • Nuclear senile cataract    • On long term drug therapy    • Osteoarthritis    • Osteopenia    • Osteopenia 12/3/2016   • Photopsia     Right   • Postmenopausal bleeding    • Psoriasis    • Sinus tachycardia    • Sjogren's syndrome (HCC)    • Sleep apnea     when available on recall   • Tinnitus    • Vitamin D deficiency    • Vitamin D deficiency 12/3/2016   • Vitreous detachment of right eye    • Wears glasses        Current Outpatient Medications:   •  aspirin 81 MG EC tablet, Take 81 mg by mouth daily., Disp: , Rfl:   •  Biotin 5 MG capsule, Take 1 tablet by mouth Daily., Disp: , Rfl:   •  Calcium Carbonate-Vitamin D 600-200 MG-UNIT tablet, Take 1 tablet by mouth Daily., Disp: , Rfl:    •  clobetasol (TEMOVATE) 0.05 % ointment, Apply  topically to the appropriate area as directed 2 (Two) Times a Day., Disp: 60 g, Rfl: 20  •  cyclobenzaprine (FLEXERIL) 10 MG tablet, Take 1 tablet by mouth 3 (Three) Times a Day As Needed for Muscle Spasms for up to 10 days., Disp: 30 tablet, Rfl: 0  •  levothyroxine (SYNTHROID, LEVOTHROID) 88 MCG tablet, Take 88 mcg by mouth Daily. One tablet  Monday through Saturday and none on Sunday, Disp: , Rfl:   •  losartan (COZAAR) 25 MG tablet, Take 1 tablet by mouth Daily., Disp: 90 tablet, Rfl: 3  •  metoprolol succinate XL (TOPROL-XL) 50 MG 24 hr tablet, Take 1 tablet by mouth Daily With Dinner., Disp: 90 tablet, Rfl: 3  •  Multiple Vitamins-Minerals (MULTIVITAMIN ADULT PO), Take 1 tablet by mouth Daily., Disp: , Rfl:   •  pravastatin (PRAVACHOL) 20 MG tablet, Take 1 tablet by mouth Every Night., Disp: 90 tablet, Rfl: 3  •  triamcinolone (KENALOG) 0.1 % cream, Apply 1 application topically to the appropriate area as directed 2 (Two) Times a Day., Disp: 45 g, Rfl: 9  •  vitamin B-12 (CYANOCOBALAMIN) 1000 MCG tablet, Take 1,000 mcg by mouth Every Other Day., Disp: , Rfl:   •  vitamin D (ERGOCALCIFEROL) 1.25 MG (24971 UT) capsule capsule, Take 1 capsule by mouth Every 14 (Fourteen) Days., Disp: 6 capsule, Rfl: 3  •  diazePAM (Valium) 5 MG tablet, Take 1 tablet about 30 minutes prior to procedure and another about 10 minutes prior if needed., Disp: 2 tablet, Rfl: 0      Assessment and Plan:    1. Obstructive sleep apnea    1. Not compliant with PAP therapy- discussed health impact of sleep apnea   2. Discussed [facility] as alternative therapy. She is very interested. Has upcoming shoulder procedure. She is going to think more about it and will call me for referral. Will need to repeat HST if she decides to pursue.   3. Sleep educator in room for mask fitting- fitted for size small nasal p10   4. Continue PAP as prescribed  5. Script for PAP supplies  6. Drowsy driving tips- do  not drive if feeling sleepy   7. Return to clinic in 6 months with compliance report unless change in symptoms in interim period          I spent 20 minutes caring for Carla on this date of service. This time includes time spent by me in the following activities: preparing for the visit, obtaining and/or reviewing a separately obtained history, performing a medically appropriate examination and/or evaluation, counseling and educating the patient/family/caregiver, ordering medications, tests, or procedures and documenting information in the medical record.           This document has been electronically signed by ZACHARY Higuera on September 1, 2022 10:50 CDT            CC: Anastacia Coppola APRN          No ref. provider found

## 2022-09-07 ENCOUNTER — HOSPITAL ENCOUNTER (OUTPATIENT)
Dept: PHYSICAL THERAPY | Facility: HOSPITAL | Age: 77
Setting detail: THERAPIES SERIES
Discharge: HOME | End: 2022-09-07

## 2022-09-07 DIAGNOSIS — M25.511 RIGHT SHOULDER PAIN, UNSPECIFIED CHRONICITY: Primary | ICD-10-CM

## 2022-09-07 PROCEDURE — 97110 THERAPEUTIC EXERCISES: CPT | Performed by: PHYSICAL THERAPIST

## 2022-09-07 NOTE — THERAPY DISCHARGE NOTE
Outpatient Physical Therapy Ortho Treatment Note/Discharge Summary  [facility]     Patient Name: Carla Best  : 1945  MRN: [id number]  Today's Date: 2022      Visit Date: 2022  Visit   Return to MD: PANKAJ  Re-certification date: N/A-2 sessions total  Visit Dx:    ICD-10-CM ICD-9-CM   1. Right shoulder pain, unspecified chronicity  M25.511 719.41       Patient Active Problem List   Diagnosis   • Osteopenia   • Hypothyroidism due to Hashimoto's thyroiditis   • Vitamin D deficiency   • Keratoconjunctivitis sicca (HCC)   • Long term use of drug   • Cataract   • Primary osteoarthritis of right knee   • B12 deficiency   • Right hip pain   • Encounter for screening for osteoporosis   • GERD (gastroesophageal reflux disease)   • Hip pain, acute, right   • Myofascial pain   • Neck pain   • Osteoarthritis   • Spondylosis of cervical joint   • Palpitations   • Acquired hypothyroidism   • Pre-operative clearance   • Vaginal discharge   • Vaginal irritation   • Urinary incontinence in female   • UTI (urinary tract infection)   • Female cystocele   • Acute cystitis without hematuria   • Overweight   • Diverticulosis of large intestine without perforation or abscess without bleeding   • Generalized abdominal pain   • Helicobacter pylori (H. 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   • Fibromyalgia   • Cystocele with prolapse   • Obstructive sleep apnea, adult   • Age-related osteoporosis without current pathological fracture   • Right shoulder pain   • Rotator cuff impingement syndrome of right shoulder   • Nontraumatic tear of right rotator cuff        Past Medical History:   Diagnosis Date   • Allergic rhinitis    • Cancer (HCC)      ear sp carcinoma   • Chest pain    • Dental disease    • Dizziness    • Elevated cholesterol    • Fibrocystic disease of breast    • GERD (gastroesophageal reflux disease)    • Hashimoto's thyroiditis    • Hearing aid worn    • History of screening mammography 08/27/2014    SCREENING MAMMOGRAPHY DIGITAL  (Medicare) (1)    • History of transfusion    • HL (hearing loss)    • Hyperlipidemia    • Hypertension    • Hypothyroidism due to Hashimoto's thyroiditis 12/3/2016   • Keratoconjunctivitis sicca (HCC)    • Migraines    • Nausea    • Nuclear senile cataract    • On long term drug therapy    • Osteoarthritis    • Osteopenia    • Osteopenia 12/3/2016   • Photopsia     Right   • Postmenopausal bleeding    • Psoriasis    • Sinus tachycardia    • Sjogren's syndrome (HCC)    • Sleep apnea     when available on recall   • Tinnitus    • Vitamin D deficiency    • Vitamin D deficiency 12/3/2016   • Vitreous detachment of right eye    • Wears glasses         Past Surgical History:   Procedure Laterality Date   • CATARACT EXTRACTION, BILATERAL  2018   • CHOLECYSTECTOMY  06/09/1997    with operative cholangiogram. Chronic cholecystitis & cholelithiasis. HX of passed common duct stone   • CYSTOSCOPY  11/26/1962    urethral dilatation.Recurrent pyelonephritis. urethritis   • ENDOSCOPY N/A 2/17/2017    Procedure: ESOPHAGOGASTRODUODENOSCOPY;  Surgeon: Cisco Mason DO;  Location: [facility] ENDOSCOPY;  Service:    • ENDOSCOPY N/A 10/31/2018    Procedure: ESOPHAGOGASTRODUODENOSCOPY;  Surgeon: Cisco Mason DO;  Location: [facility] ENDOSCOPY;  Service: Gastroenterology   • ENDOSCOPY N/A 10/12/2021    Procedure: ESOPHAGOGASTRODUODENOSCOPY;  Surgeon: Cisco Mason DO;  Location: [facility] ENDOSCOPY;  Service: Gastroenterology;  Laterality: N/A;   • ENDOSCOPY AND COLONOSCOPY  09/30/1985    Normal colon to left transverse colon   • EYE SURGERY      Cataract   • GALLBLADDER SURGERY     • JOINT REPLACEMENT      R Ant ROSALINE  7/6/2020   • PAP SMEAR  2009   • TONSILLECTOMY  1950   • VAGINAL DELIVERY      x3        PT Ortho     Row Name 09/07/22 1435       Subjective Comments    Subjective Comments Patient reports doing her HEP and daily icing every day except yesterday since the PT consult.  -BS       Precautions and Contraindications    Precautions R ROSALINE 7-  -BS       Subjective Pain    Able to rate subjective pain? yes  -BS    Pre-Treatment Pain Level 5  -BS    Post-Treatment Pain Level 5  -BS          User Key  (r) = Recorded By, (t) = Taken By, (c) = Cosigned By    Initials Name Provider Type    Francisco Gonzalez, PT Physical Therapist                             PT Assessment/Plan     Row Name 09/07/22 1435          PT Assessment    Assessment Comments Patient demonstrated independence and good execution of all exercises on her HEP. Patient wanting to d/c at this time and discuss with her surgeon regaring possibly delaying her surgery scheduled later this month for after the holidays in possibly January.  -BS            PT Plan    PT Plan Comments D/c'd per MD orders and pt request.  -BS           User Key  (r) = Recorded By, (t) = Taken By, (c) = Cosigned By    Initials Name Provider Type    Francisco Gonzalez, PT Physical Therapist                     OP Exercises     Row Name 09/07/22 1431             Subjective Comments    Subjective Comments Patient reports doing her HEP and daily icing every day except yesterday since the PT consult.  -BS              Subjective Pain    Able to rate subjective pain? yes  -BS      Pre-Treatment Pain Level 5  -BS      Post-Treatment Pain Level 5  -BS              Exercise 1    Exercise Name 1 Pro II, L2  -BS      Reps 1 10'  -BS              Exercise 2    Exercise Name 2 seated pulleys-flex/scap  -BS      Time 2 4' flex  -BS      Additional Comments 2' scaption  -BS              Exercise 3    Exercise Name 3 supine R shoulder flex resisted w/ 2 lb db  -BS      Sets 3 2  -BS      Reps 3 10   -BS              Exercise 4    Exercise Name 4 supine resisted serratus punches with 3 lb db  -BS      Sets 4 2  -BS      Reps 4 10  -BS              Exercise 5    Exercise Name 5 SL R shoulder abduction AROM  -BS      Sets 5 2  -BS      Reps 5 10  -BS              Exercise 6    Exercise Name 6 supine R shoulder flex AAROM with dowel  -BS      Sets 6 1  -BS      Reps 6 20  -BS              Exercise 7    Exercise Name 7 discussion only regarding sitting/supine R shoulder ER/IR with dowel  -BS              Exercise 8    Exercise Name 8 resisted mid rows w/ green TB  -BS      Sets 8 1  -BS      Reps 8 20  -BS              Exercise 9    Exercise Name 9 resisted R shoulder ext with green TB  -BS      Sets 9 1  -BS      Reps 9 20  -BS              Exercise 10    Exercise Name 10 PROM with clinician, R shoulder in supine  -BS      Time 10 6'  -BS      Additional Comments +/- long axis traction to R shoulder abduction in supine  -BS            User Key  (r) = Recorded By, (t) = Taken By, (c) = Cosigned By    Initials Name Provider Type    Francisco Gonzalez, PT Physical Therapist                                PT OP Goals     Row Name 09/07/22 1435 09/07/22 1400       PT Short Term Goals    STG Date to Achieve 09/09/22  -BS --    STG 1 Patient demonstrate independence with HEP  -BS --    STG 1 Progress Met  -BS --       Time Calculation    PT Goal Re-Cert Due Date -- --  N/A  -BS          User Key  (r) = Recorded By, (t) = Taken By, (c) = Cosigned By    Initials Name Provider Type    Francisco Gonzalez, PT Physical Therapist                               Time Calculation:   Start Time: 1435  Stop Time: 1525  Time Calculation (min): 50 min  Total Timed Code Minutes- PT: 50 minute(s)  Therapy Charges for Today     Code Description Service Date Service Provider Modifiers Qty    [id number] HC PT THER PROC EA 15 MIN 9/7/2022 Francisco Gatica, PT GP 3                OP PT Discharge Summary  Date of Discharge: 09/07/22  Reason for  Discharge: All goals achieved, Per MD order, Patient/Caregiver request  Outcomes Achieved: Able to achieve all goals within established timeline  Discharge Destination: Home with home program      Francisco Gatica, PT  9/7/2022

## 2022-09-14 ENCOUNTER — APPOINTMENT (OUTPATIENT)
Dept: PHYSICAL THERAPY | Facility: HOSPITAL | Age: 77
End: 2022-09-14

## 2022-09-19 RX ORDER — LEVOTHYROXINE SODIUM 88 MCG
TABLET ORAL
Qty: 96 TABLET | Refills: 6 | Status: SHIPPED | OUTPATIENT
Start: 2022-09-19 | End: 2023-01-20 | Stop reason: SDUPTHER

## 2022-09-29 RX ORDER — LOSARTAN POTASSIUM 25 MG/1
TABLET ORAL
Qty: 90 TABLET | Refills: 3 | Status: SHIPPED | OUTPATIENT
Start: 2022-09-29 | End: 2023-01-20 | Stop reason: SDUPTHER

## 2022-09-29 RX ORDER — METOPROLOL SUCCINATE 50 MG/1
TABLET, EXTENDED RELEASE ORAL
Qty: 90 TABLET | Refills: 3 | Status: SHIPPED | OUTPATIENT
Start: 2022-09-29 | End: 2023-01-20 | Stop reason: SDUPTHER

## 2022-12-21 ENCOUNTER — TELEPHONE (OUTPATIENT)
Dept: ENDOCRINOLOGY | Facility: CLINIC | Age: 77
End: 2022-12-21

## 2022-12-28 ENCOUNTER — LAB (OUTPATIENT)
Dept: LAB | Facility: HOSPITAL | Age: 77
End: 2022-12-28
Payer: MEDICARE

## 2022-12-28 DIAGNOSIS — E53.8 B12 DEFICIENCY: ICD-10-CM

## 2022-12-28 DIAGNOSIS — M81.0 AGE-RELATED OSTEOPOROSIS WITHOUT CURRENT PATHOLOGICAL FRACTURE: ICD-10-CM

## 2022-12-28 DIAGNOSIS — E78.5 DYSLIPIDEMIA: ICD-10-CM

## 2022-12-28 DIAGNOSIS — E89.0 POSTABLATIVE HYPOTHYROIDISM: ICD-10-CM

## 2022-12-28 DIAGNOSIS — R73.09 ELEVATED GLUCOSE: ICD-10-CM

## 2022-12-28 DIAGNOSIS — E55.9 VITAMIN D DEFICIENCY: ICD-10-CM

## 2022-12-28 LAB
ALBUMIN SERPL-MCNC: 4.1 G/DL (ref 3.5–5.2)
ALBUMIN/GLOB SERPL: 1.7 G/DL
ALP SERPL-CCNC: 94 U/L (ref 39–117)
ALT SERPL W P-5'-P-CCNC: 12 U/L (ref 1–33)
ANION GAP SERPL CALCULATED.3IONS-SCNC: 10 MMOL/L (ref 5–15)
AST SERPL-CCNC: 17 U/L (ref 1–32)
BASOPHILS # BLD AUTO: 0.06 10*3/MM3 (ref 0–0.2)
BASOPHILS NFR BLD AUTO: 0.9 % (ref 0–1.5)
BILIRUB SERPL-MCNC: 0.6 MG/DL (ref 0–1.2)
BUN SERPL-MCNC: 11 MG/DL (ref 8–23)
BUN/CREAT SERPL: 14.5 (ref 7–25)
CALCIUM SPEC-SCNC: 9.5 MG/DL (ref 8.6–10.5)
CHLORIDE SERPL-SCNC: 107 MMOL/L (ref 98–107)
CHOLEST SERPL-MCNC: 213 MG/DL (ref 0–200)
CO2 SERPL-SCNC: 25 MMOL/L (ref 22–29)
CREAT SERPL-MCNC: 0.76 MG/DL (ref 0.57–1)
DEPRECATED RDW RBC AUTO: 41.4 FL (ref 37–54)
EGFRCR SERPLBLD CKD-EPI 2021: 80.3 ML/MIN/1.73
EOSINOPHIL # BLD AUTO: 0.36 10*3/MM3 (ref 0–0.4)
EOSINOPHIL NFR BLD AUTO: 5.2 % (ref 0.3–6.2)
ERYTHROCYTE [DISTWIDTH] IN BLOOD BY AUTOMATED COUNT: 12.6 % (ref 12.3–15.4)
GLOBULIN UR ELPH-MCNC: 2.4 GM/DL
GLUCOSE SERPL-MCNC: 96 MG/DL (ref 65–99)
HBA1C MFR BLD: 5.2 % (ref 4.8–5.6)
HCT VFR BLD AUTO: 38.7 % (ref 34–46.6)
HDLC SERPL-MCNC: 50 MG/DL (ref 40–60)
HGB BLD-MCNC: 12.8 G/DL (ref 12–15.9)
IMM GRANULOCYTES # BLD AUTO: 0.02 10*3/MM3 (ref 0–0.05)
IMM GRANULOCYTES NFR BLD AUTO: 0.3 % (ref 0–0.5)
LDLC SERPL CALC-MCNC: 129 MG/DL (ref 0–100)
LDLC/HDLC SERPL: 2.49 {RATIO}
LYMPHOCYTES # BLD AUTO: 1.45 10*3/MM3 (ref 0.7–3.1)
LYMPHOCYTES NFR BLD AUTO: 21.1 % (ref 19.6–45.3)
MCH RBC QN AUTO: 29.8 PG (ref 26.6–33)
MCHC RBC AUTO-ENTMCNC: 33.1 G/DL (ref 31.5–35.7)
MCV RBC AUTO: 90.2 FL (ref 79–97)
MONOCYTES # BLD AUTO: 0.55 10*3/MM3 (ref 0.1–0.9)
MONOCYTES NFR BLD AUTO: 8 % (ref 5–12)
NEUTROPHILS NFR BLD AUTO: 4.42 10*3/MM3 (ref 1.7–7)
NEUTROPHILS NFR BLD AUTO: 64.5 % (ref 42.7–76)
NRBC BLD AUTO-RTO: 0 /100 WBC (ref 0–0.2)
PLATELET # BLD AUTO: 270 10*3/MM3 (ref 140–450)
PMV BLD AUTO: 10.1 FL (ref 6–12)
POTASSIUM SERPL-SCNC: 4.2 MMOL/L (ref 3.5–5.2)
PROT SERPL-MCNC: 6.5 G/DL (ref 6–8.5)
RBC # BLD AUTO: 4.29 10*6/MM3 (ref 3.77–5.28)
SODIUM SERPL-SCNC: 142 MMOL/L (ref 136–145)
TRIGL SERPL-MCNC: 192 MG/DL (ref 0–150)
TSH SERPL DL<=0.05 MIU/L-ACNC: 0.8 UIU/ML (ref 0.27–4.2)
VLDLC SERPL-MCNC: 34 MG/DL (ref 5–40)
WBC NRBC COR # BLD: 6.86 10*3/MM3 (ref 3.4–10.8)

## 2022-12-28 PROCEDURE — 82306 VITAMIN D 25 HYDROXY: CPT

## 2022-12-28 PROCEDURE — 84443 ASSAY THYROID STIM HORMONE: CPT

## 2022-12-28 PROCEDURE — 85025 COMPLETE CBC W/AUTO DIFF WBC: CPT

## 2022-12-28 PROCEDURE — 80061 LIPID PANEL: CPT

## 2022-12-28 PROCEDURE — 36415 COLL VENOUS BLD VENIPUNCTURE: CPT

## 2022-12-28 PROCEDURE — 80053 COMPREHEN METABOLIC PANEL: CPT

## 2022-12-28 PROCEDURE — 82607 VITAMIN B-12: CPT

## 2022-12-28 PROCEDURE — 83036 HEMOGLOBIN GLYCOSYLATED A1C: CPT

## 2022-12-29 LAB
25(OH)D3 SERPL-MCNC: 40.1 NG/ML (ref 30–100)
VIT B12 BLD-MCNC: 685 PG/ML (ref 211–946)

## 2022-12-30 ENCOUNTER — LAB (OUTPATIENT)
Dept: LAB | Facility: HOSPITAL | Age: 77
End: 2022-12-30
Payer: MEDICARE

## 2022-12-30 DIAGNOSIS — M81.0 AGE-RELATED OSTEOPOROSIS WITHOUT CURRENT PATHOLOGICAL FRACTURE: ICD-10-CM

## 2022-12-30 DIAGNOSIS — E89.0 POSTABLATIVE HYPOTHYROIDISM: ICD-10-CM

## 2022-12-30 DIAGNOSIS — E53.8 B12 DEFICIENCY: ICD-10-CM

## 2022-12-30 DIAGNOSIS — E78.5 DYSLIPIDEMIA: ICD-10-CM

## 2022-12-30 DIAGNOSIS — R73.09 ELEVATED GLUCOSE: ICD-10-CM

## 2022-12-30 DIAGNOSIS — E55.9 VITAMIN D DEFICIENCY: ICD-10-CM

## 2022-12-30 LAB
ALBUMIN UR-MCNC: <1.2 MG/DL
CREAT UR-MCNC: 33.3 MG/DL
MICROALBUMIN/CREAT UR: NORMAL MG/G{CREAT}

## 2022-12-30 PROCEDURE — 82043 UR ALBUMIN QUANTITATIVE: CPT

## 2022-12-30 PROCEDURE — 82570 ASSAY OF URINE CREATININE: CPT

## 2023-01-12 ENCOUNTER — OFFICE VISIT (OUTPATIENT)
Dept: OTOLARYNGOLOGY | Facility: CLINIC | Age: 78
End: 2023-01-12
Payer: MEDICARE

## 2023-01-12 VITALS — WEIGHT: 192.6 LBS | BODY MASS INDEX: 29.19 KG/M2 | TEMPERATURE: 97.2 F | HEIGHT: 68 IN

## 2023-01-12 DIAGNOSIS — H60.63 CHRONIC NON-INFECTIVE OTITIS EXTERNA OF BOTH EARS, UNSPECIFIED TYPE: Primary | ICD-10-CM

## 2023-01-12 PROCEDURE — 99213 OFFICE O/P EST LOW 20 MIN: CPT | Performed by: OTOLARYNGOLOGY

## 2023-01-12 NOTE — PROGRESS NOTES
Subjective   Carla Best is a 77 y.o. female.       History of Present Illness   Patient is followed with chronic otitis externa.  Reports she is not having any pain or drainage.      The following portions of the patient's history were reviewed and updated as appropriate: allergies, current medications, past family history, past medical history, past social history, past surgical history and problem list.     reports that she has never smoked. She has never used smokeless tobacco. She reports that she does not drink alcohol and does not use drugs.   Patient is not a tobacco user and has not been counseled for use of tobacco products      Review of Systems        Objective   Physical Exam    Both ear canals show uninfected squamous debris that is cleaned under the microscope using instrumentation.  Beyond this tympanic membranes are intact no infection or effusion       Assessment and Plan   Diagnoses and all orders for this visit:    1. Chronic non-infective otitis externa of both ears, unspecified type (Primary)             Plan: Ears cleaned as described above.  Return in 1 year.  Call for problems.

## 2023-01-20 ENCOUNTER — OFFICE VISIT (OUTPATIENT)
Dept: ENDOCRINOLOGY | Facility: CLINIC | Age: 78
End: 2023-01-20
Payer: MEDICARE

## 2023-01-20 VITALS
SYSTOLIC BLOOD PRESSURE: 140 MMHG | HEART RATE: 77 BPM | OXYGEN SATURATION: 98 % | WEIGHT: 193 LBS | HEIGHT: 68 IN | DIASTOLIC BLOOD PRESSURE: 90 MMHG | BODY MASS INDEX: 29.25 KG/M2

## 2023-01-20 DIAGNOSIS — E55.9 VITAMIN D DEFICIENCY: ICD-10-CM

## 2023-01-20 DIAGNOSIS — I10 ESSENTIAL HYPERTENSION: ICD-10-CM

## 2023-01-20 DIAGNOSIS — E53.8 B12 DEFICIENCY: ICD-10-CM

## 2023-01-20 DIAGNOSIS — M81.0 AGE-RELATED OSTEOPOROSIS WITHOUT CURRENT PATHOLOGICAL FRACTURE: ICD-10-CM

## 2023-01-20 DIAGNOSIS — E78.5 DYSLIPIDEMIA: ICD-10-CM

## 2023-01-20 DIAGNOSIS — R73.01 IMPAIRED FASTING GLUCOSE: ICD-10-CM

## 2023-01-20 DIAGNOSIS — E89.0 POSTABLATIVE HYPOTHYROIDISM: Primary | ICD-10-CM

## 2023-01-20 PROCEDURE — 99214 OFFICE O/P EST MOD 30 MIN: CPT | Performed by: INTERNAL MEDICINE

## 2023-01-20 RX ORDER — PRAVASTATIN SODIUM 20 MG
20 TABLET ORAL NIGHTLY
Qty: 90 TABLET | Refills: 3 | Status: SHIPPED | OUTPATIENT
Start: 2023-01-20

## 2023-01-20 RX ORDER — LEVOTHYROXINE SODIUM 88 MCG
TABLET ORAL
Qty: 90 TABLET | Refills: 3 | Status: SHIPPED | OUTPATIENT
Start: 2023-01-20 | End: 2023-01-20 | Stop reason: SDUPTHER

## 2023-01-20 RX ORDER — METOPROLOL SUCCINATE 50 MG/1
50 TABLET, EXTENDED RELEASE ORAL
Qty: 90 TABLET | Refills: 3 | Status: SHIPPED | OUTPATIENT
Start: 2023-01-20

## 2023-01-20 RX ORDER — ERGOCALCIFEROL 1.25 MG/1
50000 CAPSULE ORAL
Qty: 6 CAPSULE | Refills: 3 | Status: SHIPPED | OUTPATIENT
Start: 2023-01-20

## 2023-01-20 RX ORDER — LEVOTHYROXINE SODIUM 88 MCG
TABLET ORAL
Qty: 114 TABLET | Refills: 3 | Status: SHIPPED | OUTPATIENT
Start: 2023-01-20

## 2023-01-20 RX ORDER — LOSARTAN POTASSIUM 25 MG/1
25 TABLET ORAL DAILY
Qty: 90 TABLET | Refills: 3 | Status: SHIPPED | OUTPATIENT
Start: 2023-01-20

## 2023-01-20 NOTE — PROGRESS NOTES
"  CC Hypothyroidism    HPI     77 y.o.      female comes for fu , very pleasant       history of subacute thyroiditis with subsequent permanent hypothyroidism      duration - 5+ years    timing - constant    quality - biochemically controlled      severity - moderate     symptoms - mainly fatigue and myalgias         PE    /90   Pulse 77   Ht 172.7 cm (68\")   Wt 87.5 kg (193 lb)   SpO2 98%   BMI 29.35 kg/m²   AOx3  No visible goiter  Normal Respiratory Effort , Lung CTA  RRR  No Edema    Labs    Lab Results   Component Value Date    WBC 6.86 12/28/2022    HGB 12.8 12/28/2022    HCT 38.7 12/28/2022    MCV 90.2 12/28/2022     12/28/2022     Lab Results   Component Value Date    GLUCOSE 96 12/28/2022    BUN 11 12/28/2022    CREATININE 0.76 12/28/2022    EGFRIFNONA 80 01/21/2022    EGFRIFAFRI 85 06/29/2020    BCR 14.5 12/28/2022     12/28/2022    K 4.2 12/28/2022    CO2 25.0 12/28/2022    CALCIUM 9.5 12/28/2022    ALBUMIN 4.1 12/28/2022    LABIL2 1.6 10/22/2019    AST 17 12/28/2022    ALT 12 12/28/2022     Lab Results   Component Value Date    HGBA1C 5.20 12/28/2022         Assessment & Plan       Diagnosis Plan   1. Postablative hypothyroidism  TSH    Synthroid 88 MCG tablet      2. Impaired fasting glucose  CBC Auto Differential    Comprehensive Metabolic Panel    Hemoglobin A1c    Microalbumin / Creatinine Urine Ratio - Urine, Clean Catch      3. Vitamin D deficiency  vitamin D (ERGOCALCIFEROL) 1.25 MG (72100 UT) capsule capsule      4. B12 deficiency  Vitamin B12      5. Age-related osteoporosis without current pathological fracture   vitamin D (ERGOCALCIFEROL) 1.25 MG (61714 UT) capsule capsule      6. Dyslipidemia  pravastatin (PRAVACHOL) 20 MG tablet    Lipid Panel      7. Essential hypertension  losartan (COZAAR) 25 MG tablet    metoprolol succinate XL (TOPROL-XL) 50 MG 24 hr tablet               Hypothyroidism      Has taken multiple formulations and doses     Is on biotin but stops 1 " week before     Now on synthroid 112-100-88    Has been on 88 mcgs ; 8 tabs, 7 tabs but now on 6 tabs weekly and working    Thyroid Workup    Lab Results   Component Value Date    TSH 0.797 12/28/2022    TSH 3.540 07/18/2022    TSH 0.126 (L) 03/01/2022       Lab Results   Component Value Date    FREET4 1.60 03/04/2021    FREET4 1.65 11/06/2020    FREET4 1.36 08/21/2020       Lab Results   Component Value Date    T3FREE 3.36 11/06/2020    T3FREE 2.70 08/21/2020    T3FREE 3.42 02/12/2020       TPO antibodies    No results found for: THYROIDAB    TSI antibodies    No results found for: THYRSTIMIMMU          neg celiac panel     she also has sjogren's    =====     Osteoporosis - Osteopenia      comparing 2019 to  Feb 2022 , improvement    She went from numerical osteopenia w high FRAX to    FRAX   Hip 2.8  Major 12    Fosamax gave GERD, she preferred no prolia and doesn't need     On Vit D and calcium           Lab Results   Component Value Date    UQLN79OI 40.1 12/28/2022    YEQJ95QD 40.4 01/21/2022    FOSL45FU 39.1 07/19/2021      next DXA Feb 2024        =====      Alopecia     Thyroid as above  Dr. Baig added aldactone, she can't tolerate  Added iron even though levels are normal       I added finasteride 1 mg daily - preferred not to     Weight    Gained weight due to elavil , she had to stop due to side effects  Fasting glucose 101 and mild elevation in LFT  ,now nl   Weight now stable       ===    Dyslipidemia    Lab Results   Component Value Date    CHOL 213 (H) 12/28/2022    CHLPL 249 (H) 12/07/2016    TRIG 192 (H) 12/28/2022    HDL 50 12/28/2022     (H) 12/28/2022     Lab Results   Component Value Date     (H) 12/28/2022     (H) 03/01/2022     (H) 01/21/2022     The 10-year ASCVD risk score (Julio FERRARA, et al., 2019) is: 32.4%    Values used to calculate the score:      Age: 77 years      Sex: Female      Is Non- : No      Diabetic: No      Tobacco smoker:  No      Systolic Blood Pressure: 140 mmHg      Is BP treated: Yes      HDL Cholesterol: 50 mg/dL      Total Cholesterol: 213 mg/dL      Side effects to statins in the past     Tolerating pravastatin 20 w some pain   Strong fam hx of CAD      IFG  Lifestyle changes  nlized    Lab Results   Component Value Date    HGBA1C 5.20 12/28/2022     --    HTN    Elevated today   Back pain    On toprol and losartan     ==    KIMMIE    Hasn't been able to get     ==    sjogren , OA    On chloroquine, stopped             This document has been electronically signed by Andre Og MD on January 20, 2023 09:47 CST

## 2023-03-01 ENCOUNTER — OFFICE VISIT (OUTPATIENT)
Dept: SLEEP MEDICINE | Facility: HOSPITAL | Age: 78
End: 2023-03-01
Payer: MEDICARE

## 2023-03-01 VITALS
OXYGEN SATURATION: 98 % | BODY MASS INDEX: 29.89 KG/M2 | HEART RATE: 93 BPM | WEIGHT: 197.2 LBS | DIASTOLIC BLOOD PRESSURE: 79 MMHG | HEIGHT: 68 IN | SYSTOLIC BLOOD PRESSURE: 138 MMHG

## 2023-03-01 DIAGNOSIS — Z78.9 DIFFICULTY WITH CPAP USE: ICD-10-CM

## 2023-03-01 DIAGNOSIS — G47.33 OSA (OBSTRUCTIVE SLEEP APNEA): Primary | ICD-10-CM

## 2023-03-01 PROCEDURE — 99213 OFFICE O/P EST LOW 20 MIN: CPT | Performed by: NURSE PRACTITIONER

## 2023-03-01 NOTE — PROGRESS NOTES
Sleep Clinic Follow Up    Date: 3/1/2023  Primary Care Provider: Anastacia Coppola APRN    Last office visit: 2022 (I reviewed this note)    CC: Follow up: KIMMIE on CPAP, 6 month      Interim History:  Since the last visit:    1) severe KIMMIE -  Carla Best has mostly remained compliant with CPAP. She denies  machine issues, dry mouth, headaches, or pressures intolerance. She denies abnormal dreams, sleep paralysis, nasal congestion, URI sx.  She does not like using machine.     Since last OV she tried using nasal p10 mask. Likes mask better but headgear is getting loose. She had consult with ENT regarding Inspire but has chosen to not pursue. Will continue to try to use CPAP. She is wearing every night but takes it off after several hours.     Had to get new machine because hers stopped working. I only have data from old machine.     2) Patient denies RLS symptoms.    Sleep Testin. Split night PSG on 2019, AHI of 44   2. CPAP titration, recommended 8-11 cm H2O   3. Currently on 9-15 cm H2O    PAP Data:  Usage not accurate because she got replacement machine middle of December. Will try to get data from new machine     Time frame: 2022-2023   Average use on days used: 4hrs 30 min  PAP range: 9-15 cm H2O  Average 90% pressure: 10 cmH2O  Leak: 1 minutes  Average AHI: 1 events/hr  Mask type: Nasal mask  DME: [facility]     Bed time: 4852-2822  Sleep latency: 20 minutes  Number of times awakens during the night: 3  Wake time: 6382-3929  Estimated total sleep time at night: 4-5 hours  Caffeine intake: 1 cups of coffee, 2 cups of tea, and 1 sodas per day  Alcohol intake: 0 drinks per week  Nap time: most days    Sleepiness with Driving: denies, does not drive usually      [place] - 13        PMHx, FH, SH reviewed and pertinent changes are: reports unchanged from last office visit on 2022      REVIEW OF SYSTEMS:   Negative for chest pain, SOA, fever, chills, cough, N/V/D, abdominal  pain.    Smoking:none        Exam:  Vitals:    03/01/23 1000   BP: 138/79   Pulse: 93   SpO2: 98%           03/01/23  1000   Weight: 89.4 kg (197 lb 3.2 oz)     Body mass index is 29.99 kg/m². BMI is >= 25 and <30. (Overweight) The following options were offered after discussion;: nutrition counseling/recommendations      Gen:                No distress, conversant, pleasant, appears stated age, alert, oriented  Eyes:               Anicteric sclera, moist conjunctiva, no lid lag                           EOMI   Lungs:             normal effort, non-labored breathing                          Clear to auscultation bilaterally          CV:                  Normal S1/S2, no murmur                          no lower extremity edema                 Psych:             Appropriate affect  Neuro:             CN 2-12 appear intact    Past Medical History:   Diagnosis Date   • Allergic rhinitis    • Cancer (HCC)     ear sp carcinoma   • Chest pain    • Dental disease    • Dizziness    • Elevated cholesterol    • Fibrocystic disease of breast    • GERD (gastroesophageal reflux disease)    • Hashimoto's thyroiditis    • Hearing aid worn    • History of screening mammography 08/27/2014    SCREENING MAMMOGRAPHY DIGITAL  (Medicare) (1)    • History of transfusion    • HL (hearing loss)    • Hyperlipidemia    • Hypertension    • Hypothyroidism due to Hashimoto's thyroiditis 12/3/2016   • Keratoconjunctivitis sicca (HCC)    • Migraines    • Nausea    • Nuclear senile cataract    • On long term drug therapy    • Osteoarthritis    • Osteopenia    • Osteopenia 12/3/2016   • Photopsia     Right   • Postmenopausal bleeding    • Psoriasis    • Sinus tachycardia    • Sjogren's syndrome (HCC)    • Sleep apnea     when available on recall   • Tinnitus    • Vitamin D deficiency    • Vitamin D deficiency 12/3/2016   • Vitreous detachment of right eye    • Wears glasses        Current Outpatient Medications:   •  aspirin 81 MG EC tablet,  Take 1 tablet by mouth Daily., Disp: , Rfl:   •  Biotin 5 MG capsule, Take 1 capsule by mouth Daily., Disp: , Rfl:   •  Calcium Carbonate-Vitamin D 600-200 MG-UNIT tablet, Take 1 tablet by mouth Daily., Disp: , Rfl:   •  clobetasol (TEMOVATE) 0.05 % ointment, Apply  topically to the appropriate area as directed 2 (Two) Times a Day., Disp: 60 g, Rfl: 20  •  losartan (COZAAR) 25 MG tablet, Take 1 tablet by mouth Daily., Disp: 90 tablet, Rfl: 3  •  metoprolol succinate XL (TOPROL-XL) 50 MG 24 hr tablet, Take 1 tablet by mouth Daily With Dinner., Disp: 90 tablet, Rfl: 3  •  Multiple Vitamins-Minerals (MULTIVITAMIN ADULT PO), Take 1 tablet by mouth Daily., Disp: , Rfl:   •  pravastatin (PRAVACHOL) 20 MG tablet, Take 1 tablet by mouth Every Night., Disp: 90 tablet, Rfl: 3  •  Synthroid 88 MCG tablet, Take one tab daily and 2 on Sat and 2 on Sunday. This is the correct dose, Disp: 114 tablet, Rfl: 3  •  triamcinolone (KENALOG) 0.1 % cream, Apply 1 application topically to the appropriate area as directed 2 (Two) Times a Day., Disp: 45 g, Rfl: 9  •  vitamin B-12 (CYANOCOBALAMIN) 1000 MCG tablet, Take 1 tablet by mouth Every Other Day., Disp: , Rfl:   •  vitamin D (ERGOCALCIFEROL) 1.25 MG (89151 UT) capsule capsule, Take 1 capsule by mouth Every 14 (Fourteen) Days., Disp: 6 capsule, Rfl: 3      Assessment and Plan:    1. Obstructive sleep apnea- Established, stable   1. Mostly compliant with PAP therapy- compliance report reviewed with patient. Obtain data from new machine - reach out to DME. Encouraged increased usage. Discussed consequences of sleep apnea. She is aware   2. Continue PAP as prescribed  3. Script for PAP supplies- replace mask and headgear as frequent as insurance will allow   4. Pertinent labs reviewed   5. Drowsy driving tips- do not drive if feeling sleepy   6. Return to clinic in 6 months with compliance report unless change in symptoms in interim period          I spent 20 minutes caring for Carla on  this date of service. This time includes time spent by me in the following activities: preparing for the visit, obtaining and/or reviewing a separately obtained history, performing a medically appropriate examination and/or evaluation, counseling and educating the patient/family/caregiver, ordering medications, tests, or procedures and documenting information in the medical record. Educated on PAP management, maintenance, and compliance.           This document has been electronically signed by ZACHARY Higeura on March 1, 2023 10:15 CST            CC: Anastacia Coppola APRN          No ref. provider found

## 2023-03-07 ENCOUNTER — PREP FOR SURGERY (OUTPATIENT)
Dept: OTHER | Facility: HOSPITAL | Age: 78
End: 2023-03-07
Payer: MEDICARE

## 2023-03-07 DIAGNOSIS — K22.2 STRICTURE AND STENOSIS OF ESOPHAGUS: Primary | ICD-10-CM

## 2023-03-07 RX ORDER — DEXTROSE AND SODIUM CHLORIDE 5; .45 G/100ML; G/100ML
30 INJECTION, SOLUTION INTRAVENOUS CONTINUOUS PRN
OUTPATIENT
Start: 2023-03-07

## 2023-03-07 RX ORDER — SODIUM CHLORIDE 9 MG/ML
40 INJECTION, SOLUTION INTRAVENOUS AS NEEDED
OUTPATIENT
Start: 2023-03-07

## 2023-03-08 PROBLEM — K22.2 STRICTURE AND STENOSIS OF ESOPHAGUS: Status: ACTIVE | Noted: 2023-03-08

## 2023-04-11 ENCOUNTER — ANESTHESIA (OUTPATIENT)
Dept: GASTROENTEROLOGY | Facility: HOSPITAL | Age: 78
End: 2023-04-11
Payer: MEDICARE

## 2023-04-11 ENCOUNTER — HOSPITAL ENCOUNTER (OUTPATIENT)
Facility: HOSPITAL | Age: 78
Setting detail: HOSPITAL OUTPATIENT SURGERY
Discharge: HOME | End: 2023-04-11
Attending: INTERNAL MEDICINE | Admitting: INTERNAL MEDICINE
Payer: MEDICARE

## 2023-04-11 ENCOUNTER — ANESTHESIA EVENT (OUTPATIENT)
Dept: GASTROENTEROLOGY | Facility: HOSPITAL | Age: 78
End: 2023-04-11
Payer: MEDICARE

## 2023-04-11 VITALS
HEIGHT: 68 IN | BODY MASS INDEX: 28.95 KG/M2 | DIASTOLIC BLOOD PRESSURE: 72 MMHG | TEMPERATURE: 96.2 F | OXYGEN SATURATION: 96 % | SYSTOLIC BLOOD PRESSURE: 137 MMHG | WEIGHT: 191 LBS | RESPIRATION RATE: 16 BRPM | HEART RATE: 78 BPM

## 2023-04-11 DIAGNOSIS — K22.2 STRICTURE AND STENOSIS OF ESOPHAGUS: ICD-10-CM

## 2023-04-11 PROCEDURE — C1769 GUIDE WIRE: HCPCS | Performed by: INTERNAL MEDICINE

## 2023-04-11 PROCEDURE — 25010000002 PROPOFOL 10 MG/ML EMULSION: Performed by: NURSE ANESTHETIST, CERTIFIED REGISTERED

## 2023-04-11 RX ORDER — DEXTROSE AND SODIUM CHLORIDE 5; .45 G/100ML; G/100ML
INJECTION, SOLUTION INTRAVENOUS CONTINUOUS PRN
Status: DISCONTINUED | OUTPATIENT
Start: 2023-04-11 | End: 2023-04-11 | Stop reason: SURG

## 2023-04-11 RX ORDER — LIDOCAINE HYDROCHLORIDE 20 MG/ML
INJECTION, SOLUTION INTRAVENOUS AS NEEDED
Status: DISCONTINUED | OUTPATIENT
Start: 2023-04-11 | End: 2023-04-11 | Stop reason: SURG

## 2023-04-11 RX ORDER — DEXTROSE AND SODIUM CHLORIDE 5; .45 G/100ML; G/100ML
30 INJECTION, SOLUTION INTRAVENOUS CONTINUOUS PRN
Status: DISCONTINUED | OUTPATIENT
Start: 2023-04-11 | End: 2023-04-11 | Stop reason: HOSPADM

## 2023-04-11 RX ORDER — SODIUM CHLORIDE 9 MG/ML
40 INJECTION, SOLUTION INTRAVENOUS AS NEEDED
Status: DISCONTINUED | OUTPATIENT
Start: 2023-04-11 | End: 2023-04-11 | Stop reason: HOSPADM

## 2023-04-11 RX ORDER — PROPOFOL 10 MG/ML
VIAL (ML) INTRAVENOUS AS NEEDED
Status: DISCONTINUED | OUTPATIENT
Start: 2023-04-11 | End: 2023-04-11 | Stop reason: SURG

## 2023-04-11 RX ADMIN — LIDOCAINE HYDROCHLORIDE 80 MG: 20 INJECTION, SOLUTION INTRAVENOUS at 10:41

## 2023-04-11 RX ADMIN — DEXTROSE AND SODIUM CHLORIDE 30 ML/HR: 5; 450 INJECTION, SOLUTION INTRAVENOUS at 09:44

## 2023-04-11 RX ADMIN — PROPOFOL 30 MG: 10 INJECTION, EMULSION INTRAVENOUS at 10:43

## 2023-04-11 RX ADMIN — PROPOFOL 70 MG: 10 INJECTION, EMULSION INTRAVENOUS at 10:41

## 2023-04-11 RX ADMIN — PROPOFOL 30 MG: 10 INJECTION, EMULSION INTRAVENOUS at 10:45

## 2023-04-11 RX ADMIN — DEXTROSE AND SODIUM CHLORIDE: 5; 450 INJECTION, SOLUTION INTRAVENOUS at 10:35

## 2023-04-11 NOTE — H&P
Sydnee Schwartz DO,[facility]  Gastroenterology  Hepatology  Endoscopy  Board Certified in Internal Medicine and gastroenterology  [address], [address]  [place]. [address]  - [phone]   F [phone]     GASTROENTEROLOGY HISTORY AND PHYSICAL  NOTE   SYDNEE SCHWARTZ DO.         SUBJECTIVE:   4/11/2023    Name: Carla Best  DOD: 1945        Chief Complaint:     Subjective : Dysphagia.    Patient is 77 y.o. female presents with desire for elective EGD with dilation.      ROS/HISTORY/ CURRENT MEDICATIONS/OBJECTIVE/VS/PE:   Review of Systems:  All systems unremarkable unless specified below.  Constitutional   HENT  Eyes   Respiratory    Cardiovascular  Gastrointestinal   Endocrine  Genitourinary    Musculoskeletal   Skin  Allergic/Immunologic    Neurological    Hematological  Psychiatric/Behavioral    History:     Past Medical History:   Diagnosis Date   • Allergic rhinitis    • Cancer     ear sp carcinoma   • Chest pain    • Dental disease    • Dizziness    • Elevated cholesterol    • Fibrocystic disease of breast    • GERD (gastroesophageal reflux disease)    • Hashimoto's thyroiditis    • Hearing aid worn    • History of screening mammography 08/27/2014    SCREENING MAMMOGRAPHY DIGITAL  (Medicare) (1)    • History of transfusion    • HL (hearing loss)    • Hyperlipidemia    • Hypertension    • Hypothyroidism due to Hashimoto's thyroiditis 12/3/2016   • Keratoconjunctivitis sicca    • Migraines    • Nausea    • Nuclear senile cataract    • On long term drug therapy    • Osteoarthritis    • Osteopenia    • Osteopenia 12/3/2016   • Photopsia     Right   • Postmenopausal bleeding    • Psoriasis    • Sinus tachycardia    • Sjogren's syndrome    • Sleep apnea     when available on recall   • Tinnitus    • Vitamin D deficiency    • Vitamin D deficiency 12/3/2016   • Vitreous detachment of right eye    • Wears glasses      Past Surgical History:   Procedure Laterality Date   • CATARACT  EXTRACTION, BILATERAL  2018   • CHOLECYSTECTOMY  06/09/1997    with operative cholangiogram. Chronic cholecystitis & cholelithiasis. HX of passed common duct stone   • CYSTOSCOPY  11/26/1962    urethral dilatation.Recurrent pyelonephritis. urethritis   • ENDOSCOPY N/A 2/17/2017    Procedure: ESOPHAGOGASTRODUODENOSCOPY;  Surgeon: Cisco Mason DO;  Location: [facility] ENDOSCOPY;  Service:    • ENDOSCOPY N/A 10/31/2018    Procedure: ESOPHAGOGASTRODUODENOSCOPY;  Surgeon: Cisco Mason DO;  Location: [facility] ENDOSCOPY;  Service: Gastroenterology   • ENDOSCOPY N/A 10/12/2021    Procedure: ESOPHAGOGASTRODUODENOSCOPY;  Surgeon: Cisco Mason DO;  Location: [facility] ENDOSCOPY;  Service: Gastroenterology;  Laterality: N/A;   • ENDOSCOPY AND COLONOSCOPY  09/30/1985    Normal colon to left transverse colon   • EYE SURGERY      Cataract   • GALLBLADDER SURGERY     • JOINT REPLACEMENT      R Ant ROSALINE 7/6/2020   • PAP SMEAR  2009   • TONSILLECTOMY  1950   • VAGINAL DELIVERY      x3     Family History   Problem Relation Age of Onset   • Heart disease Mother    • Arthritis Mother    • Osteoporosis Mother    • Cataracts Mother    • Heart disease Father    • Heart failure Father    • Rashes / Skin problems Father    • Hypertension Sister    • Arthritis Sister    • Diabetes Sister    • Fuchs' dystrophy Sister    • Breast cancer Paternal Grandmother    • Heart failure Paternal Grandmother    • Diabetes Other    • Heart disease Other    • Hypertension Other    • Stroke Other    • Thyroid disease Other    • Lung disease Other    • Other Other         Gallstones, Bleeding Tendency, Colon Problems.   • Fuchs' dystrophy Maternal Grandfather    • Heart failure Maternal Grandfather    • Heart failure Maternal Grandmother    • Heart failure Paternal Grandfather      Social History     Tobacco Use   • Smoking status: Never   • Smokeless tobacco: Never   Vaping Use   • Vaping Use: Never used   Substance Use Topics   • Alcohol use: Never    • Drug use: Never     Prior to Admission medications    Medication Sig Start Date End Date Taking? Authorizing Provider   Biotin 5 MG capsule Take 1 capsule by mouth Daily.   Yes Lauri Lagunas MD   Calcium Carbonate-Vitamin D 600-200 MG-UNIT tablet Take 1 tablet by mouth Daily.   Yes Lauri Lagunas MD   clobetasol (TEMOVATE) 0.05 % ointment Apply  topically to the appropriate area as directed 2 (Two) Times a Day. 4/12/21  Yes Nav Barber MD   losartan (COZAAR) 25 MG tablet Take 1 tablet by mouth Daily. 1/20/23  Yes Andre Jacques MD   metoprolol succinate XL (TOPROL-XL) 50 MG 24 hr tablet Take 1 tablet by mouth Daily With Dinner. 1/20/23  Yes Andre Jacques MD   Multiple Vitamins-Minerals (MULTIVITAMIN ADULT PO) Take 1 tablet by mouth Daily.   Yes Lauri Lagunas MD   pravastatin (PRAVACHOL) 20 MG tablet Take 1 tablet by mouth Every Night. 1/20/23  Yes Andre Jacques MD   Synthroid 88 MCG tablet Take one tab daily and 2 on Sat and 2 on Sunday. This is the correct dose 1/20/23  Yes Andre Jacques MD   triamcinolone (KENALOG) 0.1 % cream Apply 1 application topically to the appropriate area as directed 2 (Two) Times a Day. 4/12/21  Yes Nav Barber MD   vitamin B-12 (CYANOCOBALAMIN) 1000 MCG tablet Take 1 tablet by mouth Every Other Day.   Yes Lauri Lagunas MD   vitamin D (ERGOCALCIFEROL) 1.25 MG (65584 UT) capsule capsule Take 1 capsule by mouth Every 14 (Fourteen) Days. 1/20/23  Yes Andre Jacques MD   aspirin 81 MG EC tablet Take 1 tablet by mouth Daily.    Lauri Lagunas MD     Allergies:  Ciprofloxacin, Codeine, Demerol [meperidine], Dexlansoprazole, Lipitor [atorvastatin calcium], Tape, and Phenazopyridine    I have reviewed the patients medical history, surgical history and family history in the available medical record system.     Current Medications:     Current Facility-Administered Medications   Medication  Dose Route Frequency Provider Last Rate Last Admin   • dextrose 5 % and sodium chloride 0.45 % infusion  30 mL/hr Intravenous Continuous PRN Cisco Mason DO 30 mL/hr at 04/11/23 0944 30 mL/hr at 04/11/23 0944   • sodium chloride 0.9 % infusion 40 mL  40 mL Intravenous PRN Cisco Mason DO           Objective     Physical Exam:   Temp:  [97.6 °F (36.4 °C)] 97.6 °F (36.4 °C)  Heart Rate:  [72] 72  Resp:  [14] 14  BP: (145)/(73) 145/73    Physical Exam:  General Appearance:    Alert, cooperative, in no acute distress   Head:    Normocephalic, without obvious abnormality, atraumatic   Eyes:            Lids and lashes normal, conjunctivae and sclerae normal, no icterus, no pallor, corneas clear, PERRLA   Ears:    Ears appear intact with no abnormalities noted   Throat:   No oral lesions, no thrush, oral mucosa moist   Neck:   No adenopathy, supple, trachea midline, no thyromegaly, no  carotid bruit, no JVD   Back:     No kyphosis present, no scoliosis present, no skin lesions,   erythema or scars, no tenderness to percussion or                 palpation,  range of motion normal   Lungs:     Clear to auscultation,respirations regular, even and         unlabored    Heart:    Regular rhythm and normal rate, normal S1 and S2, no  murmur, no gallop, no rub, no click   Breast Exam:    Deferred   Abdomen:     Normal bowel sounds, no masses, no organomegaly, soft  nontender, nondistended, no guarding, no rebound                 tenderness   Genitalia:    Deferred   Extremities:   Moves all extremities well, no edema, no cyanosis, no          redness   Pulses:   Pulses palpable and equal bilaterally   Skin:   No bleeding, bruising or rash   Lymph nodes:   No palpable adenopathy   Neurologic:   Cranial nerves 2 - 12 grossly intact, sensation intact, DTR     present and equal bilaterally      Results Review:     Lab Results   Component Value Date    WBC 6.86 12/28/2022    WBC 4.75 01/21/2022    WBC 6.98 07/19/2021    HGB  12.8 12/28/2022    HGB 13.2 01/21/2022    HGB 14.3 07/19/2021    HCT 38.7 12/28/2022    HCT 41.1 01/21/2022    HCT 44.2 07/19/2021     12/28/2022     01/21/2022     07/19/2021             No results found for: LIPASE  No results found for: INR  No results found for: CULTURE    Radiology Review:  Imaging Results (Last 72 Hours)     ** No results found for the last 72 hours. **           I reviewed the patient's new clinical results.  I reviewed the patient's new imaging results and agree with the interpretation.     ASSESSMENT/PLAN:   ASSESSMENT:  1.  Dysphagia.  Esophageal stricture    PLAN:  1.  Esophagogastroduodenoscopy with dilation of the esophagus    Risk and benefits associated with the procedure are reviewed with the patient.  The patient wished to proceed     Cisco Mason DO  04/11/23  10:30 CDT

## 2023-04-11 NOTE — ADDENDUM NOTE
Addendum  created 04/11/23 1050 by Neeraj Watts, CRNA    Review and Sign - Ready for Procedure, Review and Sign - Signed

## 2023-09-01 ENCOUNTER — OFFICE VISIT (OUTPATIENT)
Dept: SLEEP MEDICINE | Facility: HOSPITAL | Age: 78
End: 2023-09-01
Payer: MEDICARE

## 2023-09-01 VITALS
SYSTOLIC BLOOD PRESSURE: 124 MMHG | HEART RATE: 68 BPM | WEIGHT: 175.3 LBS | DIASTOLIC BLOOD PRESSURE: 75 MMHG | HEIGHT: 68 IN | OXYGEN SATURATION: 92 % | BODY MASS INDEX: 26.57 KG/M2

## 2023-09-01 DIAGNOSIS — G47.33 OSA (OBSTRUCTIVE SLEEP APNEA): Primary | ICD-10-CM

## 2023-09-01 NOTE — PROGRESS NOTES
Sleep Clinic Follow Up    Date: 9/1/2023  Primary Care Provider: Anastacia Coppola APRN    Last office visit: 03/1/2023 (I reviewed this note)    CC: Follow up: KIMMIE on CPAP, 6 month      Interim History:  Since the last visit:    1) severe KIMMIE -  Carla Best has reportedly remained compliant with CPAP. She denies mask and machine issues, dry mouth, headaches, or pressures intolerance. She denies abnormal dreams, sleep paralysis, nasal congestion, URI sx.  Did not bring card with her. Modem not picking up.     States her machine is showing increased AHI levels in AM with when she wakes up.     2) Patient denies RLS symptoms.     Sleep Testing:    Split night PSG on 08/23/2019, AHI of 44   CPAP titration, recommended 8-11 cm H2O   Currently on 9-15 cm H2O    PAP Data:  Data not available   Machine: dreamstation 2   Mask type: Nasal mask, p10  DME: [facility]    Bed time: 0716-4742  Sleep latency: 15 minutes  Number of times awakens during the night: 3  Wake time: 0530  Estimated total sleep time at night: 5 hours  Caffeine intake: 1 cups of coffee, 3 cups of tea, and 1 sodas per day  Alcohol intake: 0 drinks per week  Nap time: occasionally    Sleepiness with Driving: denies        Merlin Sleepiness Scale Score: 10    How likely are you to doze off or fall asleep in the following situation, in contrast to feeling just tired?     Use the following scale to choose the most appropriate number for each situation:    0 = would never doze  1 = slight chance of dozing   2 = moderate chance of dozing   3 = high chance of dozing    It is important that you answer each question as best you can.      Situation       Chance of Dozing (0-3)    Sitting and reading            ___2____    Watching TV          ___3____    Sitting, inactive in a public place (e.g. a theatre or meeting)   ___1____    As a passenger in a car for an hour without break    ___0____    Lying down to rest in the afternoon, when circumstances  permit  ___3____    Sitting and talking to someone      ___0____    Sitting quietly after a lunch without alcohol     ___1____    In a car, while stopped for a few minutes in traffic    ___0____         PMHx, FH, SH reviewed and pertinent changes are: weight loss, BP improved       REVIEW OF SYSTEMS:   Negative for chest pain, SOA, fever, chills, cough, N/V/D, abdominal pain.    Smoking:none         Exam:  Vitals:    09/01/23 0959   BP: 124/75   Pulse: 68   SpO2: 92%           09/01/23 0959   Weight: 79.5 kg (175 lb 4.8 oz)     Body mass index is 26.66 kg/mý.  BMI is >= 25 and <30. (Overweight) The following options were offered after discussion;: nutrition counseling/recommendations       Gen:                No distress, conversant, pleasant, appears stated age, alert, oriented  Eyes:               Anicteric sclera, moist conjunctiva, no lid lag                           EOMI   Lungs:             normal effort, non-labored breathing                          Clear to auscultation bilaterally          CV:                  Normal S1/S2, no murmur                          no lower extremity edema                 Psych:             Appropriate affect  Neuro:             CN 2-12 appear intact    Past Medical History:   Diagnosis Date    Allergic rhinitis     Cancer     ear sp carcinoma    Chest pain     Dental disease     Dizziness     Elevated cholesterol     Fibrocystic disease of breast     GERD (gastroesophageal reflux disease)     Hashimoto's thyroiditis     Hearing aid worn     History of screening mammography 08/27/2014    SCREENING MAMMOGRAPHY DIGITAL  (Medicare) (1)     History of transfusion     HL (hearing loss)     Hyperlipidemia     Hypertension     Hypothyroidism due to Hashimoto's thyroiditis 12/3/2016    Keratoconjunctivitis sicca     Migraines     Nausea     Nuclear senile cataract     On long term drug therapy     Osteoarthritis     Osteopenia     Osteopenia 12/3/2016    Photopsia     Right     Postmenopausal bleeding     Psoriasis     Sinus tachycardia     Sjogren's syndrome     Sleep apnea     when available on recall    Tinnitus     Vitamin D deficiency     Vitamin D deficiency 12/3/2016    Vitreous detachment of right eye     Wears glasses        Current Outpatient Medications:     aspirin 81 MG EC tablet, Take 1 tablet by mouth Daily., Disp: , Rfl:     Biotin 5 MG capsule, Take 1 capsule by mouth Daily., Disp: , Rfl:     Calcium Carbonate-Vitamin D 600-200 MG-UNIT tablet, Take 1 tablet by mouth Daily., Disp: , Rfl:     clobetasol (TEMOVATE) 0.05 % ointment, Apply  topically to the appropriate area as directed 2 (Two) Times a Day., Disp: 60 g, Rfl: 20    losartan (COZAAR) 25 MG tablet, Take 1 tablet by mouth Daily., Disp: 90 tablet, Rfl: 3    metoprolol succinate XL (TOPROL-XL) 50 MG 24 hr tablet, Take 1 tablet by mouth Daily With Dinner., Disp: 90 tablet, Rfl: 3    Multiple Vitamins-Minerals (MULTIVITAMIN ADULT PO), Take 1 tablet by mouth Daily., Disp: , Rfl:     pravastatin (PRAVACHOL) 20 MG tablet, TAKE ONE TABLET BY MOUTH ONCE NIGHTLY, Disp: 90 tablet, Rfl: 3    Synthroid 88 MCG tablet, Take 2 tabs every day, Disp: 120 tablet, Rfl: 11    triamcinolone (KENALOG) 0.1 % cream, Apply 1 application topically to the appropriate area as directed 2 (Two) Times a Day., Disp: 45 g, Rfl: 9    vitamin B-12 (CYANOCOBALAMIN) 1000 MCG tablet, Take 1 tablet by mouth Every Other Day., Disp: , Rfl:     vitamin D (ERGOCALCIFEROL) 1.25 MG (80020 UT) capsule capsule, Take 1 capsule by mouth Every 14 (Fourteen) Days., Disp: 6 capsule, Rfl: 3    rosuvastatin (Crestor) 10 MG tablet, Take 1 tablet by mouth Every Night., Disp: 30 tablet, Rfl: 11  WBC   Date Value Ref Range Status   07/07/2023 5.63 3.40 - 10.80 10*3/mm3 Final   10/18/2019 6.1 4.8 - 10.8 10*9/L Final     RBC   Date Value Ref Range Status   07/07/2023 4.78 3.77 - 5.28 10*6/mm3 Final   10/18/2019 4.42 4.20 - 5.40 10*12/L Final     Hemoglobin   Date Value Ref  Range Status   07/07/2023 14.0 12.0 - 15.9 g/dL Final   10/18/2019 13.7 12.0 - 16.0 g/dL Final     Hematocrit   Date Value Ref Range Status   07/07/2023 42.6 34.0 - 46.6 % Final   10/18/2019 40.5 36 - 48 % Final     MCV   Date Value Ref Range Status   07/07/2023 89.1 79.0 - 97.0 fL Final   10/18/2019 91.7 80.0 - 100.0 fL Final     MCH   Date Value Ref Range Status   07/07/2023 29.3 26.6 - 33.0 pg Final   10/18/2019 30.9 26.0 - 34.0 pg Final     MCHC   Date Value Ref Range Status   07/07/2023 32.9 31.5 - 35.7 g/dL Final   10/18/2019 33.8 31 - 36 g/dL Final     RDW   Date Value Ref Range Status   07/07/2023 12.4 12.3 - 15.4 % Final   10/18/2019 13.6 11.5 - 14.5 % Final     RDW-SD   Date Value Ref Range Status   07/07/2023 40.7 37.0 - 54.0 fl Final     MPV   Date Value Ref Range Status   07/07/2023 10.1 6.0 - 12.0 fL Final   10/18/2019 8.4 7.4 - 10.4 fL Final     Platelets   Date Value Ref Range Status   07/07/2023 276 140 - 450 10*3/mm3 Final   10/18/2019 274 150 - 450 10*9/L Final     Neutrophil Rel %   Date Value Ref Range Status   10/18/2019 61.6 35 - 80 % Final     Neutrophil %   Date Value Ref Range Status   07/07/2023 63.0 42.7 - 76.0 % Final     Lymphocyte Rel %   Date Value Ref Range Status   10/18/2019 25.5 18 - 44 % Final     Lymphocyte %   Date Value Ref Range Status   07/07/2023 21.3 19.6 - 45.3 % Final     Monocyte Rel %   Date Value Ref Range Status   10/18/2019 8.1 0 - 10 % Final     Monocyte %   Date Value Ref Range Status   07/07/2023 9.1 5.0 - 12.0 % Final     Eosinophil %   Date Value Ref Range Status   07/07/2023 5.7 0.3 - 6.2 % Final   10/18/2019 4.1 (H) 0 - 3 % Final     Basophil Rel %   Date Value Ref Range Status   10/18/2019 0.7 0 - 1 % Final     Basophil %   Date Value Ref Range Status   07/07/2023 0.7 0.0 - 1.5 % Final     Immature Grans %   Date Value Ref Range Status   07/07/2023 0.2 0.0 - 0.5 % Final     Neutrophils, Absolute   Date Value Ref Range Status   07/07/2023 3.55 1.70 - 7.00  10*3/mm3 Final     Lymphocytes Absolute   Date Value Ref Range Status   10/18/2019 1.5 0 - 4 10*9/L Final     Lymphocytes, Absolute   Date Value Ref Range Status   07/07/2023 1.20 0.70 - 3.10 10*3/mm3 Final     Monocytes Absolute   Date Value Ref Range Status   10/18/2019 0.5 0 - 0 10*9/L Final     Monocytes, Absolute   Date Value Ref Range Status   07/07/2023 0.51 0.10 - 0.90 10*3/mm3 Final     Eosinophil Abs   Date Value Ref Range Status   10/18/2019 0.3 0 - 0 10*9/L Final     Eosinophils, Absolute   Date Value Ref Range Status   07/07/2023 0.32 0.00 - 0.40 10*3/mm3 Final     Basophils Absolute   Date Value Ref Range Status   10/18/2019 0.0 0 - 0 10*9/L Final     Basophils, Absolute   Date Value Ref Range Status   07/07/2023 0.04 0.00 - 0.20 10*3/mm3 Final     Immature Grans, Absolute   Date Value Ref Range Status   07/07/2023 0.01 0.00 - 0.05 10*3/mm3 Final     nRBC   Date Value Ref Range Status   07/07/2023 0.0 0.0 - 0.2 /100 WBC Final       Lab Results   Component Value Date    GLUCOSE 96 07/07/2023    BUN 16 07/07/2023    CREATININE 0.75 07/07/2023    EGFR 81.6 07/07/2023    BCR 21.3 07/07/2023    K 4.4 07/07/2023    CO2 25.0 07/07/2023    CALCIUM 9.7 07/07/2023    ALBUMIN 4.2 07/07/2023    BILITOT 0.6 07/07/2023    AST 19 07/07/2023    ALT 12 07/07/2023         Assessment and Plan:    Obstructive sleep apnea- Established, stable   Reportedly compliant with PAP therapy- patient to take machine to [facility] to get modem set up   Monitor compliance report   Continue PAP as prescribed  Script for PAP supplies  Pertinent labs reviewed   Drowsy driving tips- do not drive if feeling sleepy   Return to clinic in 6 months with compliance report unless change in symptoms in interim period      Educated on PAP management, maintenance, and compliance.           This document has been electronically signed by ZACHARY Higuera on September 1, 2023 10:06 CDT            CC: Anastacia Coppola, ZACHARY          No ref. provider  found

## 2023-09-08 DIAGNOSIS — E89.0 POSTABLATIVE HYPOTHYROIDISM: ICD-10-CM

## 2023-09-08 RX ORDER — LEVOTHYROXINE SODIUM 88 MCG
TABLET ORAL
Qty: 120 TABLET | Refills: 11 | Status: SHIPPED | OUTPATIENT
Start: 2023-09-08 | End: 2023-09-11 | Stop reason: SDUPTHER

## 2023-09-11 ENCOUNTER — TELEPHONE (OUTPATIENT)
Dept: ENDOCRINOLOGY | Facility: CLINIC | Age: 78
End: 2023-09-11
Payer: MEDICARE

## 2023-09-11 DIAGNOSIS — E89.0 POSTABLATIVE HYPOTHYROIDISM: ICD-10-CM

## 2023-09-11 RX ORDER — LEVOTHYROXINE SODIUM 88 MCG
TABLET ORAL
Qty: 90 TABLET | Refills: 3 | Status: SHIPPED | OUTPATIENT
Start: 2023-09-11

## 2023-09-11 NOTE — TELEPHONE ENCOUNTER
Ms. Carla Best called and stated that she has requested a medication refill on Friday to be sent to [place], she did receive a message back but did not understand the message.    Wants to know if Suze can call her back.    Pt call back [phone]

## 2023-09-13 ENCOUNTER — TELEPHONE (OUTPATIENT)
Dept: ENDOCRINOLOGY | Facility: CLINIC | Age: 78
End: 2023-09-13
Payer: MEDICARE

## 2023-09-13 NOTE — TELEPHONE ENCOUNTER
----- Message from Andre Og MD sent at 9/12/2023  9:09 PM CDT -----  Please let her know that her Heart Ultrasound is normal

## 2023-09-19 ENCOUNTER — DOCUMENTATION (OUTPATIENT)
Dept: SLEEP MEDICINE | Facility: HOSPITAL | Age: 78
End: 2023-09-19
Payer: MEDICARE

## 2023-09-19 NOTE — PROGRESS NOTES
PAP Data:    Time frame: 04/1/2023-09/18/2023   Compliance 99.4 %  Average use on days used: 4hrs 54 min  Percent of days with usage greater than or equal to 4 hours: 73%  PAP range : 9-15 cm H2O  Average 90% pressure: 11 cmH2O  Leak: 1 minutes  Average AHI 1.6 events/hr

## (undated) DEVICE — CLEANGUIDE DISPOSABLE MARKED SPRING TIP GUIDEWIRE, 1.86 MM X 210 CM: Brand: CLEANGUIDE

## (undated) DEVICE — BITEBLOCK ENDO W/STRAP 60F A/ LF DISP

## (undated) DEVICE — CANN SMPL SOFTECH BIFLO ETCO2 A/M 7FT

## (undated) DEVICE — SINGLE-USE BIOPSY FORCEPS: Brand: RADIAL JAW 4

## (undated) DEVICE — TRAP,MUCUS SPECIMEN,40CC: Brand: MEDLINE